# Patient Record
Sex: FEMALE | Race: WHITE | NOT HISPANIC OR LATINO | Employment: FULL TIME | URBAN - NONMETROPOLITAN AREA
[De-identification: names, ages, dates, MRNs, and addresses within clinical notes are randomized per-mention and may not be internally consistent; named-entity substitution may affect disease eponyms.]

---
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
## 2021-04-06 ENCOUNTER — APPOINTMENT (OUTPATIENT)
Dept: GENERAL RADIOLOGY | Facility: HOSPITAL | Age: 21
End: 2021-04-06

## 2021-04-06 ENCOUNTER — HOSPITAL ENCOUNTER (EMERGENCY)
Facility: HOSPITAL | Age: 21
Discharge: HOME | End: 2021-04-06
Attending: EMERGENCY MEDICINE | Admitting: EMERGENCY MEDICINE

## 2021-04-06 VITALS
SYSTOLIC BLOOD PRESSURE: 124 MMHG | DIASTOLIC BLOOD PRESSURE: 74 MMHG | BODY MASS INDEX: 25.04 KG/M2 | RESPIRATION RATE: 16 BRPM | HEART RATE: 80 BPM | OXYGEN SATURATION: 100 % | WEIGHT: 155.8 LBS | HEIGHT: 66 IN | TEMPERATURE: 98 F

## 2021-04-06 DIAGNOSIS — S60.042A CONTUSION OF LEFT RING FINGER WITHOUT DAMAGE TO NAIL, INITIAL ENCOUNTER: Primary | ICD-10-CM

## 2021-04-06 PROCEDURE — 73130 X-RAY EXAM OF HAND: CPT

## 2021-04-06 PROCEDURE — 99282 EMERGENCY DEPT VISIT SF MDM: CPT

## 2021-04-06 RX ORDER — ETODOLAC 200 MG/1
200 CAPSULE ORAL EVERY 8 HOURS
Qty: 15 CAPSULE | Refills: 0 | Status: SHIPPED | OUTPATIENT
Start: 2021-04-06 | End: 2022-01-12

## 2021-04-07 NOTE — ED PROVIDER NOTES
Subjective   Chief Complaint: Pain in the proximal phalanx of the left ring finger  History of Present Illness: 20-year-old female comes in for evaluation of acute onset of pain to the proximal phalanx of the left ring finger just prior to arrival.  She states she is noted some ecchymosis and a small erythematous area to the lateral aspect of the proximal phalanx.  No known injury.  Full range of motion.  Onset: Prior to arrival  Timing: Constant  Exacerbating / Alleviating factors: Worse with movement and palpation  Associated symptoms: No injury, fever, decreased range of motion  Character: sharp stabbing    Nurses Notes reviewed and agree, including vitals, allergies, social history and prior medical history.          Review of Systems   Constitutional: Negative.    HENT: Negative.    Eyes: Negative.    Respiratory: Negative.    Cardiovascular: Negative.    Gastrointestinal: Negative.    Genitourinary: Negative.    Musculoskeletal: Negative.         Pain to the proximal phalanx of the left ring finger   Skin: Negative.    Neurological: Negative.    Psychiatric/Behavioral: Negative.        Past Medical History:   Diagnosis Date   • Thyroid activity decreased        No Known Allergies    Past Surgical History:   Procedure Laterality Date   • FOOT/TOE TENDON REPAIR Left 1/30/2020    Procedure: RECONSTRUCTION PT TENDON LEFT;  Surgeon: Uziel Garcia DPM;  Location: [facility];  Service: Podiatry   • NO PAST SURGERIES     • TARSAL TUNNEL RELEASE Left 1/30/2020    Procedure: EXCISION TARSAL BONE LEFT;  Surgeon: Uziel Garcia DPM;  Location: [facility] OR;  Service: Podiatry   • WISDOM TOOTH EXTRACTION         Family History   Problem Relation Age of Onset   • Hypertension Mother    • Diabetes Father    • Heart attack Maternal Grandfather    • Diabetes Maternal Grandfather        Social History     Socioeconomic History   • Marital status: Single     Spouse name: Not on file   • Number of children: Not on  file   • Years of education: Not on file   • Highest education level: Not on file   Tobacco Use   • Smoking status: Never Smoker   • Smokeless tobacco: Never Used   Substance and Sexual Activity   • Alcohol use: No   • Drug use: No   • Sexual activity: Defer           Objective   Physical Exam  Vitals and nursing note reviewed.   Constitutional:       Appearance: Normal appearance.   HENT:      Head: Normocephalic and atraumatic.      Nose: Nose normal.   Eyes:      Extraocular Movements: Extraocular movements intact.   Cardiovascular:      Rate and Rhythm: Normal rate and regular rhythm.      Pulses: Normal pulses.   Pulmonary:      Effort: Pulmonary effort is normal.   Musculoskeletal:         General: Normal range of motion.      Cervical back: Normal range of motion.      Comments: Very small area of erythema approximately 2 mm in diameter to the medial aspect of the proximal phalanx of the left fourth finger.  No signs of abscess or cellulitis.  There is a very slight discoloration that would be consistent with a mild ecchymosis to the proximal phalanx dorsally of the left fourth finger   Skin:     Capillary Refill: Capillary refill takes less than 2 seconds.   Neurological:      Mental Status: She is alert.   Psychiatric:         Mood and Affect: Mood normal.         Behavior: Behavior normal.         Procedures           ED Course      Plain films reveal no acute bony abnormality.  Findings consistent with contusion will prescribe NSAIDs and have her follow-up outpatient                                     MDM    Final diagnoses:   Contusion of left ring finger without damage to nail, initial encounter       ED Disposition  ED Disposition     ED Disposition Condition Comment    Discharge Stable           Nadya Hidalgo MD  [address] [place] [id number]  [phone]               Medication List      New Prescriptions    etodolac 200 MG capsule  Commonly known as: LODINE  Take 1 capsule by mouth Every 8  (Eight) Hours.           Where to Get Your Medications      These medications were sent to [facility] - [facility] - [address] - [phone] PH - [phone] FX  [address], [place] [address]    Phone: [phone]   · etodolac 200 MG capsule          Bunny Schilling PA-C  04/06/21 3764

## 2021-09-07 ENCOUNTER — HOSPITAL ENCOUNTER (EMERGENCY)
Facility: HOSPITAL | Age: 21
Discharge: HOME | End: 2021-09-07
Attending: EMERGENCY MEDICINE
Payer: MEDICAID

## 2021-09-07 VITALS
BODY MASS INDEX: 22.5 KG/M2 | TEMPERATURE: 98.6 F | RESPIRATION RATE: 18 BRPM | SYSTOLIC BLOOD PRESSURE: 130 MMHG | WEIGHT: 140 LBS | HEIGHT: 66 IN | HEART RATE: 100 BPM | OXYGEN SATURATION: 100 % | DIASTOLIC BLOOD PRESSURE: 76 MMHG

## 2021-09-07 DIAGNOSIS — U07.1 LAB TEST POSITIVE FOR DETECTION OF COVID-19 VIRUS: Primary | ICD-10-CM

## 2021-09-07 LAB — SARS-COV-2, NAAT: DETECTED

## 2021-09-07 PROCEDURE — 99282 EMERGENCY DEPT VISIT SF MDM: CPT

## 2021-09-07 PROCEDURE — 87635 SARS-COV-2 COVID-19 AMP PRB: CPT

## 2021-09-07 RX ORDER — ALBUTEROL SULFATE 90 UG/1
2 AEROSOL, METERED RESPIRATORY (INHALATION) 4 TIMES DAILY PRN
Qty: 8 G | Refills: 0 | Status: SHIPPED | OUTPATIENT
Start: 2021-09-07

## 2021-09-07 RX ORDER — DEXAMETHASONE 6 MG/1
6 TABLET ORAL
Qty: 5 TABLET | Refills: 0 | Status: SHIPPED | OUTPATIENT
Start: 2021-09-07 | End: 2021-09-12

## 2021-09-07 NOTE — ED NOTES
Pt arrived to ED with complaints of concern for COVID, due to her entire household is positive, states she is here to be tested, SOA, fatigue started yesterday.         Candida Lorenz RN  09/07/21 1501

## 2021-09-07 NOTE — ED PROVIDER NOTES
History     Socioeconomic History    Marital status: None     Spouse name: None    Number of children: None    Years of education: None    Highest education level: None   Occupational History    None   Tobacco Use    Smoking status: None   Substance and Sexual Activity    Alcohol use: None    Drug use: None    Sexual activity: None   Other Topics Concern    None   Social History Narrative    None     Social Determinants of Health     Financial Resource Strain:     Difficulty of Paying Living Expenses:    Food Insecurity:     Worried About Running Out of Food in the Last Year:     Ran Out of Food in the Last Year:    Transportation Needs:     Lack of Transportation (Medical):  Lack of Transportation (Non-Medical):    Physical Activity:     Days of Exercise per Week:     Minutes of Exercise per Session:    Stress:     Feeling of Stress :    Social Connections:     Frequency of Communication with Friends and Family:     Frequency of Social Gatherings with Friends and Family:     Attends Rastafarian Services:     Active Member of Clubs or Organizations:     Attends Club or Organization Meetings:     Marital Status:    Intimate Partner Violence:     Fear of Current or Ex-Partner:     Emotionally Abused:     Physically Abused:     Sexually Abused:          PHYSICAL EXAM    (up to 7 forlevel 4, 8 or more for level 5)     ED Triage Vitals [09/07/21 1641]   BP Temp Temp Source Pulse Resp SpO2 Height Weight   130/76 98.6 °F (37 °C) Oral 100 18 100 % 5' 6\" (1.676 m) 140 lb (63.5 kg)       Physical Exam  General :Patient is awake, alert, oriented, in no acute distress, nontoxic appearing  HEENT: Pupils are equally round and reactive to light, EOMI. Cardiac: Heart regular rate, rhythm, no murmurs, rubs, or gallops  Lungs: Lungs are clear to auscultation, there is no wheezing, rhonchi, or rales. Abdomen:Abdomen is soft, nontender, nondistended.    Musculoskeletal: Ambulatory  Back: No midline or bony tenderness. Dermatology: Skin is warm and dry  Psych: Mentation is grossly normal, cognition is grossly normal. Affect is appropriate. DIAGNOSTIC RESULTS       RADIOLOGY:   Non-plain film images such as CT, Ultrasoundand MRI are read by the radiologist. Plain radiographic images are visualized and preliminarily interpreted by the emergency physician with the below findings:      [] Radiologist's Report Reviewed:  No orders to display         ED BEDSIDE ULTRASOUND:   Performed by ED Physician - none    LABS:  Labs Reviewed   COVID-19, RAPID - Abnormal; Notable for the following components:       Result Value    SARS-CoV-2, NAAT DETECTED (*)     All other components within normal limits    Narrative:     Enrrique Pooja tel. [phone],  COVID CALLED TO Marc Arias RN. Jane Reeves, 09/07/2021 17:01, by Don Vera  Performed at:  [address] [place] [address] Laboratory  [address] [place] [address], [place] [address]   Phone [phone]       I have reviewed and interpreted all of the currently available lab resultsfrom this visit (if applicable):  Results for orders placed or performed during the hospital encounter of 09/07/21   COVID-19, Rapid    Specimen: Nasopharyngeal Swab   Result Value Ref Range    SARS-CoV-2, NAAT DETECTED (AA) Not Detected        All other labs were within normal range or not returned as of this dictation. EMERGENCY DEPARTMENT COURSE and DIFFERENTIAL DIAGNOSIS/MDM:   Vitals:    Vitals:    09/07/21 1641   BP: 130/76   Pulse: 100   Resp: 18   Temp: 98.6 °F (37 °C)   TempSrc: Oral   SpO2: 100%   Weight: 140 lb (63.5 kg)   Height: 5' 6\" (1.676 m)           The patient will follow-up with their PCP in 1-2 days for reevaluation. If the patient or family members have any further concerns or any worsening symptoms they will return to the ED for reevaluation. Patient has been informed that she has to be quarantined for 10 to 14days. CONSULTS:  None    PROCEDURES:  Procedures    CRITICAL CARE TIME    Total Critical Care time was 0 minutes, excluding separately reportable procedures. There was a high probability of clinically significant/life threatening deterioration in the patient's condition which required my urgent intervention. FINAL IMPRESSION      1. Lab test positive for detection of COVID-19 virus          DISPOSITION/PLAN   DISPOSITION Decision To Discharge 09/07/2021 05:02:01 PM      PATIENT REFERRED TO:  [facility] Emergency Department  [facility] [address] [facility]  [phone]  In 3 days  If symptoms worsen    [facility] Emergency Department  [facility] [address] [facility]  [phone]    You must quarantine for 10 to 14 days. DISCHARGE MEDICATIONS:  New Prescriptions    ALBUTEROL SULFATE HFA (VENTOLIN HFA) 108 (90 BASE) MCG/ACT INHALER    Inhale 2 puffs into the lungs 4 times daily as needed for Wheezing or Shortness of Breath    DEXAMETHASONE (DECADRON) 6 MG TABLET    Take 1 tablet by mouth daily (with breakfast) for 5 days       Comment: Please note this report has been produced using speech recognition software and may contain errors related tothat system including errors in grammar, punctuation, and spelling, as well as words and phrases that may be inappropriate. If there are any questions or concerns please feel free to contact the dictating provider forclarification.     Trisha Weems DO  Attending Emergency Physician                  Solange Sheehan DO  09/07/21 0651

## 2022-01-04 ENCOUNTER — TELEPHONE (OUTPATIENT)
Dept: OBSTETRICS AND GYNECOLOGY | Facility: CLINIC | Age: 22
End: 2022-01-04

## 2022-01-04 RX ORDER — PROMETHAZINE HYDROCHLORIDE 12.5 MG/1
12.5 TABLET ORAL EVERY 6 HOURS PRN
Qty: 30 TABLET | Refills: 0 | Status: ON HOLD | OUTPATIENT
Start: 2022-01-04 | End: 2022-04-22 | Stop reason: SDUPTHER

## 2022-01-04 NOTE — TELEPHONE ENCOUNTER
----- Message from Chantal Lynn MA sent at 1/4/2022  8:55 AM EST -----  Patient has NOB fletcher on 1/12/22 and is requesting a RX for nausea.         Chemo Castillo    [facility]    Thank You      Patient's Phone number [phone]

## 2022-01-11 ENCOUNTER — HOSPITAL ENCOUNTER (EMERGENCY)
Facility: HOSPITAL | Age: 22
Discharge: HOME | End: 2022-01-11
Attending: FAMILY MEDICINE | Admitting: FAMILY MEDICINE

## 2022-01-11 VITALS
OXYGEN SATURATION: 100 % | TEMPERATURE: 97.7 F | HEART RATE: 79 BPM | RESPIRATION RATE: 16 BRPM | DIASTOLIC BLOOD PRESSURE: 68 MMHG | WEIGHT: 146.2 LBS | HEIGHT: 66 IN | SYSTOLIC BLOOD PRESSURE: 100 MMHG | BODY MASS INDEX: 23.5 KG/M2

## 2022-01-11 DIAGNOSIS — N30.00 ACUTE CYSTITIS WITHOUT HEMATURIA: ICD-10-CM

## 2022-01-11 DIAGNOSIS — O21.9 VOMITING DURING PREGNANCY: Primary | ICD-10-CM

## 2022-01-11 LAB
ALBUMIN SERPL-MCNC: 4.2 G/DL (ref 3.5–5.2)
ALBUMIN/GLOB SERPL: 1.4 G/DL
ALP SERPL-CCNC: 54 U/L (ref 39–117)
ALT SERPL W P-5'-P-CCNC: 14 U/L (ref 1–33)
ANION GAP SERPL CALCULATED.3IONS-SCNC: 10.8 MMOL/L (ref 5–15)
AST SERPL-CCNC: 19 U/L (ref 1–32)
BACTERIA UR QL AUTO: ABNORMAL /HPF
BASOPHILS # BLD AUTO: 0.03 10*3/MM3 (ref 0–0.2)
BASOPHILS NFR BLD AUTO: 0.3 % (ref 0–1.5)
BILIRUB SERPL-MCNC: 0.4 MG/DL (ref 0–1.2)
BILIRUB UR QL STRIP: NEGATIVE
BUN SERPL-MCNC: 8 MG/DL (ref 6–20)
BUN/CREAT SERPL: 13.8 (ref 7–25)
CALCIUM SPEC-SCNC: 9.4 MG/DL (ref 8.6–10.5)
CHLORIDE SERPL-SCNC: 101 MMOL/L (ref 98–107)
CLARITY UR: CLEAR
CO2 SERPL-SCNC: 22.2 MMOL/L (ref 22–29)
COLOR UR: YELLOW
CREAT SERPL-MCNC: 0.58 MG/DL (ref 0.57–1)
DEPRECATED RDW RBC AUTO: 37.4 FL (ref 37–54)
EOSINOPHIL # BLD AUTO: 0.05 10*3/MM3 (ref 0–0.4)
EOSINOPHIL NFR BLD AUTO: 0.5 % (ref 0.3–6.2)
ERYTHROCYTE [DISTWIDTH] IN BLOOD BY AUTOMATED COUNT: 11.8 % (ref 12.3–15.4)
GFR SERPL CREATININE-BSD FRML MDRD: 133 ML/MIN/1.73
GLOBULIN UR ELPH-MCNC: 3.1 GM/DL
GLUCOSE SERPL-MCNC: 86 MG/DL (ref 65–99)
GLUCOSE UR STRIP-MCNC: NEGATIVE MG/DL
HCT VFR BLD AUTO: 35.6 % (ref 34–46.6)
HGB BLD-MCNC: 12.4 G/DL (ref 12–15.9)
HGB UR QL STRIP.AUTO: NEGATIVE
HYALINE CASTS UR QL AUTO: ABNORMAL /LPF
IMM GRANULOCYTES # BLD AUTO: 0.02 10*3/MM3 (ref 0–0.05)
IMM GRANULOCYTES NFR BLD AUTO: 0.2 % (ref 0–0.5)
KETONES UR QL STRIP: ABNORMAL
LEUKOCYTE ESTERASE UR QL STRIP.AUTO: ABNORMAL
LYMPHOCYTES # BLD AUTO: 1.73 10*3/MM3 (ref 0.7–3.1)
LYMPHOCYTES NFR BLD AUTO: 17.6 % (ref 19.6–45.3)
MCH RBC QN AUTO: 30.2 PG (ref 26.6–33)
MCHC RBC AUTO-ENTMCNC: 34.8 G/DL (ref 31.5–35.7)
MCV RBC AUTO: 86.8 FL (ref 79–97)
MONOCYTES # BLD AUTO: 0.73 10*3/MM3 (ref 0.1–0.9)
MONOCYTES NFR BLD AUTO: 7.4 % (ref 5–12)
NEUTROPHILS NFR BLD AUTO: 7.27 10*3/MM3 (ref 1.7–7)
NEUTROPHILS NFR BLD AUTO: 74 % (ref 42.7–76)
NITRITE UR QL STRIP: NEGATIVE
NRBC BLD AUTO-RTO: 0 /100 WBC (ref 0–0.2)
PH UR STRIP.AUTO: 5.5 [PH] (ref 5–8)
PLATELET # BLD AUTO: 252 10*3/MM3 (ref 140–450)
PMV BLD AUTO: 10 FL (ref 6–12)
POTASSIUM SERPL-SCNC: 4 MMOL/L (ref 3.5–5.2)
PROT SERPL-MCNC: 7.3 G/DL (ref 6–8.5)
PROT UR QL STRIP: NEGATIVE
RBC # BLD AUTO: 4.1 10*6/MM3 (ref 3.77–5.28)
RBC # UR STRIP: ABNORMAL /HPF
REF LAB TEST METHOD: ABNORMAL
SODIUM SERPL-SCNC: 134 MMOL/L (ref 136–145)
SP GR UR STRIP: 1.02 (ref 1–1.03)
SQUAMOUS #/AREA URNS HPF: ABNORMAL /HPF
UROBILINOGEN UR QL STRIP: ABNORMAL
WBC # UR STRIP: ABNORMAL /HPF
WBC NRBC COR # BLD: 9.83 10*3/MM3 (ref 3.4–10.8)

## 2022-01-11 PROCEDURE — 80053 COMPREHEN METABOLIC PANEL: CPT | Performed by: FAMILY MEDICINE

## 2022-01-11 PROCEDURE — 85025 COMPLETE CBC W/AUTO DIFF WBC: CPT | Performed by: FAMILY MEDICINE

## 2022-01-11 PROCEDURE — 96374 THER/PROPH/DIAG INJ IV PUSH: CPT

## 2022-01-11 PROCEDURE — 25010000002 METOCLOPRAMIDE PER 10 MG: Performed by: FAMILY MEDICINE

## 2022-01-11 PROCEDURE — 87186 SC STD MICRODIL/AGAR DIL: CPT | Performed by: FAMILY MEDICINE

## 2022-01-11 PROCEDURE — 81001 URINALYSIS AUTO W/SCOPE: CPT | Performed by: FAMILY MEDICINE

## 2022-01-11 PROCEDURE — 87086 URINE CULTURE/COLONY COUNT: CPT | Performed by: FAMILY MEDICINE

## 2022-01-11 PROCEDURE — 99283 EMERGENCY DEPT VISIT LOW MDM: CPT

## 2022-01-11 PROCEDURE — 87077 CULTURE AEROBIC IDENTIFY: CPT | Performed by: FAMILY MEDICINE

## 2022-01-11 RX ORDER — PRENATAL VIT NO.126/IRON/FOLIC 28MG-0.8MG
TABLET ORAL DAILY
COMMUNITY
End: 2022-09-08

## 2022-01-11 RX ORDER — METOCLOPRAMIDE HYDROCHLORIDE 5 MG/ML
10 INJECTION INTRAMUSCULAR; INTRAVENOUS ONCE
Status: COMPLETED | OUTPATIENT
Start: 2022-01-11 | End: 2022-01-11

## 2022-01-11 RX ORDER — CEPHALEXIN 500 MG/1
500 CAPSULE ORAL EVERY 6 HOURS
Qty: 20 CAPSULE | Refills: 0 | Status: SHIPPED | OUTPATIENT
Start: 2022-01-11 | End: 2022-01-12

## 2022-01-11 RX ORDER — PROMETHAZINE HYDROCHLORIDE 25 MG/1
25 TABLET ORAL EVERY 6 HOURS PRN
Qty: 10 TABLET | Refills: 0 | Status: SHIPPED | OUTPATIENT
Start: 2022-01-11 | End: 2022-04-04

## 2022-01-11 RX ADMIN — METOCLOPRAMIDE 10 MG: 5 INJECTION, SOLUTION INTRAMUSCULAR; INTRAVENOUS at 02:23

## 2022-01-11 RX ADMIN — SODIUM CHLORIDE 1000 ML: 9 INJECTION, SOLUTION INTRAVENOUS at 02:22

## 2022-01-11 NOTE — ED PROVIDER NOTES
Subjective   21-year-old female presents emergency department at 11 weeks IUP.  Patient reports that she went to work today felt fine and then since she has been home she is having uncontrollable vomiting.  Patient reports she has vomited at least 6 times she did take Phenergan prior to arrival but is still vomited 1 time.      History provided by:  Patient  Pregnancy Problem  The current episode started today. The problem has been unchanged. The problem occurs continuously. Episode is moderate. Gestational age is 11 weeks. Patient reports no abdominal pain. Primary symptoms: vomiting. Prenatal care has been regular.   Associated symptoms include nausea and vomiting.   Associated symptoms include no dysuria and no fever.       Review of Systems   Constitutional: Negative.  Negative for fever.   HENT: Negative.    Respiratory: Negative.    Cardiovascular: Negative.  Negative for chest pain.   Gastrointestinal: Positive for nausea and vomiting. Negative for abdominal pain.   Endocrine: Negative.    Genitourinary: Negative.  Negative for dysuria.   Skin: Negative.    Neurological: Negative.    Psychiatric/Behavioral: Negative.    All other systems reviewed and are negative.      Past Medical History:   Diagnosis Date   • Thyroid activity decreased        No Known Allergies    Past Surgical History:   Procedure Laterality Date   • FOOT/TOE TENDON REPAIR Left 1/30/2020    Procedure: RECONSTRUCTION PT TENDON LEFT;  Surgeon: Uziel Garcia DPM;  Location: [facility] OR;  Service: Podiatry   • NO PAST SURGERIES     • TARSAL TUNNEL RELEASE Left 1/30/2020    Procedure: EXCISION TARSAL BONE LEFT;  Surgeon: Uziel Garcia DPM;  Location: [facility] OR;  Service: Podiatry   • WISDOM TOOTH EXTRACTION         Family History   Problem Relation Age of Onset   • Hypertension Mother    • Diabetes Father    • Heart attack Maternal Grandfather    • Diabetes Maternal Grandfather        Social History     Socioeconomic History   •  Marital status: Single   Tobacco Use   • Smoking status: Never Smoker   • Smokeless tobacco: Never Used   Substance and Sexual Activity   • Alcohol use: No   • Drug use: No   • Sexual activity: Defer           Objective   Physical Exam  Vitals and nursing note reviewed.   Constitutional:       Appearance: Normal appearance.   HENT:      Head: Normocephalic and atraumatic.      Right Ear: Tympanic membrane normal.      Left Ear: Tympanic membrane normal.      Nose: Nose normal.      Mouth/Throat:      Mouth: Mucous membranes are dry.   Eyes:      Extraocular Movements: Extraocular movements intact.      Pupils: Pupils are equal, round, and reactive to light.   Cardiovascular:      Rate and Rhythm: Normal rate and regular rhythm.   Pulmonary:      Effort: Pulmonary effort is normal.   Abdominal:      General: Abdomen is flat. Bowel sounds are normal.      Palpations: Abdomen is soft.   Musculoskeletal:         General: Normal range of motion.      Cervical back: Normal range of motion.   Skin:     General: Skin is warm.      Capillary Refill: Capillary refill takes less than 2 seconds.   Neurological:      General: No focal deficit present.      Mental Status: She is alert and oriented to person, place, and time.   Psychiatric:         Mood and Affect: Mood normal.         Behavior: Behavior normal.         Thought Content: Thought content normal.         Judgment: Judgment normal.         Procedures           ED Course  ED Course as of 01/11/22 0515   Tue Jan 11, 2022   0515 Patient's ED stay has been unremarkable.  Patient has had no vomiting since arrival.  Patient is also tolerated p.o. intake.  Will discharge home with outpatient follow-up. [MH]      ED Course User Index  [MH] Concepcion Mtz DO MDM  Number of Diagnoses or Management Options  Acute cystitis without hematuria: new and requires workup  Vomiting during pregnancy: new and requires workup      Amount and/or Complexity of Data Reviewed  Clinical lab tests: reviewed and ordered  Tests in the radiology section of CPT®: ordered and reviewed  Tests in the medicine section of CPT®: reviewed and ordered  Independent visualization of images, tracings, or specimens: yes    Risk of Complications, Morbidity, and/or Mortality  Presenting problems: high  Diagnostic procedures: high  Management options: high    Patient Progress  Patient progress: improved      Final diagnoses:   Vomiting during pregnancy   Acute cystitis without hematuria       ED Disposition  ED Disposition     ED Disposition Condition Comment    Discharge Stable           Nadya Hidalgo MD  [address] [place], [place] [id number]  [phone]    Schedule an appointment as soon as possible for a visit            Medication List      New Prescriptions    cephalexin 500 MG capsule  Commonly known as: KEFLEX  Take 1 capsule by mouth Every 6 (Six) Hours for 5 days.        Changed    * promethazine 12.5 MG tablet  Commonly known as: PHENERGAN  Take 1 tablet by mouth Every 6 (Six) Hours As Needed for Nausea or Vomiting.  What changed: Another medication with the same name was added. Make sure you understand how and when to take each.     * promethazine 25 MG tablet  Commonly known as: PHENERGAN  Take 1 tablet by mouth Every 6 (Six) Hours As Needed for Nausea or Vomiting.  What changed: You were already taking a medication with the same name, and this prescription was added. Make sure you understand how and when to take each.         * This list has 2 medication(s) that are the same as other medications prescribed for you. Read the directions carefully, and ask your doctor or other care provider to review them with you.               Where to Get Your Medications      These medications were sent to [id number] [place], [place] - [address] [employer] [facility] [employer] [facility] - [phone] [facility] [phone] FX  [address]  [facility] [address]    Phone: [phone]   · cephalexin 500 MG capsule  · promethazine 25 MG tablet          Concepcion Mtz DO  01/11/22 0523

## 2022-01-11 NOTE — ED NOTES
Called 2nd floor requesting a RN come down to assess for fetal heart tones.      Debbie Perez, RN  01/11/22 0238

## 2022-01-12 ENCOUNTER — INITIAL PRENATAL (OUTPATIENT)
Dept: OBSTETRICS AND GYNECOLOGY | Facility: CLINIC | Age: 22
End: 2022-01-12

## 2022-01-12 VITALS — BODY MASS INDEX: 23.08 KG/M2 | WEIGHT: 143 LBS | SYSTOLIC BLOOD PRESSURE: 100 MMHG | DIASTOLIC BLOOD PRESSURE: 62 MMHG

## 2022-01-12 DIAGNOSIS — O21.9 NAUSEA/VOMITING IN PREGNANCY: ICD-10-CM

## 2022-01-12 DIAGNOSIS — Z34.91 PRENATAL CARE IN FIRST TRIMESTER: Primary | ICD-10-CM

## 2022-01-12 PROCEDURE — 99204 OFFICE O/P NEW MOD 45 MIN: CPT | Performed by: OBSTETRICS & GYNECOLOGY

## 2022-01-12 RX ORDER — FOLIC ACID 0.4 MG
TABLET ORAL
COMMUNITY
Start: 2022-01-04 | End: 2022-04-04

## 2022-01-12 RX ORDER — DIPHENHYDRAMINE HYDROCHLORIDE 25 MG/1
25 CAPSULE ORAL NIGHTLY
Qty: 30 TABLET | Refills: 5 | Status: SHIPPED | OUTPATIENT
Start: 2022-01-12 | End: 2022-07-30 | Stop reason: HOSPADM

## 2022-01-12 NOTE — PROGRESS NOTES
New Pregnancy Visit    Subjective   Chief Complaint   Patient presents with   • Initial Prenatal Visit     LMP 10/16/21, TVS done today 11w5d. No complaints.       Romy Langley is a 21 y.o. year old .  Patient's last menstrual period was 10/16/2021.  She presents to initiate prenatal care with our group today.     First pregnancy.  Nausea and emesis, Phenergan is sedating.    Social History    Tobacco Use      Smoking status: Never Smoker      Smokeless tobacco: Never Used      Current Outpatient Medications on File Prior to Visit   Medication Sig Dispense Refill   • GNP Folic Acid 400 MCG tablet      • prenatal vitamin (prenatal, CLASSIC, vitamin) tablet Take  by mouth Daily.     • promethazine (PHENERGAN) 12.5 MG tablet Take 1 tablet by mouth Every 6 (Six) Hours As Needed for Nausea or Vomiting. 30 tablet 0   • promethazine (PHENERGAN) 25 MG tablet Take 1 tablet by mouth Every 6 (Six) Hours As Needed for Nausea or Vomiting. 10 tablet 0   • [DISCONTINUED] cephalexin (KEFLEX) 500 MG capsule Take 1 capsule by mouth Every 6 (Six) Hours for 5 days. 20 capsule 0   • [DISCONTINUED] etodolac (LODINE) 200 MG capsule Take 1 capsule by mouth Every 8 (Eight) Hours. 15 capsule 0   • [DISCONTINUED] polyethylene glycol (MIRALAX) powder   3     No current facility-administered medications on file prior to visit.          Objective   /62   Wt 64.9 kg (143 lb)   LMP 10/16/2021   BMI 23.08 kg/m²   Physical Exam:  Normal, gestational age-appropriate exam today        Medical Decision Making:    Lab Review:   No data reviewed    Note Review:  None    Imaging Review:  Pelvic ultrasound report   IUP at 11+3 weeks appropriate fetal cardiac activity.  DEQUAN is 2022 based on 8 week ultrasound.  Both ovaries are normal.  Normal cervix.  Assessment   1. IUP at 11+3 weeks  2. Supervision of low risk pregnancy   3. Nausea and emesis     Plan    1. The problem list for pregnancy was initiated  today  2. Tests/Orders/Rx for today:  Orders Placed This Encounter   Procedures   • NuSwab VG+ - Swab, Cervix     Order Specific Question:   Release to patient     Answer:   Immediate   • OB Panel With HIV     Order Specific Question:   Release to patient     Answer:   Immediate       Medication Management: B6/Unisom    3. Testing for GC / Chlamydia / trichomonas was done today  4. Genetic testing reviewed: she will consider the information and make a decision at a later date.  5. Information reviewed: exercise in pregnancy, nutrition in pregnancy, weight gain in pregnancy, work and travel restrictions during pregnancy, list of OTC medications acceptable in pregnancy and call coverage groups    Follow up: 4 week(s)    Parvez Galindo MD  Obstetrics and Gynecology  [facility]

## 2022-01-13 LAB — BACTERIA SPEC AEROBE CULT: ABNORMAL

## 2022-01-14 LAB
A VAGINAE DNA VAG QL NAA+PROBE: ABNORMAL SCORE
ABO GROUP BLD: ABNORMAL
BASOPHILS # BLD AUTO: 0 X10E3/UL (ref 0–0.2)
BASOPHILS NFR BLD AUTO: 0 %
BLD GP AB SCN SERPL QL: NEGATIVE
BVAB2 DNA VAG QL NAA+PROBE: ABNORMAL SCORE
C ALBICANS DNA VAG QL NAA+PROBE: POSITIVE
C GLABRATA DNA VAG QL NAA+PROBE: NEGATIVE
C TRACH DNA VAG QL NAA+PROBE: NEGATIVE
EOSINOPHIL # BLD AUTO: 0.1 X10E3/UL (ref 0–0.4)
EOSINOPHIL NFR BLD AUTO: 1 %
ERYTHROCYTE [DISTWIDTH] IN BLOOD BY AUTOMATED COUNT: 12.2 % (ref 11.7–15.4)
HBV SURFACE AG SERPL QL IA: NEGATIVE
HCT VFR BLD AUTO: 38.3 % (ref 34–46.6)
HCV AB S/CO SERPL IA: 0.2 S/CO RATIO (ref 0–0.9)
HGB BLD-MCNC: 12.6 G/DL (ref 11.1–15.9)
HIV 1+2 AB+HIV1 P24 AG SERPL QL IA: NON REACTIVE
IMM GRANULOCYTES # BLD AUTO: 0 X10E3/UL (ref 0–0.1)
IMM GRANULOCYTES NFR BLD AUTO: 0 %
LYMPHOCYTES # BLD AUTO: 1.8 X10E3/UL (ref 0.7–3.1)
LYMPHOCYTES NFR BLD AUTO: 18 %
MCH RBC QN AUTO: 29.2 PG (ref 26.6–33)
MCHC RBC AUTO-ENTMCNC: 32.9 G/DL (ref 31.5–35.7)
MCV RBC AUTO: 89 FL (ref 79–97)
MEGA1 DNA VAG QL NAA+PROBE: ABNORMAL SCORE
MONOCYTES # BLD AUTO: 0.6 X10E3/UL (ref 0.1–0.9)
MONOCYTES NFR BLD AUTO: 6 %
N GONORRHOEA DNA VAG QL NAA+PROBE: NEGATIVE
NEUTROPHILS # BLD AUTO: 7.4 X10E3/UL (ref 1.4–7)
NEUTROPHILS NFR BLD AUTO: 75 %
PLATELET # BLD AUTO: 291 X10E3/UL (ref 150–450)
RBC # BLD AUTO: 4.32 X10E6/UL (ref 3.77–5.28)
RH BLD: POSITIVE
RPR SER QL: NON REACTIVE
RUBV IGG SERPL IA-ACNC: 1.66 INDEX
T VAGINALIS DNA VAG QL NAA+PROBE: NEGATIVE
WBC # BLD AUTO: 9.9 X10E3/UL (ref 3.4–10.8)

## 2022-01-18 ENCOUNTER — LAB (OUTPATIENT)
Dept: LAB | Facility: HOSPITAL | Age: 22
End: 2022-01-18

## 2022-01-18 DIAGNOSIS — B37.31 VULVOVAGINAL CANDIDIASIS: Primary | ICD-10-CM

## 2022-01-18 DIAGNOSIS — Z03.818 ENCOUNTER FOR PATIENT CONCERN ABOUT EXPOSURE TO INFECTIOUS ORGANISM: Primary | ICD-10-CM

## 2022-01-18 PROCEDURE — U0004 COV-19 TEST NON-CDC HGH THRU: HCPCS

## 2022-01-19 LAB — SARS-COV-2 RNA NOSE QL NAA+PROBE: DETECTED

## 2022-02-09 ENCOUNTER — ROUTINE PRENATAL (OUTPATIENT)
Dept: OBSTETRICS AND GYNECOLOGY | Facility: CLINIC | Age: 22
End: 2022-02-09

## 2022-02-09 VITALS — BODY MASS INDEX: 23.24 KG/M2 | DIASTOLIC BLOOD PRESSURE: 60 MMHG | SYSTOLIC BLOOD PRESSURE: 110 MMHG | WEIGHT: 144 LBS

## 2022-02-09 DIAGNOSIS — O21.9 NAUSEA/VOMITING IN PREGNANCY: ICD-10-CM

## 2022-02-09 DIAGNOSIS — R35.0 URINARY FREQUENCY: ICD-10-CM

## 2022-02-09 DIAGNOSIS — Z34.02 ENCOUNTER FOR SUPERVISION OF NORMAL FIRST PREGNANCY IN SECOND TRIMESTER: Primary | ICD-10-CM

## 2022-02-09 DIAGNOSIS — U07.1 COVID-19 VIRUS DETECTED: ICD-10-CM

## 2022-02-09 PROCEDURE — 99214 OFFICE O/P EST MOD 30 MIN: CPT | Performed by: OBSTETRICS & GYNECOLOGY

## 2022-02-09 RX ORDER — ASPIRIN 81 MG/1
81 TABLET ORAL DAILY
Qty: 30 TABLET | Refills: 6 | Status: SHIPPED | OUTPATIENT
Start: 2022-02-09 | End: 2022-05-13 | Stop reason: HOSPADM

## 2022-02-09 RX ORDER — FAMOTIDINE 20 MG/1
20 TABLET, FILM COATED ORAL 2 TIMES DAILY
Qty: 60 TABLET | Refills: 5 | Status: SHIPPED | OUTPATIENT
Start: 2022-02-09 | End: 2022-08-04

## 2022-02-10 NOTE — PROGRESS NOTES
Chief Complaint  Routine Prenatal Visit (No complaints. )    History of Present Illness:  Romy is a  currently at 15w4d who presents today with no complaints other than continued nausea vomiting.  Patient has been using her Unisom and B6.  Patient reports she has continued symptoms.  Patient was diagnosed with Covid in January.  Patient is not on a baby aspirin.  Patient did have labs last visit as noted.  These findings have been reviewed with the patient.  Patient declines any genetic screening today.  Patient did complete her antibiotics.  Patient denies any dysuria today.  She denies any flank pain, fever, or chills.  The patient does have urinary frequency.    Exam:  Vitals:  See prenatal flowsheet as noted and reviewed  General: Alert, cooperative, and does not appear in any distress  Abdomen:   See prenatal flowsheet as noted and reviewed    Uterus gravid, non-tender; no palpable masses    No guarding or rebound tenderness  Pelvic:  See prenatal flowsheet as noted and reviewed  Ext:  See prenatal flowsheet as noted and reviewed    Moves extremities well, no cyanosis and no redness  Urine:  See prenatal flowsheet as noted and reviewed    Data Review:  The following data was reviewed by: Suni Oquendo MD on 2022:  Prenatal Labs:  Lab Results   Component Value Date    HGB 12.6 2022    RUBELLAABIGG 1.66 2022    HEPBSAG Negative 2022    ABO O 2022    RH Positive 2022    ABSCRN Negative 2022    UBE4TMZ1 Non Reactive 2022    HEPCVIRUSABY 0.2 2022    URINECX >100,000 CFU/mL Escherichia coli (A) 2022       Lab on 2022   Component Date Value   • SARS-CoV-2 PAUL 2022 Detected*   Initial Prenatal on 2022   Component Date Value   • Atopobium Vaginae 2022 Moderate - 1    • BVAB 2 2022 Low - 0    • Megasphaera 1 2022 Low - 0    • Bree Albicans, PAUL 2022 Positive*   • Bree Glabrata, PAUL 2022 Negative    •  Trichomonas vaginosis 01/12/2022 Negative    • Chlamydia trachomatis, N* 01/12/2022 Negative    • Neisseria gonorrhoeae, N* 01/12/2022 Negative    • Hepatitis B Surface Ag 01/12/2022 Negative    • Hep C Virus Ab 01/12/2022 0.2    • RPR 01/12/2022 Non Reactive    • Rubella Antibodies, IgG 01/12/2022 1.66    • ABO Type 01/12/2022 O    • Rh Factor 01/12/2022 Positive    • Antibody Screen 01/12/2022 Negative    • HIV Screen 4th Gen w/RFX* 01/12/2022 Non Reactive    • WBC 01/12/2022 9.9    • RBC 01/12/2022 4.32    • Hemoglobin 01/12/2022 12.6    • Hematocrit 01/12/2022 38.3    • MCV 01/12/2022 89    • MCH 01/12/2022 29.2    • MCHC 01/12/2022 32.9    • RDW 01/12/2022 12.2    • Platelets 01/12/2022 291    • Neutrophil Rel % 01/12/2022 75    • Lymphocyte Rel % 01/12/2022 18    • Monocyte Rel % 01/12/2022 6    • Eosinophil Rel % 01/12/2022 1    • Basophil Rel % 01/12/2022 0    • Neutrophils Absolute 01/12/2022 7.4*   • Lymphocytes Absolute 01/12/2022 1.8    • Monocytes Absolute 01/12/2022 0.6    • Eosinophils Absolute 01/12/2022 0.1    • Basophils Absolute 01/12/2022 0.0    • Immature Granulocyte Rel* 01/12/2022 0    • Immature Grans Absolute 01/12/2022 0.0    Admission on 01/11/2022, Discharged on 01/11/2022   Component Date Value   • Glucose 01/11/2022 86    • BUN 01/11/2022 8    • Creatinine 01/11/2022 0.58    • Sodium 01/11/2022 134*   • Potassium 01/11/2022 4.0    • Chloride 01/11/2022 101    • CO2 01/11/2022 22.2    • Calcium 01/11/2022 9.4    • Total Protein 01/11/2022 7.3    • Albumin 01/11/2022 4.20    • ALT (SGPT) 01/11/2022 14    • AST (SGOT) 01/11/2022 19    • Alkaline Phosphatase 01/11/2022 54    • Total Bilirubin 01/11/2022 0.4    • eGFR Non African Amer 01/11/2022 133    • Globulin 01/11/2022 3.1    • A/G Ratio 01/11/2022 1.4    • BUN/Creatinine Ratio 01/11/2022 13.8    • Anion Gap 01/11/2022 10.8    • Color, UA 01/11/2022 Yellow    • Appearance, UA 01/11/2022 Clear    • pH, UA 01/11/2022 5.5    • Specific  Gravity, UA 01/11/2022 1.019    • Glucose, UA 01/11/2022 Negative    • Ketones, UA 01/11/2022 80 mg/dL (3+)*   • Bilirubin, UA 01/11/2022 Negative    • Blood, UA 01/11/2022 Negative    • Protein, UA 01/11/2022 Negative    • Leuk Esterase, UA 01/11/2022 Moderate (2+)*   • Nitrite, UA 01/11/2022 Negative    • Urobilinogen, UA 01/11/2022 1.0 E.U./dL    • WBC 01/11/2022 9.83    • RBC 01/11/2022 4.10    • Hemoglobin 01/11/2022 12.4    • Hematocrit 01/11/2022 35.6    • MCV 01/11/2022 86.8    • MCH 01/11/2022 30.2    • MCHC 01/11/2022 34.8    • RDW 01/11/2022 11.8*   • RDW-SD 01/11/2022 37.4    • MPV 01/11/2022 10.0    • Platelets 01/11/2022 252    • Neutrophil % 01/11/2022 74.0    • Lymphocyte % 01/11/2022 17.6*   • Monocyte % 01/11/2022 7.4    • Eosinophil % 01/11/2022 0.5    • Basophil % 01/11/2022 0.3    • Immature Grans % 01/11/2022 0.2    • Neutrophils, Absolute 01/11/2022 7.27*   • Lymphocytes, Absolute 01/11/2022 1.73    • Monocytes, Absolute 01/11/2022 0.73    • Eosinophils, Absolute 01/11/2022 0.05    • Basophils, Absolute 01/11/2022 0.03    • Immature Grans, Absolute 01/11/2022 0.02    • nRBC 01/11/2022 0.0    • RBC, UA 01/11/2022 None Seen    • WBC, UA 01/11/2022 6-12*   • Bacteria, UA 01/11/2022 2+*   • Squamous Epithelial Cell* 01/11/2022 3-6*   • Hyaline Casts, UA 01/11/2022 None Seen    • Methodology 01/11/2022 Manual Light Microscopy    • Urine Culture 01/11/2022 >100,000 CFU/mL Escherichia coli*     Imaging:  US Ob < 14 Weeks Single or First Gestation  IUP at 11+3 weeks appropriate fetal cardiac activity.  DEQUAN is 07/31/2022   based on 8 week ultrasound.  Both ovaries are normal.  Normal cervix.    Parvez Galindo MD  Obstetrics and Gynecology  [facility]    Medical Records:  None    Assessment and Plan:  Problem List Items Addressed This Visit     None      Visit Diagnoses     Encounter for supervision of normal first pregnancy in second trimester    -  Primary  Topics discussed:     ab  precautions  genetic screening - Today we discussed genetic testing.  She is aware that the MSAFP-4 and NIPT - [id number] is a screening test.  A screening test is not a diagnostic test.  This means that a negative test does not guarantee an unaffected fetus and a positive test does not mean the fetus has the condition for which the test is being performed.  If the test returns positive, a diagnostic test should be consider to determine if the fetus in fact has the condition.  After considering the options previously presented, she is still undecided if she is interested in having genetic testing performed.  GERD management  kick counts and fetal movement  PIH precautions  Anatomic scan next visit.  Patient is to consider genetic screening as discussed.    Relevant Orders    US Ob 14 + Weeks Single or First Gestation    Urinary frequency      We will send urine for clean-catch UA culture and sensitivity.  Plan pending results.    Relevant Orders    Urine Culture - Urine, Urine, Clean Catch    Urinalysis With Microscopic - Urine, Clean Catch    Nausea/vomiting in pregnancy      Prescription is given for Pepcid as needed.    Relevant Medications    famotidine (PEPCID) 20 MG tablet    COVID-19 virus detected      Prescription is given for baby aspirin as discussed.  Instructions and precautions have been given.  Scan next visit as noted.    Relevant Medications    aspirin (aspirin) 81 MG EC tablet        Follow Up/Instructions:  Follow up as scheduled.  Patient was given instructions and counseling regarding her condition or for health maintenance advice. Please see specific information pulled into the AVS if appropriate.     Note: Speech recognition transcription software may have been used to dictate portions of this document.  An attempt at proofreading has been made though minor errors in transcription may still be present.    This note was electronically signed.  Suni Oquendo M.D.

## 2022-02-11 LAB
APPEARANCE UR: CLEAR
BACTERIA #/AREA URNS HPF: ABNORMAL /HPF
BACTERIA UR CULT: NORMAL
BACTERIA UR CULT: NORMAL
BILIRUB UR QL STRIP: NEGATIVE
CASTS URNS MICRO: ABNORMAL
COLOR UR: YELLOW
EPI CELLS #/AREA URNS HPF: ABNORMAL /HPF
GLUCOSE UR QL STRIP: NEGATIVE
HGB UR QL STRIP: NEGATIVE
KETONES UR QL STRIP: NEGATIVE
LEUKOCYTE ESTERASE UR QL STRIP: ABNORMAL
NITRITE UR QL STRIP: NEGATIVE
PH UR STRIP: >=9 [PH] (ref 5–8)
PROT UR QL STRIP: ABNORMAL
RBC #/AREA URNS HPF: ABNORMAL /HPF
SP GR UR STRIP: 1.02 (ref 1–1.03)
UROBILINOGEN UR STRIP-MCNC: ABNORMAL MG/DL
WBC #/AREA URNS HPF: ABNORMAL /HPF

## 2022-03-02 ENCOUNTER — ROUTINE PRENATAL (OUTPATIENT)
Dept: OBSTETRICS AND GYNECOLOGY | Facility: CLINIC | Age: 22
End: 2022-03-02

## 2022-03-02 VITALS — DIASTOLIC BLOOD PRESSURE: 64 MMHG | BODY MASS INDEX: 24.37 KG/M2 | WEIGHT: 151 LBS | SYSTOLIC BLOOD PRESSURE: 108 MMHG

## 2022-03-02 DIAGNOSIS — O43.199 MARGINAL INSERTION OF UMBILICAL CORD AFFECTING MANAGEMENT OF MOTHER: ICD-10-CM

## 2022-03-02 DIAGNOSIS — Z34.92 PRENATAL CARE IN SECOND TRIMESTER: Primary | ICD-10-CM

## 2022-03-02 PROCEDURE — 99213 OFFICE O/P EST LOW 20 MIN: CPT | Performed by: OBSTETRICS & GYNECOLOGY

## 2022-03-05 PROBLEM — O43.199 MARGINAL INSERTION OF UMBILICAL CORD AFFECTING MANAGEMENT OF MOTHER: Status: ACTIVE | Noted: 2022-03-05

## 2022-03-05 NOTE — PROGRESS NOTES
Prenatal Care Visit    Subjective   Chief Complaint   Patient presents with   • Routine Prenatal Visit     Anatomy scan done today, no complaints.       History:   Romy is a  currently at 18w6d who presents for a prenatal care visit today.    No major issues.    Social History    Tobacco Use      Smoking status: Never Smoker      Smokeless tobacco: Never Used       Objective   /64   Wt 68.5 kg (151 lb)   LMP 10/16/2021   BMI 24.37 kg/m²   Physical Exam:  Normal, gestational age-appropriate exam today        Plan   Medical Decision Making:    I have reviewed the prenatal labs and ultrasound(s) today. I have reviewed the most recent prenatal progress note(s).    Diagnosis: Supervision of high risk pregnancy   Marginal cord insertion   Tests/Orders/Rx today: No orders of the defined types were placed in this encounter.      Medication Management: None     Topics discussed: Prenatal care milestones  U/S findings   Repeat growth scan in 6-8 weeks   Tests next visit: none   Next visit: 4 week(s)     Parvez Galindo MD  Obstetrics and Gynecology  [facility]

## 2022-03-07 ENCOUNTER — HOSPITAL ENCOUNTER (OUTPATIENT)
Dept: MRI IMAGING | Facility: HOSPITAL | Age: 22
Discharge: HOME | End: 2022-03-07

## 2022-03-07 DIAGNOSIS — M66.872 NONTRAUMATIC RUPTURE OF LEFT POSTERIOR TIBIAL TENDON: ICD-10-CM

## 2022-03-13 ENCOUNTER — APPOINTMENT (OUTPATIENT)
Dept: ULTRASOUND IMAGING | Facility: HOSPITAL | Age: 22
End: 2022-03-13

## 2022-03-13 ENCOUNTER — HOSPITAL ENCOUNTER (EMERGENCY)
Facility: HOSPITAL | Age: 22
Discharge: HOME | End: 2022-03-13
Attending: EMERGENCY MEDICINE | Admitting: EMERGENCY MEDICINE

## 2022-03-13 VITALS
HEART RATE: 89 BPM | BODY MASS INDEX: 24.11 KG/M2 | OXYGEN SATURATION: 100 % | SYSTOLIC BLOOD PRESSURE: 97 MMHG | TEMPERATURE: 98.1 F | HEIGHT: 66 IN | WEIGHT: 150 LBS | RESPIRATION RATE: 18 BRPM | DIASTOLIC BLOOD PRESSURE: 50 MMHG

## 2022-03-13 DIAGNOSIS — N39.0 ACUTE LOWER UTI: ICD-10-CM

## 2022-03-13 DIAGNOSIS — Z3A.21 21 WEEKS GESTATION OF PREGNANCY: Primary | ICD-10-CM

## 2022-03-13 LAB
ALBUMIN SERPL-MCNC: 4 G/DL (ref 3.5–5.2)
ALBUMIN/GLOB SERPL: 1.2 G/DL
ALP SERPL-CCNC: 65 U/L (ref 39–117)
ALT SERPL W P-5'-P-CCNC: 18 U/L (ref 1–33)
ANION GAP SERPL CALCULATED.3IONS-SCNC: 11.2 MMOL/L (ref 5–15)
AST SERPL-CCNC: 21 U/L (ref 1–32)
BACTERIA UR QL AUTO: ABNORMAL /HPF
BASOPHILS # BLD AUTO: 0.03 10*3/MM3 (ref 0–0.2)
BASOPHILS NFR BLD AUTO: 0.3 % (ref 0–1.5)
BILIRUB SERPL-MCNC: 0.2 MG/DL (ref 0–1.2)
BILIRUB UR QL STRIP: NEGATIVE
BUN SERPL-MCNC: 6 MG/DL (ref 6–20)
BUN/CREAT SERPL: 9.8 (ref 7–25)
CALCIUM SPEC-SCNC: 9 MG/DL (ref 8.6–10.5)
CHLORIDE SERPL-SCNC: 100 MMOL/L (ref 98–107)
CLARITY UR: ABNORMAL
CO2 SERPL-SCNC: 22.8 MMOL/L (ref 22–29)
COLOR UR: YELLOW
CREAT SERPL-MCNC: 0.61 MG/DL (ref 0.57–1)
DEPRECATED RDW RBC AUTO: 40.2 FL (ref 37–54)
EGFRCR SERPLBLD CKD-EPI 2021: 131.4 ML/MIN/1.73
EOSINOPHIL # BLD AUTO: 0.09 10*3/MM3 (ref 0–0.4)
EOSINOPHIL NFR BLD AUTO: 0.8 % (ref 0.3–6.2)
ERYTHROCYTE [DISTWIDTH] IN BLOOD BY AUTOMATED COUNT: 12.6 % (ref 12.3–15.4)
GLOBULIN UR ELPH-MCNC: 3.4 GM/DL
GLUCOSE SERPL-MCNC: 83 MG/DL (ref 65–99)
GLUCOSE UR STRIP-MCNC: NEGATIVE MG/DL
HCT VFR BLD AUTO: 33 % (ref 34–46.6)
HGB BLD-MCNC: 11.4 G/DL (ref 12–15.9)
HGB UR QL STRIP.AUTO: NEGATIVE
HOLD SPECIMEN: NORMAL
HOLD SPECIMEN: NORMAL
HYALINE CASTS UR QL AUTO: ABNORMAL /LPF
IMM GRANULOCYTES # BLD AUTO: 0.06 10*3/MM3 (ref 0–0.05)
IMM GRANULOCYTES NFR BLD AUTO: 0.5 % (ref 0–0.5)
KETONES UR QL STRIP: NEGATIVE
LEUKOCYTE ESTERASE UR QL STRIP.AUTO: ABNORMAL
LYMPHOCYTES # BLD AUTO: 1.91 10*3/MM3 (ref 0.7–3.1)
LYMPHOCYTES NFR BLD AUTO: 16.2 % (ref 19.6–45.3)
MCH RBC QN AUTO: 30.5 PG (ref 26.6–33)
MCHC RBC AUTO-ENTMCNC: 34.5 G/DL (ref 31.5–35.7)
MCV RBC AUTO: 88.2 FL (ref 79–97)
MONOCYTES # BLD AUTO: 0.68 10*3/MM3 (ref 0.1–0.9)
MONOCYTES NFR BLD AUTO: 5.8 % (ref 5–12)
NEUTROPHILS NFR BLD AUTO: 76.4 % (ref 42.7–76)
NEUTROPHILS NFR BLD AUTO: 9 10*3/MM3 (ref 1.7–7)
NITRITE UR QL STRIP: NEGATIVE
NRBC BLD AUTO-RTO: 0 /100 WBC (ref 0–0.2)
PH UR STRIP.AUTO: 7 [PH] (ref 5–8)
PLATELET # BLD AUTO: 291 10*3/MM3 (ref 140–450)
PMV BLD AUTO: 9.6 FL (ref 6–12)
POTASSIUM SERPL-SCNC: 3.7 MMOL/L (ref 3.5–5.2)
PROT SERPL-MCNC: 7.4 G/DL (ref 6–8.5)
PROT UR QL STRIP: NEGATIVE
RBC # BLD AUTO: 3.74 10*6/MM3 (ref 3.77–5.28)
RBC # UR STRIP: ABNORMAL /HPF
REF LAB TEST METHOD: ABNORMAL
SODIUM SERPL-SCNC: 134 MMOL/L (ref 136–145)
SP GR UR STRIP: 1.02 (ref 1–1.03)
SQUAMOUS #/AREA URNS HPF: ABNORMAL /HPF
UROBILINOGEN UR QL STRIP: ABNORMAL
WBC # UR STRIP: ABNORMAL /HPF
WBC NRBC COR # BLD: 11.77 10*3/MM3 (ref 3.4–10.8)
WHOLE BLOOD HOLD SPECIMEN: NORMAL
WHOLE BLOOD HOLD SPECIMEN: NORMAL

## 2022-03-13 PROCEDURE — 99283 EMERGENCY DEPT VISIT LOW MDM: CPT

## 2022-03-13 PROCEDURE — 81001 URINALYSIS AUTO W/SCOPE: CPT | Performed by: EMERGENCY MEDICINE

## 2022-03-13 PROCEDURE — 76805 OB US >/= 14 WKS SNGL FETUS: CPT

## 2022-03-13 PROCEDURE — 80053 COMPREHEN METABOLIC PANEL: CPT | Performed by: PHYSICIAN ASSISTANT

## 2022-03-13 PROCEDURE — 85025 COMPLETE CBC W/AUTO DIFF WBC: CPT | Performed by: PHYSICIAN ASSISTANT

## 2022-03-13 PROCEDURE — 36415 COLL VENOUS BLD VENIPUNCTURE: CPT

## 2022-03-13 RX ORDER — SODIUM CHLORIDE 0.9 % (FLUSH) 0.9 %
10 SYRINGE (ML) INJECTION AS NEEDED
Status: DISCONTINUED | OUTPATIENT
Start: 2022-03-13 | End: 2022-03-13 | Stop reason: HOSPADM

## 2022-03-13 RX ORDER — CEPHALEXIN 500 MG/1
500 CAPSULE ORAL 2 TIMES DAILY
Qty: 20 CAPSULE | Refills: 0 | Status: SHIPPED | OUTPATIENT
Start: 2022-03-13 | End: 2022-04-04

## 2022-03-13 RX ADMIN — SODIUM CHLORIDE 1000 ML: 9 INJECTION, SOLUTION INTRAVENOUS at 17:12

## 2022-03-13 NOTE — ED PROVIDER NOTES
Subjective   21-year-old female who presents to the emergency department chief complaint lower abdominal pain.  Patient has had cramping.  Patient does states she is 20 weeks pregnant.  No associated nausea or vomiting or diarrhea.  No reported fever, chills, body aches.  Patient states is been present for the last 2 days.  Patient has had normal prenatal care up to this point.      History provided by:  Patient   used: No    Abdominal Pain  Pain location:  Suprapubic and periumbilical  Pain quality: cramping    Pain radiates to:  Does not radiate  Pain severity:  Moderate  Onset quality:  Gradual  Duration:  2 days  Timing:  Intermittent  Progression:  Worsening  Chronicity:  New  Context: not alcohol use, not awakening from sleep, not diet changes, not laxative use, not previous surgeries, not recent illness, not recent sexual activity, not recent travel, not retching, not sick contacts, not suspicious food intake and not trauma    Relieved by:  Nothing  Worsened by:  Nothing  Ineffective treatments:  None tried  Associated symptoms: chills, fatigue and nausea    Associated symptoms: no chest pain, no cough, no dysuria, no hematemesis, no hematuria, no shortness of breath, no sore throat, no vaginal discharge and no vomiting    Risk factors: no alcohol abuse, no aspirin use, not elderly, has not had multiple surgeries, no NSAID use, not pregnant and no recent hospitalization        Review of Systems   Constitutional: Positive for chills and fatigue.   HENT: Negative.  Negative for congestion, dental problem, drooling, ear discharge, hearing loss, mouth sores and sore throat.    Eyes: Negative.  Negative for photophobia, pain and itching.   Respiratory: Negative.  Negative for cough, choking, shortness of breath and wheezing.    Cardiovascular: Negative.  Negative for chest pain, palpitations and leg swelling.   Gastrointestinal: Positive for abdominal pain and nausea. Negative for hematemesis  and vomiting.   Endocrine: Negative.  Negative for heat intolerance, polydipsia, polyphagia and polyuria.   Genitourinary: Negative.  Negative for dysuria, enuresis, flank pain, frequency, genital sores, hematuria, menstrual problem and vaginal discharge.   Musculoskeletal: Negative.  Negative for back pain, gait problem, joint swelling, myalgias and neck pain.   Skin: Negative.  Negative for color change, pallor and wound.   Neurological: Negative.  Negative for seizures, syncope, speech difficulty, numbness and headaches.   Hematological: Negative.    Psychiatric/Behavioral: Negative.  Negative for agitation, behavioral problems, confusion, decreased concentration, dysphoric mood and self-injury. The patient is not hyperactive.    All other systems reviewed and are negative.      Past Medical History:   Diagnosis Date   • Thyroid activity decreased    • Urinary tract infection        No Known Allergies    Past Surgical History:   Procedure Laterality Date   • FOOT/TOE TENDON REPAIR Left 1/30/2020    Procedure: RECONSTRUCTION PT TENDON LEFT;  Surgeon: Uziel Garcia DPM;  Location: [facility];  Service: Podiatry   • NO PAST SURGERIES     • TARSAL TUNNEL RELEASE Left 1/30/2020    Procedure: EXCISION TARSAL BONE LEFT;  Surgeon: Uziel Garcia DPM;  Location: [facility];  Service: Podiatry   • WISDOM TOOTH EXTRACTION         Family History   Problem Relation Age of Onset   • Hypertension Mother    • Diabetes Father    • Heart attack Maternal Grandfather    • Diabetes Maternal Grandfather        Social History     Socioeconomic History   • Marital status: Single   Tobacco Use   • Smoking status: Never Smoker   • Smokeless tobacco: Never Used   Vaping Use   • Vaping Use: Never used   Substance and Sexual Activity   • Alcohol use: No   • Drug use: No   • Sexual activity: Defer     Partners: Male           Objective   Physical Exam  Vitals and nursing note reviewed.   Constitutional:       General: She is  not in acute distress.     Appearance: She is well-developed and normal weight. She is not ill-appearing, toxic-appearing or diaphoretic.   HENT:      Head: Normocephalic and atraumatic.      Mouth/Throat:      Mouth: Mucous membranes are moist.      Pharynx: Oropharynx is clear. No pharyngeal swelling or oropharyngeal exudate.   Eyes:      General: No scleral icterus.     Extraocular Movements: Extraocular movements intact.      Pupils: Pupils are equal, round, and reactive to light.   Cardiovascular:      Rate and Rhythm: Normal rate and regular rhythm.      Heart sounds: Normal heart sounds. No murmur heard.    No friction rub. No gallop.   Pulmonary:      Effort: Pulmonary effort is normal. No respiratory distress.      Breath sounds: Normal breath sounds. No stridor. No wheezing, rhonchi or rales.   Chest:      Chest wall: No tenderness.   Abdominal:      General: Abdomen is flat. Bowel sounds are normal. There is no distension or abdominal bruit. There are no signs of injury.      Palpations: Abdomen is soft. There is no shifting dullness, fluid wave, hepatomegaly, splenomegaly, mass or pulsatile mass.      Tenderness: There is abdominal tenderness in the right lower quadrant, suprapubic area and left lower quadrant. There is guarding. There is no rebound. Negative signs include Rovsing's sign, McBurney's sign and obturator sign.      Hernia: No hernia is present. There is no hernia in the umbilical area, ventral area, left inguinal area, right femoral area, left femoral area or right inguinal area.   Skin:     General: Skin is warm and dry.      Capillary Refill: Capillary refill takes less than 2 seconds.      Coloration: Skin is not cyanotic, jaundiced or mottled.      Findings: No erythema.   Neurological:      General: No focal deficit present.      Mental Status: She is alert and oriented to person, place, and time.      Cranial Nerves: No cranial nerve deficit.   Psychiatric:         Mood and Affect:  Mood normal. Mood is not anxious.         Behavior: Behavior normal.         Procedures           ED Course  ED Course as of 03/14/22 1916   Sun Mar 13, 2022   1901 Normal appearing anterior placenta fetal heart rate 152.  21 weeks 4 days.  Estimated date of delivery July 26.  Patient will be discharged advised to follow-up with primary care. [BH]      ED Course User Index  [BH] Max Lopez PA-C                                                 [facility]    Final diagnoses:   21 weeks gestation of pregnancy   Acute lower UTI       ED Disposition  ED Disposition     ED Disposition   Discharge    Condition   Stable    Comment   --             Nadya Hidalgo MD  [address] [place] [id number] [phone]    Call in 1 day           Medication List      New Prescriptions    cephalexin 500 MG capsule  Commonly known as: KEFLEX  Take 1 capsule by mouth 2 (Two) Times a Day.           Where to Get Your Medications      These medications were sent to [id number] - [place], [place] - [address] AT [facility] [address] - [phone] PH - [phone] FX  [address] [facility] [address]    Phone: [phone]   · cephalexin 500 MG capsule          Max Lopez PA-C  03/14/22 1916

## 2022-04-04 ENCOUNTER — ROUTINE PRENATAL (OUTPATIENT)
Dept: OBSTETRICS AND GYNECOLOGY | Facility: CLINIC | Age: 22
End: 2022-04-04

## 2022-04-04 VITALS — WEIGHT: 157.5 LBS | BODY MASS INDEX: 25.42 KG/M2 | SYSTOLIC BLOOD PRESSURE: 106 MMHG | DIASTOLIC BLOOD PRESSURE: 68 MMHG

## 2022-04-04 DIAGNOSIS — D50.9 IRON DEFICIENCY ANEMIA DURING PREGNANCY: ICD-10-CM

## 2022-04-04 DIAGNOSIS — K21.9 GASTROESOPHAGEAL REFLUX DISEASE WITHOUT ESOPHAGITIS: ICD-10-CM

## 2022-04-04 DIAGNOSIS — O21.9 NAUSEA/VOMITING IN PREGNANCY: ICD-10-CM

## 2022-04-04 DIAGNOSIS — O43.199 MARGINAL INSERTION OF UMBILICAL CORD AFFECTING MANAGEMENT OF MOTHER: ICD-10-CM

## 2022-04-04 DIAGNOSIS — O99.019 IRON DEFICIENCY ANEMIA DURING PREGNANCY: ICD-10-CM

## 2022-04-04 DIAGNOSIS — Z34.02 ENCOUNTER FOR SUPERVISION OF NORMAL FIRST PREGNANCY IN SECOND TRIMESTER: Primary | ICD-10-CM

## 2022-04-04 PROCEDURE — 99214 OFFICE O/P EST MOD 30 MIN: CPT | Performed by: OBSTETRICS & GYNECOLOGY

## 2022-04-04 RX ORDER — FERROUS SULFATE 325(65) MG
325 TABLET ORAL
Qty: 60 TABLET | Refills: 3 | Status: SHIPPED | OUTPATIENT
Start: 2022-04-04 | End: 2022-04-21 | Stop reason: SDUPTHER

## 2022-04-05 NOTE — PROGRESS NOTES
Chief Complaint  Routine Prenatal Visit (Doing well, No complaints)    History of Present Illness:  Romy is a  currently at 23w2d who presents today with no complaints.  Patient reports she continues to have episodes of nausea and vomiting.  Patient has continued on her Unisom as well as B6.  Patient is also taking her Pepcid.  Patient was recently seen in the emergency room for abdominal pain as noted.  Patient had a scan.  Patient also had labs as noted.  Patient reports she does not eat any meat.  Patient reports positive fetal movement.  Patient denies any cramping or contractions.    Exam:  Vitals:  See prenatal flowsheet as noted and reviewed  General: Alert, cooperative, and does not appear in any distress  Abdomen:   See prenatal flowsheet as noted and reviewed    Uterus gravid, non-tender; no palpable masses    No guarding or rebound tenderness  Pelvic:  See prenatal flowsheet as noted and reviewed  Ext:  See prenatal flowsheet as noted and reviewed    Moves extremities well, no cyanosis and no redness  Urine:  See prenatal flowsheet as noted and reviewed    Data Review:  The following data was reviewed by: Suni Oquendo MD on 2022:  Prenatal Labs:  Lab Results   Component Value Date    HGB 11.4 (L) 2022    RUBELLAABIGG 1.66 2022    HEPBSAG Negative 2022    ABO O 2022    RH Positive 2022    ABSCRN Negative 2022    ZSL6PSO7 Non Reactive 2022    HEPCVIRUSABY 0.2 2022    URINECX Final report 2022       Admission on 2022, Discharged on 2022   Component Date Value   • Color, UA 2022 Yellow    • Appearance, UA 2022 Turbid (A)   • pH, UA 2022 7.0    • Specific Gravity, UA 2022 1.019    • Glucose, UA 2022 Negative    • Ketones, UA 2022 Negative    • Bilirubin, UA 2022 Negative    • Blood, UA 2022 Negative    • Protein, UA 2022 Negative    • Leuk Esterase, UA 2022 Moderate (2+)  (A)   • Nitrite, UA 03/13/2022 Negative    • Urobilinogen, UA 03/13/2022 1.0 E.U./dL    • Extra Tube 03/13/2022 Hold for add-ons.    • Extra Tube 03/13/2022 hold for add-on    • Extra Tube 03/13/2022 Hold for add-ons.    • Extra Tube 03/13/2022 hold for add-on    • RBC, UA 03/13/2022 None Seen    • WBC, UA 03/13/2022 13-20 (A)   • Bacteria, UA 03/13/2022 3+ (A)   • Squamous Epithelial Cell* 03/13/2022 13-20 (A)   • Hyaline Casts, UA 03/13/2022 None Seen    • Methodology 03/13/2022 Manual Light Microscopy    • Glucose 03/13/2022 83    • BUN 03/13/2022 6    • Creatinine 03/13/2022 0.61    • Sodium 03/13/2022 134 (A)   • Potassium 03/13/2022 3.7    • Chloride 03/13/2022 100    • CO2 03/13/2022 22.8    • Calcium 03/13/2022 9.0    • Total Protein 03/13/2022 7.4    • Albumin 03/13/2022 4.00    • ALT (SGPT) 03/13/2022 18    • AST (SGOT) 03/13/2022 21    • Alkaline Phosphatase 03/13/2022 65    • Total Bilirubin 03/13/2022 0.2    • Globulin 03/13/2022 3.4    • A/G Ratio 03/13/2022 1.2    • BUN/Creatinine Ratio 03/13/2022 9.8    • Anion Gap 03/13/2022 11.2    • eGFR 03/13/2022 131.4    • WBC 03/13/2022 11.77 (A)   • RBC 03/13/2022 3.74 (A)   • Hemoglobin 03/13/2022 11.4 (A)   • Hematocrit 03/13/2022 33.0 (A)   • MCV 03/13/2022 88.2    • MCH 03/13/2022 30.5    • MCHC 03/13/2022 34.5    • RDW 03/13/2022 12.6    • RDW-SD 03/13/2022 40.2    • MPV 03/13/2022 9.6    • Platelets 03/13/2022 291    • Neutrophil % 03/13/2022 76.4 (A)   • Lymphocyte % 03/13/2022 16.2 (A)   • Monocyte % 03/13/2022 5.8    • Eosinophil % 03/13/2022 0.8    • Basophil % 03/13/2022 0.3    • Immature Grans % 03/13/2022 0.5    • Neutrophils, Absolute 03/13/2022 9.00 (A)   • Lymphocytes, Absolute 03/13/2022 1.91    • Monocytes, Absolute 03/13/2022 0.68    • Eosinophils, Absolute 03/13/2022 0.09    • Basophils, Absolute 03/13/2022 0.03    • Immature Grans, Absolute 03/13/2022 0.06 (A)   • nRBC 03/13/2022 0.0      Imaging:  US Ob 14 + Weeks Single or First  Gestation  Narrative: FINAL REPORT    TECHNIQUE:  Transvaginal sonographic images the pelvis were obtained.    CLINICAL HISTORY:  abdominal pain, nausea    FINDINGS:  There is a single intrauterine pregnancy.  There is a normal  appearing anterior placenta.  A fetal heart rate of 152 bpm is  detected.  The biparietal diameter of 5.1 cm corresponds to 21  weeks 4 days gestation.  The abdominal circumference of 15 cm  corresponds to 20 weeks 2 days gestation.  The femur length of  3.3 cm corresponds to 20 weeks 2 days gestation.  These findings  correspond to an average ultrasound age of 20 weeks 5 days and  an estimated date of delivery of 2022.  The ovaries are  unremarkable.  There is no free fluid.  Impression: Living intrauterine pregnancy.    Authenticated by Arron Flores M.D. on 2022 06:19:31 PM    Medical Records:  None    Assessment and Plan:  Problem List Items Addressed This Visit        Other    Marginal insertion of umbilical cord affecting management of mother  Repeat scan next visit as noted.    Relevant Orders    US Ob Limited 1 + Fetuses      Other Visit Diagnoses     Encounter for supervision of normal first pregnancy in second trimester    -  Primary  Topics discussed:     GERD management  iron supplementation  kick counts and fetal movement  PIH precautions   labor signs and symptoms  Labs next visit as noted.    Relevant Orders    CBC (No Diff)    Gestational Screen 1 Hr ([facility])    Nausea/vomiting in pregnancy      Patient to continue her current medication as discussed.    Gastroesophageal reflux disease without esophagitis      Patient to continue her Pepcid as previously given.    Iron deficiency anemia during pregnancy      Prescriptions given for iron supplements as discussed.    Relevant Medications    ferrous sulfate 325 (65 FE) MG tablet        Follow Up/Instructions:  Follow up as scheduled.  Patient was given instructions and counseling regarding her condition or for  health maintenance advice. Please see specific information pulled into the AVS if appropriate.     Note: Speech recognition transcription software may have been used to dictate portions of this document.  An attempt at proofreading has been made though minor errors in transcription may still be present.    This note was electronically signed.  Suni Oquendo M.D.

## 2022-04-18 ENCOUNTER — ROUTINE PRENATAL (OUTPATIENT)
Dept: OBSTETRICS AND GYNECOLOGY | Facility: CLINIC | Age: 22
End: 2022-04-18

## 2022-04-18 VITALS — BODY MASS INDEX: 26.31 KG/M2 | SYSTOLIC BLOOD PRESSURE: 108 MMHG | WEIGHT: 163 LBS | DIASTOLIC BLOOD PRESSURE: 68 MMHG

## 2022-04-18 DIAGNOSIS — Z34.02 ENCOUNTER FOR SUPERVISION OF NORMAL FIRST PREGNANCY IN SECOND TRIMESTER: Primary | ICD-10-CM

## 2022-04-18 LAB
ERYTHROCYTE [DISTWIDTH] IN BLOOD BY AUTOMATED COUNT: 11.9 % (ref 12.3–15.4)
GLUCOSE 1H P 50 G GLC PO SERPL-MCNC: 89 MG/DL (ref 65–139)
HCT VFR BLD AUTO: 31.8 % (ref 34–46.6)
HGB BLD-MCNC: 10.8 G/DL (ref 12–15.9)
MCH RBC QN AUTO: 30.2 PG (ref 26.6–33)
MCHC RBC AUTO-ENTMCNC: 34 G/DL (ref 31.5–35.7)
MCV RBC AUTO: 88.8 FL (ref 79–97)
PLATELET # BLD AUTO: 285 10*3/MM3 (ref 140–450)
RBC # BLD AUTO: 3.58 10*6/MM3 (ref 3.77–5.28)
WBC # BLD AUTO: 11.73 10*3/MM3 (ref 3.4–10.8)

## 2022-04-18 PROCEDURE — 99213 OFFICE O/P EST LOW 20 MIN: CPT | Performed by: MIDWIFE

## 2022-04-18 NOTE — PROGRESS NOTES
Chief Complaint   Patient presents with   • Routine Prenatal Visit     Shamar today,   No  Complaints/concerns        HPI: Romy is a  currently at 25w1d here for prenatal visit who reports the following:  Baby is active.  She stopped taking her Pepcid because she was taking iron.  She continues to have problems with heartburn.                EXAM:     Vitals:    22 1015   BP: 108/68      Abdomen:   See prenatal flowsheet as noted and reviewed, soft, nontender   Pelvic:  See prenatal flowsheet as noted and reviewed   Urine:  See prenatal flowsheet as noted and reviewed    Lab Results   Component Value Date    ABO O 2022    RH Positive 2022    ABSCRN Negative 2022       MDM:  Impression: Supervision of low risk pregnancy  Anemia in pregnancy   Tests done today: GCT  HgB   Topics discussed: kick counts and fetal movement  Advise she may take Pepcid but not at the same time as iron   Reviewed OB labs   Tests next visit: none                RTO:                        4 weeks    This note was electronically signed.  Karly Duarte, JASPAL  2022

## 2022-04-20 ENCOUNTER — TELEPHONE (OUTPATIENT)
Dept: OBSTETRICS AND GYNECOLOGY | Facility: CLINIC | Age: 22
End: 2022-04-20

## 2022-04-20 NOTE — TELEPHONE ENCOUNTER
----- Message from Remedios Negrete sent at 4/19/2022  3:11 PM EDT -----  Regarding: LAB QUESTION  Patient is requesting a return call with lab results. Ob patient    Patient call back: [phone]

## 2022-04-21 ENCOUNTER — HOSPITAL ENCOUNTER (OUTPATIENT)
Facility: HOSPITAL | Age: 22
Setting detail: OBSERVATION
Discharge: HOME | End: 2022-04-22
Attending: MIDWIFE | Admitting: OBSTETRICS & GYNECOLOGY

## 2022-04-21 ENCOUNTER — TELEPHONE (OUTPATIENT)
Dept: OBSTETRICS AND GYNECOLOGY | Facility: CLINIC | Age: 22
End: 2022-04-21

## 2022-04-21 DIAGNOSIS — O99.019 IRON DEFICIENCY ANEMIA DURING PREGNANCY: ICD-10-CM

## 2022-04-21 DIAGNOSIS — D50.9 IRON DEFICIENCY ANEMIA DURING PREGNANCY: ICD-10-CM

## 2022-04-21 PROBLEM — R11.10 HYPEREMESIS: Status: ACTIVE | Noted: 2022-04-21

## 2022-04-21 LAB
BILIRUB BLD-MCNC: NEGATIVE MG/DL
CLARITY, POC: CLEAR
COLOR UR: ABNORMAL
GLUCOSE UR STRIP-MCNC: NEGATIVE MG/DL
KETONES UR QL: ABNORMAL
LEUKOCYTE EST, POC: NEGATIVE
NITRITE UR-MCNC: NEGATIVE MG/ML
PH UR: 6 [PH] (ref 5–8)
PROT UR STRIP-MCNC: ABNORMAL MG/DL
RBC # UR STRIP: NEGATIVE /UL
SP GR UR: 1.03 (ref 1–1.03)
UROBILINOGEN UR QL: NORMAL

## 2022-04-21 PROCEDURE — 25010000002 ONDANSETRON PER 1 MG: Performed by: MIDWIFE

## 2022-04-21 PROCEDURE — 59025 FETAL NON-STRESS TEST: CPT

## 2022-04-21 PROCEDURE — G0378 HOSPITAL OBSERVATION PER HR: HCPCS

## 2022-04-21 PROCEDURE — 96374 THER/PROPH/DIAG INJ IV PUSH: CPT

## 2022-04-21 PROCEDURE — 96361 HYDRATE IV INFUSION ADD-ON: CPT

## 2022-04-21 PROCEDURE — 81002 URINALYSIS NONAUTO W/O SCOPE: CPT | Performed by: MIDWIFE

## 2022-04-21 RX ORDER — SODIUM CHLORIDE, SODIUM LACTATE, POTASSIUM CHLORIDE, CALCIUM CHLORIDE 600; 310; 30; 20 MG/100ML; MG/100ML; MG/100ML; MG/100ML
150 INJECTION, SOLUTION INTRAVENOUS CONTINUOUS
Status: DISCONTINUED | OUTPATIENT
Start: 2022-04-21 | End: 2022-04-22 | Stop reason: HOSPADM

## 2022-04-21 RX ORDER — SODIUM CHLORIDE 0.9 % (FLUSH) 0.9 %
10 SYRINGE (ML) INJECTION AS NEEDED
Status: DISCONTINUED | OUTPATIENT
Start: 2022-04-21 | End: 2022-04-22 | Stop reason: HOSPADM

## 2022-04-21 RX ORDER — FERROUS SULFATE 325(65) MG
325 TABLET ORAL
Qty: 60 TABLET | Refills: 3 | Status: SHIPPED | OUTPATIENT
Start: 2022-04-21 | End: 2022-05-13 | Stop reason: HOSPADM

## 2022-04-21 RX ORDER — ONDANSETRON 2 MG/ML
4 INJECTION INTRAMUSCULAR; INTRAVENOUS EVERY 4 HOURS PRN
Status: DISCONTINUED | OUTPATIENT
Start: 2022-04-21 | End: 2022-04-22 | Stop reason: HOSPADM

## 2022-04-21 RX ORDER — SODIUM CHLORIDE 0.9 % (FLUSH) 0.9 %
10 SYRINGE (ML) INJECTION EVERY 12 HOURS SCHEDULED
Status: DISCONTINUED | OUTPATIENT
Start: 2022-04-21 | End: 2022-04-22 | Stop reason: HOSPADM

## 2022-04-21 RX ADMIN — SODIUM CHLORIDE, POTASSIUM CHLORIDE, SODIUM LACTATE AND CALCIUM CHLORIDE 150 ML/HR: 600; 310; 30; 20 INJECTION, SOLUTION INTRAVENOUS at 22:36

## 2022-04-21 RX ADMIN — ONDANSETRON 4 MG: 2 INJECTION INTRAMUSCULAR; INTRAVENOUS at 22:37

## 2022-04-21 NOTE — TELEPHONE ENCOUNTER
----- Message from Chantal Lynn MA sent at 4/21/2022 11:13 AM EDT -----  Patient is requesting RX for iron. She went to [facility]  ( [facility]) to pick it up and it was not there.       [facility] ( [facility])    Thank You

## 2022-04-22 ENCOUNTER — HOSPITAL ENCOUNTER (OUTPATIENT)
Facility: HOSPITAL | Age: 22
End: 2022-04-22
Attending: OBSTETRICS & GYNECOLOGY | Admitting: OBSTETRICS & GYNECOLOGY

## 2022-04-22 VITALS
SYSTOLIC BLOOD PRESSURE: 102 MMHG | WEIGHT: 166 LBS | RESPIRATION RATE: 16 BRPM | DIASTOLIC BLOOD PRESSURE: 55 MMHG | HEIGHT: 66 IN | BODY MASS INDEX: 26.68 KG/M2 | HEART RATE: 98 BPM | TEMPERATURE: 98 F | OXYGEN SATURATION: 97 %

## 2022-04-22 LAB
ALBUMIN SERPL-MCNC: 3.1 G/DL (ref 3.5–5.2)
ALBUMIN/GLOB SERPL: 1.2 G/DL
ALP SERPL-CCNC: 66 U/L (ref 39–117)
ALT SERPL W P-5'-P-CCNC: 8 U/L (ref 1–33)
AMYLASE SERPL-CCNC: 53 U/L (ref 28–100)
ANION GAP SERPL CALCULATED.3IONS-SCNC: 8.8 MMOL/L (ref 5–15)
AST SERPL-CCNC: 13 U/L (ref 1–32)
BILIRUB SERPL-MCNC: 0.5 MG/DL (ref 0–1.2)
BUN SERPL-MCNC: 7 MG/DL (ref 6–20)
BUN/CREAT SERPL: 13 (ref 7–25)
CALCIUM SPEC-SCNC: 8.3 MG/DL (ref 8.6–10.5)
CHLORIDE SERPL-SCNC: 104 MMOL/L (ref 98–107)
CO2 SERPL-SCNC: 22.2 MMOL/L (ref 22–29)
CREAT SERPL-MCNC: 0.54 MG/DL (ref 0.57–1)
DEPRECATED RDW RBC AUTO: 40.1 FL (ref 37–54)
EGFRCR SERPLBLD CKD-EPI 2021: 134.5 ML/MIN/1.73
ERYTHROCYTE [DISTWIDTH] IN BLOOD BY AUTOMATED COUNT: 12.4 % (ref 12.3–15.4)
GLOBULIN UR ELPH-MCNC: 2.5 GM/DL
GLUCOSE SERPL-MCNC: 87 MG/DL (ref 65–99)
HCT VFR BLD AUTO: 28 % (ref 34–46.6)
HGB BLD-MCNC: 9.7 G/DL (ref 12–15.9)
LIPASE SERPL-CCNC: 16 U/L (ref 13–60)
MCH RBC QN AUTO: 30.7 PG (ref 26.6–33)
MCHC RBC AUTO-ENTMCNC: 34.6 G/DL (ref 31.5–35.7)
MCV RBC AUTO: 88.6 FL (ref 79–97)
PLATELET # BLD AUTO: 211 10*3/MM3 (ref 140–450)
PMV BLD AUTO: 8.7 FL (ref 6–12)
POTASSIUM SERPL-SCNC: 3.9 MMOL/L (ref 3.5–5.2)
PROT SERPL-MCNC: 5.6 G/DL (ref 6–8.5)
RBC # BLD AUTO: 3.16 10*6/MM3 (ref 3.77–5.28)
SARS-COV-2 RNA PNL SPEC NAA+PROBE: NOT DETECTED
SODIUM SERPL-SCNC: 135 MMOL/L (ref 136–145)
WBC NRBC COR # BLD: 12.78 10*3/MM3 (ref 3.4–10.8)

## 2022-04-22 PROCEDURE — 25010000002 ONDANSETRON PER 1 MG: Performed by: MIDWIFE

## 2022-04-22 PROCEDURE — 83690 ASSAY OF LIPASE: CPT | Performed by: MIDWIFE

## 2022-04-22 PROCEDURE — G0378 HOSPITAL OBSERVATION PER HR: HCPCS

## 2022-04-22 PROCEDURE — 99234 HOSP IP/OBS SM DT SF/LOW 45: CPT | Performed by: MIDWIFE

## 2022-04-22 PROCEDURE — 96361 HYDRATE IV INFUSION ADD-ON: CPT

## 2022-04-22 PROCEDURE — 59025 FETAL NON-STRESS TEST: CPT

## 2022-04-22 PROCEDURE — G0463 HOSPITAL OUTPT CLINIC VISIT: HCPCS

## 2022-04-22 PROCEDURE — 96376 TX/PRO/DX INJ SAME DRUG ADON: CPT

## 2022-04-22 PROCEDURE — 80053 COMPREHEN METABOLIC PANEL: CPT | Performed by: MIDWIFE

## 2022-04-22 PROCEDURE — 85027 COMPLETE CBC AUTOMATED: CPT | Performed by: MIDWIFE

## 2022-04-22 PROCEDURE — 87635 SARS-COV-2 COVID-19 AMP PRB: CPT | Performed by: MIDWIFE

## 2022-04-22 PROCEDURE — 82150 ASSAY OF AMYLASE: CPT | Performed by: MIDWIFE

## 2022-04-22 RX ORDER — PROMETHAZINE HYDROCHLORIDE 12.5 MG/1
12.5 TABLET ORAL EVERY 6 HOURS PRN
Qty: 30 TABLET | Refills: 0 | Status: SHIPPED | OUTPATIENT
Start: 2022-04-22 | End: 2022-05-13 | Stop reason: HOSPADM

## 2022-04-22 RX ORDER — PROMETHAZINE HYDROCHLORIDE 25 MG/1
25 SUPPOSITORY RECTAL EVERY 4 HOURS PRN
Status: DISCONTINUED | OUTPATIENT
Start: 2022-04-22 | End: 2022-04-22 | Stop reason: HOSPADM

## 2022-04-22 RX ADMIN — PROMETHAZINE HYDROCHLORIDE 25 MG: 25 SUPPOSITORY RECTAL at 01:59

## 2022-04-22 RX ADMIN — ONDANSETRON 4 MG: 2 INJECTION INTRAMUSCULAR; INTRAVENOUS at 09:49

## 2022-04-22 RX ADMIN — SODIUM CHLORIDE, POTASSIUM CHLORIDE, SODIUM LACTATE AND CALCIUM CHLORIDE 150 ML/HR: 600; 310; 30; 20 INJECTION, SOLUTION INTRAVENOUS at 09:49

## 2022-04-22 NOTE — NURSING NOTE
JASPAL Mujica notified of pt vomiting again and not time for more Zofran.  Telephone order given for Phenergan 25 mg [place].

## 2022-04-22 NOTE — NON STRESS TEST
Triage Note - Nursing Documentation  Labor and Delivery Admission Log    Romy Langley  : 2000  MRN: [id number]  CSN: [id number]    Date in / Time in:  2022 (22)  Time in:     Date out / Time out:    Time out:  Still a pt    Nurse: Deidra Verdin RN    Patient Info: She is a 22 y.o. year old  at 25w5d with an DEQUAN of 2022, Date entered prior to episode creation who was seen on the [facility].    Chief Complaint:   Chief Complaint   Patient presents with   • Abdominal Cramping   • Hyperemesis Gravidarum       Provider Instructions / Disposition: still a pt    Patient Active Problem List   Diagnosis   • Marginal insertion of umbilical cord affecting management of mother   • Hyperemesis       NST Documentation (Only applicable > 32 weeks):  Reactive

## 2022-04-22 NOTE — H&P
: 2000  MRN: [id number]  [facility]: [id number]    History and Physical    Chief Complaint   Patient presents with   • Abdominal Cramping   • Hyperemesis Gravidarum      Romy Langley is a 22 y.o. year old  with an Estimated Date of Delivery: 22 currently at 25w5d presenting with nausea and vomiting.  Her symptoms started yesterday. She vomited several times after arrival. She states diarrhea started during the night. She was given IVFs, Zofran and Phenergan. She states she is still nauseated this am. Baby is active. She denies any ctxs.    She has received prenatal care with [facility]. It has been complicated by anemia      OB History    Para Term  AB Living   1 0 0 0 0 0   SAB IAB Ectopic Molar Multiple Live Births   0 0 0 0 0 0      # Outcome Date GA Lbr Jay/2nd Weight Sex Delivery Anes PTL Lv   1 Current              Past Medical History:   Diagnosis Date   • Thyroid activity decreased    • Urinary tract infection      Past Surgical History:   Procedure Laterality Date   • FOOT/TOE TENDON REPAIR Left 2020    Procedure: RECONSTRUCTION PT TENDON LEFT;  Surgeon: Uziel Garcia DPM;  Location: [facility] OR;  Service: Podiatry   • NO PAST SURGERIES     • TARSAL TUNNEL RELEASE Left 2020    Procedure: EXCISION TARSAL BONE LEFT;  Surgeon: Uziel Garcia DPM;  Location: [facility] OR;  Service: Podiatry   • WISDOM TOOTH EXTRACTION         Current Facility-Administered Medications:   •  lactated ringers infusion, 150 mL/hr, Intravenous, Continuous, Karly Duarte CNM, Last Rate: 150 mL/hr at 22, 150 mL/hr at 22  •  ondansetron (ZOFRAN) injection 4 mg, 4 mg, Intravenous, Q4H PRN, Karly Duarte, CNM, 4 mg at 22  •  promethazine (PHENERGAN) suppository 25 mg, 25 mg, Rectal, Q4H PRN, Karly Duarte, CNM, 25 mg at 22 0159  •  sodium chloride 0.9 % flush 10 mL, 10 mL, Intravenous, Q12H, Karly Duarte CNM  •   "sodium chloride 0.9 % flush 10 mL, 10 mL, Intravenous, PRN, Karly Duarte CNM    No Known Allergies    Review of Systems     Respiratory ROS: no cough, shortness of breath, or wheezing  Cardiovascular ROS: no chest pain or dyspnea on exertion  Gastrointestinal ROS: positive for - diarrhea and nausea/vomiting  Genito-Urinary ROS: no dysuria, trouble voiding, or hematuria        Objective   /55   Pulse 98   Temp 98 °F (36.7 °C) (Oral)   Resp 16   Ht 167.6 cm (66\")   Wt 75.3 kg (166 lb)   LMP 10/16/2021   SpO2 97%   BMI 26.79 kg/m²     Physical Exam: General Appearance: alert, appears stated age and cooperative  Lungs: respirations regular, respirations even and respirations unlabored  Abdomen: no guarding, no rebound tenderness and uterus gravid and nontender  Extremities: moves extremities well, no edema, no cyanosis and no redness  Skin: no bleeding, bruising or rash and no lesions noted  Neurologic: Mental Status orientated to person, place, time and situation, Speech normal content and execusion       FHT's: reassuring and category 1      Cervix: was not checked.   Presentation: cephalic   Contractions: none - external monitors used         Prenatal Labs  Lab Results   Component Value Date    HGB 10.8 (L) 04/18/2022    HEPBSAG Negative 01/12/2022    ABSCRN Negative 01/12/2022    [id number] Non Reactive 01/12/2022    HEPCVIRUSABY 0.2 01/12/2022    URINECX Final report 02/09/2022       Current Labs Reviewed   Lab Results (last 24 hours)     Procedure Component Value Units Date/Time    COVID PRE-OP / PRE-PROCEDURE SCREENING ORDER (NO ISOLATION) - Swab, Nasal Cavity [[id number]]  (Normal) Collected: 04/22/22 0115    Specimen: Swab from Nasal Cavity Updated: 04/22/22 0157    Narrative:      The following orders were created for panel order COVID PRE-OP / PRE-PROCEDURE SCREENING ORDER (NO ISOLATION) - Swab, Nasal Cavity.  Procedure                               Abnormality         Status                  "    ---------                               -----------         ------                     COVID-19,[facility]...[[id number]]  Normal              Final result                 Please view results for these tests on the individual orders.    COVID-19,[facility] IN-HOUSE,Nasal Swab No Transport Media 3-4 HR TAT - Swab, Nasal Cavity [[id number]]  (Normal) Collected: 04/22/22 0115    Specimen: Swab from Nasal Cavity Updated: 04/22/22 0157     COVID19 Not Detected    Narrative:      Fact sheet for providers: https://www.fda.gov/media/117170/download     Fact sheet for patients: https://www.fda.gov/media/954857/download    Test performed by PCR.    Consider negative results in combination with clinical observations, patient history, and epidemiological information.    POC Urinalysis Dipstick [[id number]]  (Abnormal) Collected: 04/21/22 2206    Specimen: Urine, Clean Catch Updated: 04/21/22 2207     Color Dark Yellow     Clarity, UA Clear     Glucose, UA Negative mg/dL      Bilirubin Negative     Ketones, UA Trace     Specific Gravity  1.030     Blood, UA Negative     pH, Urine 6.0     Protein, POC Trace mg/dL      Urobilinogen, UA Normal     Leukocytes Negative     Nitrite, UA Negative               Assessment   1. IUP at 25w5d  2. N/V/D           Plan   1. Observation     2. IVFs, Zofran, Phenergan   3.   CBC, CMP, Amylase, Lipase    New Medications Ordered This Visit   Medications   • sodium chloride 0.9 % flush 10 mL   • sodium chloride 0.9 % flush 10 mL   • lactated ringers infusion   • ondansetron (ZOFRAN) injection 4 mg   • promethazine (PHENERGAN) suppository 25 mg       Karly Duarte CNM  4/22/2022  08:23 EDT

## 2022-04-22 NOTE — NON STRESS TEST
Triage Note - Nursing Documentation  Labor and Delivery Admission Log    Romy Langley  : 2000  MRN: [id number]  CSN: [id number]    Date in / Time in:  2022  Time in:     Date out / Time out:  2022  Time out: 1500  Nurse: Sheila Bush RN    Patient Info: She is a 22 y.o. year old  at 25w5d with an DEQUAN of 2022, Date entered prior to episode creation who was seen on the [facility].    Chief Complaint:   Chief Complaint   Patient presents with   • Abdominal Cramping   • Hyperemesis Gravidarum       Provider Instructions / Disposition: Pt sent with prescription for nausea medication. Will return to office for follow up     Patient Active Problem List   Diagnosis   • Marginal insertion of umbilical cord affecting management of mother   • Hyperemesis       NST Documentation (Only applicable > 32 weeks):

## 2022-04-22 NOTE — DISCHARGE SUMMARY
Discharge Summary     Jose Langley  : 2000  MRN: [id number]  CSN: [id number]    Date of Admission: 2022   Date of Discharge:  2022       Admission Diagnosis: 1. Hyperemesis [R11.10], Nausea/vomiting/diarrhea   Discharge Diagnosis: 1. Same as above plus  2. Pregnancy at 25w5d     Hospital Course  Patient is a 22 y.o.  who at 25w5d was admitted for observation for IVFs due to N/V/D. This started yesterday. She was given Zofran IV and Phenergan supp x 1. The vomiting has stopped. She has tolerated clear liquids and feels ready to go home.    Min/max vitals past 24 hours:   Temp  Min: 98 °F (36.7 °C)  Max: 98 °F (36.7 °C)  BP  Min: 102/55  Max: 119/69  Pulse  Min: 92  Max: 98  Resp  Min: 16  Max: 16         Condition at Discharge: stable    Discharge labs  Lab Results   Component Value Date    WBC 12.78 (H) 2022    HGB 9.7 (L) 2022    HCT 28.0 (L) 2022     2022       Discharge Medications     Discharge Medications      Continue These Medications      Instructions Start Date   aspirin 81 MG EC tablet   81 mg, Oral, Daily      doxylamine 25 MG tablet  Commonly known as: UNISOM   25 mg, Oral, Nightly      famotidine 20 MG tablet  Commonly known as: PEPCID   20 mg, Oral, 2 Times Daily      ferrous sulfate 325 (65 FE) MG tablet   325 mg, Oral, Daily With Breakfast      prenatal (CLASSIC) vitamin  tablet  Generic drug: prenatal vitamin   Oral, Daily      promethazine 12.5 MG tablet  Commonly known as: PHENERGAN   12.5 mg, Oral, Every 6 Hours PRN      vitamin B-6 25 MG tablet  Commonly known as: PYRIDOXINE   25 mg, Oral, Nightly             Discharge Disposition Home or Self Care       Follow-up: As scheduled with [facility] KEARA Duarte, JASPAL  2022

## 2022-04-25 ENCOUNTER — TELEPHONE (OUTPATIENT)
Dept: OBSTETRICS AND GYNECOLOGY | Facility: CLINIC | Age: 22
End: 2022-04-25

## 2022-04-25 RX ORDER — FERROUS SULFATE 325(65) MG
325 TABLET ORAL 2 TIMES DAILY
Qty: 30 TABLET | Refills: 5 | Status: SHIPPED | OUTPATIENT
Start: 2022-04-25 | End: 2022-05-25

## 2022-04-25 NOTE — TELEPHONE ENCOUNTER
----- Message from Chantal Lynn MA sent at 4/25/2022  1:05 PM EDT -----  Patient is requesting RX for iron to be sent to [facility]. [facility] would not refill, because it was too soon.      Dr [facility]      Thank You

## 2022-05-09 ENCOUNTER — HOSPITAL ENCOUNTER (OUTPATIENT)
Facility: HOSPITAL | Age: 22
Setting detail: OBSERVATION
Discharge: HOME | End: 2022-05-13
Attending: OBSTETRICS & GYNECOLOGY | Admitting: OBSTETRICS & GYNECOLOGY

## 2022-05-09 LAB
ALBUMIN SERPL-MCNC: 3.4 G/DL (ref 3.5–5.2)
ALBUMIN/GLOB SERPL: 1.2 G/DL
ALP SERPL-CCNC: 93 U/L (ref 39–117)
ALT SERPL W P-5'-P-CCNC: 7 U/L (ref 1–33)
ANION GAP SERPL CALCULATED.3IONS-SCNC: 12 MMOL/L (ref 5–15)
AST SERPL-CCNC: 13 U/L (ref 1–32)
BILIRUB BLD-MCNC: NEGATIVE MG/DL
BILIRUB SERPL-MCNC: 0.3 MG/DL (ref 0–1.2)
BUN SERPL-MCNC: 9 MG/DL (ref 6–20)
BUN/CREAT SERPL: 17.3 (ref 7–25)
CALCIUM SPEC-SCNC: 8.5 MG/DL (ref 8.6–10.5)
CHLORIDE SERPL-SCNC: 98 MMOL/L (ref 98–107)
CLARITY, POC: ABNORMAL
CO2 SERPL-SCNC: 23 MMOL/L (ref 22–29)
COLOR UR: ABNORMAL
CREAT SERPL-MCNC: 0.52 MG/DL (ref 0.57–1)
EGFRCR SERPLBLD CKD-EPI 2021: 135.8 ML/MIN/1.73
GLOBULIN UR ELPH-MCNC: 2.8 GM/DL
GLUCOSE SERPL-MCNC: 93 MG/DL (ref 65–99)
GLUCOSE UR STRIP-MCNC: NEGATIVE MG/DL
KETONES UR QL: NEGATIVE
LEUKOCYTE EST, POC: NEGATIVE
NITRITE UR-MCNC: NEGATIVE MG/ML
PH UR: 5 [PH] (ref 5–8)
POTASSIUM SERPL-SCNC: 3.9 MMOL/L (ref 3.5–5.2)
PROT SERPL-MCNC: 6.2 G/DL (ref 6–8.5)
PROT UR STRIP-MCNC: ABNORMAL MG/DL
RBC # UR STRIP: NEGATIVE /UL
SODIUM SERPL-SCNC: 133 MMOL/L (ref 136–145)
SP GR UR: 1.03 (ref 1–1.03)
UROBILINOGEN UR QL: NORMAL

## 2022-05-09 PROCEDURE — G0463 HOSPITAL OUTPT CLINIC VISIT: HCPCS

## 2022-05-09 PROCEDURE — 96361 HYDRATE IV INFUSION ADD-ON: CPT

## 2022-05-09 PROCEDURE — 96374 THER/PROPH/DIAG INJ IV PUSH: CPT

## 2022-05-09 PROCEDURE — G0378 HOSPITAL OBSERVATION PER HR: HCPCS

## 2022-05-09 PROCEDURE — 81002 URINALYSIS NONAUTO W/O SCOPE: CPT | Performed by: OBSTETRICS & GYNECOLOGY

## 2022-05-09 PROCEDURE — 25010000002 ONDANSETRON PER 1 MG: Performed by: OBSTETRICS & GYNECOLOGY

## 2022-05-09 PROCEDURE — 59025 FETAL NON-STRESS TEST: CPT

## 2022-05-09 PROCEDURE — 80053 COMPREHEN METABOLIC PANEL: CPT | Performed by: OBSTETRICS & GYNECOLOGY

## 2022-05-09 PROCEDURE — 36415 COLL VENOUS BLD VENIPUNCTURE: CPT | Performed by: OBSTETRICS & GYNECOLOGY

## 2022-05-09 RX ORDER — PROMETHAZINE HYDROCHLORIDE 25 MG/1
25 SUPPOSITORY RECTAL ONCE
Status: DISCONTINUED | OUTPATIENT
Start: 2022-05-09 | End: 2022-05-09

## 2022-05-09 RX ORDER — ONDANSETRON 2 MG/ML
4 INJECTION INTRAMUSCULAR; INTRAVENOUS EVERY 6 HOURS PRN
Status: DISCONTINUED | OUTPATIENT
Start: 2022-05-09 | End: 2022-05-11

## 2022-05-09 RX ORDER — PROMETHAZINE HYDROCHLORIDE 12.5 MG/1
25 SUPPOSITORY RECTAL ONCE
Status: COMPLETED | OUTPATIENT
Start: 2022-05-09 | End: 2022-05-09

## 2022-05-09 RX ORDER — ONDANSETRON 2 MG/ML
4 INJECTION INTRAMUSCULAR; INTRAVENOUS EVERY 6 HOURS PRN
Status: DISCONTINUED | OUTPATIENT
Start: 2022-05-09 | End: 2022-05-13 | Stop reason: HOSPADM

## 2022-05-09 RX ORDER — PROMETHAZINE HYDROCHLORIDE 25 MG/1
25 SUPPOSITORY RECTAL EVERY 6 HOURS PRN
Status: ACTIVE | OUTPATIENT
Start: 2022-05-09 | End: 2022-05-11

## 2022-05-09 RX ORDER — SODIUM CHLORIDE 9 MG/ML
999 INJECTION, SOLUTION INTRAVENOUS ONCE
Status: COMPLETED | OUTPATIENT
Start: 2022-05-09 | End: 2022-05-09

## 2022-05-09 RX ORDER — DEXTROSE AND SODIUM CHLORIDE 5; .9 G/100ML; G/100ML
125 INJECTION, SOLUTION INTRAVENOUS CONTINUOUS
Status: DISCONTINUED | OUTPATIENT
Start: 2022-05-09 | End: 2022-05-13 | Stop reason: HOSPADM

## 2022-05-09 RX ADMIN — DEXTROSE AND SODIUM CHLORIDE 125 ML/HR: 5; 900 INJECTION, SOLUTION INTRAVENOUS at 21:43

## 2022-05-09 RX ADMIN — ONDANSETRON 4 MG: 2 INJECTION INTRAMUSCULAR; INTRAVENOUS at 20:10

## 2022-05-09 RX ADMIN — SODIUM CHLORIDE 1000 ML: 9 INJECTION, SOLUTION INTRAVENOUS at 18:13

## 2022-05-09 RX ADMIN — PROMETHAZINE HYDROCHLORIDE 25 MG: 12.5 SUPPOSITORY RECTAL at 18:41

## 2022-05-09 RX ADMIN — SODIUM CHLORIDE 999 ML/HR: 9 INJECTION, SOLUTION INTRAVENOUS at 19:30

## 2022-05-09 NOTE — NURSING NOTE
Discussed POC with Dr. Rodriguez, orders received to admit for observation overnight, move to mother-baby, q shift NST, meds and fluids orders received.

## 2022-05-10 PROCEDURE — 96375 TX/PRO/DX INJ NEW DRUG ADDON: CPT

## 2022-05-10 PROCEDURE — 96361 HYDRATE IV INFUSION ADD-ON: CPT

## 2022-05-10 PROCEDURE — 96376 TX/PRO/DX INJ SAME DRUG ADON: CPT

## 2022-05-10 PROCEDURE — 25010000002 ONDANSETRON PER 1 MG: Performed by: MIDWIFE

## 2022-05-10 PROCEDURE — 99219 PR INITIAL OBSERVATION CARE/DAY 50 MINUTES: CPT | Performed by: MIDWIFE

## 2022-05-10 PROCEDURE — G0378 HOSPITAL OBSERVATION PER HR: HCPCS

## 2022-05-10 RX ORDER — FAMOTIDINE 10 MG/ML
20 INJECTION, SOLUTION INTRAVENOUS EVERY 12 HOURS
Status: DISCONTINUED | OUTPATIENT
Start: 2022-05-10 | End: 2022-05-13 | Stop reason: HOSPADM

## 2022-05-10 RX ADMIN — DEXTROSE AND SODIUM CHLORIDE 125 ML/HR: 5; 900 INJECTION, SOLUTION INTRAVENOUS at 05:32

## 2022-05-10 RX ADMIN — DEXTROSE AND SODIUM CHLORIDE 125 ML/HR: 5; 900 INJECTION, SOLUTION INTRAVENOUS at 19:18

## 2022-05-10 RX ADMIN — ONDANSETRON 4 MG: 2 INJECTION INTRAMUSCULAR; INTRAVENOUS at 19:14

## 2022-05-10 RX ADMIN — ONDANSETRON 4 MG: 2 INJECTION INTRAMUSCULAR; INTRAVENOUS at 09:41

## 2022-05-10 RX ADMIN — FAMOTIDINE 20 MG: 10 INJECTION, SOLUTION INTRAVENOUS at 21:15

## 2022-05-10 RX ADMIN — FAMOTIDINE 20 MG: 10 INJECTION, SOLUTION INTRAVENOUS at 09:41

## 2022-05-10 RX ADMIN — DEXTROSE AND SODIUM CHLORIDE 125 ML/HR: 5; 900 INJECTION, SOLUTION INTRAVENOUS at 12:18

## 2022-05-10 NOTE — H&P
: 2000  MRN: [id number]  [facility]: [id number]    History and Physical    Chief Complaint   Patient presents with   • Non-stress Test     Nausea/vomiting      Romy Langley is a 22 y.o. year old  with an Estimated Date of Delivery: 22 currently at 28w2d presenting with nausea, vomiting and diarrhea.  Her symptoms started at 0230 yesterday. She arrived on [facility] @ 1630. She was admitted overnight for observation. She received Phenergan supp and Zofran IV x 1 since arrival. She states she continues to be nauseated this am and ate a little bit of breakfast. She has been taking Phenergan at home. Baby is active. She denies cramping or ctxs.    She has received prenatal care with [facility]. It has been complicated by nausea and vomiting      OB History    Para Term  AB Living   1 0 0 0 0 0   SAB IAB Ectopic Molar Multiple Live Births   0 0 0 0 0 0      # Outcome Date GA Lbr Jay/2nd Weight Sex Delivery Anes PTL Lv   1 Current              Past Medical History:   Diagnosis Date   • Thyroid activity decreased    • Urinary tract infection      Past Surgical History:   Procedure Laterality Date   • FOOT/TOE TENDON REPAIR Left 2020    Procedure: RECONSTRUCTION PT TENDON LEFT;  Surgeon: Uziel Garcia DPM;  Location: [facility] OR;  Service: Podiatry   • NO PAST SURGERIES     • TARSAL TUNNEL RELEASE Left 2020    Procedure: EXCISION TARSAL BONE LEFT;  Surgeon: Uziel Garcia DPM;  Location: [facility] OR;  Service: Podiatry   • WISDOM TOOTH EXTRACTION         Current Facility-Administered Medications:   •  dextrose 5 % and sodium chloride 0.9 % infusion, 125 mL/hr, Intravenous, Continuous, Karly Duarte CNM, Last Rate: 125 mL/hr at 05/10/22 0532, 125 mL/hr at 05/10/22 0532  •  famotidine (PEPCID) injection 20 mg, 20 mg, Intravenous, Q12H, Karly Duarte CNM  •  ondansetron (ZOFRAN) injection 4 mg, 4 mg, Intravenous, Q6H PRN, Karly Duarte CNM, 4 mg at  "05/09/22 2010  •  ondansetron (ZOFRAN) injection 4 mg, 4 mg, Intravenous, Q6H PRN, Karly Duarte CNM  •  promethazine (PHENERGAN) suppository 25 mg, 25 mg, Rectal, Q6H PRN, Lukas Rodriguez MD    No Known Allergies    Review of Systems     Respiratory ROS: no cough, shortness of breath, or wheezing  Cardiovascular ROS: no chest pain or dyspnea on exertion  Gastrointestinal ROS: positive for - diarrhea and nausea/vomiting  Genito-Urinary ROS: no dysuria, trouble voiding, or hematuria        Objective   BP (!) 82/42 (BP Location: Right arm, Patient Position: Lying)   Pulse 79   Temp 98 °F (36.7 °C) (Oral)   Resp 18   Ht 167.6 cm (66\")   Wt 74.9 kg (165 lb 1.6 oz)   LMP 10/16/2021   SpO2 98%   BMI 26.65 kg/m²     Physical Exam: General Appearance: alert, appears stated age and cooperative  Lungs: clear to auscultation, respirations regular, respirations even and respirations unlabored  Heart: regular rhythm and normal rate and normal S1, S2  Abdomen: uterus gravid and nontender  Extremities: moves extremities well, no edema, no cyanosis and no redness  Skin: no bleeding, bruising or rash and no lesions noted  Neurologic: Mental Status orientated to person, place, time and situation, Speech normal content and execusion       FHT's: reassuring and category 1      Cervix: was not checked.   Presentation: cephalic   Contractions: none         Prenatal Labs  Lab Results   Component Value Date    HGB 9.7 (L) 04/22/2022    HEPBSAG Negative 01/12/2022    ABSCRN Negative 01/12/2022    [id number] Non Reactive 01/12/2022    HEPCVIRUSABY 0.2 01/12/2022    URINECX Final report 02/09/2022       Current Labs Reviewed   Lab Results (last 24 hours)     Procedure Component Value Units Date/Time    Comprehensive Metabolic Panel [[id number]]  (Abnormal) Collected: 05/09/22 1825    Specimen: Blood Updated: 05/09/22 1901     Glucose 93 mg/dL      BUN 9 mg/dL      Creatinine 0.52 mg/dL      Sodium 133 mmol/L      Potassium " 3.9 mmol/L      Chloride 98 mmol/L      CO2 23.0 mmol/L      Calcium 8.5 mg/dL      Total Protein 6.2 g/dL      Albumin 3.40 g/dL      ALT (SGPT) 7 U/L      AST (SGOT) 13 U/L      Alkaline Phosphatase 93 U/L      Total Bilirubin 0.3 mg/dL      Globulin 2.8 gm/dL      A/G Ratio 1.2 g/dL      BUN/Creatinine Ratio 17.3     Anion Gap 12.0 mmol/L      eGFR 135.8 mL/min/1.73      Comment: National Kidney Foundation and American Society of Nephrology (ASN) Task Force recommended calculation based on the Chronic Kidney Disease Epidemiology Collaboration (CKD-EPI) equation refit without adjustment for race.       Narrative:      GFR Normal >60  Chronic Kidney Disease <60  Kidney Failure <15      POC Urinalysis Dipstick [[id number]]  (Abnormal) Collected: 05/09/22 1656    Specimen: Urine, Clean Catch Updated: 05/09/22 1658     Color Dark Yellow     Clarity, UA Slightly Cloudy     Glucose, UA Negative mg/dL      Bilirubin Negative     Ketones, UA Negative     Specific Gravity  1.030     Blood, UA Negative     pH, Urine 5.0     Protein, POC Trace mg/dL      Urobilinogen, UA Normal     Leukocytes Negative     Nitrite, UA Negative               Assessment   1. IUP at 28w2d  2. N/V/D           Plan   1. Observation     2. CMP  3. IVFs  4. Pepcid 20 mg IV q 12 hrs    New Medications Ordered This Visit   Medications   • sodium chloride 0.9 % bolus 1,000 mL   • promethazine (PHENERGAN) suppository 25 mg   • sodium chloride 0.9 % infusion   • dextrose 5 % and sodium chloride 0.9 % infusion   • ondansetron (ZOFRAN) injection 4 mg   • ondansetron (ZOFRAN) injection 4 mg   • promethazine (PHENERGAN) suppository 25 mg   • famotidine (PEPCID) injection 20 mg       Karly Duarte CNM  5/10/2022  09:03 EDT

## 2022-05-11 ENCOUNTER — HOSPITAL ENCOUNTER (OUTPATIENT)
Facility: HOSPITAL | Age: 22
End: 2022-05-11
Attending: OBSTETRICS & GYNECOLOGY | Admitting: OBSTETRICS & GYNECOLOGY

## 2022-05-11 ENCOUNTER — APPOINTMENT (OUTPATIENT)
Dept: GENERAL RADIOLOGY | Facility: HOSPITAL | Age: 22
End: 2022-05-11

## 2022-05-11 LAB
BASOPHILS # BLD AUTO: 0.05 10*3/MM3 (ref 0–0.2)
BASOPHILS NFR BLD AUTO: 0.3 % (ref 0–1.5)
D-LACTATE SERPL-SCNC: 1.3 MMOL/L (ref 0.5–2)
DEPRECATED RDW RBC AUTO: 38.2 FL (ref 37–54)
EOSINOPHIL # BLD AUTO: 0.01 10*3/MM3 (ref 0–0.4)
EOSINOPHIL NFR BLD AUTO: 0.1 % (ref 0.3–6.2)
ERYTHROCYTE [DISTWIDTH] IN BLOOD BY AUTOMATED COUNT: 11.9 % (ref 12.3–15.4)
HCT VFR BLD AUTO: 31.3 % (ref 34–46.6)
HGB BLD-MCNC: 10.4 G/DL (ref 12–15.9)
IMM GRANULOCYTES # BLD AUTO: 0.11 10*3/MM3 (ref 0–0.05)
IMM GRANULOCYTES NFR BLD AUTO: 0.6 % (ref 0–0.5)
LYMPHOCYTES # BLD AUTO: 0.78 10*3/MM3 (ref 0.7–3.1)
LYMPHOCYTES NFR BLD AUTO: 4.2 % (ref 19.6–45.3)
MCH RBC QN AUTO: 29.5 PG (ref 26.6–33)
MCHC RBC AUTO-ENTMCNC: 33.2 G/DL (ref 31.5–35.7)
MCV RBC AUTO: 88.7 FL (ref 79–97)
MONOCYTES # BLD AUTO: 1.19 10*3/MM3 (ref 0.1–0.9)
MONOCYTES NFR BLD AUTO: 6.4 % (ref 5–12)
NEUTROPHILS NFR BLD AUTO: 16.36 10*3/MM3 (ref 1.7–7)
NEUTROPHILS NFR BLD AUTO: 88.4 % (ref 42.7–76)
NRBC BLD AUTO-RTO: 0 /100 WBC (ref 0–0.2)
PLATELET # BLD AUTO: 287 10*3/MM3 (ref 140–450)
PMV BLD AUTO: 9.1 FL (ref 6–12)
RBC # BLD AUTO: 3.53 10*6/MM3 (ref 3.77–5.28)
SARS-COV-2 RNA PNL SPEC NAA+PROBE: NOT DETECTED
WBC NRBC COR # BLD: 18.5 10*3/MM3 (ref 3.4–10.8)

## 2022-05-11 PROCEDURE — 25010000002 AZITHROMYCIN PER 500 MG: Performed by: OBSTETRICS & GYNECOLOGY

## 2022-05-11 PROCEDURE — G0378 HOSPITAL OBSERVATION PER HR: HCPCS

## 2022-05-11 PROCEDURE — 83605 ASSAY OF LACTIC ACID: CPT | Performed by: OBSTETRICS & GYNECOLOGY

## 2022-05-11 PROCEDURE — 96372 THER/PROPH/DIAG INJ SC/IM: CPT

## 2022-05-11 PROCEDURE — 25010000002 BETAMETHASONE ACET & SOD PHOS PER 4 MG: Performed by: OBSTETRICS & GYNECOLOGY

## 2022-05-11 PROCEDURE — 25010000002 CEFTRIAXONE SODIUM-DEXTROSE 1-3.74 GM-%(50ML) RECONSTITUTED SOLUTION: Performed by: OBSTETRICS & GYNECOLOGY

## 2022-05-11 PROCEDURE — 96361 HYDRATE IV INFUSION ADD-ON: CPT

## 2022-05-11 PROCEDURE — 85025 COMPLETE CBC W/AUTO DIFF WBC: CPT | Performed by: PHYSICIAN ASSISTANT

## 2022-05-11 PROCEDURE — 87040 BLOOD CULTURE FOR BACTERIA: CPT | Performed by: OBSTETRICS & GYNECOLOGY

## 2022-05-11 PROCEDURE — 59025 FETAL NON-STRESS TEST: CPT | Performed by: OBSTETRICS & GYNECOLOGY

## 2022-05-11 PROCEDURE — 71045 X-RAY EXAM CHEST 1 VIEW: CPT

## 2022-05-11 PROCEDURE — 25010000002 ONDANSETRON PER 1 MG: Performed by: MIDWIFE

## 2022-05-11 PROCEDURE — 63710000001 DIPHENHYDRAMINE PER 50 MG: Performed by: OBSTETRICS & GYNECOLOGY

## 2022-05-11 PROCEDURE — 99225 PR SBSQ OBSERVATION CARE/DAY 25 MINUTES: CPT | Performed by: OBSTETRICS & GYNECOLOGY

## 2022-05-11 PROCEDURE — 96376 TX/PRO/DX INJ SAME DRUG ADON: CPT

## 2022-05-11 PROCEDURE — 87635 SARS-COV-2 COVID-19 AMP PRB: CPT | Performed by: OBSTETRICS & GYNECOLOGY

## 2022-05-11 RX ORDER — LANOLIN ALCOHOL/MO/W.PET/CERES
25 CREAM (GRAM) TOPICAL NIGHTLY
Status: DISCONTINUED | OUTPATIENT
Start: 2022-05-11 | End: 2022-05-13 | Stop reason: HOSPADM

## 2022-05-11 RX ORDER — DIPHENHYDRAMINE HCL 25 MG
25 CAPSULE ORAL NIGHTLY
Status: DISCONTINUED | OUTPATIENT
Start: 2022-05-11 | End: 2022-05-13 | Stop reason: HOSPADM

## 2022-05-11 RX ORDER — BETAMETHASONE SODIUM PHOSPHATE AND BETAMETHASONE ACETATE 3; 3 MG/ML; MG/ML
12 INJECTION, SUSPENSION INTRA-ARTICULAR; INTRALESIONAL; INTRAMUSCULAR; SOFT TISSUE EVERY 24 HOURS
Status: COMPLETED | OUTPATIENT
Start: 2022-05-11 | End: 2022-05-12

## 2022-05-11 RX ORDER — METOCLOPRAMIDE 10 MG/1
10 TABLET ORAL
Status: DISCONTINUED | OUTPATIENT
Start: 2022-05-11 | End: 2022-05-13 | Stop reason: HOSPADM

## 2022-05-11 RX ORDER — CEFTRIAXONE 1 G/50ML
1 INJECTION, SOLUTION INTRAVENOUS EVERY 24 HOURS
Status: DISCONTINUED | OUTPATIENT
Start: 2022-05-11 | End: 2022-05-13 | Stop reason: HOSPADM

## 2022-05-11 RX ADMIN — CEFTRIAXONE 1 G: 1 INJECTION, SOLUTION INTRAVENOUS at 10:43

## 2022-05-11 RX ADMIN — FAMOTIDINE 20 MG: 10 INJECTION, SOLUTION INTRAVENOUS at 22:37

## 2022-05-11 RX ADMIN — PYRIDOXINE HCL TAB 50 MG 25 MG: 50 TAB at 23:00

## 2022-05-11 RX ADMIN — DEXTROSE AND SODIUM CHLORIDE 125 ML/HR: 5; 900 INJECTION, SOLUTION INTRAVENOUS at 01:30

## 2022-05-11 RX ADMIN — FAMOTIDINE 20 MG: 10 INJECTION, SOLUTION INTRAVENOUS at 10:43

## 2022-05-11 RX ADMIN — SODIUM CHLORIDE 500 MG: 900 INJECTION, SOLUTION INTRAVENOUS at 11:42

## 2022-05-11 RX ADMIN — ONDANSETRON 4 MG: 2 INJECTION INTRAMUSCULAR; INTRAVENOUS at 07:40

## 2022-05-11 RX ADMIN — METOCLOPRAMIDE 10 MG: 10 TABLET ORAL at 16:31

## 2022-05-11 RX ADMIN — DIPHENHYDRAMINE HYDROCHLORIDE 25 MG: 25 CAPSULE ORAL at 22:37

## 2022-05-11 RX ADMIN — BETAMETHASONE SODIUM PHOSPHATE AND BETAMETHASONE ACETATE 12 MG: 3; 3 INJECTION, SUSPENSION INTRA-ARTICULAR; INTRALESIONAL; INTRAMUSCULAR at 12:57

## 2022-05-11 RX ADMIN — ONDANSETRON 4 MG: 2 INJECTION INTRAMUSCULAR; INTRAVENOUS at 01:27

## 2022-05-11 NOTE — PLAN OF CARE
Goal Outcome Evaluation:           Progress: improving  Outcome Evaluation: no nausea or comitting, has rested through the shift.  VSS denies any pain.

## 2022-05-11 NOTE — NURSING NOTE
Transferred to [facility] per order of Dr. Galindo. Report given to Sheila SINGLETARY and Steff SINGLETARY.

## 2022-05-11 NOTE — SIGNIFICANT NOTE
0545 up to bathroom, void lemon urine,  on return call out complaining of chilling warm blankets placed on her temp 98.6. reflex 2+.  DENIES any extra nausea, decllined phenergan   Will continue to evaluate temperatur.

## 2022-05-11 NOTE — PROGRESS NOTES
"[facility]  Obstetric Progress Note      Subjective   History 23yo G1 admitted on 5/9/2022 at 28-2/7 weeks with nausea vomiting diarrhea.  Patient reports that her GI symptoms have improved although she is still feeling nauseous she has not had any emesis and diarrhea is improved.  However early this morning she had a rather abrupt onset of chest pain and shortness of breath.  Has started experiencing some coughing after doing her blow bottles.  Chest x-ray obtained at this morning revealed an extensive right lung pneumonia.           Objective     Vital signs in last 24 hours:    Vital Signs Range for the last 24 hours  Temperature: Temp:  [97.5 °F (36.4 °C)-99.6 °F (37.6 °C)] 99.6 °F (37.6 °C)   Temp Source: Temp src: Temporal   BP: BP: ()/(56-71) 113/71   Pulse: Heart Rate:  [] 112   Respirations: Resp:  [16] 16   SPO2: SpO2:  [96 %-100 %] 96 %   O2 Amount (l/min):     O2 Devices Device (Oxygen Therapy): room air   Weight:         Flowsheet Rows    Flowsheet Row First Filed Value   Admission Height 167.6 cm (66\") Documented at 05/09/2022 1642   Admission Weight 74.9 kg (165 lb 1.6 oz) Documented at 05/09/2022 1642          Intake/Output last 24 hours:      Intake/Output Summary (Last 24 hours) at 5/11/2022 0952  Last data filed at 5/11/2022 0557  Gross per 24 hour   Intake 3075 ml   Output 950 ml   Net 2125 ml       Intake/Output this shift:    No intake/output data recorded.  Exam  Physical Exam:  General: Patient is in no acute distress   Heart normal rate, regular rhythm    Lungs Chest: decreased air entry noted right upper lung         Extremities Exam of extremities: no pedal edema                       Assessment & Plan       Assessment:  1.Pregnancy  28w4d gestation with hyperemesis improving   2.New onset chest pain and shortness of breath  3.Right lung pneumonia  4.Anemia      Plan:  1. MD consulted and will begin antibiotics  2. Stat CBC         Jackelin Pradhan PA-C  5/11/2022  09:52 " EDT

## 2022-05-11 NOTE — PROGRESS NOTES
Daily Progress Note    Service: OB/GYN        Hospital Day: 3   Attending: Lukas Rodriguez*     Diagnoses:         IUP at 28+3 weeks  Right lung pneumonia, possible aspiration related  Nausea and emesis    Subjective:                                                                                            She reports a worsening cough starting this morning.  The cough is productive.  Ongoing nausea and vomiting  Appropriate fetal movement    ROS: She denies dizziness, lightheadedness, leg pain or leg swelling. All other systems reviewed and negative.    Objective:      24hour vital signs:   Temp:  [97.5 °F (36.4 °C)-99.6 °F (37.6 °C)] 98.8 °F (37.1 °C)  Heart Rate:  [] 105  Resp:  [16-18] 18  BP: ()/(56-71) 106/69     I/O last 3 completed shifts:  In: 6075 [P.O.:75; I.V.:6000]  Out: 1100 [Urine:1100]     General:    alert and no distress   HEENT:   NCAT, MMM   Cardiovascular:   RRR, no murmurs   Pulmonary:   Diminished breath sounds on the right, no wheezing, no crackles, unlabored breathing   Abdomen:  Gravid, active bowel sounds, no TTP, no distension       Extremities:  no edema, wwp, SCDs in place b/l        Labs:  Results from last 7 days   Lab Units 05/11/22  1002   WBC 10*3/mm3 18.50*   HEMOGLOBIN g/dL 10.4*   HEMATOCRIT % 31.3*   PLATELETS 10*3/mm3 287           Results from last 7 days   Lab Units 05/09/22  1825   SODIUM mmol/L 133*   POTASSIUM mmol/L 3.9   CHLORIDE mmol/L 98   CO2 mmol/L 23.0   BUN mg/dL 9   CREATININE mg/dL 0.52*   GLUCOSE mg/dL 93   CALCIUM mg/dL 8.5*        Other Notable Lab and Imaging Results:      Medications:  Scheduled Meds:  azithromycin, 500 mg, Intravenous, Q24H  betamethasone acetate-betamethasone sodium phosphate, 12 mg, Intramuscular, Q24H  cefTRIAXone, 1 g, Intravenous, Q24H  famotidine, 20 mg, Intravenous, Q12H           Continuous Infusions:  dextrose 5 % and sodium chloride 0.9 %, 125 mL/hr, Last Rate: 125 mL/hr (05/11/22 0130)            PRN Meds:   ondansetron  •  ondansetron  •  promethazine      .Assessment:     22 y.o. year old  female  IUP at 28+3 weeks  Right lung pneumonia, possible aspiration related  Nausea and emesis    Plan:     - Pain: well controlled with current regimen  - CV: No issues  - DVT ppx: SCD's + ambulate  - Pulm: CXR reveals extensive right lung pneumonia this morning.  Start Azithromycin 500 mg IV q 24 hours + Rocephin 1 g IV q 24 hours.  Blood cultures ordered.  CBC with differential ordered.  Lactate ordered.  - GI: Zofran prn nausea  - : No voiding issues. Adequate UOP.   - Heme: Mild anemia. Repeat CBC with Diff tomorrow AM.   - FEN: D5 normal saline at 125 cc/h.  Advance diet as tolerated.  - Endocrine: No issues  - Move to labor duran for continuous fetal monitoring.  Betamethasone #1 ordered.    Parvez Galindo MD  Obstetrics and Gynecology  [facility]

## 2022-05-12 VITALS
DIASTOLIC BLOOD PRESSURE: 56 MMHG | TEMPERATURE: 97.9 F | HEART RATE: 88 BPM | HEIGHT: 66 IN | RESPIRATION RATE: 16 BRPM | OXYGEN SATURATION: 99 % | BODY MASS INDEX: 26.53 KG/M2 | WEIGHT: 165.1 LBS | SYSTOLIC BLOOD PRESSURE: 103 MMHG

## 2022-05-12 LAB
BASOPHILS # BLD AUTO: 0.02 10*3/MM3 (ref 0–0.2)
BASOPHILS NFR BLD AUTO: 0.1 % (ref 0–1.5)
DEPRECATED RDW RBC AUTO: 37.7 FL (ref 37–54)
EOSINOPHIL # BLD AUTO: 0.01 10*3/MM3 (ref 0–0.4)
EOSINOPHIL NFR BLD AUTO: 0.1 % (ref 0.3–6.2)
ERYTHROCYTE [DISTWIDTH] IN BLOOD BY AUTOMATED COUNT: 11.8 % (ref 12.3–15.4)
HCT VFR BLD AUTO: 27 % (ref 34–46.6)
HGB BLD-MCNC: 9.2 G/DL (ref 12–15.9)
IMM GRANULOCYTES # BLD AUTO: 0.13 10*3/MM3 (ref 0–0.05)
IMM GRANULOCYTES NFR BLD AUTO: 0.7 % (ref 0–0.5)
LYMPHOCYTES # BLD AUTO: 1.23 10*3/MM3 (ref 0.7–3.1)
LYMPHOCYTES NFR BLD AUTO: 6.7 % (ref 19.6–45.3)
MCH RBC QN AUTO: 30 PG (ref 26.6–33)
MCHC RBC AUTO-ENTMCNC: 34.1 G/DL (ref 31.5–35.7)
MCV RBC AUTO: 87.9 FL (ref 79–97)
MONOCYTES # BLD AUTO: 0.9 10*3/MM3 (ref 0.1–0.9)
MONOCYTES NFR BLD AUTO: 4.9 % (ref 5–12)
NEUTROPHILS NFR BLD AUTO: 15.96 10*3/MM3 (ref 1.7–7)
NEUTROPHILS NFR BLD AUTO: 87.5 % (ref 42.7–76)
NRBC BLD AUTO-RTO: 0 /100 WBC (ref 0–0.2)
PLATELET # BLD AUTO: 273 10*3/MM3 (ref 140–450)
PMV BLD AUTO: 9.2 FL (ref 6–12)
RBC # BLD AUTO: 3.07 10*6/MM3 (ref 3.77–5.28)
WBC NRBC COR # BLD: 18.25 10*3/MM3 (ref 3.4–10.8)

## 2022-05-12 PROCEDURE — 99225 PR SBSQ OBSERVATION CARE/DAY 25 MINUTES: CPT | Performed by: MIDWIFE

## 2022-05-12 PROCEDURE — G0378 HOSPITAL OBSERVATION PER HR: HCPCS

## 2022-05-12 PROCEDURE — 85025 COMPLETE CBC W/AUTO DIFF WBC: CPT | Performed by: OBSTETRICS & GYNECOLOGY

## 2022-05-12 PROCEDURE — 25010000002 AZITHROMYCIN PER 500 MG: Performed by: OBSTETRICS & GYNECOLOGY

## 2022-05-12 PROCEDURE — 96372 THER/PROPH/DIAG INJ SC/IM: CPT

## 2022-05-12 PROCEDURE — 96376 TX/PRO/DX INJ SAME DRUG ADON: CPT

## 2022-05-12 PROCEDURE — 25010000002 CEFTRIAXONE SODIUM-DEXTROSE 1-3.74 GM-%(50ML) RECONSTITUTED SOLUTION: Performed by: OBSTETRICS & GYNECOLOGY

## 2022-05-12 PROCEDURE — 63710000001 DIPHENHYDRAMINE PER 50 MG: Performed by: OBSTETRICS & GYNECOLOGY

## 2022-05-12 PROCEDURE — 25010000002 BETAMETHASONE ACET & SOD PHOS PER 4 MG: Performed by: OBSTETRICS & GYNECOLOGY

## 2022-05-12 PROCEDURE — 99225 PR SBSQ OBSERVATION CARE/DAY 25 MINUTES: CPT | Performed by: NURSE PRACTITIONER

## 2022-05-12 RX ADMIN — DEXTROSE AND SODIUM CHLORIDE 125 ML/HR: 5; 900 INJECTION, SOLUTION INTRAVENOUS at 00:40

## 2022-05-12 RX ADMIN — PYRIDOXINE HCL TAB 50 MG 25 MG: 50 TAB at 21:24

## 2022-05-12 RX ADMIN — METOCLOPRAMIDE 10 MG: 10 TABLET ORAL at 17:32

## 2022-05-12 RX ADMIN — FAMOTIDINE 20 MG: 10 INJECTION, SOLUTION INTRAVENOUS at 10:55

## 2022-05-12 RX ADMIN — CEFTRIAXONE 1 G: 1 INJECTION, SOLUTION INTRAVENOUS at 10:55

## 2022-05-12 RX ADMIN — BETAMETHASONE SODIUM PHOSPHATE AND BETAMETHASONE ACETATE 12 MG: 3; 3 INJECTION, SUSPENSION INTRA-ARTICULAR; INTRALESIONAL; INTRAMUSCULAR at 13:38

## 2022-05-12 RX ADMIN — DEXTROSE AND SODIUM CHLORIDE 125 ML/HR: 5; 900 INJECTION, SOLUTION INTRAVENOUS at 08:25

## 2022-05-12 RX ADMIN — FAMOTIDINE 20 MG: 10 INJECTION, SOLUTION INTRAVENOUS at 21:24

## 2022-05-12 RX ADMIN — METOCLOPRAMIDE 10 MG: 10 TABLET ORAL at 08:25

## 2022-05-12 RX ADMIN — SODIUM CHLORIDE 500 MG: 900 INJECTION, SOLUTION INTRAVENOUS at 11:47

## 2022-05-12 RX ADMIN — METOCLOPRAMIDE 10 MG: 10 TABLET ORAL at 11:49

## 2022-05-12 RX ADMIN — DIPHENHYDRAMINE HYDROCHLORIDE 25 MG: 25 CAPSULE ORAL at 21:24

## 2022-05-12 NOTE — PROGRESS NOTES
Jose Langley  : 2000  MRN: [id number]  CSN: [id number]    Antepartum Progress Note    Subjective   Her nausea and vomiting have improved. She continues with a productive cough of yellow green sputum. Her breathing is better. She states she awakened Monday am vomiting in her sleep. She hadn't been taking her Pepcid regularly at home. She came to hospital Monday afternoon. She states yesterday am she awakened coughing with shortness of breath. Baby is active. She denies any contractions.      Objective     Min/max vitals past 24 hours:   Temp  Min: 97.8 °F (36.6 °C)  Max: 98.8 °F (37.1 °C)  BP  Min: 94/44  Max: 106/69  Pulse  Min: 79  Max: 105  Resp  Min: 18  Max: 18         General: well developed; well nourished  no acute distress   Heart: normal apical impulse  regular rate and rhythm   Lungs: breathing is unlabored  clear to auscultation bilaterally   Abdomen: gravid and nontender   FHT's: reassuring and appropriate for gestational age.   Cervix: was not checked.   Contractions: none   Back: NA   Extremities: normal appearance with no cyanosis or edema and no calf tenderness      Labs:   Lab Results (last 24 hours)     Procedure Component Value Units Date/Time    CBC & Differential [[id number]]  (Abnormal) Collected: 22    Specimen: Blood Updated: 22    Narrative:      The following orders were created for panel order CBC & Differential.  Procedure                               Abnormality         Status                     ---------                               -----------         ------                     CBC Auto Differential[[id number]]        Abnormal            Final result                 Please view results for these tests on the individual orders.    CBC Auto Differential [[id number]]  (Abnormal) Collected: 22    Specimen: Blood Updated: 22     WBC 18.25 10*3/mm3      RBC 3.07 10*6/mm3      Hemoglobin 9.2 g/dL      Hematocrit  27.0 %      MCV 87.9 fL      MCH 30.0 pg      MCHC 34.1 g/dL      RDW 11.8 %      RDW-SD 37.7 fl      MPV 9.2 fL      Platelets 273 10*3/mm3      Neutrophil % 87.5 %      Lymphocyte % 6.7 %      Monocyte % 4.9 %      Eosinophil % 0.1 %      Basophil % 0.1 %      Immature Grans % 0.7 %      Neutrophils, Absolute 15.96 10*3/mm3      Lymphocytes, Absolute 1.23 10*3/mm3      Monocytes, Absolute 0.90 10*3/mm3      Eosinophils, Absolute 0.01 10*3/mm3      Basophils, Absolute 0.02 10*3/mm3      Immature Grans, Absolute 0.13 10*3/mm3      nRBC 0.0 /100 WBC     COVID PRE-OP / PRE-PROCEDURE SCREENING ORDER (NO ISOLATION) - Swab, Nasal Cavity [[id number]]  (Normal) Collected: 05/11/22 0956    Specimen: Swab from Nasal Cavity Updated: 05/11/22 1048    Narrative:      The following orders were created for panel order COVID PRE-OP / PRE-PROCEDURE SCREENING ORDER (NO ISOLATION) - Swab, Nasal Cavity.  Procedure                               Abnormality         Status                     ---------                               -----------         ------                     COVID-19,[facility]...[[id number]]  Normal              Final result                 Please view results for these tests on the individual orders.    COVID-19,[facility] IN-HOUSE,Nasal Swab No Transport Media 3-4 HR TAT - Swab, Nasal Cavity [[id number]]  (Normal) Collected: 05/11/22 0956    Specimen: Swab from Nasal Cavity Updated: 05/11/22 1048     COVID19 Not Detected    Narrative:      Fact sheet for providers: https://www.fda.gov/media/660532/download     Fact sheet for patients: https://www.fda.gov/media/966054/download    Test performed by PCR.    Consider negative results in combination with clinical observations, patient history, and epidemiological information.    Lactic Acid, Plasma [[id number]]  (Normal) Collected: 05/11/22 1002    Specimen: Blood Updated: 05/11/22 1030     Lactate 1.3 mmol/L     CBC & Differential [[id number]]  (Abnormal) Collected:  05/11/22 1002    Specimen: Blood Updated: 05/11/22 1020    Narrative:      The following orders were created for panel order CBC & Differential.  Procedure                               Abnormality         Status                     ---------                               -----------         ------                     CBC Auto Differential[[id number]]        Abnormal            Final result                 Please view results for these tests on the individual orders.    CBC Auto Differential [[id number]]  (Abnormal) Collected: 05/11/22 1002    Specimen: Blood Updated: 05/11/22 1020     WBC 18.50 10*3/mm3      RBC 3.53 10*6/mm3      Hemoglobin 10.4 g/dL      Hematocrit 31.3 %      MCV 88.7 fL      MCH 29.5 pg      MCHC 33.2 g/dL      RDW 11.9 %      RDW-SD 38.2 fl      MPV 9.1 fL      Platelets 287 10*3/mm3      Neutrophil % 88.4 %      Lymphocyte % 4.2 %      Monocyte % 6.4 %      Eosinophil % 0.1 %      Basophil % 0.3 %      Immature Grans % 0.6 %      Neutrophils, Absolute 16.36 10*3/mm3      Lymphocytes, Absolute 0.78 10*3/mm3      Monocytes, Absolute 1.19 10*3/mm3      Eosinophils, Absolute 0.01 10*3/mm3      Basophils, Absolute 0.05 10*3/mm3      Immature Grans, Absolute 0.11 10*3/mm3      nRBC 0.0 /100 WBC     Blood Culture - Blood, Arm, Right [[id number]] Collected: 05/11/22 1002    Specimen: Blood from Arm, Right Updated: 05/11/22 1009    Blood Culture - Blood, Arm, Right [[id number]] Collected: 05/11/22 1002    Specimen: Blood from Arm, Right Updated: 05/11/22 1009             Assessment   1. IUP at 28w4d  2. Right lung pneumonia, possible aspiration  3. Nausea/vomiting resolved     Plan   1. Consulted with Dr Oquendo: continue IV antibiotics, IVFs. She will receive Celestone second dose this afternoon. Consult with Hospitalist for assessment and appropriate antibiotics.    Karly Duarte, APRN  5/12/2022  09:12 EDT

## 2022-05-12 NOTE — NON STRESS TEST
Romy Langley, a  at 28w3d with an DEQUAN of 2022, Date entered prior to episode creation, was seen at [facility] for a nonstress test.    Chief Complaint   Patient presents with   • Non-stress Test     Nausea/vomiting       Patient Active Problem List   Diagnosis   • Marginal insertion of umbilical cord affecting management of mother   • Hyperemesis       Start Time:   Stop Time:

## 2022-05-12 NOTE — CONSULTS
[facility]      Name:  Romy Langley   Age:  22 y.o.  Sex:  female  :  2000  MRN:  [id number]   Visit Number:  [id number]  Admission Date:  2022  Date Of Service:  22  Primary Care Physician:  Nadya Hidalgo MD    Consulting Physician:    JASPAL Rey    Referring Physician:    Jere    Reason For Consult:    Medical Management of Pneumonia during Pregnancy    Chief Complaint:     Pneumonia    History Of Presenting Illness:      Patient is a 22 year old female who is  with DEQUAN 2022 who presented to the hospital 5/10/2022 with complaints of nausea, vomiting, and diarrhea which started on Monday.  She was admitted for observation.  Patient was given IV fluids and anti-emetics with some improvement but developed some chest fullness and a cough.  CXR was obtained and noted right lung pneumonia which is likely aspiration related given presentation.  Patient had blood cultures drawn and was started on Rocephin and Azithromycin for antibiotic coverage.  Vital signs have been stable and patient has been afebrile.  Leukocytosis present with lab work, normal lactate, and grossly unremarkable CMP.  Fetal monitoring has been continued along with pulse oximetry monitoring.  She has no pertinent past medical history.    Patient is examined at bedside with mom present in the room.  Patient denies any shortness of breath, chest pain or pressure, and states she is no longer nauseated.  She is resting on room air with saturations at 97%.  She is currently tolerating a bland diet.    Review Of Systems:     All systems were reviewed and negative except for: chest fullness initially    Past Medical History: Patient  has a past medical history of Thyroid activity decreased and Urinary tract infection.    Past Surgical History: Patient  has a past surgical history that includes [place] tooth extraction; No past surgeries; Foot/Toe Tendon Repair  (Left, 1/30/2020); and Tarsal Tunnel Release (Left, 1/30/2020).    Social History: Patient  reports that she has never smoked. She has never used smokeless tobacco. She reports that she does not drink alcohol and does not use drugs.    Family History: Patient's family history includes Diabetes in her father and maternal grandfather; Heart attack in her maternal grandfather; Hypertension in her mother.    Allergies:      Patient has no known allergies.    Home Medications:    Prior to Admission Medications     Prescriptions Last Dose Informant Patient Reported? Taking?    aspirin (aspirin) 81 MG EC tablet Past Month  No Yes    Take 1 tablet by mouth Daily.    doxylamine (UNISOM) 25 MG tablet Past Month  No Yes    Take 1 tablet by mouth Every Night.    famotidine (PEPCID) 20 MG tablet Past Month  No Yes    Take 1 tablet by mouth 2 (Two) Times a Day.    ferrous sulfate 325 (65 FE) MG tablet Past Week  No Yes    Take 1 tablet by mouth Daily With Breakfast.    ferrous sulfate 325 (65 FE) MG tablet Past Week  No Yes    Take 1 tablet by mouth 2 (Two) Times a Day for 30 days.    prenatal vitamin (prenatal, CLASSIC, vitamin) tablet Past Week  Yes Yes    Take  by mouth Daily.    promethazine (PHENERGAN) 12.5 MG tablet 5/9/2022  No Yes    Take 1 tablet by mouth Every 6 (Six) Hours As Needed for Nausea or Vomiting.    vitamin B-6 (PYRIDOXINE) 25 MG tablet Past Month  No Yes    Take 1 tablet by mouth Every Night.           Medication Review:     I have reviewed the patient's active and prn medications.      Vital Signs:    Temp:  [97.8 °F (36.6 °C)-98 °F (36.7 °C)] 97.8 °F (36.6 °C)  Heart Rate:  [79-81] 81  BP: (94)/(44) 94/44        05/09/22  1642   Weight: 74.9 kg (165 lb 1.6 oz)       Body mass index is 26.65 kg/m².    Physical Exam:     General Appearance:  Alert and cooperative, pleasant young female, no acute distress on exam   Head:  Atraumatic and normocephalic.   Eyes: Conjunctivae and sclerae normal, no icterus. No  pallor.   Ears:  Ears with no abnormalities noted.   Throat: No oral lesions, no thrush, oral mucosa moist.   Neck: Supple, trachea midline   Back:   No kyphoscoliosis present. No tenderness to palpation.   Lungs:   Breath sounds heard bilaterally equally.  No crackles or wheezing. Unlabored on room air with stable saturations   Heart:  Normal S1 and S2, tachycardic, no murmur, no gallop, no rub. No JVD.   Abdomen:   Normal bowel sounds, no masses, no organomegaly. Patient is 28 wks pregnant with stable FHTs   Extremities: Supple, no edema, no cyanosis, no clubbing.   Pulses: Pulses palpable bilaterally.   Skin: No bleeding or rash.   Neurologic: Alert and oriented x 3. No facial asymmetry. Moves all four limbs.       Laboratory data:    Results from last 7 days   Lab Units 05/09/22  1825   SODIUM mmol/L 133*   POTASSIUM mmol/L 3.9   CHLORIDE mmol/L 98   CO2 mmol/L 23.0   BUN mg/dL 9   CREATININE mg/dL 0.52*   CALCIUM mg/dL 8.5*   BILIRUBIN mg/dL 0.3   ALK PHOS U/L 93   ALT (SGPT) U/L 7   AST (SGOT) U/L 13   GLUCOSE mg/dL 93     Results from last 7 days   Lab Units 05/12/22  0605 05/11/22  1002   WBC 10*3/mm3 18.25* 18.50*   HEMOGLOBIN g/dL 9.2* 10.4*   HEMATOCRIT % 27.0* 31.3*   PLATELETS 10*3/mm3 273 287                             Results from last 7 days   Lab Units 05/09/22  1656   COLOR UA  Dark Yellow   SPECIFIC GRAVITY, URINE  1.030   KETONES UA  Negative   LEUKOCYTES UA  Negative   PH, URINE  5.0   BILIRUBIN UA  Negative   UROBILINOGEN UA  Normal     Pain Management Panel    There is no flowsheet data to display.       Results from last 7 days   Lab Units 05/11/22  1002   BLOODCX  No growth at 24 hours  No growth at 24 hours       Radiology:    XR Chest 1 View    Result Date: 5/11/2022  PROCEDURE: XR CHEST 1 VIEW-  HISTORY: new onset wheezing cough and chest discomfort  COMPARISON:  None  FINDINGS:  Portable view of the chest demonstrates extensive airspace opacities, right much greater than left,  compatible with pneumonia. There is no evidence of effusion, pneumothorax or other significant pleural disease. The mediastinum is unremarkable.  The heart size is normal.      Extensive right lung pneumonia.  This report was signed and finalized on 5/11/2022 7:54 AM by Olman Heller MD.      Assessment:     1.  Right Lung Pneumonia--likely aspiration  2.  IUP 28 wks and 4 days  3.  Nausea and vomiting    Recommendations/Plan:     1.  Right Lung Pneumonia--likely aspiration related  -Agree with current antibiotics--Rocephin and Azithromycin.  -Recommend monitoring overnight although patient is currently on room air with stable saturations.    -Would transition to Augmentin PO at time of discharge.  -Follow blood cultures, currently with no growth to date  -Lactate is normal  -Recheck basic labs in am    2.  IUP 28 weeks 4 days  -Continue fetal monitoring     3.  Nausea and Vomiting  -Continue with current antiemetics as patient denies any nausea on my exam    At this point hospitalist will sign off as patient is stable. Reach for any questions or with any patient decompensation.    Thank you for this consult. Please do not hesitate to call me for any questions.    Martina Sen, APRN  05/12/22  15:18 EDT    Dictated utilizing Dragon dictation.

## 2022-05-12 NOTE — PLAN OF CARE
Goal Outcome Evaluation:  Plan of Care Reviewed With: patient        Progress: improving  Outcome Evaluation: VSS, denies n/v, sleeping well

## 2022-05-13 PROBLEM — J18.9 PNEUMONIA AFFECTING PREGNANCY IN THIRD TRIMESTER: Status: ACTIVE | Noted: 2022-05-13

## 2022-05-13 PROBLEM — O99.513 PNEUMONIA AFFECTING PREGNANCY IN THIRD TRIMESTER: Status: ACTIVE | Noted: 2022-05-13

## 2022-05-13 LAB
DEPRECATED RDW RBC AUTO: 38 FL (ref 37–54)
ERYTHROCYTE [DISTWIDTH] IN BLOOD BY AUTOMATED COUNT: 11.9 % (ref 12.3–15.4)
HCT VFR BLD AUTO: 27.9 % (ref 34–46.6)
HGB BLD-MCNC: 9.3 G/DL (ref 12–15.9)
MCH RBC QN AUTO: 29.5 PG (ref 26.6–33)
MCHC RBC AUTO-ENTMCNC: 33.3 G/DL (ref 31.5–35.7)
MCV RBC AUTO: 88.6 FL (ref 79–97)
PLATELET # BLD AUTO: 266 10*3/MM3 (ref 140–450)
PMV BLD AUTO: 8.8 FL (ref 6–12)
RBC # BLD AUTO: 3.15 10*6/MM3 (ref 3.77–5.28)
WBC NRBC COR # BLD: 16.65 10*3/MM3 (ref 3.4–10.8)

## 2022-05-13 PROCEDURE — G0378 HOSPITAL OBSERVATION PER HR: HCPCS

## 2022-05-13 PROCEDURE — 85027 COMPLETE CBC AUTOMATED: CPT | Performed by: MIDWIFE

## 2022-05-13 PROCEDURE — 99217 PR OBSERVATION CARE DISCHARGE MANAGEMENT: CPT | Performed by: MIDWIFE

## 2022-05-13 RX ORDER — METOCLOPRAMIDE 10 MG/1
10 TABLET ORAL
Qty: 90 TABLET | Refills: 3 | Status: SHIPPED | OUTPATIENT
Start: 2022-05-13 | End: 2022-07-30 | Stop reason: HOSPADM

## 2022-05-13 RX ORDER — AMOXICILLIN AND CLAVULANATE POTASSIUM 875; 125 MG/1; MG/1
1 TABLET, FILM COATED ORAL EVERY 12 HOURS
Qty: 14 TABLET | Refills: 0 | Status: SHIPPED | OUTPATIENT
Start: 2022-05-13 | End: 2022-05-20

## 2022-05-13 RX ORDER — ONDANSETRON 4 MG/1
4 TABLET, ORALLY DISINTEGRATING ORAL EVERY 8 HOURS PRN
Qty: 20 TABLET | Refills: 0 | Status: SHIPPED | OUTPATIENT
Start: 2022-05-13 | End: 2022-06-16 | Stop reason: SDUPTHER

## 2022-05-13 NOTE — PLAN OF CARE
Goal Outcome Evaluation:  Plan of Care Reviewed With: patient        Progress: improving  Outcome Evaluation: VSS, denies n/v, wants to go home tomorrow

## 2022-05-13 NOTE — NON STRESS TEST
Triage Note - Nursing Documentation  Labor and Delivery Admission Log    Romy Langley  : 2000  MRN: [id number]  CSN: [id number]    Date in / Time in:  2022    Time in: 1638    Date out / Time out:  2022  Time out: 1030    Nurse: Sheila Bush RN    Patient Info: She is a 22 y.o. year old  at 28w5d with an DEQUAN of 2022, Date entered prior to episode creation who was seen on the [facility].    Chief Complaint:   Chief Complaint   Patient presents with   • Non-stress Test     Nausea/vomiting       Provider Instructions / Disposition: Patient educated on home care, medications, and importance of continuing cough and deep breathing exercises. Patient instructed to follow up with the office . Patient expresses no further questions or needs at this time.     Patient Active Problem List   Diagnosis   • Marginal insertion of umbilical cord affecting management of mother   • Hyperemesis   • Pneumonia affecting pregnancy in third trimester       NST Documentation (Only applicable > 32 weeks): Interpretation A  Nonstress Test Interpretation A: Reactive (22 1102 : Sheila Bush, RN)

## 2022-05-13 NOTE — NURSING NOTE
JASPAL Mujica called by pt request to see if fetal monitoring could be d/c'd.  No monitoring tonight per Evita.

## 2022-05-13 NOTE — NON STRESS TEST
Romy Langley, a  at 28w4d with an DEQUAN of 2022, Date entered prior to episode creation, was seen at [facility] for a nonstress test.    Chief Complaint   Patient presents with   • Non-stress Test     Nausea/vomiting       Patient Active Problem List   Diagnosis   • Marginal insertion of umbilical cord affecting management of mother   • Hyperemesis       Start Time:   Stop Time:     Interpretation A  Nonstress Test Interpretation A: Reactive (22 : Deidra Verdin, RN)

## 2022-05-13 NOTE — PROGRESS NOTES
Jose Langley  : 2000  MRN: [id number]  CSN: [id number]    Antepartum Progress Note    Subjective   She feels good this am. States coughing is less. Baby is active. She denies any ctxs.      Objective     Min/max vitals past 24 hours:   Temp  Min: 97.9 °F (36.6 °C)  Max: 97.9 °F (36.6 °C)  BP  Min: 103/56  Max: 103/56  Pulse  Min: 88  Max: 88  Resp  Min: 16  Max: 16         General: well developed; well nourished  no acute distress   Heart: Not performed.   Lungs: breathing is unlabored  clear to auscultation bilaterally   Abdomen: soft, non-tender; no masses  Gravid   FHT's: reassuring   Cervix: was not checked.   Contractions: none   Back: NA   Extremities: normal appearance with no cyanosis or edema and no calf tenderness      Labs:   Lab Results (last 24 hours)     Procedure Component Value Units Date/Time    Blood Culture - Blood, Arm, Right [[id number]]  (Normal) Collected: 22 1002    Specimen: Blood from Arm, Right Updated: 22 1017     Blood Culture No growth at 24 hours    Blood Culture - Blood, Arm, Right [[id number]]  (Normal) Collected: 22 1002    Specimen: Blood from Arm, Right Updated: 22 1017     Blood Culture No growth at 24 hours             Assessment   1. IUP at 28w5d  2. N/V resolved  3. Right lung pneumonia     Plan   1. CBC  2. Will discuss with Dr Rodriguez about possible discharge    Karly Duarte, JASPAL  2022  08:10 EDT

## 2022-05-13 NOTE — DISCHARGE SUMMARY
Discharge Summary     Jose Langley  : 2000  MRN: [id number]  CSN: [id number]    Date of Admission: 2022   Date of Discharge:  2022       Admission Diagnosis: 1. Hyperemesis [R11.10]   Discharge Diagnosis: 1. Same as above plus  2. Pregnancy at 28w5d  3. Right lung pneumonia     Consults:                            Hospitalist  Hospital Course  Patient is a 22 y.o.  who at 28w5d was admitted for N/V/D. She states early that am she was awakened in her sleep with vomiting. She was given IVFs, antiemetics. On she was diagnosed with pneumonia most likely due to aspiration and started on IV antibiotics. Blood cultures negative. O2 sats >95 on room air. Her N/V have resolved. She is breathing without difficulty. She desires to go home.    Min/max vitals past 24 hours:   Temp  Min: 97.9 °F (36.6 °C)  Max: 97.9 °F (36.6 °C)  BP  Min: 103/56  Max: 103/56  Pulse  Min: 88  Max: 88  Resp  Min: 16  Max: 16         Condition at Discharge: stable    Discharge labs  Lab Results   Component Value Date    WBC 16.65 (H) 2022    HGB 9.3 (L) 2022    HCT 27.9 (L) 2022     2022       Discharge Medications     Discharge Medications      New Medications      Instructions Start Date   amoxicillin-clavulanate 875-125 MG per tablet  Commonly known as: Augmentin   1 tablet, Oral, Every 12 Hours      metoclopramide 10 MG tablet  Commonly known as: REGLAN   10 mg, Oral, 3 Times Daily Before Meals      ondansetron ODT 4 MG disintegrating tablet  Commonly known as: Zofran ODT   4 mg, Translingual, Every 8 Hours PRN         Changes to Medications      Instructions Start Date   ferrous sulfate 325 (65 FE) MG tablet  What changed: Another medication with the same name was removed. Continue taking this medication, and follow the directions you see here.   325 mg, Oral, 2 Times Daily         Continue These Medications      Instructions Start Date   doxylamine 25 MG  tablet  Commonly known as: UNISOM   25 mg, Oral, Nightly      famotidine 20 MG tablet  Commonly known as: PEPCID   20 mg, Oral, 2 Times Daily      prenatal (CLASSIC) vitamin  tablet  Generic drug: prenatal vitamin   Oral, Daily      vitamin B-6 25 MG tablet  Commonly known as: PYRIDOXINE   25 mg, Oral, Nightly         Stop These Medications    aspirin 81 MG EC tablet     promethazine 12.5 MG tablet  Commonly known as: PHENERGAN            Discharge Disposition Home or Self Care       Follow-up: 3 days with [facility] as scheduled     Karly Duarte, APRN  5/13/2022

## 2022-05-16 ENCOUNTER — ROUTINE PRENATAL (OUTPATIENT)
Dept: OBSTETRICS AND GYNECOLOGY | Facility: CLINIC | Age: 22
End: 2022-05-16

## 2022-05-16 VITALS — WEIGHT: 168 LBS | BODY MASS INDEX: 27.12 KG/M2 | SYSTOLIC BLOOD PRESSURE: 108 MMHG | DIASTOLIC BLOOD PRESSURE: 70 MMHG

## 2022-05-16 DIAGNOSIS — O99.513 PNEUMONIA AFFECTING PREGNANCY IN THIRD TRIMESTER: Primary | ICD-10-CM

## 2022-05-16 DIAGNOSIS — J18.9 PNEUMONIA AFFECTING PREGNANCY IN THIRD TRIMESTER: Primary | ICD-10-CM

## 2022-05-16 LAB
BACTERIA SPEC AEROBE CULT: NORMAL
BACTERIA SPEC AEROBE CULT: NORMAL

## 2022-05-16 PROCEDURE — 99213 OFFICE O/P EST LOW 20 MIN: CPT | Performed by: MIDWIFE

## 2022-05-16 NOTE — PROGRESS NOTES
Chief Complaint   Patient presents with   • Routine Prenatal Visit     No Complaints/concerns        HPI: Romy is a  currently at 29w1d here for prenatal visit who reports the following:  Baby is active. She denies any cramping or ctxs. Her cough is minimal. She is continuing her antibiotics for pneumonia. Her nausea and vomiting have improved. She is taking her Pepcid and Reglan.                EXAM:     Vitals:    22 1330   BP: 108/70      Abdomen:   See prenatal flowsheet as noted and reviewed, soft, nontender   Pelvic:  See prenatal flowsheet as noted and reviewed   Urine:  See prenatal flowsheet as noted and reviewed    Lab Results   Component Value Date    ABO O 2022    RH Positive 2022    ABSCRN Negative 2022       MDM:  Impression: Supervision of low risk pregnancy   S/P pneumonia   Tests done today: none   Topics discussed: kick counts and fetal movement   labor signs and symptoms  Continue current meds  Reviewed OB labs   Tests next visit: none                RTO:                        1 weeks    This note was electronically signed.  Karly Duarte, JASPAL  2022

## 2022-05-23 ENCOUNTER — ROUTINE PRENATAL (OUTPATIENT)
Dept: OBSTETRICS AND GYNECOLOGY | Facility: CLINIC | Age: 22
End: 2022-05-23

## 2022-05-23 VITALS — WEIGHT: 169 LBS | SYSTOLIC BLOOD PRESSURE: 108 MMHG | DIASTOLIC BLOOD PRESSURE: 60 MMHG | BODY MASS INDEX: 27.28 KG/M2

## 2022-05-23 DIAGNOSIS — J18.9 PNEUMONIA AFFECTING PREGNANCY IN THIRD TRIMESTER: ICD-10-CM

## 2022-05-23 DIAGNOSIS — Z34.93 PRENATAL CARE IN THIRD TRIMESTER: Primary | ICD-10-CM

## 2022-05-23 DIAGNOSIS — O43.199 MARGINAL INSERTION OF UMBILICAL CORD AFFECTING MANAGEMENT OF MOTHER: ICD-10-CM

## 2022-05-23 DIAGNOSIS — O99.513 PNEUMONIA AFFECTING PREGNANCY IN THIRD TRIMESTER: ICD-10-CM

## 2022-05-23 DIAGNOSIS — R11.10 HYPEREMESIS: ICD-10-CM

## 2022-05-23 PROCEDURE — 99213 OFFICE O/P EST LOW 20 MIN: CPT | Performed by: OBSTETRICS & GYNECOLOGY

## 2022-05-23 NOTE — PROGRESS NOTES
Prenatal Care Visit    Subjective   Chief Complaint   Patient presents with   • Routine Prenatal Visit     No complaints       History:   Romy is a  currently at 30w1d who presents for a prenatal care visit today.    No major issues.    Social History    Tobacco Use      Smoking status: Never Smoker      Smokeless tobacco: Never Used       Objective   /60   Wt 76.7 kg (169 lb)   LMP 10/16/2021   BMI 27.28 kg/m²   Physical Exam:  Normal, gestational age-appropriate exam today        Plan   Medical Decision Making:    I have reviewed the prenatal labs and ultrasound(s) today. I have reviewed the most recent prenatal progress note(s).    Diagnosis: Supervision of high risk pregnancy   Marginal cord insertion  Recent RL pneumonia  Nausea and emesis   Tests/Orders/Rx today: No orders of the defined types were placed in this encounter.      Medication Management: None     Topics discussed: Prenatal care milestones  kick counts and fetal movement  PIH precautions   labor signs and symptoms   She has finished Abx and feels well, no respiratory symptoms, no fevers   Tests next visit: U/S for EFW   Next visit: 2 week(s)     Parvez Galindo MD  Obstetrics and Gynecology  [facility]

## 2022-06-06 ENCOUNTER — ROUTINE PRENATAL (OUTPATIENT)
Dept: OBSTETRICS AND GYNECOLOGY | Facility: CLINIC | Age: 22
End: 2022-06-06

## 2022-06-06 VITALS — SYSTOLIC BLOOD PRESSURE: 108 MMHG | WEIGHT: 168 LBS | DIASTOLIC BLOOD PRESSURE: 64 MMHG | BODY MASS INDEX: 27.12 KG/M2

## 2022-06-06 DIAGNOSIS — Z34.93 PRENATAL CARE IN THIRD TRIMESTER: Primary | ICD-10-CM

## 2022-06-06 DIAGNOSIS — O99.513 PNEUMONIA AFFECTING PREGNANCY IN THIRD TRIMESTER: ICD-10-CM

## 2022-06-06 DIAGNOSIS — O43.199 MARGINAL INSERTION OF UMBILICAL CORD AFFECTING MANAGEMENT OF MOTHER: ICD-10-CM

## 2022-06-06 DIAGNOSIS — J18.9 PNEUMONIA AFFECTING PREGNANCY IN THIRD TRIMESTER: ICD-10-CM

## 2022-06-06 PROCEDURE — 99213 OFFICE O/P EST LOW 20 MIN: CPT | Performed by: OBSTETRICS & GYNECOLOGY

## 2022-06-06 NOTE — PROGRESS NOTES
Prenatal Care Visit    Subjective   Chief Complaint   Patient presents with   • Routine Prenatal Visit     Growth scan done today, no complaints.       History:   Romy is a  currently at 32w1d who presents for a prenatal care visit today.    No major issues.    Social History    Tobacco Use      Smoking status: Never Smoker      Smokeless tobacco: Never Used       Objective   /64   Wt 76.2 kg (168 lb)   LMP 10/16/2021   BMI 27.12 kg/m²   Physical Exam:  Normal, gestational age-appropriate exam today        Plan   Medical Decision Making:    I have reviewed the prenatal labs and ultrasound(s) today. I have reviewed the most recent prenatal progress note(s).    Diagnosis: Supervision of high risk pregnancy   Marginal cord insertion  Recent RL pneumonia, resolved  Nausea and emesis, improved  Anemia   Tests/Orders/Rx today: No orders of the defined types were placed in this encounter.      Medication Management: None     Topics discussed: Prenatal care milestones  kick counts and fetal movement  PIH precautions   labor signs and symptoms  U/S findings   Anemia + iron   Tests next visit: none   Next visit: 2 week(s)     Parvez Galindo MD  Obstetrics and Gynecology  [facility]

## 2022-06-15 ENCOUNTER — TELEPHONE (OUTPATIENT)
Dept: OBSTETRICS AND GYNECOLOGY | Facility: CLINIC | Age: 22
End: 2022-06-15

## 2022-06-15 NOTE — TELEPHONE ENCOUNTER
----- Message from Ginger Sims sent at 6/15/2022  2:22 PM EDT -----  Pt requested a refill for: Reglan, Zofran & Iron.    Rx: Jr

## 2022-06-16 RX ORDER — FERROUS SULFATE 325(65) MG
325 TABLET ORAL
Qty: 1 TABLET | Refills: 4 | Status: SHIPPED | OUTPATIENT
Start: 2022-06-16 | End: 2022-09-08

## 2022-06-16 RX ORDER — ONDANSETRON 4 MG/1
4 TABLET, ORALLY DISINTEGRATING ORAL EVERY 8 HOURS PRN
Qty: 20 TABLET | Refills: 0 | Status: SHIPPED | OUTPATIENT
Start: 2022-06-16 | End: 2022-07-30 | Stop reason: HOSPADM

## 2022-06-20 ENCOUNTER — ROUTINE PRENATAL (OUTPATIENT)
Dept: OBSTETRICS AND GYNECOLOGY | Facility: CLINIC | Age: 22
End: 2022-06-20

## 2022-06-20 VITALS — SYSTOLIC BLOOD PRESSURE: 100 MMHG | DIASTOLIC BLOOD PRESSURE: 60 MMHG | BODY MASS INDEX: 28.28 KG/M2 | WEIGHT: 175.2 LBS

## 2022-06-20 DIAGNOSIS — Z34.93 THIRD TRIMESTER PREGNANCY: Primary | ICD-10-CM

## 2022-06-20 DIAGNOSIS — O43.199 MARGINAL INSERTION OF UMBILICAL CORD AFFECTING MANAGEMENT OF MOTHER: ICD-10-CM

## 2022-06-20 PROCEDURE — 99213 OFFICE O/P EST LOW 20 MIN: CPT | Performed by: OBSTETRICS & GYNECOLOGY

## 2022-06-20 NOTE — PROGRESS NOTES
Chief Complaint   Patient presents with   • Routine Prenatal Visit     Prenatal visit with no problems or concerns        HPI:   , 34w1d gestation reports doing well    ROS:  See Prenatal Episode/Flowsheet  /60   Wt 79.5 kg (175 lb 3.2 oz)   LMP 10/16/2021   BMI 28.28 kg/m²      EXAM:  EXTREMITIES:  No swelling-See Prenatal Episode/Flowsheet    ABDOMEN:  FHTs/Movement noted-See Prenatal Episode/Flowsheet    URINE GLUCOSE/PROTEIN:  See Prenatal Episode/Flowsheet    PELVIC EXAM:  See Prenatal Episode/Flowsheet  CV:  Lungs:  GYN:    MDM:    Lab Results   Component Value Date    HGB 9.3 (L) 2022    RUBELLAABIGG 1.66 2022    HEPBSAG Negative 2022    ABO O 2022    RH Positive 2022    ABSCRN Negative 2022    VIT5SFU9 Non Reactive 2022    HEPCVIRUSABY 0.2 2022    URINECX Final report 2022       U/S:US Ob Follow Up Transabdominal Approach (2022 14:34)      1. IUP 34w1d  2. Routine care   3. GBs next time  4. Anemia: taking Fe and PNV

## 2022-06-25 ENCOUNTER — HOSPITAL ENCOUNTER (OUTPATIENT)
Facility: HOSPITAL | Age: 22
Discharge: HOME | End: 2022-06-25
Attending: OBSTETRICS & GYNECOLOGY | Admitting: OBSTETRICS & GYNECOLOGY

## 2022-06-25 VITALS
OXYGEN SATURATION: 99 % | SYSTOLIC BLOOD PRESSURE: 111 MMHG | DIASTOLIC BLOOD PRESSURE: 67 MMHG | BODY MASS INDEX: 28.28 KG/M2 | WEIGHT: 176 LBS | RESPIRATION RATE: 16 BRPM | TEMPERATURE: 97.9 F | HEART RATE: 87 BPM | HEIGHT: 66 IN

## 2022-06-25 LAB
BILIRUB BLD-MCNC: NEGATIVE MG/DL
CLARITY, POC: CLEAR
COLOR UR: ABNORMAL
GLUCOSE UR STRIP-MCNC: NEGATIVE MG/DL
KETONES UR QL: ABNORMAL
LEUKOCYTE EST, POC: NEGATIVE
NITRITE UR-MCNC: NEGATIVE MG/ML
PH UR: 5 [PH] (ref 5–8)
PROT UR STRIP-MCNC: NEGATIVE MG/DL
RBC # UR STRIP: NEGATIVE /UL
SP GR UR: 1.03 (ref 1–1.03)
UROBILINOGEN UR QL: NORMAL

## 2022-06-25 PROCEDURE — 59025 FETAL NON-STRESS TEST: CPT

## 2022-06-25 PROCEDURE — 81002 URINALYSIS NONAUTO W/O SCOPE: CPT | Performed by: OBSTETRICS & GYNECOLOGY

## 2022-06-25 PROCEDURE — G0463 HOSPITAL OUTPT CLINIC VISIT: HCPCS

## 2022-06-25 NOTE — NURSING NOTE
Patient complains of bilat lower abd cramping and lower back pain. Abdomen palpated soft and nontender with positive fetal movement. Denies vaginal bleeding or leaking of fluids. Patient appears calm, pleasant and without distress.

## 2022-06-25 NOTE — NON STRESS TEST
"Triage Note - Nursing Documentation  Labor and Delivery Admission Log    Romy Langley  : 2000  MRN: [id number]  CSN: [id number]    Date in / Time in:  2022  Time in: 22    Date out / Time out:    Time out: 250    Nurse: Elliott Sorenson, RN    Patient Info: She is a 22 y.o. year old  at 34w6d with an DEQUAN of 2022, Date entered prior to episode creation who was seen on the [facility].    Chief Complaint:   Chief Complaint   Patient presents with   • Pelvic Pain     \"I feel like my hips are splitting apart.\"       Provider Instructions / Disposition: EFM, po hydrate,  labor precautions given. Verbalized understanding. Discharged home in stable condition.     Patient Active Problem List   Diagnosis   • Marginal insertion of umbilical cord affecting management of mother   • Hyperemesis   • Pneumonia affecting pregnancy in third trimester       NST Documentation (Only applicable > 32 weeks): Interpretation A  Nonstress Test Interpretation A: Reactive (22 0239 : Elliott Sorenson, RN)  "

## 2022-07-05 ENCOUNTER — ROUTINE PRENATAL (OUTPATIENT)
Dept: OBSTETRICS AND GYNECOLOGY | Facility: CLINIC | Age: 22
End: 2022-07-05

## 2022-07-05 VITALS — WEIGHT: 178 LBS | DIASTOLIC BLOOD PRESSURE: 62 MMHG | SYSTOLIC BLOOD PRESSURE: 104 MMHG | BODY MASS INDEX: 28.74 KG/M2

## 2022-07-05 DIAGNOSIS — J18.9 PNEUMONIA AFFECTING PREGNANCY IN THIRD TRIMESTER: ICD-10-CM

## 2022-07-05 DIAGNOSIS — O99.513 PNEUMONIA AFFECTING PREGNANCY IN THIRD TRIMESTER: ICD-10-CM

## 2022-07-05 DIAGNOSIS — O43.199 MARGINAL INSERTION OF UMBILICAL CORD AFFECTING MANAGEMENT OF MOTHER: ICD-10-CM

## 2022-07-05 DIAGNOSIS — Z36.85 ANTENATAL SCREENING FOR STREPTOCOCCUS B: ICD-10-CM

## 2022-07-05 DIAGNOSIS — Z34.93 PRENATAL CARE IN THIRD TRIMESTER: Primary | ICD-10-CM

## 2022-07-05 PROCEDURE — 99213 OFFICE O/P EST LOW 20 MIN: CPT | Performed by: OBSTETRICS & GYNECOLOGY

## 2022-07-05 NOTE — PROGRESS NOTES
Prenatal Care Visit    Subjective   Chief Complaint   Patient presents with   • Routine Prenatal Visit     Growth scan and GBS done today, no complaints       History:   Romy is a  currently at 36w2d who presents for a prenatal care visit today.    No major issues.    Social History    Tobacco Use      Smoking status: Never Smoker      Smokeless tobacco: Never Used       Objective   /62   Wt 80.7 kg (178 lb)   LMP 10/16/2021   BMI 28.74 kg/m²   Physical Exam:  Normal, gestational age-appropriate exam today        Plan   Medical Decision Making:    I have reviewed the prenatal labs and ultrasound(s) today. I have reviewed the most recent prenatal progress note(s).    Diagnosis: Supervision of high risk pregnancy   Marginal cord insertion  Recent RL pneumonia, resolved  Anemia   Tests/Orders/Rx today: Orders Placed This Encounter   Procedures   • Strep Grp B PAUL + Reflex - Swab, Vaginal/Rectum     Order Specific Question:   Release to patient     Answer:   Immediate       Medication Management: None     Topics discussed: Prenatal care milestones  kick counts and fetal movement  PIH precautions   labor signs and symptoms  U/S findings   Anemia + iron   Tests next visit: none   Next visit: 1 week(s)     Parvez Galindo MD  Obstetrics and Gynecology  [facility]

## 2022-07-06 ENCOUNTER — HOSPITAL ENCOUNTER (OUTPATIENT)
Facility: HOSPITAL | Age: 22
Discharge: HOME | End: 2022-07-06
Attending: OBSTETRICS & GYNECOLOGY | Admitting: OBSTETRICS & GYNECOLOGY

## 2022-07-06 VITALS
DIASTOLIC BLOOD PRESSURE: 66 MMHG | WEIGHT: 174 LBS | HEART RATE: 89 BPM | SYSTOLIC BLOOD PRESSURE: 107 MMHG | TEMPERATURE: 98 F | BODY MASS INDEX: 27.97 KG/M2 | OXYGEN SATURATION: 100 % | HEIGHT: 66 IN | RESPIRATION RATE: 16 BRPM

## 2022-07-06 PROCEDURE — 81002 URINALYSIS NONAUTO W/O SCOPE: CPT | Performed by: OBSTETRICS & GYNECOLOGY

## 2022-07-06 PROCEDURE — 59025 FETAL NON-STRESS TEST: CPT

## 2022-07-06 PROCEDURE — 59025 FETAL NON-STRESS TEST: CPT | Performed by: OBSTETRICS & GYNECOLOGY

## 2022-07-06 PROCEDURE — 84112 EVAL AMNIOTIC FLUID PROTEIN: CPT | Performed by: OBSTETRICS & GYNECOLOGY

## 2022-07-06 PROCEDURE — G0463 HOSPITAL OUTPT CLINIC VISIT: HCPCS

## 2022-07-07 LAB — A1 MICROGLOB PLACENTAL VAG QL: NEGATIVE

## 2022-07-07 NOTE — NON STRESS TEST
"Triage Note - Nursing Documentation  Labor and Delivery Admission Log    Romy Langley  : 2000  MRN: [id number]  CSN: [id number]    Date in / Time in:  2022  Time in:22  8482    Date out / Time out:    Time out:22   1350     Nurse: Elliott Sorenson, RN    Patient Info: She is a 22 y.o. year old  at 36w4d with an DEQUAN of 2022, Date entered prior to episode creation who was seen on the [facility].    Chief Complaint:   Chief Complaint   Patient presents with   • Back Pain     \"My lower back hurts and I feel pressure.\"       Provider Instructions / Disposition: NST. Cervix closed, thick, and high. Labor precautions given. Verbalized understanding. Discharged home in stable condition.     Patient Active Problem List   Diagnosis   • Marginal insertion of umbilical cord affecting management of mother   • Hyperemesis   • Pneumonia affecting pregnancy in third trimester       NST Documentation (Only applicable > 32 weeks): Interpretation A  Nonstress Test Interpretation A: Reactive (22 2315 : Elliott Sorenson, RN)  "

## 2022-07-11 LAB
CLINDAMYCIN ISLT KB: NORMAL
GP B STREP DNA SPEC QL NAA+PROBE: POSITIVE
ORGANISM ID: NORMAL

## 2022-07-12 ENCOUNTER — ROUTINE PRENATAL (OUTPATIENT)
Dept: OBSTETRICS AND GYNECOLOGY | Facility: CLINIC | Age: 22
End: 2022-07-12

## 2022-07-12 VITALS — DIASTOLIC BLOOD PRESSURE: 60 MMHG | BODY MASS INDEX: 29.16 KG/M2 | SYSTOLIC BLOOD PRESSURE: 102 MMHG | WEIGHT: 180.6 LBS

## 2022-07-12 DIAGNOSIS — Z34.93 THIRD TRIMESTER PREGNANCY: Primary | ICD-10-CM

## 2022-07-12 PROCEDURE — 99212 OFFICE O/P EST SF 10 MIN: CPT | Performed by: OBSTETRICS & GYNECOLOGY

## 2022-07-12 NOTE — PROGRESS NOTES
Chief Complaint   Patient presents with   • Routine Prenatal Visit     Prenatal visit with, No problems or concerns.        HPI:   , 37w2d gestation reports doing well    ROS:  See Prenatal Episode/Flowsheet  /60   Wt 81.9 kg (180 lb 9.6 oz)   LMP 10/16/2021   BMI 29.16 kg/m²      EXAM:  EXTREMITIES:  No swelling-See Prenatal Episode/Flowsheet    ABDOMEN:  FHTs/Movement noted-See Prenatal Episode/Flowsheet    URINE GLUCOSE/PROTEIN:  See Prenatal Episode/Flowsheet    PELVIC EXAM:  See Prenatal Episode/Flowsheet  CV:  Lungs:  GYN:    MDM:    Lab Results   Component Value Date    HGB 9.3 (L) 2022    RUBELLAABIGG 1.66 2022    HEPBSAG Negative 2022    ABO O 2022    RH Positive 2022    ABSCRN Negative 2022    YGM4BTA3 Non Reactive 2022    HEPCVIRUSABY 0.2 2022    STREPGPB Positive (A) 2022    URINECX Final report 2022       U/S:US Ob Follow Up Transabdominal Approach (2022 08:53)      1. IUP 37w2d  2. Routine care   3. Anemia: taking Fe and PNV  4. Check cervix next time

## 2022-07-18 ENCOUNTER — HOSPITAL ENCOUNTER (OUTPATIENT)
Facility: HOSPITAL | Age: 22
Discharge: HOME | End: 2022-07-18
Attending: OBSTETRICS & GYNECOLOGY | Admitting: OBSTETRICS & GYNECOLOGY

## 2022-07-18 VITALS
WEIGHT: 178 LBS | DIASTOLIC BLOOD PRESSURE: 56 MMHG | OXYGEN SATURATION: 95 % | HEIGHT: 66 IN | TEMPERATURE: 97.9 F | HEART RATE: 98 BPM | RESPIRATION RATE: 18 BRPM | BODY MASS INDEX: 28.61 KG/M2 | SYSTOLIC BLOOD PRESSURE: 96 MMHG

## 2022-07-18 LAB
BACTERIA UR QL AUTO: ABNORMAL /HPF
BILIRUB BLD-MCNC: NEGATIVE MG/DL
BILIRUB UR QL STRIP: NEGATIVE
CLARITY UR: ABNORMAL
CLARITY, POC: ABNORMAL
COLOR UR: ABNORMAL
COLOR UR: YELLOW
GLUCOSE UR STRIP-MCNC: NEGATIVE MG/DL
GLUCOSE UR STRIP-MCNC: NEGATIVE MG/DL
HGB UR QL STRIP.AUTO: ABNORMAL
HYALINE CASTS UR QL AUTO: ABNORMAL /LPF
KETONES UR QL STRIP: NEGATIVE
KETONES UR QL: NEGATIVE
LEUKOCYTE EST, POC: ABNORMAL
LEUKOCYTE ESTERASE UR QL STRIP.AUTO: ABNORMAL
NITRITE UR QL STRIP: NEGATIVE
NITRITE UR-MCNC: NEGATIVE MG/ML
PH UR STRIP.AUTO: 6 [PH] (ref 5–8)
PH UR: 6 [PH] (ref 5–8)
PROT UR QL STRIP: ABNORMAL
PROT UR STRIP-MCNC: ABNORMAL MG/DL
RBC # UR STRIP: ABNORMAL /HPF
RBC # UR STRIP: ABNORMAL /UL
REF LAB TEST METHOD: ABNORMAL
SP GR UR STRIP: 1.02 (ref 1–1.03)
SP GR UR: 1.02 (ref 1–1.03)
SQUAMOUS #/AREA URNS HPF: ABNORMAL /HPF
UROBILINOGEN UR QL STRIP: ABNORMAL
UROBILINOGEN UR QL: NORMAL
WBC # UR STRIP: ABNORMAL /HPF

## 2022-07-18 PROCEDURE — G0463 HOSPITAL OUTPT CLINIC VISIT: HCPCS

## 2022-07-18 PROCEDURE — 99214 OFFICE O/P EST MOD 30 MIN: CPT | Performed by: OBSTETRICS & GYNECOLOGY

## 2022-07-18 PROCEDURE — 81002 URINALYSIS NONAUTO W/O SCOPE: CPT | Performed by: OBSTETRICS & GYNECOLOGY

## 2022-07-18 PROCEDURE — 87086 URINE CULTURE/COLONY COUNT: CPT | Performed by: OBSTETRICS & GYNECOLOGY

## 2022-07-18 PROCEDURE — 59025 FETAL NON-STRESS TEST: CPT

## 2022-07-18 PROCEDURE — 81001 URINALYSIS AUTO W/SCOPE: CPT | Performed by: OBSTETRICS & GYNECOLOGY

## 2022-07-18 RX ORDER — CEPHALEXIN 500 MG/1
500 CAPSULE ORAL 4 TIMES DAILY
Qty: 28 CAPSULE | Refills: 0 | Status: SHIPPED | OUTPATIENT
Start: 2022-07-18 | End: 2022-07-25

## 2022-07-18 RX ORDER — ACETAMINOPHEN 500 MG
1000 TABLET ORAL ONCE
Status: COMPLETED | OUTPATIENT
Start: 2022-07-18 | End: 2022-07-18

## 2022-07-18 RX ORDER — SODIUM CHLORIDE, SODIUM LACTATE, POTASSIUM CHLORIDE, CALCIUM CHLORIDE 600; 310; 30; 20 MG/100ML; MG/100ML; MG/100ML; MG/100ML
999 INJECTION, SOLUTION INTRAVENOUS CONTINUOUS
Status: DISCONTINUED | OUTPATIENT
Start: 2022-07-18 | End: 2022-07-18 | Stop reason: HOSPADM

## 2022-07-18 RX ORDER — CEPHALEXIN 250 MG/1
500 CAPSULE ORAL ONCE
Status: COMPLETED | OUTPATIENT
Start: 2022-07-18 | End: 2022-07-18

## 2022-07-18 RX ADMIN — CEPHALEXIN 500 MG: 250 CAPSULE ORAL at 16:49

## 2022-07-18 RX ADMIN — ACETAMINOPHEN 1000 MG: 500 TABLET ORAL at 17:45

## 2022-07-18 RX ADMIN — SODIUM CHLORIDE, POTASSIUM CHLORIDE, SODIUM LACTATE AND CALCIUM CHLORIDE 999 ML: 600; 310; 30; 20 INJECTION, SOLUTION INTRAVENOUS at 17:43

## 2022-07-18 NOTE — H&P
[facility]  Romy Langley  : 2000  MRN: [id number]  CSN: [id number]    History and Physical  Romy Langley is a 22 y.o. year old  with an Estimated Date of Delivery: 22 currently at 38w1d presenting with suprapubic pain.  Her symptoms began today.  Patient is here with her mother.  Patient has not been checked in the office recently.  Patient does report having tightening as well.  Patient does report the suprapubic pain is associated with the tightening.  Patient denies any vaginal bleeding or leaking of fluid.  She does report having back pain as well.  She denies any fever or chills.  Patient denies any other symptoms.    Prenatal care has been with [facility].  It has been complicated by anemia, GERD.    History  OB History    Para Term  AB Living   1 0 0 0 0 0   SAB IAB Ectopic Molar Multiple Live Births   0 0 0 0 0 0      # Outcome Date GA Lbr Jay/2nd Weight Sex Delivery Anes PTL Lv   1 Current              Past Medical History:   Diagnosis Date   • Thyroid activity decreased    • Urinary tract infection      No current facility-administered medications on file prior to encounter.     Current Outpatient Medications on File Prior to Encounter   Medication Sig Dispense Refill   • famotidine (PEPCID) 20 MG tablet Take 1 tablet by mouth 2 (Two) Times a Day. 60 tablet 5   • metoclopramide (REGLAN) 10 MG tablet Take 1 tablet by mouth 3 (Three) Times a Day Before Meals. 90 tablet 3   • ondansetron ODT (Zofran ODT) 4 MG disintegrating tablet Place 1 tablet on the tongue Every 8 (Eight) Hours As Needed for Nausea or Vomiting. 20 tablet 0   • doxylamine (UNISOM) 25 MG tablet Take 1 tablet by mouth Every Night. 30 tablet 5   • ferrous sulfate 325 (65 FE) MG tablet Take 1 tablet by mouth Daily With Breakfast. 1 tablet 4   • prenatal vitamin (prenatal, CLASSIC, vitamin) tablet Take  by mouth Daily.     • vitamin B-6 (PYRIDOXINE) 25 MG tablet Take 1 tablet by  mouth Every Night. 30 tablet 5     No Known Allergies  Past Surgical History:   Procedure Laterality Date   • FOOT/TOE TENDON REPAIR Left 1/30/2020    Procedure: RECONSTRUCTION PT TENDON LEFT;  Surgeon: Uziel Garcia DPM;  Location: [facility] OR;  Service: Podiatry   • NO PAST SURGERIES     • TARSAL TUNNEL RELEASE Left 1/30/2020    Procedure: EXCISION TARSAL BONE LEFT;  Surgeon: Uziel Garcia DPM;  Location: [facility] OR;  Service: Podiatry   • WISDOM TOOTH EXTRACTION       Family History   Problem Relation Age of Onset   • Hypertension Mother    • Diabetes Father    • Heart attack Maternal Grandfather    • Diabetes Maternal Grandfather      Social History     Socioeconomic History   • Marital status: Single   Tobacco Use   • Smoking status: Never Smoker   • Smokeless tobacco: Never Used   Vaping Use   • Vaping Use: Never used   Substance and Sexual Activity   • Alcohol use: No   • Drug use: No   • Sexual activity: Defer     Partners: Male     Review of Systems  All systems were reviewed and negative except for:  Genitourinary: postivie for  suprapubic pain    Objective  Vitals:    07/18/22 1509 07/18/22 1514 07/18/22 1515 07/18/22 1519   BP:   111/66    BP Location:       Patient Position:       Pulse: 116 113 115 110   Resp:       Temp:       TempSrc:       SpO2: 98% 98%  98%   Weight:       Height:         Physical Exam:  General Appearance:  Alert and cooperative, not in any acute distress.   Head:  Atraumatic and normocephalic, without obvious abnormality.   Eyes:          PERRLA, conjunctivae and sclerae normal, no Icterus. No pallor. Extraocular movements are within normal limits.   Ears:  Ears appear intact with no abnormalities noted.   Throat: No oral lesions, no thrush, oral mucosa moist.   Neck: Supple, trachea midline, no thyromegaly, no carotid bruit.   Back:   No kyphoscoliosis present. No tenderness to palpation,   range of motion normal.  No CVAT.   Lungs:   Chest shape is normal. Breath  sounds heard bilaterally equally.  No crackles or wheezing. No Pleural rub or bronchial breathing.   Heart:  Normal S1 and S2, no murmur, no gallop, no rub. No JVD.   Abdomen:   Normal bowel sounds, no masses, no organomegaly. Soft, non-tender, no guarding, no rebound tenderness.  Gravid uterus.   Cervix: was checked (by me): 1 cm / 80 % / -2   Presentation: cephalic   Extremities: Moves all extremities well, no edema, no cyanosis, no clubbing.   Pulses: Pulses palpable and equal bilaterally.   Skin: No bleeding, bruising or rash.   Lymph nodes: No palpable adenopathy.   Neurologic: Alert and oriented x 3. Moves all four limbs equally. No tremors. No facial asymetry.     FHT's:  reassuring and category 1  Contractions:  irregular    Prenatal Labs  Lab Results   Component Value Date    HGB 9.3 (L) 05/13/2022    HEPBSAG Negative 01/12/2022    ABSCRN Negative 01/12/2022    LXD7MAA6 Non Reactive 01/12/2022    HEPCVIRUSABY 0.2 01/12/2022    URINECX Final report 02/09/2022       Current Labs Reviewed   I reviewed the patient's new clinical results.  Lab Results (last 24 hours)     Procedure Component Value Units Date/Time    Urinalysis, Microscopic Only - Urine, Clean Catch [[id number]]  (Abnormal) Collected: 07/18/22 1521    Specimen: Urine, Clean Catch Updated: 07/18/22 1556     RBC, UA None Seen /HPF      WBC, UA 13-20 /HPF      Bacteria, UA 1+ /HPF      Squamous Epithelial Cells, UA 3-6 /HPF      Hyaline Casts, UA None Seen /LPF      Methodology Manual Light Microscopy    Urinalysis With Culture If Indicated - Urine, Clean Catch [[id number]]  (Abnormal) Collected: 07/18/22 1521    Specimen: Urine, Clean Catch Updated: 07/18/22 1543     Color, UA Yellow     Appearance, UA Cloudy     pH, UA 6.0     Specific Gravity, UA 1.023     Glucose, UA Negative     Ketones, UA Negative     Bilirubin, UA Negative     Blood, UA Trace     Protein, UA Trace     Leuk Esterase, UA Moderate (2+)     Nitrite, UA Negative     Urobilinogen, UA  1.0 E.U./dL    Narrative:      In absence of clinical symptoms, the presence of pyuria, bacteria, and/or nitrites on the urinalysis result does not correlate with infection.    Urine Culture - Urine, Urine, Clean Catch [[id number]] Collected: 07/18/22 1521    Specimen: Urine, Clean Catch Updated: 07/18/22 1542    POC Urinalysis Dipstick [[id number]]  (Abnormal) Collected: 07/18/22 1519    Specimen: Urine Updated: 07/18/22 1520     Color Dark Yellow     Clarity, UA Slightly Cloudy     Glucose, UA Negative mg/dL      Bilirubin Negative     Ketones, UA Negative     Specific Gravity  1.025     Blood, UA Trace     pH, Urine 6.0     Protein, POC Trace mg/dL      Urobilinogen, UA Normal     Leukocytes Moderate (2+)     Nitrite, UA Negative          Current Imaging Reviewed  None  Imaging Results (Last 72 Hours)     ** No results found for the last 72 hours. **             Assessment   1. IUP at 38w1d  2. Suprapubic pain  3. Low back pain     Plan   1. Patient to receive Keflex p.o.  We will continue to monitor patient.  If improvement in her symptoms and no further changes then patient will be discharged home on Keflex.  Patient has a follow-up appointment tomorrow.  Instructions and precautions have been given.  Patient voices understanding.  2. Follow up instructions given.  3. Discharge instructions and precautions given.    Suni Oquendo M.D.  7/18/2022  16:42 EDT

## 2022-07-18 NOTE — NON STRESS TEST
"Triage Note - Nursing Documentation  Labor and Delivery Admission Log    Romy Langley  : 2000  MRN: [id number]  CSN: [id number]    Date in / Time in:  2022  Time in: 14:52    Date out / Time out:    Time out: 18:35    Nurse: Dalia Coleman RN    Patient Info: She is a 22 y.o. year old  at 38w1d with an DEQUAN of 2022, Date entered prior to episode creation who was [facility] the [facility].    Chief Complaint:   Chief Complaint   Patient presents with   • Abdominal Pain     \"I have had abdominal pain that radiates to my back for a couple days.\"       Provider Instructions / Disposition: pt came in complaining abdominal pain Monitored Pt started on Keflex for UTI give a liter of fluid bolus and has an appt tommorow    Patient Active Problem List   Diagnosis   • Marginal insertion of umbilical cord affecting management of mother   • Hyperemesis   • Pneumonia affecting pregnancy in third trimester       NST Documentation (Only applicable > 32 weeks):      "

## 2022-07-19 ENCOUNTER — ROUTINE PRENATAL (OUTPATIENT)
Dept: OBSTETRICS AND GYNECOLOGY | Facility: CLINIC | Age: 22
End: 2022-07-19

## 2022-07-19 ENCOUNTER — HOSPITAL ENCOUNTER (OUTPATIENT)
Facility: HOSPITAL | Age: 22
Discharge: HOME | End: 2022-07-19
Attending: OBSTETRICS & GYNECOLOGY | Admitting: OBSTETRICS & GYNECOLOGY

## 2022-07-19 VITALS
TEMPERATURE: 98.2 F | HEART RATE: 64 BPM | HEIGHT: 66 IN | BODY MASS INDEX: 29.11 KG/M2 | DIASTOLIC BLOOD PRESSURE: 59 MMHG | OXYGEN SATURATION: 99 % | RESPIRATION RATE: 18 BRPM | SYSTOLIC BLOOD PRESSURE: 109 MMHG | WEIGHT: 181.1 LBS

## 2022-07-19 VITALS — DIASTOLIC BLOOD PRESSURE: 60 MMHG | BODY MASS INDEX: 29.38 KG/M2 | SYSTOLIC BLOOD PRESSURE: 116 MMHG | WEIGHT: 182 LBS

## 2022-07-19 DIAGNOSIS — O09.93 ENCOUNTER FOR SUPERVISION OF HIGH RISK PREGNANCY IN THIRD TRIMESTER, ANTEPARTUM: Primary | ICD-10-CM

## 2022-07-19 DIAGNOSIS — O99.019 IRON DEFICIENCY ANEMIA DURING PREGNANCY: ICD-10-CM

## 2022-07-19 DIAGNOSIS — K21.9 GASTROESOPHAGEAL REFLUX DISEASE WITHOUT ESOPHAGITIS: ICD-10-CM

## 2022-07-19 DIAGNOSIS — D50.9 IRON DEFICIENCY ANEMIA DURING PREGNANCY: ICD-10-CM

## 2022-07-19 DIAGNOSIS — O43.199 MARGINAL INSERTION OF UMBILICAL CORD AFFECTING MANAGEMENT OF MOTHER: ICD-10-CM

## 2022-07-19 DIAGNOSIS — O47.9 IRREGULAR UTERINE CONTRACTIONS: ICD-10-CM

## 2022-07-19 LAB
BACTERIA SPEC AEROBE CULT: NO GROWTH
BILIRUB BLD-MCNC: NEGATIVE MG/DL
CLARITY, POC: CLEAR
COLOR UR: YELLOW
GLUCOSE UR STRIP-MCNC: NEGATIVE MG/DL
KETONES UR QL: NEGATIVE
LEUKOCYTE EST, POC: NEGATIVE
NITRITE UR-MCNC: NEGATIVE MG/ML
PH UR: 6.5 [PH] (ref 5–8)
PROT UR STRIP-MCNC: NEGATIVE MG/DL
RBC # UR STRIP: NEGATIVE /UL
SP GR UR: 1.01 (ref 1–1.03)
UROBILINOGEN UR QL: NORMAL

## 2022-07-19 PROCEDURE — 99214 OFFICE O/P EST MOD 30 MIN: CPT | Performed by: OBSTETRICS & GYNECOLOGY

## 2022-07-19 PROCEDURE — 59025 FETAL NON-STRESS TEST: CPT

## 2022-07-19 PROCEDURE — 59025 FETAL NON-STRESS TEST: CPT | Performed by: OBSTETRICS & GYNECOLOGY

## 2022-07-19 PROCEDURE — G0463 HOSPITAL OUTPT CLINIC VISIT: HCPCS

## 2022-07-19 PROCEDURE — 81002 URINALYSIS NONAUTO W/O SCOPE: CPT | Performed by: OBSTETRICS & GYNECOLOGY

## 2022-07-19 NOTE — NON STRESS TEST
Triage Note - Nursing Documentation  Labor and Delivery Admission Log    Romy Langley  : 2000  MRN: [id number]  CSN: [id number]    Date in / Time in:  2022  Time in: 107    Date out / Time out:    Time out: 325    Nurse: Anali Mendoza RN    Patient Info: She is a 22 y.o. year old  at 38w2d with an DEQUAN of 2022, Date entered prior to episode creation who was seen on the [facility].    Chief Complaint:   Chief Complaint   Patient presents with   • Contractions       Provider Instructions / Disposition: discharge home, follow up in OB office    Patient Active Problem List   Diagnosis   • Marginal insertion of umbilical cord affecting management of mother   • Hyperemesis   • Pneumonia affecting pregnancy in third trimester       NST Documentation (Only applicable > 32 weeks): Interpretation A  Nonstress Test Interpretation A: Reactive (22 0200 : Anali Mendoza, RN)

## 2022-07-20 NOTE — PROGRESS NOTES
Chief Complaint  Routine Prenatal Visit (Patient complains of pressure and contractions, was on labor duran last night. )    History of Present Illness:  Romy is a  currently at 38w3d who presents today with complaints of pressure as well as continued contractions.  Patient was seen on labor and delivery x2 yesterday.  Patient was noted to be 1 cm.  Patient did have evidence of UTI.  She has started her Keflex.  Patient denies any vaginal bleeding or spotting.  She does report good fetal movement.  Patient has been taking her prenatal vitamins and iron supplements.    Exam:  Vitals:  See prenatal flowsheet as noted and reviewed  General: Alert, cooperative, and does not appear in any distress  Abdomen:   See prenatal flowsheet as noted and reviewed    Uterus gravid, non-tender; no palpable masses    No guarding or rebound tenderness  Pelvic:  See prenatal flowsheet as noted and reviewed  Ext:  See prenatal flowsheet as noted and reviewed    Moves extremities well, no cyanosis and no redness  Urine:  See prenatal flowsheet as noted and reviewed    Data Review:  The following data was reviewed by: Suni Oquendo MD on 2022:  Prenatal Labs:  Lab Results   Component Value Date    HGB 9.3 (L) 2022    RUBELLAABIGG 1.66 2022    HEPBSAG Negative 2022    ABO O 2022    RH Positive 2022    ABSCRN Negative 2022    DHH4YML3 Non Reactive 2022    HEPCVIRUSABY 0.2 2022    STREPGPB Positive (A) 2022    URINECX No growth 2022       Admission on 2022, Discharged on 2022   Component Date Value   • Color 2022 Yellow    • Clarity, UA 2022 Clear    • Glucose, UA 2022 Negative    • Bilirubin 2022 Negative    • Ketones, UA 2022 Negative    • Specific Gravity  2022 1.015    • Blood, UA 2022 Negative    • pH, Urine 2022 6.5    • Protein, POC 2022 Negative    • Urobilinogen, UA 2022 Normal    •  Leukocytes 07/19/2022 Negative    • Nitrite, UA 07/19/2022 Negative    Admission on 07/18/2022, Discharged on 07/18/2022   Component Date Value   • Color 07/18/2022 Dark Yellow    • Clarity, UA 07/18/2022 Slightly Cloudy (A)   • Glucose, UA 07/18/2022 Negative    • Bilirubin 07/18/2022 Negative    • Ketones, UA 07/18/2022 Negative    • Specific Gravity  07/18/2022 1.025    • Blood, UA 07/18/2022 Trace (A)   • pH, Urine 07/18/2022 6.0    • Protein, POC 07/18/2022 Trace (A)   • Urobilinogen, UA 07/18/2022 Normal    • Leukocytes 07/18/2022 Moderate (2+) (A)   • Nitrite, UA 07/18/2022 Negative    • Color, UA 07/18/2022 Yellow    • Appearance, UA 07/18/2022 Cloudy (A)   • pH, UA 07/18/2022 6.0    • Specific Gravity, UA 07/18/2022 1.023    • Glucose, UA 07/18/2022 Negative    • Ketones, UA 07/18/2022 Negative    • Bilirubin, UA 07/18/2022 Negative    • Blood, UA 07/18/2022 Trace (A)   • Protein, UA 07/18/2022 Trace (A)   • Leuk Esterase, UA 07/18/2022 Moderate (2+) (A)   • Nitrite, UA 07/18/2022 Negative    • Urobilinogen, UA 07/18/2022 1.0 E.U./dL    • RBC, UA 07/18/2022 None Seen    • WBC, UA 07/18/2022 13-20 (A)   • Bacteria, UA 07/18/2022 1+ (A)   • Squamous Epithelial Cell* 07/18/2022 3-6 (A)   • Hyaline Casts, UA 07/18/2022 None Seen    • Methodology 07/18/2022 Manual Light Microscopy    • Urine Culture 07/18/2022 No growth    Admission on 07/06/2022, Discharged on 07/06/2022   Component Date Value   • Color 07/06/2022 Orange (A)   • Clarity, UA 07/06/2022 Clear    • Glucose, UA 07/06/2022 Negative    • Bilirubin 07/06/2022 Negative    • Ketones, UA 07/06/2022 Trace (A)   • Specific Gravity  07/06/2022 1.030    • Blood, UA 07/06/2022 Negative    • pH, Urine 07/06/2022 5.0    • Protein, POC 07/06/2022 Negative    • Urobilinogen, UA 07/06/2022 Normal    • Leukocytes 07/06/2022 Negative    • Nitrite, UA 07/06/2022 Negative    • Rupture of Membranes 07/06/2022 Negative    Routine Prenatal on 07/05/2022   Component Date  Value   • Strep Gp B PAUL 2022 Positive (A)   • Organism ID 2022 Comment    • Clindamycin 2022 Susceptible    Admission on 2022, Discharged on 2022   Component Date Value   • Color 2022 Orange (A)   • Clarity, UA 2022 Clear    • Glucose, UA 2022 Negative    • Bilirubin 2022 Negative    • Ketones, UA 2022 1+ (A)   • Specific Gravity  2022 1.030    • Blood, UA 2022 Negative    • pH, Urine 2022 5.0    • Protein, POC 2022 Negative    • Urobilinogen, UA 2022 Normal    • Leukocytes 2022 Negative    • Nitrite, UA 2022 Negative      Imaging:  US Ob Follow Up Transabdominal Approach  Impression:   IUP at 36+2 weeks. Limited anatomy was reviewed today and is normal in   appearance. Marginal cord insertion. EFW 3124g(74%) AC 87%. Cephalic   presentation. Placenta is anterior. Amniotic fluid and fetal heart rate   are normal.    Parvez Galindo MD  Obstetrics and Gynecology  [facility]     Medical Records:  None    Assessment and Plan:  Problem List Items Addressed This Visit        Gravid and     Marginal insertion of umbilical cord affecting management of mother  NST as ordered.  Scan for growth next visit.    Relevant Orders    US Ob Follow Up Transabdominal Approach    US Fetal Biophysical Profile;Without Non-Stress Testing      Other Visit Diagnoses     Encounter for supervision of high risk pregnancy in third trimester, antepartum    -  Primary  Topics discussed:     iron supplementation  kick counts and fetal movement  labor signs and symptoms  PIH precautions  NST as scheduled.  Biophysical profile next visit with scan for growth.    Iron deficiency anemia during pregnancy      Patient is to continue her iron supplements.    Gastroesophageal reflux disease without esophagitis      Patient is to continue her current medication as previously given.    Irregular uterine contractions      Labor  precautions and instructions are given.  There are no changes noted on examination.        Follow Up/Instructions:  Follow up as scheduled.  Patient was given instructions and counseling regarding her condition or for health maintenance advice. Please see specific information pulled into the AVS if appropriate.     Note: Speech recognition transcription software may have been used to dictate portions of this document.  An attempt at proofreading has been made though minor errors in transcription may still be present.    This note was electronically signed.  Suni Oquendo M.D.

## 2022-07-24 ENCOUNTER — HOSPITAL ENCOUNTER (OUTPATIENT)
Facility: HOSPITAL | Age: 22
Discharge: HOME | End: 2022-07-24
Attending: OBSTETRICS & GYNECOLOGY | Admitting: OBSTETRICS & GYNECOLOGY

## 2022-07-24 VITALS
OXYGEN SATURATION: 98 % | DIASTOLIC BLOOD PRESSURE: 72 MMHG | HEART RATE: 90 BPM | SYSTOLIC BLOOD PRESSURE: 126 MMHG | TEMPERATURE: 97.9 F | RESPIRATION RATE: 18 BRPM

## 2022-07-24 LAB
BILIRUB BLD-MCNC: NEGATIVE MG/DL
CLARITY, POC: CLEAR
COLOR UR: NORMAL
GLUCOSE UR STRIP-MCNC: NEGATIVE MG/DL
KETONES UR QL: NEGATIVE
LEUKOCYTE EST, POC: NEGATIVE
NITRITE UR-MCNC: NEGATIVE MG/ML
PH UR: 6 [PH] (ref 5–8)
PROT UR STRIP-MCNC: NEGATIVE MG/DL
RBC # UR STRIP: NEGATIVE /UL
SP GR UR: 1.02 (ref 1–1.03)
UROBILINOGEN UR QL: NORMAL

## 2022-07-24 PROCEDURE — 59025 FETAL NON-STRESS TEST: CPT

## 2022-07-24 PROCEDURE — 59025 FETAL NON-STRESS TEST: CPT | Performed by: OBSTETRICS & GYNECOLOGY

## 2022-07-24 PROCEDURE — 81002 URINALYSIS NONAUTO W/O SCOPE: CPT | Performed by: OBSTETRICS & GYNECOLOGY

## 2022-07-24 PROCEDURE — G0463 HOSPITAL OUTPT CLINIC VISIT: HCPCS

## 2022-07-24 RX ORDER — SODIUM CHLORIDE 0.9 % (FLUSH) 0.9 %
10 SYRINGE (ML) INJECTION AS NEEDED
Status: DISCONTINUED | OUTPATIENT
Start: 2022-07-24 | End: 2022-07-24 | Stop reason: HOSPADM

## 2022-07-24 RX ORDER — SODIUM CHLORIDE, SODIUM LACTATE, POTASSIUM CHLORIDE, CALCIUM CHLORIDE 600; 310; 30; 20 MG/100ML; MG/100ML; MG/100ML; MG/100ML
999 INJECTION, SOLUTION INTRAVENOUS CONTINUOUS
Status: DISCONTINUED | OUTPATIENT
Start: 2022-07-24 | End: 2022-07-24 | Stop reason: HOSPADM

## 2022-07-24 RX ORDER — HYDROXYZINE PAMOATE 50 MG/1
50 CAPSULE ORAL ONCE
Status: COMPLETED | OUTPATIENT
Start: 2022-07-24 | End: 2022-07-24

## 2022-07-24 RX ORDER — SODIUM CHLORIDE 0.9 % (FLUSH) 0.9 %
10 SYRINGE (ML) INJECTION EVERY 12 HOURS SCHEDULED
Status: DISCONTINUED | OUTPATIENT
Start: 2022-07-24 | End: 2022-07-24 | Stop reason: HOSPADM

## 2022-07-24 RX ADMIN — SODIUM CHLORIDE, POTASSIUM CHLORIDE, SODIUM LACTATE AND CALCIUM CHLORIDE 999 ML/HR: 600; 310; 30; 20 INJECTION, SOLUTION INTRAVENOUS at 18:00

## 2022-07-24 RX ADMIN — HYDROXYZINE PAMOATE 50 MG: 50 CAPSULE ORAL at 20:40

## 2022-07-24 NOTE — NON STRESS TEST
Non Stress Test    [facility]    Patient: Romy Langley  : 2000  MRN: [id number]  CSN: [id number]    Gestational Age: 38w0d    Indication for NST contractions       Time On 01:12   Time Off 03:16       Interpretation    Baseline 's beats per minute   Category 1   Decelerations Absent       Additional Comments See nursing notes       Recommendations for f/u See nursing notes       This note has been electronically signed.    Suni Oquendo M.D.

## 2022-07-26 ENCOUNTER — PREP FOR SURGERY (OUTPATIENT)
Dept: OTHER | Facility: HOSPITAL | Age: 22
End: 2022-07-26

## 2022-07-26 ENCOUNTER — ROUTINE PRENATAL (OUTPATIENT)
Dept: OBSTETRICS AND GYNECOLOGY | Facility: CLINIC | Age: 22
End: 2022-07-26

## 2022-07-26 VITALS — SYSTOLIC BLOOD PRESSURE: 108 MMHG | DIASTOLIC BLOOD PRESSURE: 62 MMHG | WEIGHT: 184 LBS | BODY MASS INDEX: 29.7 KG/M2

## 2022-07-26 DIAGNOSIS — Z34.93 PRENATAL CARE IN THIRD TRIMESTER: Primary | ICD-10-CM

## 2022-07-26 DIAGNOSIS — O99.513 PNEUMONIA AFFECTING PREGNANCY IN THIRD TRIMESTER: ICD-10-CM

## 2022-07-26 DIAGNOSIS — O99.820 GBS (GROUP B STREPTOCOCCUS CARRIER), +RV CULTURE, CURRENTLY PREGNANT: ICD-10-CM

## 2022-07-26 DIAGNOSIS — J18.9 PNEUMONIA AFFECTING PREGNANCY IN THIRD TRIMESTER: ICD-10-CM

## 2022-07-26 DIAGNOSIS — O43.199 MARGINAL INSERTION OF UMBILICAL CORD AFFECTING MANAGEMENT OF MOTHER: ICD-10-CM

## 2022-07-26 DIAGNOSIS — Z34.90 ENCOUNTER FOR INDUCTION OF LABOR: Primary | ICD-10-CM

## 2022-07-26 PROCEDURE — 99213 OFFICE O/P EST LOW 20 MIN: CPT | Performed by: OBSTETRICS & GYNECOLOGY

## 2022-07-26 RX ORDER — MORPHINE SULFATE 4 MG/ML
4 INJECTION, SOLUTION INTRAMUSCULAR; INTRAVENOUS ONCE AS NEEDED
Status: CANCELLED | OUTPATIENT
Start: 2022-07-26

## 2022-07-26 RX ORDER — MISOPROSTOL 200 UG/1
800 TABLET ORAL AS NEEDED
Status: CANCELLED | OUTPATIENT
Start: 2022-07-26

## 2022-07-26 RX ORDER — PROMETHAZINE HYDROCHLORIDE 12.5 MG/1
12.5 TABLET ORAL EVERY 6 HOURS PRN
Status: CANCELLED | OUTPATIENT
Start: 2022-07-26

## 2022-07-26 RX ORDER — PENICILLIN G 3000000 [IU]/50ML
3 INJECTION, SOLUTION INTRAVENOUS
Status: CANCELLED | OUTPATIENT
Start: 2022-07-26 | End: 2022-07-29

## 2022-07-26 RX ORDER — SODIUM CHLORIDE, SODIUM LACTATE, POTASSIUM CHLORIDE, CALCIUM CHLORIDE 600; 310; 30; 20 MG/100ML; MG/100ML; MG/100ML; MG/100ML
125 INJECTION, SOLUTION INTRAVENOUS CONTINUOUS
Status: CANCELLED | OUTPATIENT
Start: 2022-07-26

## 2022-07-26 RX ORDER — METHYLERGONOVINE MALEATE 0.2 MG/ML
200 INJECTION INTRAVENOUS ONCE AS NEEDED
Status: CANCELLED | OUTPATIENT
Start: 2022-07-26

## 2022-07-26 RX ORDER — MORPHINE SULFATE 2 MG/ML
2 INJECTION, SOLUTION INTRAMUSCULAR; INTRAVENOUS ONCE AS NEEDED
Status: CANCELLED | OUTPATIENT
Start: 2022-07-26

## 2022-07-26 RX ORDER — ONDANSETRON 4 MG/1
4 TABLET, FILM COATED ORAL ONCE AS NEEDED
Status: CANCELLED | OUTPATIENT
Start: 2022-07-26

## 2022-07-26 RX ORDER — ONDANSETRON 2 MG/ML
4 INJECTION INTRAMUSCULAR; INTRAVENOUS ONCE AS NEEDED
Status: CANCELLED | OUTPATIENT
Start: 2022-07-26

## 2022-07-26 RX ORDER — SODIUM CHLORIDE 0.9 % (FLUSH) 0.9 %
3 SYRINGE (ML) INJECTION EVERY 12 HOURS SCHEDULED
Status: CANCELLED | OUTPATIENT
Start: 2022-07-26

## 2022-07-26 RX ORDER — PENICILLIN G 3000000 [IU]/50ML
3 INJECTION, SOLUTION INTRAVENOUS
Status: CANCELLED | OUTPATIENT
Start: 2022-07-26 | End: 2022-07-26

## 2022-07-26 RX ORDER — MORPHINE SULFATE 4 MG/ML
4 INJECTION, SOLUTION INTRAMUSCULAR; INTRAVENOUS EVERY 4 HOURS PRN
Status: CANCELLED | OUTPATIENT
Start: 2022-07-26 | End: 2022-08-05

## 2022-07-26 RX ORDER — OXYTOCIN/0.9 % SODIUM CHLORIDE 30/500 ML
1-20 PLASTIC BAG, INJECTION (ML) INTRAVENOUS
Status: CANCELLED | OUTPATIENT
Start: 2022-07-26

## 2022-07-26 RX ORDER — MORPHINE SULFATE 2 MG/ML
6 INJECTION, SOLUTION INTRAMUSCULAR; INTRAVENOUS EVERY 4 HOURS PRN
Status: CANCELLED | OUTPATIENT
Start: 2022-07-26 | End: 2022-08-05

## 2022-07-26 RX ORDER — LIDOCAINE HYDROCHLORIDE 10 MG/ML
5 INJECTION, SOLUTION EPIDURAL; INFILTRATION; INTRACAUDAL; PERINEURAL AS NEEDED
Status: CANCELLED | OUTPATIENT
Start: 2022-07-26

## 2022-07-26 RX ORDER — CARBOPROST TROMETHAMINE 250 UG/ML
250 INJECTION, SOLUTION INTRAMUSCULAR AS NEEDED
Status: CANCELLED | OUTPATIENT
Start: 2022-07-26

## 2022-07-26 RX ORDER — ACETAMINOPHEN 325 MG/1
650 TABLET ORAL ONCE AS NEEDED
Status: CANCELLED | OUTPATIENT
Start: 2022-07-26

## 2022-07-26 RX ORDER — PROMETHAZINE HYDROCHLORIDE 12.5 MG/1
12.5 SUPPOSITORY RECTAL EVERY 6 HOURS PRN
Status: CANCELLED | OUTPATIENT
Start: 2022-07-26

## 2022-07-26 RX ORDER — OXYTOCIN/0.9 % SODIUM CHLORIDE 30/500 ML
85 PLASTIC BAG, INJECTION (ML) INTRAVENOUS ONCE
Status: CANCELLED | OUTPATIENT
Start: 2022-07-26 | End: 2022-07-26

## 2022-07-26 RX ORDER — SODIUM CHLORIDE 0.9 % (FLUSH) 0.9 %
3-10 SYRINGE (ML) INJECTION AS NEEDED
Status: CANCELLED | OUTPATIENT
Start: 2022-07-26

## 2022-07-26 RX ORDER — OXYTOCIN/0.9 % SODIUM CHLORIDE 30/500 ML
650 PLASTIC BAG, INJECTION (ML) INTRAVENOUS ONCE
Status: CANCELLED | OUTPATIENT
Start: 2022-07-26 | End: 2022-07-26

## 2022-07-26 NOTE — PROGRESS NOTES
Prenatal Care Visit    Subjective   Chief Complaint   Patient presents with   • Routine Prenatal Visit     Growth scan done today, no complaints.       History:   Romy is a  currently at 39w2d who presents for a prenatal care visit today.    No major issues.    Social History    Tobacco Use      Smoking status: Never Smoker      Smokeless tobacco: Never Used       Objective   /62   Wt 83.5 kg (184 lb)   LMP 10/16/2021   BMI 29.70 kg/m²   Physical Exam:  Normal, gestational age-appropriate exam today   SVE 0.5/50/-3       Plan   Medical Decision Making:    I have reviewed the prenatal labs and ultrasound(s) today. I have reviewed the most recent prenatal progress note(s).    Diagnosis: Supervision of high risk pregnancy   Marginal cord insertion  Recent RL pneumonia, resolved  Anemia  Fetal liver abnormality on U/S  GBS +   Tests/Orders/Rx today: No orders of the defined types were placed in this encounter.      Medication Management: None     Topics discussed: Prenatal care milestones  induction of labor  kick counts and fetal movement  labor signs and symptoms  PIH precautions  U/S findings   Anemia + iron  GBS   Tests next visit: none   Next visit: none     Parvez Galindo MD  Obstetrics and Gynecology  [facility]

## 2022-07-27 ENCOUNTER — ANESTHESIA (OUTPATIENT)
Dept: PERIOP | Facility: HOSPITAL | Age: 22
End: 2022-07-27

## 2022-07-27 ENCOUNTER — HOSPITAL ENCOUNTER (OUTPATIENT)
Dept: LABOR AND DELIVERY | Facility: HOSPITAL | Age: 22
Discharge: HOME | End: 2022-07-27

## 2022-07-27 ENCOUNTER — ANESTHESIA EVENT (OUTPATIENT)
Dept: PERIOP | Facility: HOSPITAL | Age: 22
End: 2022-07-27

## 2022-07-27 ENCOUNTER — HOSPITAL ENCOUNTER (INPATIENT)
Facility: HOSPITAL | Age: 22
LOS: 3 days | Discharge: HOME | End: 2022-07-30
Attending: OBSTETRICS & GYNECOLOGY | Admitting: OBSTETRICS & GYNECOLOGY

## 2022-07-27 DIAGNOSIS — Z98.891 S/P CESAREAN SECTION: ICD-10-CM

## 2022-07-27 DIAGNOSIS — Z34.90 ENCOUNTER FOR INDUCTION OF LABOR: ICD-10-CM

## 2022-07-27 LAB
ABO GROUP BLD: NORMAL
ABO GROUP BLD: NORMAL
BASOPHILS # BLD AUTO: 0.04 10*3/MM3 (ref 0–0.2)
BASOPHILS NFR BLD AUTO: 0.3 % (ref 0–1.5)
BILIRUB BLD-MCNC: NEGATIVE MG/DL
BLD GP AB SCN SERPL QL: NEGATIVE
CLARITY, POC: CLEAR
COLOR UR: ABNORMAL
DEPRECATED RDW RBC AUTO: 37.9 FL (ref 37–54)
EOSINOPHIL # BLD AUTO: 0.06 10*3/MM3 (ref 0–0.4)
EOSINOPHIL NFR BLD AUTO: 0.5 % (ref 0.3–6.2)
ERYTHROCYTE [DISTWIDTH] IN BLOOD BY AUTOMATED COUNT: 13.2 % (ref 12.3–15.4)
GLUCOSE UR STRIP-MCNC: NEGATIVE MG/DL
HCT VFR BLD AUTO: 28.3 % (ref 34–46.6)
HGB BLD-MCNC: 9.1 G/DL (ref 12–15.9)
IMM GRANULOCYTES # BLD AUTO: 0.08 10*3/MM3 (ref 0–0.05)
IMM GRANULOCYTES NFR BLD AUTO: 0.6 % (ref 0–0.5)
KETONES UR QL: NEGATIVE
LEUKOCYTE EST, POC: NEGATIVE
LYMPHOCYTES # BLD AUTO: 2.17 10*3/MM3 (ref 0.7–3.1)
LYMPHOCYTES NFR BLD AUTO: 17.4 % (ref 19.6–45.3)
MCH RBC QN AUTO: 25.3 PG (ref 26.6–33)
MCHC RBC AUTO-ENTMCNC: 32.2 G/DL (ref 31.5–35.7)
MCV RBC AUTO: 78.8 FL (ref 79–97)
MONOCYTES # BLD AUTO: 1.1 10*3/MM3 (ref 0.1–0.9)
MONOCYTES NFR BLD AUTO: 8.8 % (ref 5–12)
NEUTROPHILS NFR BLD AUTO: 72.4 % (ref 42.7–76)
NEUTROPHILS NFR BLD AUTO: 9.04 10*3/MM3 (ref 1.7–7)
NITRITE UR-MCNC: NEGATIVE MG/ML
NRBC BLD AUTO-RTO: 0 /100 WBC (ref 0–0.2)
PH UR: 6.5 [PH] (ref 5–8)
PLATELET # BLD AUTO: 260 10*3/MM3 (ref 140–450)
PMV BLD AUTO: 10 FL (ref 6–12)
PROT UR STRIP-MCNC: NEGATIVE MG/DL
RBC # BLD AUTO: 3.59 10*6/MM3 (ref 3.77–5.28)
RBC # UR STRIP: ABNORMAL /UL
RH BLD: POSITIVE
RH BLD: POSITIVE
SARS-COV-2 RNA PNL SPEC NAA+PROBE: NOT DETECTED
SP GR UR: 1.02 (ref 1–1.03)
T&S EXPIRATION DATE: NORMAL
UROBILINOGEN UR QL: NORMAL
WBC NRBC COR # BLD: 12.49 10*3/MM3 (ref 3.4–10.8)

## 2022-07-27 PROCEDURE — 86901 BLOOD TYPING SEROLOGIC RH(D): CPT | Performed by: OBSTETRICS & GYNECOLOGY

## 2022-07-27 PROCEDURE — 25010000002 PENICILLIN G POTASSIUM PER 600000 UNITS: Performed by: OBSTETRICS & GYNECOLOGY

## 2022-07-27 PROCEDURE — 85025 COMPLETE CBC W/AUTO DIFF WBC: CPT | Performed by: OBSTETRICS & GYNECOLOGY

## 2022-07-27 PROCEDURE — 86850 RBC ANTIBODY SCREEN: CPT | Performed by: OBSTETRICS & GYNECOLOGY

## 2022-07-27 PROCEDURE — 87635 SARS-COV-2 COVID-19 AMP PRB: CPT | Performed by: PHYSICIAN ASSISTANT

## 2022-07-27 PROCEDURE — 86901 BLOOD TYPING SEROLOGIC RH(D): CPT

## 2022-07-27 PROCEDURE — 51702 INSERT TEMP BLADDER CATH: CPT

## 2022-07-27 PROCEDURE — 3E033VJ INTRODUCTION OF OTHER HORMONE INTO PERIPHERAL VEIN, PERCUTANEOUS APPROACH: ICD-10-PCS | Performed by: OBSTETRICS & GYNECOLOGY

## 2022-07-27 PROCEDURE — 63710000001 PROMETHAZINE PER 12.5 MG: Performed by: OBSTETRICS & GYNECOLOGY

## 2022-07-27 PROCEDURE — 59025 FETAL NON-STRESS TEST: CPT

## 2022-07-27 PROCEDURE — 86900 BLOOD TYPING SEROLOGIC ABO: CPT | Performed by: OBSTETRICS & GYNECOLOGY

## 2022-07-27 PROCEDURE — 99223 1ST HOSP IP/OBS HIGH 75: CPT | Performed by: OBSTETRICS & GYNECOLOGY

## 2022-07-27 PROCEDURE — 81002 URINALYSIS NONAUTO W/O SCOPE: CPT | Performed by: OBSTETRICS & GYNECOLOGY

## 2022-07-27 PROCEDURE — 86900 BLOOD TYPING SEROLOGIC ABO: CPT

## 2022-07-27 RX ORDER — MORPHINE SULFATE 2 MG/ML
6 INJECTION, SOLUTION INTRAMUSCULAR; INTRAVENOUS EVERY 4 HOURS PRN
Status: DISCONTINUED | OUTPATIENT
Start: 2022-07-27 | End: 2022-07-28 | Stop reason: HOSPADM

## 2022-07-27 RX ORDER — ONDANSETRON 2 MG/ML
4 INJECTION INTRAMUSCULAR; INTRAVENOUS ONCE AS NEEDED
Status: DISCONTINUED | OUTPATIENT
Start: 2022-07-27 | End: 2022-07-28 | Stop reason: HOSPADM

## 2022-07-27 RX ORDER — SODIUM CHLORIDE, SODIUM LACTATE, POTASSIUM CHLORIDE, CALCIUM CHLORIDE 600; 310; 30; 20 MG/100ML; MG/100ML; MG/100ML; MG/100ML
125 INJECTION, SOLUTION INTRAVENOUS CONTINUOUS
Status: DISCONTINUED | OUTPATIENT
Start: 2022-07-27 | End: 2022-07-28

## 2022-07-27 RX ORDER — PENICILLIN G 3000000 [IU]/50ML
3 INJECTION, SOLUTION INTRAVENOUS
Status: COMPLETED | OUTPATIENT
Start: 2022-07-27 | End: 2022-07-27

## 2022-07-27 RX ORDER — PROMETHAZINE HYDROCHLORIDE 12.5 MG/1
12.5 SUPPOSITORY RECTAL EVERY 6 HOURS PRN
Status: DISCONTINUED | OUTPATIENT
Start: 2022-07-27 | End: 2022-07-28 | Stop reason: HOSPADM

## 2022-07-27 RX ORDER — SODIUM CHLORIDE 0.9 % (FLUSH) 0.9 %
3 SYRINGE (ML) INJECTION EVERY 12 HOURS SCHEDULED
Status: DISCONTINUED | OUTPATIENT
Start: 2022-07-27 | End: 2022-07-28 | Stop reason: HOSPADM

## 2022-07-27 RX ORDER — TRISODIUM CITRATE DIHYDRATE AND CITRIC ACID MONOHYDRATE 500; 334 MG/5ML; MG/5ML
30 SOLUTION ORAL ONCE
Status: DISCONTINUED | OUTPATIENT
Start: 2022-07-27 | End: 2022-07-28 | Stop reason: HOSPADM

## 2022-07-27 RX ORDER — SODIUM CHLORIDE 0.9 % (FLUSH) 0.9 %
3-10 SYRINGE (ML) INJECTION AS NEEDED
Status: DISCONTINUED | OUTPATIENT
Start: 2022-07-27 | End: 2022-07-28 | Stop reason: HOSPADM

## 2022-07-27 RX ORDER — EPHEDRINE SULFATE 5 MG/ML
5 INJECTION INTRAVENOUS
Status: DISCONTINUED | OUTPATIENT
Start: 2022-07-27 | End: 2022-07-28 | Stop reason: HOSPADM

## 2022-07-27 RX ORDER — LIDOCAINE HYDROCHLORIDE 10 MG/ML
5 INJECTION, SOLUTION EPIDURAL; INFILTRATION; INTRACAUDAL; PERINEURAL AS NEEDED
Status: DISCONTINUED | OUTPATIENT
Start: 2022-07-27 | End: 2022-07-28 | Stop reason: HOSPADM

## 2022-07-27 RX ORDER — PENICILLIN G 3000000 [IU]/50ML
3 INJECTION, SOLUTION INTRAVENOUS
Status: DISCONTINUED | OUTPATIENT
Start: 2022-07-27 | End: 2022-07-28 | Stop reason: HOSPADM

## 2022-07-27 RX ORDER — OXYTOCIN/0.9 % SODIUM CHLORIDE 30/500 ML
1-20 PLASTIC BAG, INJECTION (ML) INTRAVENOUS
Status: DISCONTINUED | OUTPATIENT
Start: 2022-07-27 | End: 2022-07-28 | Stop reason: HOSPADM

## 2022-07-27 RX ORDER — PROMETHAZINE HYDROCHLORIDE 12.5 MG/1
12.5 TABLET ORAL EVERY 6 HOURS PRN
Status: DISCONTINUED | OUTPATIENT
Start: 2022-07-27 | End: 2022-07-28 | Stop reason: HOSPADM

## 2022-07-27 RX ORDER — MORPHINE SULFATE 4 MG/ML
4 INJECTION, SOLUTION INTRAMUSCULAR; INTRAVENOUS EVERY 4 HOURS PRN
Status: DISCONTINUED | OUTPATIENT
Start: 2022-07-27 | End: 2022-07-28 | Stop reason: HOSPADM

## 2022-07-27 RX ADMIN — PENICILLIN G 3 MILLION UNITS: 3000000 INJECTION, SOLUTION INTRAVENOUS at 17:53

## 2022-07-27 RX ADMIN — PENICILLIN G 3 MILLION UNITS: 3000000 INJECTION, SOLUTION INTRAVENOUS at 16:47

## 2022-07-27 RX ADMIN — PROMETHAZINE HYDROCHLORIDE 12.5 MG: 12.5 TABLET ORAL at 22:51

## 2022-07-27 RX ADMIN — SODIUM CHLORIDE, POTASSIUM CHLORIDE, SODIUM LACTATE AND CALCIUM CHLORIDE 125 ML/HR: 600; 310; 30; 20 INJECTION, SOLUTION INTRAVENOUS at 16:20

## 2022-07-27 RX ADMIN — SODIUM CHLORIDE, POTASSIUM CHLORIDE, SODIUM LACTATE AND CALCIUM CHLORIDE 125 ML/HR: 600; 310; 30; 20 INJECTION, SOLUTION INTRAVENOUS at 22:50

## 2022-07-27 RX ADMIN — PENICILLIN G 3 MILLION UNITS: 3000000 INJECTION, SOLUTION INTRAVENOUS at 20:06

## 2022-07-27 RX ADMIN — Medication 1 MILLI-UNITS/MIN: at 20:01

## 2022-07-27 NOTE — ANESTHESIA PREPROCEDURE EVALUATION
Anesthesia Evaluation     Patient summary reviewed and Nursing notes reviewed   no history of anesthetic complications:  NPO Solid Status: > 8 hours  NPO Liquid Status: > 8 hours           Airway   Mallampati: II  TM distance: <3 FB  Neck ROM: full  Possible difficult intubation  Dental - normal exam     Pulmonary - normal exam   (+) pneumonia resolved ,   Cardiovascular - negative cardio ROS and normal exam    Rhythm: regular  Rate: normal        Neuro/Psych  GI/Hepatic/Renal/Endo    (+)   thyroid problem hypothyroidism    Musculoskeletal     Abdominal  - normal exam    Abdomen: soft.  Bowel sounds: normal.   Substance History      OB/GYN    (+) Pregnant,         Other   blood dyscrasia anemia,     ROS/Med Hx Other: Hgb 9.1  Plt 260                  Anesthesia Plan    ASA 2     epidural     (Risks discussed , including but not limited to:  Headache, itching, n&v, infection, failure, decreased blood pressure, permanent chronic/back pain, nerve damage, paralysis, etc. All questions answered and informed consent obtained. )    Anesthetic plan, risks, benefits, and alternatives have been provided, discussed and informed consent has been obtained with: patient.        CODE STATUS:

## 2022-07-28 PROCEDURE — 25010000002 ONDANSETRON PER 1 MG: Performed by: NURSE ANESTHETIST, CERTIFIED REGISTERED

## 2022-07-28 PROCEDURE — 63710000001 DIPHENHYDRAMINE PER 50 MG: Performed by: OBSTETRICS & GYNECOLOGY

## 2022-07-28 PROCEDURE — 25010000002 MIDAZOLAM PER 1MG: Performed by: NURSE ANESTHETIST, CERTIFIED REGISTERED

## 2022-07-28 PROCEDURE — 25010000002 PENICILLIN G POTASSIUM PER 600000 UNITS: Performed by: OBSTETRICS & GYNECOLOGY

## 2022-07-28 PROCEDURE — 25010000002 MORPHINE PER 10 MG: Performed by: NURSE ANESTHETIST, CERTIFIED REGISTERED

## 2022-07-28 PROCEDURE — 0 CEFAZOLIN SODIUM-DEXTROSE 2-3 GM-%(50ML) RECONSTITUTED SOLUTION: Performed by: MIDWIFE

## 2022-07-28 PROCEDURE — 25010000002 AZITHROMYCIN PER 500 MG: Performed by: MIDWIFE

## 2022-07-28 PROCEDURE — 59515 CESAREAN DELIVERY: CPT | Performed by: OBSTETRICS & GYNECOLOGY

## 2022-07-28 PROCEDURE — 51703 INSERT BLADDER CATH COMPLEX: CPT

## 2022-07-28 PROCEDURE — 59025 FETAL NON-STRESS TEST: CPT

## 2022-07-28 PROCEDURE — 25010000002 PROPOFOL 10 MG/ML EMULSION: Performed by: NURSE ANESTHETIST, CERTIFIED REGISTERED

## 2022-07-28 PROCEDURE — C1755 CATHETER, INTRASPINAL: HCPCS | Performed by: NURSE ANESTHETIST, CERTIFIED REGISTERED

## 2022-07-28 PROCEDURE — 25010000002 FENTANYL CITRATE (PF) 100 MCG/2ML SOLUTION: Performed by: NURSE ANESTHETIST, CERTIFIED REGISTERED

## 2022-07-28 PROCEDURE — 10907ZC DRAINAGE OF AMNIOTIC FLUID, THERAPEUTIC FROM PRODUCTS OF CONCEPTION, VIA NATURAL OR ARTIFICIAL OPENING: ICD-10-PCS | Performed by: MIDWIFE

## 2022-07-28 RX ORDER — CARBOPROST TROMETHAMINE 250 UG/ML
250 INJECTION, SOLUTION INTRAMUSCULAR AS NEEDED
Status: DISCONTINUED | OUTPATIENT
Start: 2022-07-28 | End: 2022-07-28 | Stop reason: HOSPADM

## 2022-07-28 RX ORDER — KETOROLAC TROMETHAMINE 30 MG/ML
30 INJECTION, SOLUTION INTRAMUSCULAR; INTRAVENOUS ONCE
Status: DISCONTINUED | OUTPATIENT
Start: 2022-07-28 | End: 2022-07-28 | Stop reason: HOSPADM

## 2022-07-28 RX ORDER — SODIUM CHLORIDE, SODIUM LACTATE, POTASSIUM CHLORIDE, CALCIUM CHLORIDE 600; 310; 30; 20 MG/100ML; MG/100ML; MG/100ML; MG/100ML
INJECTION, SOLUTION INTRAVENOUS CONTINUOUS PRN
Status: DISCONTINUED | OUTPATIENT
Start: 2022-07-28 | End: 2022-07-28 | Stop reason: SURG

## 2022-07-28 RX ORDER — ACETAMINOPHEN 500 MG
1000 TABLET ORAL EVERY 6 HOURS
Status: DISPENSED | OUTPATIENT
Start: 2022-07-28 | End: 2022-07-29

## 2022-07-28 RX ORDER — DEXTROSE AND SODIUM CHLORIDE 5; .9 G/100ML; G/100ML
125 INJECTION, SOLUTION INTRAVENOUS CONTINUOUS
Status: DISCONTINUED | OUTPATIENT
Start: 2022-07-28 | End: 2022-07-30 | Stop reason: HOSPADM

## 2022-07-28 RX ORDER — CARBOPROST TROMETHAMINE 250 UG/ML
250 INJECTION, SOLUTION INTRAMUSCULAR AS NEEDED
Status: DISCONTINUED | OUTPATIENT
Start: 2022-07-28 | End: 2022-07-30 | Stop reason: HOSPADM

## 2022-07-28 RX ORDER — OXYTOCIN/0.9 % SODIUM CHLORIDE 30/500 ML
85 PLASTIC BAG, INJECTION (ML) INTRAVENOUS ONCE
Status: DISCONTINUED | OUTPATIENT
Start: 2022-07-28 | End: 2022-07-30 | Stop reason: HOSPADM

## 2022-07-28 RX ORDER — MORPHINE SULFATE 4 MG/ML
4 INJECTION, SOLUTION INTRAMUSCULAR; INTRAVENOUS ONCE AS NEEDED
Status: DISCONTINUED | OUTPATIENT
Start: 2022-07-28 | End: 2022-07-30 | Stop reason: HOSPADM

## 2022-07-28 RX ORDER — PRENATAL VIT/IRON FUM/FOLIC AC 27MG-0.8MG
1 TABLET ORAL DAILY
Status: DISCONTINUED | OUTPATIENT
Start: 2022-07-28 | End: 2022-07-30 | Stop reason: HOSPADM

## 2022-07-28 RX ORDER — DOCUSATE SODIUM 100 MG/1
100 CAPSULE, LIQUID FILLED ORAL 2 TIMES DAILY PRN
Status: DISCONTINUED | OUTPATIENT
Start: 2022-07-28 | End: 2022-07-30 | Stop reason: HOSPADM

## 2022-07-28 RX ORDER — OXYCODONE HYDROCHLORIDE 5 MG/1
10 TABLET ORAL EVERY 4 HOURS PRN
Status: DISCONTINUED | OUTPATIENT
Start: 2022-07-28 | End: 2022-07-30 | Stop reason: HOSPADM

## 2022-07-28 RX ORDER — CALCIUM CARBONATE 200(500)MG
1 TABLET,CHEWABLE ORAL EVERY 4 HOURS PRN
Status: DISCONTINUED | OUTPATIENT
Start: 2022-07-28 | End: 2022-07-30 | Stop reason: HOSPADM

## 2022-07-28 RX ORDER — ALUMINA, MAGNESIA, AND SIMETHICONE 2400; 2400; 240 MG/30ML; MG/30ML; MG/30ML
15 SUSPENSION ORAL EVERY 4 HOURS PRN
Status: DISCONTINUED | OUTPATIENT
Start: 2022-07-28 | End: 2022-07-30 | Stop reason: HOSPADM

## 2022-07-28 RX ORDER — FAMOTIDINE 10 MG/ML
20 INJECTION, SOLUTION INTRAVENOUS ONCE
Status: COMPLETED | OUTPATIENT
Start: 2022-07-28 | End: 2022-07-28

## 2022-07-28 RX ORDER — TRISODIUM CITRATE DIHYDRATE AND CITRIC ACID MONOHYDRATE 500; 334 MG/5ML; MG/5ML
30 SOLUTION ORAL ONCE
Status: COMPLETED | OUTPATIENT
Start: 2022-07-28 | End: 2022-07-28

## 2022-07-28 RX ORDER — IBUPROFEN 600 MG/1
600 TABLET ORAL EVERY 4 HOURS PRN
Status: DISCONTINUED | OUTPATIENT
Start: 2022-07-28 | End: 2022-07-30 | Stop reason: HOSPADM

## 2022-07-28 RX ORDER — MORPHINE SULFATE 2 MG/ML
2 INJECTION, SOLUTION INTRAMUSCULAR; INTRAVENOUS EVERY 4 HOURS PRN
Status: DISCONTINUED | OUTPATIENT
Start: 2022-07-28 | End: 2022-07-30 | Stop reason: HOSPADM

## 2022-07-28 RX ORDER — FENTANYL CITRATE 50 UG/ML
INJECTION, SOLUTION INTRAMUSCULAR; INTRAVENOUS AS NEEDED
Status: DISCONTINUED | OUTPATIENT
Start: 2022-07-28 | End: 2022-07-28 | Stop reason: SURG

## 2022-07-28 RX ORDER — NALOXONE HCL 0.4 MG/ML
0.4 VIAL (ML) INJECTION
Status: DISCONTINUED | OUTPATIENT
Start: 2022-07-28 | End: 2022-07-30 | Stop reason: HOSPADM

## 2022-07-28 RX ORDER — OXYTOCIN 10 [USP'U]/ML
INJECTION, SOLUTION INTRAMUSCULAR; INTRAVENOUS AS NEEDED
Status: DISCONTINUED | OUTPATIENT
Start: 2022-07-28 | End: 2022-07-28 | Stop reason: SURG

## 2022-07-28 RX ORDER — KETAMINE HCL IN NACL, ISO-OSM 100MG/10ML
SYRINGE (ML) INJECTION AS NEEDED
Status: DISCONTINUED | OUTPATIENT
Start: 2022-07-28 | End: 2022-07-28 | Stop reason: SURG

## 2022-07-28 RX ORDER — MORPHINE SULFATE 2 MG/ML
2 INJECTION, SOLUTION INTRAMUSCULAR; INTRAVENOUS ONCE AS NEEDED
Status: DISCONTINUED | OUTPATIENT
Start: 2022-07-28 | End: 2022-07-30 | Stop reason: HOSPADM

## 2022-07-28 RX ORDER — DIPHENHYDRAMINE HCL 25 MG
25 CAPSULE ORAL EVERY 4 HOURS PRN
Status: DISCONTINUED | OUTPATIENT
Start: 2022-07-28 | End: 2022-07-30 | Stop reason: HOSPADM

## 2022-07-28 RX ORDER — MORPHINE SULFATE 1 MG/ML
INJECTION, SOLUTION EPIDURAL; INTRATHECAL; INTRAVENOUS AS NEEDED
Status: DISCONTINUED | OUTPATIENT
Start: 2022-07-28 | End: 2022-07-28 | Stop reason: SURG

## 2022-07-28 RX ORDER — ONDANSETRON 2 MG/ML
4 INJECTION INTRAMUSCULAR; INTRAVENOUS ONCE AS NEEDED
Status: DISCONTINUED | OUTPATIENT
Start: 2022-07-28 | End: 2022-07-30 | Stop reason: HOSPADM

## 2022-07-28 RX ORDER — METHYLERGONOVINE MALEATE 0.2 MG/ML
200 INJECTION INTRAVENOUS ONCE AS NEEDED
Status: DISCONTINUED | OUTPATIENT
Start: 2022-07-28 | End: 2022-07-30 | Stop reason: HOSPADM

## 2022-07-28 RX ORDER — MISOPROSTOL 200 UG/1
800 TABLET ORAL AS NEEDED
Status: DISCONTINUED | OUTPATIENT
Start: 2022-07-28 | End: 2022-07-28 | Stop reason: HOSPADM

## 2022-07-28 RX ORDER — OXYTOCIN/0.9 % SODIUM CHLORIDE 30/500 ML
333 PLASTIC BAG, INJECTION (ML) INTRAVENOUS ONCE
Status: DISCONTINUED | OUTPATIENT
Start: 2022-07-28 | End: 2022-07-28 | Stop reason: HOSPADM

## 2022-07-28 RX ORDER — OXYCODONE HYDROCHLORIDE 5 MG/1
5 TABLET ORAL EVERY 4 HOURS PRN
Status: DISCONTINUED | OUTPATIENT
Start: 2022-07-28 | End: 2022-07-30 | Stop reason: HOSPADM

## 2022-07-28 RX ORDER — ACETAMINOPHEN 325 MG/1
650 TABLET ORAL EVERY 6 HOURS
Status: DISCONTINUED | OUTPATIENT
Start: 2022-07-29 | End: 2022-07-30 | Stop reason: HOSPADM

## 2022-07-28 RX ORDER — ACETAMINOPHEN 500 MG
1000 TABLET ORAL ONCE
Status: DISCONTINUED | OUTPATIENT
Start: 2022-07-28 | End: 2022-07-28 | Stop reason: HOSPADM

## 2022-07-28 RX ORDER — CEFAZOLIN SODIUM 2 G/50ML
2 SOLUTION INTRAVENOUS ONCE
Status: COMPLETED | OUTPATIENT
Start: 2022-07-28 | End: 2022-07-28

## 2022-07-28 RX ORDER — ONDANSETRON 2 MG/ML
4 INJECTION INTRAMUSCULAR; INTRAVENOUS EVERY 6 HOURS PRN
Status: DISCONTINUED | OUTPATIENT
Start: 2022-07-28 | End: 2022-07-30 | Stop reason: HOSPADM

## 2022-07-28 RX ORDER — ACETAMINOPHEN 325 MG/1
650 TABLET ORAL ONCE AS NEEDED
Status: COMPLETED | OUTPATIENT
Start: 2022-07-28 | End: 2022-07-29

## 2022-07-28 RX ORDER — ONDANSETRON 2 MG/ML
INJECTION INTRAMUSCULAR; INTRAVENOUS AS NEEDED
Status: DISCONTINUED | OUTPATIENT
Start: 2022-07-28 | End: 2022-07-28 | Stop reason: SURG

## 2022-07-28 RX ORDER — OXYTOCIN/0.9 % SODIUM CHLORIDE 30/500 ML
650 PLASTIC BAG, INJECTION (ML) INTRAVENOUS ONCE
Status: DISCONTINUED | OUTPATIENT
Start: 2022-07-28 | End: 2022-07-30 | Stop reason: HOSPADM

## 2022-07-28 RX ORDER — LIDOCAINE HYDROCHLORIDE AND EPINEPHRINE 20; 5 MG/ML; UG/ML
INJECTION, SOLUTION EPIDURAL; INFILTRATION; INTRACAUDAL; PERINEURAL AS NEEDED
Status: DISCONTINUED | OUTPATIENT
Start: 2022-07-28 | End: 2022-07-28 | Stop reason: SURG

## 2022-07-28 RX ORDER — OXYTOCIN/0.9 % SODIUM CHLORIDE 30/500 ML
42 PLASTIC BAG, INJECTION (ML) INTRAVENOUS AS NEEDED
Status: DISCONTINUED | OUTPATIENT
Start: 2022-07-28 | End: 2022-07-28 | Stop reason: HOSPADM

## 2022-07-28 RX ORDER — SIMETHICONE 80 MG
80 TABLET,CHEWABLE ORAL 4 TIMES DAILY PRN
Status: DISCONTINUED | OUTPATIENT
Start: 2022-07-28 | End: 2022-07-30 | Stop reason: HOSPADM

## 2022-07-28 RX ORDER — MISOPROSTOL 200 UG/1
800 TABLET ORAL AS NEEDED
Status: DISCONTINUED | OUTPATIENT
Start: 2022-07-28 | End: 2022-07-30 | Stop reason: HOSPADM

## 2022-07-28 RX ORDER — MIDAZOLAM HYDROCHLORIDE 2 MG/2ML
INJECTION, SOLUTION INTRAMUSCULAR; INTRAVENOUS AS NEEDED
Status: DISCONTINUED | OUTPATIENT
Start: 2022-07-28 | End: 2022-07-28 | Stop reason: SURG

## 2022-07-28 RX ORDER — METHYLERGONOVINE MALEATE 0.2 MG/ML
200 INJECTION INTRAVENOUS ONCE AS NEEDED
Status: DISCONTINUED | OUTPATIENT
Start: 2022-07-28 | End: 2022-07-28 | Stop reason: HOSPADM

## 2022-07-28 RX ORDER — ONDANSETRON 4 MG/1
4 TABLET, FILM COATED ORAL ONCE AS NEEDED
Status: DISCONTINUED | OUTPATIENT
Start: 2022-07-28 | End: 2022-07-30 | Stop reason: HOSPADM

## 2022-07-28 RX ORDER — PROPOFOL 10 MG/ML
VIAL (ML) INTRAVENOUS AS NEEDED
Status: DISCONTINUED | OUTPATIENT
Start: 2022-07-28 | End: 2022-07-28 | Stop reason: SURG

## 2022-07-28 RX ADMIN — MORPHINE SULFATE 2.5 MG: 1 INJECTION, SOLUTION EPIDURAL; INTRATHECAL; INTRAVENOUS at 16:57

## 2022-07-28 RX ADMIN — ONDANSETRON 4 MG: 2 INJECTION INTRAMUSCULAR; INTRAVENOUS at 16:26

## 2022-07-28 RX ADMIN — LIDOCAINE HYDROCHLORIDE,EPINEPHRINE BITARTRATE 3 ML: 20; .005 INJECTION, SOLUTION EPIDURAL; INFILTRATION; INTRACAUDAL; PERINEURAL at 16:42

## 2022-07-28 RX ADMIN — SODIUM CITRATE AND CITRIC ACID MONOHYDRATE 30 ML: 500; 334 SOLUTION ORAL at 16:15

## 2022-07-28 RX ADMIN — DEXTROSE AND SODIUM CHLORIDE 125 ML/HR: 5; 900 INJECTION, SOLUTION INTRAVENOUS at 20:08

## 2022-07-28 RX ADMIN — FENTANYL CITRATE 50 MCG: 50 INJECTION INTRAMUSCULAR; INTRAVENOUS at 16:53

## 2022-07-28 RX ADMIN — Medication 5 MILLI-UNITS/MIN: at 00:02

## 2022-07-28 RX ADMIN — MIDAZOLAM HYDROCHLORIDE 2 MG: 1 INJECTION, SOLUTION INTRAMUSCULAR; INTRAVENOUS at 16:44

## 2022-07-28 RX ADMIN — LIDOCAINE HYDROCHLORIDE,EPINEPHRINE BITARTRATE 15 ML: 20; .005 INJECTION, SOLUTION EPIDURAL; INFILTRATION; INTRACAUDAL; PERINEURAL at 16:26

## 2022-07-28 RX ADMIN — MORPHINE SULFATE 2.5 MG: 1 INJECTION EPIDURAL; INTRATHECAL; INTRAVENOUS at 16:57

## 2022-07-28 RX ADMIN — PROPOFOL 20 MG: 10 INJECTION, EMULSION INTRAVENOUS at 16:55

## 2022-07-28 RX ADMIN — PENICILLIN G 3 MILLION UNITS: 3000000 INJECTION, SOLUTION INTRAVENOUS at 00:02

## 2022-07-28 RX ADMIN — Medication 25 MG: at 16:43

## 2022-07-28 RX ADMIN — ACETAMINOPHEN 1000 MG: 500 TABLET ORAL at 20:07

## 2022-07-28 RX ADMIN — FAMOTIDINE 20 MG: 10 INJECTION, SOLUTION INTRAVENOUS at 16:14

## 2022-07-28 RX ADMIN — OXYTOCIN 20 UNITS: 10 INJECTION INTRAVENOUS at 17:13

## 2022-07-28 RX ADMIN — OXYCODONE HYDROCHLORIDE 5 MG: 5 TABLET ORAL at 21:05

## 2022-07-28 RX ADMIN — Medication 14 ML/HR: at 09:29

## 2022-07-28 RX ADMIN — FENTANYL CITRATE 50 MCG: 50 INJECTION INTRAMUSCULAR; INTRAVENOUS at 16:55

## 2022-07-28 RX ADMIN — DIPHENHYDRAMINE HYDROCHLORIDE 25 MG: 25 CAPSULE ORAL at 22:40

## 2022-07-28 RX ADMIN — SODIUM CHLORIDE, POTASSIUM CHLORIDE, SODIUM LACTATE AND CALCIUM CHLORIDE: 600; 310; 30; 20 INJECTION, SOLUTION INTRAVENOUS at 16:27

## 2022-07-28 RX ADMIN — PENICILLIN G 3 MILLION UNITS: 3000000 INJECTION, SOLUTION INTRAVENOUS at 07:43

## 2022-07-28 RX ADMIN — DEXTROSE AND SODIUM CHLORIDE 125 ML/HR: 5; 900 INJECTION, SOLUTION INTRAVENOUS at 18:09

## 2022-07-28 RX ADMIN — PENICILLIN G 3 MILLION UNITS: 3000000 INJECTION, SOLUTION INTRAVENOUS at 03:45

## 2022-07-28 RX ADMIN — CEFAZOLIN SODIUM 2 G: 2 SOLUTION INTRAVENOUS at 16:27

## 2022-07-28 RX ADMIN — OXYTOCIN 20 UNITS: 10 INJECTION INTRAVENOUS at 16:52

## 2022-07-28 RX ADMIN — PROPOFOL 20 MG: 10 INJECTION, EMULSION INTRAVENOUS at 16:43

## 2022-07-28 RX ADMIN — SODIUM CHLORIDE, POTASSIUM CHLORIDE, SODIUM LACTATE AND CALCIUM CHLORIDE: 600; 310; 30; 20 INJECTION, SOLUTION INTRAVENOUS at 16:52

## 2022-07-28 RX ADMIN — PROPOFOL 20 MG: 10 INJECTION, EMULSION INTRAVENOUS at 16:48

## 2022-07-28 RX ADMIN — SODIUM CHLORIDE, POTASSIUM CHLORIDE, SODIUM LACTATE AND CALCIUM CHLORIDE: 600; 310; 30; 20 INJECTION, SOLUTION INTRAVENOUS at 17:13

## 2022-07-28 NOTE — ANESTHESIA POSTPROCEDURE EVALUATION
Patient: Romy Langley    Procedure Summary     Date: 22 Room / Location: [facility] OR 2 /  CAN OR    Anesthesia Start: 909 Anesthesia Stop:     Procedure:  SECTION PRIMARY (N/A Abdomen) Diagnosis:       Failure to progress in labor      (Failure to progress in labor [O62.2])    Surgeons: Lukas Rodriguez MD Provider: Ray Bush CRNA    Anesthesia Type: epidural ASA Status: 2          Anesthesia Type: epidural    Vitals  Vitals Value Taken Time   /54 22 1555   Temp     Pulse 78 22 1609   Resp     SpO2 100 % 22 1609   Vitals shown include unvalidated device data.        Post Anesthesia Care and Evaluation    Patient location during evaluation: PACU  Patient participation: complete - patient participated  Level of consciousness: awake and alert  Pain score: 1  Pain management: satisfactory to patient    Airway patency: patent  Anesthetic complications: No anesthetic complications  PONV Status: none  Cardiovascular status: acceptable and stable  Respiratory status: acceptable, nonlabored ventilation, spontaneous ventilation and unassisted  Hydration status: acceptable    Comments: Vitals signs as noted in nursing documentation as per protocol.

## 2022-07-28 NOTE — ANESTHESIA PROCEDURE NOTES
Labor Epidural      Patient location during procedure: OB  Indication:at surgeon's request  Performed By  CRNA/UNIQUE: Ray Bush CRNA  Preanesthetic Checklist  Completed: patient identified, IV checked, site marked, risks and benefits discussed, surgical consent, monitors and equipment checked, pre-op evaluation and timeout performed  Prep:  Pt Position:sitting  Sterile Tech:cap, gloves, gown, mask and sterile barrier  Prep:chlorhexidine gluconate and isopropyl alcohol  Monitoring:blood pressure monitoring and continuous pulse oximetry  Epidural Block Procedure:  Approach:midline  Guidance:palpation technique  Location:L3-L4  Needle Type:Tuohy  Needle Gauge:18 G  Loss of Resistance: 3cm  Cath Depth at skin:7 cm  Paresthesia: none  Aspiration:negative  Test Dose:negative  Post Assessment:  Dressing:occlusive dressing applied and secured with tape  Pt Tolerance:patient tolerated the procedure well with no apparent complications  Complications:no

## 2022-07-29 PROBLEM — Z98.891 S/P CESAREAN SECTION: Status: ACTIVE | Noted: 2022-07-29

## 2022-07-29 LAB
BASOPHILS # BLD AUTO: 0.05 10*3/MM3 (ref 0–0.2)
BASOPHILS NFR BLD AUTO: 0.4 % (ref 0–1.5)
DEPRECATED RDW RBC AUTO: 37.8 FL (ref 37–54)
EOSINOPHIL # BLD AUTO: 0.04 10*3/MM3 (ref 0–0.4)
EOSINOPHIL NFR BLD AUTO: 0.3 % (ref 0.3–6.2)
ERYTHROCYTE [DISTWIDTH] IN BLOOD BY AUTOMATED COUNT: 13.3 % (ref 12.3–15.4)
HCT VFR BLD AUTO: 24.9 % (ref 34–46.6)
HGB BLD-MCNC: 7.6 G/DL (ref 12–15.9)
IMM GRANULOCYTES # BLD AUTO: 0.08 10*3/MM3 (ref 0–0.05)
IMM GRANULOCYTES NFR BLD AUTO: 0.6 % (ref 0–0.5)
LYMPHOCYTES # BLD AUTO: 2.02 10*3/MM3 (ref 0.7–3.1)
LYMPHOCYTES NFR BLD AUTO: 15.9 % (ref 19.6–45.3)
MCH RBC QN AUTO: 24.5 PG (ref 26.6–33)
MCHC RBC AUTO-ENTMCNC: 30.5 G/DL (ref 31.5–35.7)
MCV RBC AUTO: 80.3 FL (ref 79–97)
MONOCYTES # BLD AUTO: 1.14 10*3/MM3 (ref 0.1–0.9)
MONOCYTES NFR BLD AUTO: 9 % (ref 5–12)
NEUTROPHILS NFR BLD AUTO: 73.8 % (ref 42.7–76)
NEUTROPHILS NFR BLD AUTO: 9.35 10*3/MM3 (ref 1.7–7)
NRBC BLD AUTO-RTO: 0 /100 WBC (ref 0–0.2)
PLATELET # BLD AUTO: 214 10*3/MM3 (ref 140–450)
PMV BLD AUTO: 9.8 FL (ref 6–12)
RBC # BLD AUTO: 3.1 10*6/MM3 (ref 3.77–5.28)
WBC NRBC COR # BLD: 12.68 10*3/MM3 (ref 3.4–10.8)

## 2022-07-29 PROCEDURE — 85025 COMPLETE CBC W/AUTO DIFF WBC: CPT | Performed by: OBSTETRICS & GYNECOLOGY

## 2022-07-29 PROCEDURE — 0503F POSTPARTUM CARE VISIT: CPT | Performed by: MIDWIFE

## 2022-07-29 RX ORDER — FERROUS SULFATE TAB EC 324 MG (65 MG FE EQUIVALENT) 324 (65 FE) MG
324 TABLET DELAYED RESPONSE ORAL 2 TIMES DAILY WITH MEALS
Status: DISCONTINUED | OUTPATIENT
Start: 2022-07-29 | End: 2022-07-30 | Stop reason: HOSPADM

## 2022-07-29 RX ADMIN — IBUPROFEN 600 MG: 600 TABLET ORAL at 15:18

## 2022-07-29 RX ADMIN — PRENATAL VITAMINS-IRON FUMARATE 27 MG IRON-FOLIC ACID 0.8 MG TABLET 1 TABLET: at 08:24

## 2022-07-29 RX ADMIN — ACETAMINOPHEN 650 MG: 325 TABLET ORAL at 15:18

## 2022-07-29 RX ADMIN — ACETAMINOPHEN 1000 MG: 500 TABLET ORAL at 02:09

## 2022-07-29 RX ADMIN — ACETAMINOPHEN 650 MG: 325 TABLET ORAL at 20:34

## 2022-07-29 RX ADMIN — ACETAMINOPHEN 1000 MG: 500 TABLET ORAL at 08:24

## 2022-07-29 RX ADMIN — IBUPROFEN 600 MG: 600 TABLET ORAL at 05:34

## 2022-07-29 RX ADMIN — SIMETHICONE 80 MG: 80 TABLET, CHEWABLE ORAL at 15:19

## 2022-07-29 RX ADMIN — FERROUS SULFATE TAB EC 324 MG (65 MG FE EQUIVALENT) 324 MG: 324 (65 FE) TABLET DELAYED RESPONSE at 18:45

## 2022-07-30 VITALS
SYSTOLIC BLOOD PRESSURE: 107 MMHG | HEART RATE: 77 BPM | BODY MASS INDEX: 29.73 KG/M2 | OXYGEN SATURATION: 97 % | TEMPERATURE: 97.1 F | WEIGHT: 185 LBS | HEIGHT: 66 IN | DIASTOLIC BLOOD PRESSURE: 69 MMHG | RESPIRATION RATE: 17 BRPM

## 2022-07-30 PROCEDURE — 0503F POSTPARTUM CARE VISIT: CPT | Performed by: MIDWIFE

## 2022-07-30 RX ORDER — PSEUDOEPHEDRINE HCL 30 MG
100 TABLET ORAL 2 TIMES DAILY PRN
Qty: 30 CAPSULE | Refills: 0 | Status: SHIPPED | OUTPATIENT
Start: 2022-07-30 | End: 2022-09-08

## 2022-07-30 RX ORDER — IBUPROFEN 600 MG/1
600 TABLET ORAL EVERY 6 HOURS PRN
Qty: 60 TABLET | Refills: 0 | Status: SHIPPED | OUTPATIENT
Start: 2022-07-30 | End: 2022-09-08

## 2022-07-30 RX ORDER — ACETAMINOPHEN 325 MG/1
650 TABLET ORAL EVERY 6 HOURS PRN
Qty: 60 TABLET | Refills: 0 | Status: SHIPPED | OUTPATIENT
Start: 2022-07-30 | End: 2022-09-08

## 2022-07-30 RX ORDER — OXYCODONE HYDROCHLORIDE 5 MG/1
5 TABLET ORAL EVERY 4 HOURS PRN
Qty: 15 TABLET | Refills: 0 | Status: SHIPPED | OUTPATIENT
Start: 2022-07-30 | End: 2022-08-04

## 2022-07-30 RX ADMIN — ACETAMINOPHEN 650 MG: 325 TABLET ORAL at 08:50

## 2022-07-30 RX ADMIN — ACETAMINOPHEN 650 MG: 325 TABLET ORAL at 02:30

## 2022-07-30 RX ADMIN — FERROUS SULFATE TAB EC 324 MG (65 MG FE EQUIVALENT) 324 MG: 324 (65 FE) TABLET DELAYED RESPONSE at 08:45

## 2022-07-30 RX ADMIN — IBUPROFEN 600 MG: 600 TABLET ORAL at 00:27

## 2022-07-30 RX ADMIN — PRENATAL VITAMINS-IRON FUMARATE 27 MG IRON-FOLIC ACID 0.8 MG TABLET 1 TABLET: at 08:45

## 2022-08-04 ENCOUNTER — OFFICE VISIT (OUTPATIENT)
Dept: OBSTETRICS AND GYNECOLOGY | Facility: CLINIC | Age: 22
End: 2022-08-04

## 2022-08-04 VITALS
HEIGHT: 66 IN | SYSTOLIC BLOOD PRESSURE: 112 MMHG | WEIGHT: 161 LBS | DIASTOLIC BLOOD PRESSURE: 70 MMHG | BODY MASS INDEX: 25.88 KG/M2

## 2022-08-04 DIAGNOSIS — Z09 POSTOPERATIVE FOLLOW-UP: Primary | ICD-10-CM

## 2022-08-04 PROCEDURE — 0503F POSTPARTUM CARE VISIT: CPT | Performed by: MIDWIFE

## 2022-08-04 NOTE — PROGRESS NOTES
"    Postpartum Progress Note    Patient name: Romy Langley  Date of Service: 2022    ID: 22 y.o.     Diagnosis:   Chief Complaint   Patient presents with   • Post-op     1 week post op C Section, No Complaints/concerns       1 week post op  section  Patient Active Problem List   Diagnosis   • Marginal insertion of umbilical cord affecting management of mother   • Hyperemesis   • Pneumonia affecting pregnancy in third trimester   • GBS (group B Streptococcus carrier), +RV culture, currently pregnant   • Encounter for induction of labor   • Failure to progress in labor   • S/P  section       Subjective:      No complaints, Lochia light .  Ambulating, B&B habits wnl, tolerating regular diet  Pain well controlled. She is taking Tylenol and Ibuprofen occasionally  The patient is currently breastfeeding.       Objective:      Vital signs:  /70   Ht 167.6 cm (66\")   Wt 73 kg (161 lb)   LMP 10/16/2021   BMI 25.99 kg/m²    General: Alert & oriented x4, in no apparent distress  Abdomen: soft, nontender  Uterus: firm, nontender  Incision: healing well  Extremities: nontender; no edema      Labs:  Lab Results   Component Value Date    WBC 12.68 (H) 2022    HGB 7.6 (L) 2022    HCT 24.9 (L) 2022    MCV 80.3 2022     2022             Assessment/Plan:        1. S/p  delivery: Doing well.  2. Infant feeding: Supportive care.  The patient is currently breastfeeding.  3. Contraception: Discussed options for contraception, undecided  4. RTO 5 weeks     No orders of the defined types were placed in this encounter.      Karly Duarte CNM  2022    "

## 2022-09-08 ENCOUNTER — POSTPARTUM VISIT (OUTPATIENT)
Dept: OBSTETRICS AND GYNECOLOGY | Facility: CLINIC | Age: 22
End: 2022-09-08

## 2022-09-08 VITALS
HEIGHT: 66 IN | SYSTOLIC BLOOD PRESSURE: 112 MMHG | DIASTOLIC BLOOD PRESSURE: 68 MMHG | BODY MASS INDEX: 25.23 KG/M2 | WEIGHT: 157 LBS

## 2022-09-08 DIAGNOSIS — Z12.4 SCREENING FOR CERVICAL CANCER: Primary | ICD-10-CM

## 2022-09-08 PROBLEM — J18.9 PNEUMONIA AFFECTING PREGNANCY IN THIRD TRIMESTER: Status: RESOLVED | Noted: 2022-05-13 | Resolved: 2022-09-08

## 2022-09-08 PROBLEM — Z34.90 ENCOUNTER FOR INDUCTION OF LABOR: Status: RESOLVED | Noted: 2022-07-27 | Resolved: 2022-09-08

## 2022-09-08 PROBLEM — R11.10 HYPEREMESIS: Status: RESOLVED | Noted: 2022-04-21 | Resolved: 2022-09-08

## 2022-09-08 PROBLEM — O99.513 PNEUMONIA AFFECTING PREGNANCY IN THIRD TRIMESTER: Status: RESOLVED | Noted: 2022-05-13 | Resolved: 2022-09-08

## 2022-09-08 PROBLEM — Z98.891 S/P CESAREAN SECTION: Status: RESOLVED | Noted: 2022-07-29 | Resolved: 2022-09-08

## 2022-09-08 PROBLEM — O43.199 MARGINAL INSERTION OF UMBILICAL CORD AFFECTING MANAGEMENT OF MOTHER: Status: RESOLVED | Noted: 2022-03-05 | Resolved: 2022-09-08

## 2022-09-08 PROBLEM — O99.820 GBS (GROUP B STREPTOCOCCUS CARRIER), +RV CULTURE, CURRENTLY PREGNANT: Status: RESOLVED | Noted: 2022-07-26 | Resolved: 2022-09-08

## 2022-09-08 PROCEDURE — 0503F POSTPARTUM CARE VISIT: CPT | Performed by: MIDWIFE

## 2022-09-08 NOTE — PROGRESS NOTES
"History  Chief Complaint   Patient presents with   • Postpartum Care     22 C Section  , patient is due for pap,  patient is not breastfeeding and refuses birthcontrol today, No Complaints/concerns         Romy Langley is a 22 y.o. year old  presenting to be seen for her postpartum visit.  She had a Primary  (LTCS).    Since delivery she has been sexually active. She has not used condoms.  She does not have concerns about post-partum blues/depression.   She is bottle feeding.  For ongoing contraception, her plans are undecided.  She has not had a menstrual cycle.         Physical  /68   Ht 167.6 cm (66\")   Wt 71.2 kg (157 lb)   Breastfeeding No   BMI 25.34 kg/m²     General:  well developed; well nourished  no acute distress   Abdomen: soft, non-tender; no masses  incision is healed   Pelvis: External genitalia:  normal appearance of the external genitalia including Bartholin's and [place]'s glands.  Vaginal:  normal pink mucosa without prolapse or lesions.  Cervix:  normal appearance.  Uterus:  normal size, shape and consistency.  Adnexa:  normal bimanual exam of the adnexa.        Assessment   1. Normal 6 week postpartum exam  2. Contraceptive management     Plan   1. BC options reviewed and compared today: condoms  2. Pap smear done  3. Follow up 1 year    No orders of the defined types were placed in this encounter.         This note was electronically signed.  Karly Duarte, JASPAL  2022  "

## 2022-09-12 LAB — REF LAB TEST METHOD: NORMAL

## 2022-10-01 ENCOUNTER — HOSPITAL ENCOUNTER (EMERGENCY)
Facility: HOSPITAL | Age: 22
Discharge: HOME | End: 2022-10-01
Attending: FAMILY MEDICINE
Payer: MEDICAID

## 2022-10-01 ENCOUNTER — APPOINTMENT (OUTPATIENT)
Dept: GENERAL RADIOLOGY | Facility: HOSPITAL | Age: 22
End: 2022-10-01
Payer: MEDICAID

## 2022-10-01 VITALS
HEART RATE: 84 BPM | HEIGHT: 66 IN | BODY MASS INDEX: 24.91 KG/M2 | DIASTOLIC BLOOD PRESSURE: 64 MMHG | RESPIRATION RATE: 16 BRPM | TEMPERATURE: 98 F | OXYGEN SATURATION: 99 % | SYSTOLIC BLOOD PRESSURE: 108 MMHG | WEIGHT: 155 LBS

## 2022-10-01 DIAGNOSIS — S67.02XA CRUSH INJURY TO THUMB, LEFT, INITIAL ENCOUNTER: Primary | ICD-10-CM

## 2022-10-01 PROCEDURE — 73140 X-RAY EXAM OF FINGER(S): CPT

## 2022-10-01 PROCEDURE — 99283 EMERGENCY DEPT VISIT LOW MDM: CPT

## 2022-10-01 ASSESSMENT — PAIN - FUNCTIONAL ASSESSMENT
PAIN_FUNCTIONAL_ASSESSMENT: NONE - DENIES PAIN
PAIN_FUNCTIONAL_ASSESSMENT: 0-10

## 2022-10-01 ASSESSMENT — PAIN SCALES - GENERAL: PAINLEVEL_OUTOF10: 10

## 2022-10-01 NOTE — ED PROVIDER NOTES
[address]  eMERGENCY dEPARTMENT eNCOUnter      Pt Name: Jorge Matthews  MRN: [id number]  [place] 2000  Date of evaluation: 10/1/2022  Provider: Brian Linares DO    CHIEF COMPLAINT       Chief Complaint   Patient presents with    Other     Slammed the car door on her left thumb. Bruising to thumb nail present. denies any other injuries. HISTORY OF PRESENT ILLNESS   (Location/Symptom, Timing/Onset, Context/Setting, Quality, Duration, Modifying Factors, Severity)  Note limiting factors. Jorge Matthews is a 25 y.o. female who presents to the emergency department for having left thumb injury. Pt states that she was getting bottle for the baby out of the vehicle and she had her hand still in the car door area that when she closed it, her left thumb got shut up in the door. She has bruising and pain to the end of the thumb with some bruising under the thumb nail. She said that this happened about 2 days ago. No swelling to the left thumb. She said per nurse pain 10/10, but mild at rest.      Nursing Notes were reviewed. REVIEW OF SYSTEMS    (2-9 systems for level 4, 10 or more forlevel 5)     Review of Systems   Skin:  Positive for color change. Left thumb pain; Bruising under thumb nail   All other systems reviewed and are negative. PAST MEDICAL HISTORY   No past medical history on file. SURGICAL HISTORY       Past Surgical History:   Procedure Laterality Date    ANKLE SURGERY Left      SECTION           CURRENT MEDICATIONS       Discharge Medication List as of 10/1/2022  3:31 AM        CONTINUE these medications which have NOT CHANGED    Details   albuterol sulfate HFA (VENTOLIN HFA) 108 (90 Base) MCG/ACT inhaler Inhale 2 puffs into the lungs 4 times daily as needed for Wheezing or Shortness of Breath, Disp-8 g, R-0Print             ALLERGIES     Patient has no known allergies. FAMILY HISTORY     No family history on file.        SOCIAL HISTORY       Social History     Socioeconomic History    Marital status: Single       SCREENINGS    [place] Coma Scale  Eye Opening: Spontaneous  Best Verbal Response: Oriented  Best Motor Response: Obeys commands  [place] Coma Scale Score: 15        PHYSICAL EXAM    (up to 7 for level 4, 8 or more for level 5)     ED Triage Vitals [10/01/22 0127]   BP Temp Temp Source Heart Rate Resp SpO2 Height Weight   108/64 98 °F (36.7 °C) Oral 84 16 99 % 5' 6\" (1.676 m) 155 lb (70.3 kg)       Physical Exam  Vitals and nursing note reviewed. Constitutional:       General: She is not in acute distress. Appearance: Normal appearance. HENT:      Head: Normocephalic. Eyes:      Extraocular Movements: Extraocular movements intact. Pupils: Pupils are equal, round, and reactive to light. Cardiovascular:      Rate and Rhythm: Normal rate and regular rhythm. Heart sounds: Normal heart sounds. Pulmonary:      Effort: Pulmonary effort is normal.      Breath sounds: Normal breath sounds. Musculoskeletal:         General: Tenderness and signs of injury present. No swelling or deformity. Normal range of motion. Cervical back: Neck supple. Comments: Tenderness to left distal phalanx of thumb; Tenderness to pad of the thumb. Slight rim of subungual hematoma noted. No edema to finger. No laceration or abrasion   Skin:     General: Skin is warm and dry. Neurological:      Mental Status: She is alert and oriented to person, place, and time.    Psychiatric:         Mood and Affect: Mood normal.         Behavior: Behavior normal.       DIAGNOSTIC RESULTS     EKG: All EKG's are interpreted by the Emergency Department Physician who either signs or Co-signs this chart in the absence of a cardiologist.    None    RADIOLOGY:   Non-plain film images such as CT, Ultrasound and MRI are read by the radiologist. Plain radiographic images are visualized andpreliminarily interpreted by the emergency physician with the below findings:    Left Thumb Xray - See Below    Interpretationper the Radiologist below, if available at the time of this note:    XR FINGER LEFT (MIN 2 VIEWS)   Final Result   Impression:   No acute osseous injury. ED BEDSIDE ULTRASOUND:   Performed by ED Physician - none    LABS:  Labs Reviewed - No data to display    All other labs were within normal range or not returned as of this dictation. EMERGENCY DEPARTMENT COURSE and DIFFERENTIAL DIAGNOSIS/MDM:   :    Vitals:    10/01/22 0127   BP: 108/64   Pulse: 84   Resp: 16   Temp: 98 °F (36.7 °C)   TempSrc: Oral   SpO2: 99%   Weight: 155 lb (70.3 kg)   Height: 5' 6\" (1.676 m)           CRITICAL CARE TIME   Total Critical Care time was 0 minutes, excluding separatelyreportable procedures. There was a high probability ofclinically significant/life threatening deterioration in the patient's condition which required my urgent intervention. CONSULTS:  None    PROCEDURES:  None    PROGRESS NOTES:    Pt with crush injury to left thumb by shutting it in car door. Xray ordered  Xray negative for any acute fracture. Been 2 days and no way to evacuate the small hematoma. Mild pain. Will d/c home    Is this patient to be included in the SEP-1 Core Measure due to severe sepsis or septic shock? No   Exclusion criteria - the patient is NOT to be included for SEP-1 Core Measure due to: Infection is not suspected     FINAL IMPRESSION      1.  Crush injury to thumb, left, initial encounter New Problem         DISPOSITION/PLAN   DISPOSITION Decision To Discharge 10/01/2022 03:28:19 AM      PATIENT REFERRED TO:    Follow up with primary care as needed        DISCHARGE MEDICATIONS:  Discharge Medication List as of 10/1/2022  3:31 AM          (Please note that portions of this note were completed with a voice recognition program.  Efforts were made to edit the dictations but occasionallywords are mis-transcribed.)    Lydia Ivory,  (electronically signed)  Attending Emergency Physician          Martita Rendon DO  10/02/22 3699

## 2022-10-02 ASSESSMENT — ENCOUNTER SYMPTOMS: COLOR CHANGE: 1

## 2022-11-16 ENCOUNTER — APPOINTMENT (OUTPATIENT)
Dept: GENERAL RADIOLOGY | Facility: HOSPITAL | Age: 22
End: 2022-11-16
Payer: MEDICAID

## 2022-11-16 ENCOUNTER — HOSPITAL ENCOUNTER (EMERGENCY)
Facility: HOSPITAL | Age: 22
Discharge: HOME | End: 2022-11-16
Attending: EMERGENCY MEDICINE
Payer: MEDICAID

## 2022-11-16 VITALS
DIASTOLIC BLOOD PRESSURE: 69 MMHG | TEMPERATURE: 97.9 F | SYSTOLIC BLOOD PRESSURE: 108 MMHG | RESPIRATION RATE: 20 BRPM | BODY MASS INDEX: 24.69 KG/M2 | WEIGHT: 153 LBS | OXYGEN SATURATION: 98 % | HEART RATE: 68 BPM

## 2022-11-16 DIAGNOSIS — B34.9 VIRAL SYNDROME: Primary | ICD-10-CM

## 2022-11-16 DIAGNOSIS — R06.00 DYSPNEA, UNSPECIFIED TYPE: ICD-10-CM

## 2022-11-16 DIAGNOSIS — R05.1 ACUTE COUGH: ICD-10-CM

## 2022-11-16 LAB
RAPID INFLUENZA  B AGN: NEGATIVE
RAPID INFLUENZA A AGN: NEGATIVE
S PYO AG THROAT QL: NEGATIVE
SARS-COV-2, NAAT: NOT DETECTED

## 2022-11-16 PROCEDURE — 87635 SARS-COV-2 COVID-19 AMP PRB: CPT

## 2022-11-16 PROCEDURE — 87804 INFLUENZA ASSAY W/OPTIC: CPT

## 2022-11-16 PROCEDURE — 99285 EMERGENCY DEPT VISIT HI MDM: CPT

## 2022-11-16 PROCEDURE — 71045 X-RAY EXAM CHEST 1 VIEW: CPT

## 2022-11-16 PROCEDURE — 87880 STREP A ASSAY W/OPTIC: CPT

## 2022-11-16 RX ORDER — BENZONATATE 200 MG/1
200 CAPSULE ORAL 3 TIMES DAILY PRN
Qty: 30 CAPSULE | Refills: 0 | Status: SHIPPED | OUTPATIENT
Start: 2022-11-16

## 2022-11-16 NOTE — ED NOTES
Discharge instructions provided to patient. Patient verbalizes understanding of d/c plan and follow up care. Patient ambulatory off unit to POV at this time with no further needs noted.       Meg Hopson RN  11/16/22 [id number]

## 2022-11-16 NOTE — ED TRIAGE NOTES
Patient states she went to her PCP  Monday was diagnosed with the FLU and was checked for strep but the strep culture wasn't back yet. Patient states she wasn't given any medication and her symptoms of sore throat, stuffy nose , feelings as if she is going to pass out when she bends over has gotten worse. Patient ambulated in exam room without difficulty and does not appear to be short of breath or in distress.

## 2022-11-16 NOTE — ED PROVIDER NOTES
[address] [facility] ENCOUNTER      Pt Name: Tae Goodrich  MRN: [id number]  YOB: 2000  Date of evaluation: 11/16/2022  Provider: Luana Samuel MD    CHIEF COMPLAINT       Chief Complaint   Patient presents with    Pharyngitis     Patient tested positive Monday for the FLU at PCP office . Patient states she wasn't given any medications and S/S of sore throat , body aches, feeling as if she is going to pass out when bending over , and stuffy nose has gotten worse. HISTORY OF PRESENT ILLNESS  (Location/Symptom, Timing/Onset, Context/Setting, Quality, Duration, Modifying Factors, Severity.)   Tae Goodrich is a 25 y.o. female who presents to the emergency department complaining of nonproductive cough, generalized body aches, dyspnea, and chest heaviness which seems worse when she stands up. Symptoms present for 3 days. Has had some decreased oral intake recently. Has a 2 month old child, but is not breast feeding. Denies acute urinary symptoms. Saw PCP 2 days ago and was tested for influenza and was advised she tested positive for influenza. Symptoms began 3 days ago. Denies recent vomiting or diarrhea. Pt's PCP stated she did not recommend Tamiflu for this patient when she saw the patient 2 days ago. Associated malaise. Tried Tylenol, last dose this morning, with partial relief of symptoms. Nursing notes were reviewed. REVIEW OFSYSTEMS    (2-9 systems for level 4, 10 or more for level 5)   ROS:  General:  Positive as per HPI for malaise. Cardiovascular:  Positive as per HPI for chest heaviness. Pt states she feels like she might pass out when she bends over. Respiratory:  Positive as per HPI for shortness of breath and nonproductive cough. Denies hemoptysis. Denies known history of asthma. Nonsmoker.   Gastrointestinal:  Mild intermittent nausea, but denies recent  vomiting or diarrhea  Musculoskeletal:  Positive as per HPI for generalized body aches. Skin:  Denies recent rash or itching. Neurologic:  Denies acute focal weakness or difficulty speaking. Genitourinary:  Denies possibility of pregnancy, LMP 11/3/22. Except as noted above the remainder of the review of systems was reviewed and negative. PAST MEDICAL HISTORY   History reviewed. No pertinent past medical history. SURGICAL HISTORY       Past Surgical History:   Procedure Laterality Date    ANKLE SURGERY Left      SECTION           CURRENT MEDICATIONS       Discharge Medication List as of 2022  5:23 PM          ALLERGIES     Latex    FAMILY HISTORY     No family history on file. SOCIAL HISTORY       Social History     Socioeconomic History    Marital status: Single     Spouse name: None    Number of children: None    Years of education: None    Highest education level: None   Tobacco Use    Smoking status: Never    Smokeless tobacco: Never         PHYSICAL EXAM    (up to 7 for level 4, 8 or more for level 5)     ED Triage Vitals [22 1610]   BP Temp Temp Source Heart Rate Resp SpO2 Height Weight   108/69 97.9 °F (36.6 °C) Oral 68 20 98 % -- 153 lb (69.4 kg)       Physical Exam -  General : No acute distress. Appears stated age. Excellent historian. Normal body habitus. HEENT: No photophobia or eye discharge. Neck: Neck is supple, no meningismus  Cardiac: S1S2 regular rate and rhythm. No murmurs, rubs, or gallops  Lungs: Lungs are clear to auscultation, there is no wheezing, rhonchi, or rales. Coughs during exam. No respiratory distress. Good air entry. Musculoskeletal: No clubbing, no significant peripheral edema. Neuro: Normal speech, no dysarthria. Moves all extremities. Dermatology: Skin is warm and dry, no diaphoresis. Psych: Cooperative, normal affect.       DIAGNOSTIC RESULTS     EKG: All EKG's are interpreted by the Emergency Department Physician who either signs or Co-signs this chart in the [address] a cardiologist.    The EKG interpreted by me shows at 16:52, normal sinus rhythm at 75 BPM, normal intervals, QTc= 438 ms, nonspecific ST/T wave changes, no prior EKG available for comparison. RADIOLOGY:   Non-plain film images such as CT, Ultrasound and MRI are read by the radiologist. Plain radiographic images are visualized and preliminarily interpreted by the emergency physician with the below findings:      [x] Radiologist's Report Reviewed:  XR CHEST PORTABLE   Final Result      Clear lungs. Normal cardiomediastinal silhouette. LABS:    I have reviewed and interpreted all of the currently available lab results from this visit (ifapplicable):  Results for orders placed or performed during the hospital encounter of 11/16/22   Rapid Influenza A/B Antigens    Specimen: Nasopharyngeal   Result Value Ref Range    Rapid Influenza A Ag Negative Negative    Rapid Influenza B Ag Negative Negative   COVID-19, Rapid    Specimen: Nasopharyngeal Swab   Result Value Ref Range    SARS-CoV-2, NAAT Not Detected Not Detected   Strep Screen Group A Throat    Specimen: Throat   Result Value Ref Range    Rapid Strep A Screen Negative Negative        All other labs were within normal range or not returned as of this dictation. EMERGENCY DEPARTMENT COURSE and DIFFERENTIAL DIAGNOSIS/MDM:   Vitals:    Vitals:    11/16/22 1610   BP: 108/69   Pulse: 68   Resp: 20   Temp: 97.9 °F (36.6 °C)   TempSrc: Oral   SpO2: 98%   Weight: 153 lb (69.4 kg)       MEDICATIONS ADMINISTERED IN ED:  Medications - No data to display    Unremarkable ED course. Pt declines Tylenol here, last dose was this morning per pt. She has no clear indication for antibiotics at this time. No wheezing on my exam, oxygen saturation 100% on RA. Low suspicion for cardiomyopathy, CHF, acute PE. Tested positive for influenza 2 days ago, declines Tamiflu, states symptoms began 3 days ago. Nonproductive cough, nonsmoker.        The patient will follow-up with their PCP in 1-2 days for reevaluation. If the patient or family members have anyfurther concerns or any worsening symptoms they will return to the ED for reevaluation. Is this patient to be included in the SEP-1 Core Measure due to severe sepsis or septic shock? No   Exclusion criteria - the patient is NOT to be included for SEP-1 Core Measure due to:  Viral etiology found or highly suspected (including COVID-19) without concomitant bacterial infection     FINAL IMPRESSION      1. Viral syndrome    2. Dyspnea, unspecified type    3. Acute cough          DISPOSITION/PLAN   DISPOSITION Decision To Discharge 11/16/2022 05:19:07 PM      PATIENT REFERRED TO:  [address]  [phone]    In 1 week      DISCHARGE MEDICATIONS:  Discharge Medication List as of 11/16/2022  5:23 PM        START taking these medications    Details   benzonatate (TESSALON) 200 MG capsule Take 1 capsule by mouth 3 times daily as needed for Cough, Disp-30 capsule, R-0Normal             Comment: Please note this report has been produced using speech recognition software and may contain errorsrelated to that system including errors in grammar, punctuation, and spelling, as well as words and phrases that may be inappropriate. If there are any questions or concerns please feel free to contact the dictating providerfor clarification.     Beverley Hinkle MD  Attending Emergency Physician              Beverley Hinkle MD  11/19/22 3806

## 2022-11-16 NOTE — DISCHARGE INSTRUCTIONS
Tylenol and/or ibuprofen over the counter as needed for pain or fever as directed on package. Drink plenty of clear liquids to stay hydrated. Mucinex over the counter as needed for cough. Over the counter throat lozenges with \"benzocaine\" as active ingredient as directed on package as needed for sore throat. You can add Sudafed or DayQuil over the counter as needed for nasal congestion as directed on the package. These are decongestants kept behind the counter at the pharmacy you have to show ID to get. Rest, return to the ER for any new, concerning, or worsening symptoms.

## 2022-12-21 ENCOUNTER — HOSPITAL ENCOUNTER (EMERGENCY)
Facility: HOSPITAL | Age: 22
Discharge: HOME | End: 2022-12-21
Attending: HOSPITALIST
Payer: MEDICAID

## 2022-12-21 VITALS
RESPIRATION RATE: 16 BRPM | HEIGHT: 66 IN | WEIGHT: 143 LBS | HEART RATE: 74 BPM | BODY MASS INDEX: 22.98 KG/M2 | TEMPERATURE: 98.2 F | DIASTOLIC BLOOD PRESSURE: 67 MMHG | SYSTOLIC BLOOD PRESSURE: 123 MMHG | OXYGEN SATURATION: 100 %

## 2022-12-21 DIAGNOSIS — U07.1 COVID-19: Primary | ICD-10-CM

## 2022-12-21 LAB — SARS-COV-2, NAAT: DETECTED

## 2022-12-21 PROCEDURE — 87635 SARS-COV-2 COVID-19 AMP PRB: CPT

## 2022-12-21 PROCEDURE — 99283 EMERGENCY DEPT VISIT LOW MDM: CPT

## 2022-12-21 RX ORDER — ALBUTEROL SULFATE 90 UG/1
2 AEROSOL, METERED RESPIRATORY (INHALATION) EVERY 4 HOURS PRN
Qty: 18 G | Refills: 0 | Status: SHIPPED | OUTPATIENT
Start: 2022-12-21 | End: 2023-01-20

## 2022-12-21 ASSESSMENT — LIFESTYLE VARIABLES
HOW OFTEN DO YOU HAVE A DRINK CONTAINING ALCOHOL: NEVER
HOW MANY STANDARD DRINKS CONTAINING ALCOHOL DO YOU HAVE ON A TYPICAL DAY: PATIENT DOES NOT DRINK

## 2022-12-21 ASSESSMENT — PAIN - FUNCTIONAL ASSESSMENT: PAIN_FUNCTIONAL_ASSESSMENT: NONE - DENIES PAIN

## 2022-12-21 NOTE — ED NOTES
MD in bereavement room to speak with patient regarding test results.       Mallorie Perkins RN  12/21/22 7400

## 2022-12-21 NOTE — Clinical Note
Jelani Holliday was seen and treated in our emergency department on 12/21/2022. She may return to work on . If you have any questions or concerns, please don't hesitate to call.       Grabiel Kincaid, DO

## 2022-12-21 NOTE — ED PROVIDER NOTES
[facility] ENCOUNTER      Pt Name: Rena Patino  MRN: [id number]  YOB: 2000  Date of evaluation: 12/21/2022  Provider: Katie Larkin, [facility]       Chief Complaint   Patient presents with    Covid Testing     Exposure to covid             HISTORY OF PRESENT ILLNESS  (Location/Symptom, Timing/Onset, Context/Setting, Quality, Duration, Modifying Factors, Severity.)   Rena Patino is a 25 y.o. female who presents to the emergency department for cough. Patient since tested positive for COVID today at his doctor's office. They have had exposure to other individuals that tested positive for COVID within the last couple of days from family members. Patient states her symptoms started about 2 days ago. She denies any headaches at the ordinary. She states she has had sore throat. Denies any loss of taste or smell. Denies any earache. States that she has had some mild chest discomfort especially when she coughs. She denies any shortness of breath. Denies any nausea vomiting or diarrhea. She states she has had body aches. Denies any fevers or chills. Positive sick contacts. She has vaccinated for COVID. She is not vaccinated for influenza. Nursing notes were reviewed. REVIEW OFSYSTEMS    (2-9 systems for level 4, 10 or more for level 5)   ROS:  General:  No fevers, no chills, no weakness, + myalgias  Cardiovascular:  No chest pain, no palpitations  Respiratory:  No shortness of breath, +cough, no wheezing  Gastrointestinal:  No pain, no nausea, no vomiting, no diarrhea  Musculoskeletal:  No muscle pain, no joint pain  Skin:  No rash, no easy bruising  Neurologic:  No speech problems, no headache, no extremity weakness  Psychiatric:  No anxiety  Genitourinary:  No dysuria, no hematuria    Except as noted above the remainder of the review of systems was reviewed and negative.        PAST MEDICAL HISTORY   History reviewed. No pertinent past medical history. SURGICAL HISTORY       Past Surgical History:   Procedure Laterality Date    ANKLE SURGERY Left      SECTION           CURRENT MEDICATIONS       Previous Medications    No medications on file       ALLERGIES     Latex    FAMILY HISTORY     History reviewed. No pertinent family history. SOCIAL HISTORY       Social History     Socioeconomic History    Marital status: Single     Spouse name: None    Number of children: None    Years of education: None    Highest education level: None   Tobacco Use    Smoking status: Never    Smokeless tobacco: Never         PHYSICAL EXAM    (up to 7 for level 4, 8 or more for level 5)     ED Triage Vitals   BP Temp Temp src Pulse Resp SpO2 Height Weight   -- -- -- -- -- -- -- --       Physical Exam  General :Patient is awake, alert, oriented, in no acute distress, nontoxic appearing  HEENT: Pupils are equally round and reactive to light, EOMI, conjunctivae clear. Oral mucosa is moist, no exudate. Uvula is midline  Cardiac: Heart regular rate, rhythm, no murmurs, rubs, or gallops  Lungs: Lungs are clear to auscultation, there is no wheezing, rhonchi, or rales. There is no use of accessory muscles. Abdomen: Abdomen is soft, nontender, nondistended. There is no firm or pulsatile masses, no rebound rigidity or guarding. Musculoskeletal: No focal muscle deficits are appreciated  Neuro: Motor intact, sensory intact, level of consciousness is normal  Dermatology: Skin is warm and dry  Psych: Mentation is grossly normal, cognition is grossly normal. Affect is appropriate.       DIAGNOSTIC RESULTS     EKG: All EKG's are interpreted by the Emergency Department Physician who either signs or Co-signs this chart in the [address] a cardiologist.        RADIOLOGY:   Non-plain film images such as CT, Ultrasound and MRI are read by the radiologist. Plain radiographic images are visualized and preliminarily interpreted by the emergency physician with the below findings:      [] Radiologist's Report Reviewed:  No orders to display         ED BEDSIDE ULTRASOUND:   Performed by ED Physician - none    LABS:    I have reviewed and interpreted all of the currently available lab results from this visit (ifapplicable):  Results for orders placed or performed during the hospital encounter of 12/21/22   COVID-19, Rapid    Specimen: Nasopharyngeal Swab   Result Value Ref Range    SARS-CoV-2, NAAT DETECTED (AA) Not Detected        All other labs were within normal range or not returned as of this dictation. EMERGENCY DEPARTMENT COURSE and DIFFERENTIAL DIAGNOSIS/MDM:   Vitals:    Vitals:    12/21/22 1515   BP: 123/67   Pulse: 74   Resp: 16   Temp: 98.2 °F (36.8 °C)   TempSrc: Oral   SpO2: 100%   Weight: 143 lb (64.9 kg)   Height: 5' 6\" (1.676 m)       MEDICATIONS ADMINISTERED IN ED:  Medications - No data to display    Evaluation examination I did have a conversation with the patient about upcoming plan, treatment possible disposition which they are agreeable to the times dictation. Patient advised we going check a rapid COVID since her son is positive this morning and she has been around other family members that has been sick and they actually tested positive within the last couple of days and she has had interaction with him over the past week. Patient symptoms are rather benign with this except just for the cough itself. I did discuss with her about the use of Tylenol Motrin for body aches fevers and chills. We also discussed use of Pedialyte versus Gatorade or Powerade for fluid resuscitation.   Patient is not vaccinated so essentially she would need the 10 full day work excuse since she is symptomatic at this time I did discuss with her though that 5 days and if she is asymptomatic then she could just use an N95 mask and return back to work but she has to wear that all the time and she clearly would not even be able to take it off to eat around other individuals she would literally have to go to her car to eat her lunch and she states that she would just be easier for her to just take the full 10 days and try to even come up with an N95 mask and do this at work. Patient's final disposition return once her diagnostic studies been performed reviewed. Otherwise she is resting comfortably stretcher no acute distress nontoxic-appearing. Rapid COVID screen was positive    Patient's diagnostic studies were discussed with her she does state her understanding. Advised that we write her for an albuterol inhaler to use for cough and shortness of breath. 2 puffs every 4-6 hours as needed. Otherwise discharged home in stable condition. The patient advised that she does need to follow-up with her regular family physician within the next 1 to 2 days for evaluation. She was also given instructions of if symptoms worsens or new symptoms arise she should return back to emergency department for further evaluation work-up. Is this patient to be included in the SEP-1 Core Measure due to severe sepsis or septic shock? No   Exclusion criteria - the patient is NOT to be included for SEP-1 Core Measure due to:  Viral etiology found or highly suspected (including COVID-19) without concomitant bacterial infection          CONSULTS:  None    PROCEDURES:  Procedures    CRITICAL CARE TIME    Total Critical Care time was 0 minutes, excluding separately reportable procedures. There was a high probability of clinically significant/life threatening deterioration in the patient's condition which required my urgent intervention. FINAL IMPRESSION      1. COVID-19          DISPOSITION/PLAN   DISPOSITION Decision To Discharge 12/21/2022 03:18:52 PM      [facility]  [phone]      If symptoms worsen    [facility] Emergency Department  [address]   [address] [facility] [address],[place] [address] [address]  [phone]    As needed, If symptoms worsen    DISCHARGE MEDICATIONS:  New Prescriptions    ALBUTEROL SULFATE HFA (PROVENTIL HFA) 108 (90 BASE) MCG/ACT INHALER    Inhale 2 puffs into the lungs every 4 hours as needed for Wheezing or Shortness of Breath With spacer (and mask if indicated). Thanks. Comment: Please note this report has been produced using speech recognition software and may contain errorsrelated to that system including errors in grammar, punctuation, and spelling, as well as words and phrases that may be inappropriate. If there are any questions or concerns please feel free to contact the dictating providerfor clarification.     Gillian Hernandez DO  Attending Emergency Physician               Gillian Hernandez, DO  12/21/22 9779

## 2022-12-21 NOTE — Clinical Note
Len Devine was seen and treated in our emergency department on 12/21/2022. She may return to work on 01/01/2023. Patient COVID-positive unvaccinated would need 10-day quarantine but advised though 5 days and if she is asymptomatic she could return back to work but would need to wear N95 at all times would not be able to take this mask off around other individuals even if when she ate her lunch she would need to go to an area where she was isolated most likely her own vehicle. If you have any questions or concerns, please don't hesitate to call.       Grabiel Kinciad, DO

## 2023-01-09 ENCOUNTER — HOSPITAL ENCOUNTER (EMERGENCY)
Facility: HOSPITAL | Age: 23
Discharge: HOME | End: 2023-01-09
Attending: STUDENT IN AN ORGANIZED HEALTH CARE EDUCATION/TRAINING PROGRAM | Admitting: STUDENT IN AN ORGANIZED HEALTH CARE EDUCATION/TRAINING PROGRAM
Payer: COMMERCIAL

## 2023-01-09 ENCOUNTER — APPOINTMENT (OUTPATIENT)
Dept: CT IMAGING | Facility: HOSPITAL | Age: 23
End: 2023-01-09
Payer: COMMERCIAL

## 2023-01-09 VITALS
HEART RATE: 91 BPM | SYSTOLIC BLOOD PRESSURE: 106 MMHG | WEIGHT: 156.6 LBS | DIASTOLIC BLOOD PRESSURE: 51 MMHG | TEMPERATURE: 97.8 F | RESPIRATION RATE: 16 BRPM | OXYGEN SATURATION: 100 % | HEIGHT: 66 IN | BODY MASS INDEX: 25.17 KG/M2

## 2023-01-09 DIAGNOSIS — K42.9 UMBILICAL HERNIA WITHOUT OBSTRUCTION AND WITHOUT GANGRENE: ICD-10-CM

## 2023-01-09 DIAGNOSIS — N39.0 ACUTE UTI: Primary | ICD-10-CM

## 2023-01-09 LAB
ALBUMIN SERPL-MCNC: 4.7 G/DL (ref 3.5–5.2)
ALBUMIN/GLOB SERPL: 1.4 G/DL
ALP SERPL-CCNC: 98 U/L (ref 39–117)
ALT SERPL W P-5'-P-CCNC: 8 U/L (ref 1–33)
ANION GAP SERPL CALCULATED.3IONS-SCNC: 9 MMOL/L (ref 5–15)
AST SERPL-CCNC: 17 U/L (ref 1–32)
B-HCG UR QL: NEGATIVE
BACTERIA UR QL AUTO: ABNORMAL /HPF
BASOPHILS # BLD AUTO: 0.07 10*3/MM3 (ref 0–0.2)
BASOPHILS NFR BLD AUTO: 1.1 % (ref 0–1.5)
BILIRUB SERPL-MCNC: 0.5 MG/DL (ref 0–1.2)
BILIRUB UR QL STRIP: NEGATIVE
BUN SERPL-MCNC: 13 MG/DL (ref 6–20)
BUN/CREAT SERPL: 16.5 (ref 7–25)
CALCIUM SPEC-SCNC: 9.5 MG/DL (ref 8.6–10.5)
CHLORIDE SERPL-SCNC: 102 MMOL/L (ref 98–107)
CLARITY UR: CLEAR
CO2 SERPL-SCNC: 27 MMOL/L (ref 22–29)
COLOR UR: YELLOW
CREAT SERPL-MCNC: 0.79 MG/DL (ref 0.57–1)
DEPRECATED RDW RBC AUTO: 46.3 FL (ref 37–54)
EGFRCR SERPLBLD CKD-EPI 2021: 109.3 ML/MIN/1.73
EOSINOPHIL # BLD AUTO: 0.12 10*3/MM3 (ref 0–0.4)
EOSINOPHIL NFR BLD AUTO: 2 % (ref 0.3–6.2)
ERYTHROCYTE [DISTWIDTH] IN BLOOD BY AUTOMATED COUNT: 17.6 % (ref 12.3–15.4)
GLOBULIN UR ELPH-MCNC: 3.4 GM/DL
GLUCOSE SERPL-MCNC: 65 MG/DL (ref 65–99)
GLUCOSE UR STRIP-MCNC: NEGATIVE MG/DL
HCT VFR BLD AUTO: 37 % (ref 34–46.6)
HGB BLD-MCNC: 11.4 G/DL (ref 12–15.9)
HGB UR QL STRIP.AUTO: ABNORMAL
HYALINE CASTS UR QL AUTO: ABNORMAL /LPF
IMM GRANULOCYTES # BLD AUTO: 0.02 10*3/MM3 (ref 0–0.05)
IMM GRANULOCYTES NFR BLD AUTO: 0.3 % (ref 0–0.5)
KETONES UR QL STRIP: NEGATIVE
LEUKOCYTE ESTERASE UR QL STRIP.AUTO: ABNORMAL
LIPASE SERPL-CCNC: 50 U/L (ref 13–60)
LYMPHOCYTES # BLD AUTO: 2.02 10*3/MM3 (ref 0.7–3.1)
LYMPHOCYTES NFR BLD AUTO: 32.8 % (ref 19.6–45.3)
MCH RBC QN AUTO: 22.7 PG (ref 26.6–33)
MCHC RBC AUTO-ENTMCNC: 30.8 G/DL (ref 31.5–35.7)
MCV RBC AUTO: 73.7 FL (ref 79–97)
MICROCYTES BLD QL: NORMAL
MONOCYTES # BLD AUTO: 0.54 10*3/MM3 (ref 0.1–0.9)
MONOCYTES NFR BLD AUTO: 8.8 % (ref 5–12)
NEUTROPHILS NFR BLD AUTO: 3.38 10*3/MM3 (ref 1.7–7)
NEUTROPHILS NFR BLD AUTO: 55 % (ref 42.7–76)
NITRITE UR QL STRIP: POSITIVE
NRBC BLD AUTO-RTO: 0 /100 WBC (ref 0–0.2)
PH UR STRIP.AUTO: 6 [PH] (ref 5–8)
PLAT MORPH BLD: NORMAL
PLATELET # BLD AUTO: 382 10*3/MM3 (ref 140–450)
PMV BLD AUTO: 9.5 FL (ref 6–12)
POTASSIUM SERPL-SCNC: 3.7 MMOL/L (ref 3.5–5.2)
PROT SERPL-MCNC: 8.1 G/DL (ref 6–8.5)
PROT UR QL STRIP: NEGATIVE
RBC # BLD AUTO: 5.02 10*6/MM3 (ref 3.77–5.28)
RBC # UR STRIP: ABNORMAL /HPF
REF LAB TEST METHOD: ABNORMAL
SODIUM SERPL-SCNC: 138 MMOL/L (ref 136–145)
SP GR UR STRIP: 1.02 (ref 1–1.03)
SQUAMOUS #/AREA URNS HPF: ABNORMAL /HPF
UROBILINOGEN UR QL STRIP: ABNORMAL
WBC # UR STRIP: ABNORMAL /HPF
WBC MORPH BLD: NORMAL
WBC NRBC COR # BLD: 6.15 10*3/MM3 (ref 3.4–10.8)

## 2023-01-09 PROCEDURE — 83690 ASSAY OF LIPASE: CPT | Performed by: PHYSICIAN ASSISTANT

## 2023-01-09 PROCEDURE — 80053 COMPREHEN METABOLIC PANEL: CPT | Performed by: PHYSICIAN ASSISTANT

## 2023-01-09 PROCEDURE — 87086 URINE CULTURE/COLONY COUNT: CPT | Performed by: PHYSICIAN ASSISTANT

## 2023-01-09 PROCEDURE — 25010000002 CEFTRIAXONE SODIUM-DEXTROSE 1-3.74 GM-%(50ML) RECONSTITUTED SOLUTION: Performed by: PHYSICIAN ASSISTANT

## 2023-01-09 PROCEDURE — 99283 EMERGENCY DEPT VISIT LOW MDM: CPT

## 2023-01-09 PROCEDURE — 25010000002 KETOROLAC TROMETHAMINE PER 15 MG: Performed by: PHYSICIAN ASSISTANT

## 2023-01-09 PROCEDURE — 81001 URINALYSIS AUTO W/SCOPE: CPT | Performed by: PHYSICIAN ASSISTANT

## 2023-01-09 PROCEDURE — 81025 URINE PREGNANCY TEST: CPT | Performed by: PHYSICIAN ASSISTANT

## 2023-01-09 PROCEDURE — 87186 SC STD MICRODIL/AGAR DIL: CPT | Performed by: PHYSICIAN ASSISTANT

## 2023-01-09 PROCEDURE — 85025 COMPLETE CBC W/AUTO DIFF WBC: CPT | Performed by: PHYSICIAN ASSISTANT

## 2023-01-09 PROCEDURE — 96375 TX/PRO/DX INJ NEW DRUG ADDON: CPT

## 2023-01-09 PROCEDURE — 74176 CT ABD & PELVIS W/O CONTRAST: CPT

## 2023-01-09 PROCEDURE — 96365 THER/PROPH/DIAG IV INF INIT: CPT

## 2023-01-09 PROCEDURE — 85007 BL SMEAR W/DIFF WBC COUNT: CPT | Performed by: PHYSICIAN ASSISTANT

## 2023-01-09 RX ORDER — CEFTRIAXONE 1 G/50ML
1 INJECTION, SOLUTION INTRAVENOUS ONCE
Status: COMPLETED | OUTPATIENT
Start: 2023-01-09 | End: 2023-01-09

## 2023-01-09 RX ORDER — KETOROLAC TROMETHAMINE 10 MG/1
10 TABLET, FILM COATED ORAL EVERY 6 HOURS PRN
Qty: 15 TABLET | Refills: 0 | Status: SHIPPED | OUTPATIENT
Start: 2023-01-09

## 2023-01-09 RX ORDER — CEFDINIR 300 MG/1
300 CAPSULE ORAL 2 TIMES DAILY
Qty: 14 CAPSULE | Refills: 0 | Status: SHIPPED | OUTPATIENT
Start: 2023-01-09 | End: 2023-01-13

## 2023-01-09 RX ORDER — KETOROLAC TROMETHAMINE 30 MG/ML
30 INJECTION, SOLUTION INTRAMUSCULAR; INTRAVENOUS ONCE
Status: COMPLETED | OUTPATIENT
Start: 2023-01-09 | End: 2023-01-09

## 2023-01-09 RX ORDER — ONDANSETRON 4 MG/1
4 TABLET, ORALLY DISINTEGRATING ORAL EVERY 8 HOURS PRN
Qty: 12 TABLET | Refills: 0 | Status: SHIPPED | OUTPATIENT
Start: 2023-01-09 | End: 2023-01-13

## 2023-01-09 RX ADMIN — KETOROLAC TROMETHAMINE 30 MG: 30 INJECTION, SOLUTION INTRAMUSCULAR; INTRAVENOUS at 11:58

## 2023-01-09 RX ADMIN — CEFTRIAXONE 1 G: 1 INJECTION, SOLUTION INTRAVENOUS at 13:23

## 2023-01-09 NOTE — PROGRESS NOTES
"Patient: Romy Langley    YOB: 2000    Date: 01/13/2023    Primary Care Provider: Nadya Hidalgo MD    Chief Complaint   Patient presents with   • Mass     umbilical       SUBJECTIVE:    History of present illness:  I saw the patient in the office today as a consultation for evaluation and treatment of umbilical hernia. Went to ED at [facility] 1/9/23 for pain in umbilicus since 7 days ago when she lifted a heavy bag of dog food and felt a \"tearing\" around her umbilicus.  She states that she has had constant pain since then but it is worse with any kind of movement. She reports normal bowel movements.  She denies associated symptoms including vomiting, fever, urinary symptoms, and additional symptoms or complaints at this time.  She has not been taking any medication for the pain. CT Abdomen Pelvis Without Contrast was done on 1/9/23 which showed  No significant abnormality of the lower abdominal wall is visualized.  Tiny fat-containing umbilical hernia.  No hydronephrosis or nephrolithiasis.     The following portions of the patient's history were reviewed and updated as appropriate: allergies, current medications, past family history, past medical history, past social history, past surgical history and problem list.    Review of Systems   Constitutional: Negative for chills, fever and unexpected weight change.   HENT: Negative for hearing loss, trouble swallowing and voice change.    Eyes: Negative for visual disturbance.   Respiratory: Negative for apnea, cough, chest tightness, shortness of breath and wheezing.    Cardiovascular: Negative for chest pain, palpitations and leg swelling.   Gastrointestinal: Positive for abdominal pain. Negative for abdominal distention, anal bleeding, blood in stool, constipation, diarrhea, nausea, rectal pain and vomiting.   Endocrine: Negative for cold intolerance and heat intolerance.   Genitourinary: Negative for difficulty urinating, " dysuria and flank pain.   Musculoskeletal: Negative for back pain and gait problem.   Skin: Negative for color change, rash and wound.   Neurological: Negative for dizziness, syncope, speech difficulty, weakness, light-headedness, numbness and headaches.   Hematological: Negative for adenopathy. Does not bruise/bleed easily.   Psychiatric/Behavioral: Negative for confusion. The patient is not nervous/anxious.        History:  Past Medical History:   Diagnosis Date   • Thyroid activity decreased    • Urinary tract infection        Past Surgical History:   Procedure Laterality Date   •  SECTION N/A 2022    Procedure:  SECTION PRIMARY;  Surgeon: Lukas Rodriguez MD;  Location: [facility];  Service: Obstetrics/Gynecology;  Laterality: N/A;   • FOOT/TOE TENDON REPAIR Left 2020    Procedure: RECONSTRUCTION PT TENDON LEFT;  Surgeon: Uziel Garcia DPM;  Location: [facility];  Service: Podiatry   • NO PAST SURGERIES     • TARSAL TUNNEL RELEASE Left 2020    Procedure: EXCISION TARSAL BONE LEFT;  Surgeon: Uziel Garcia DPM;  Location: [facility];  Service: Podiatry   • WISDOM TOOTH EXTRACTION         Family History   Problem Relation Age of Onset   • Hypertension Mother    • Diabetes Father    • Heart attack Maternal Grandfather    • Diabetes Maternal Grandfather        Social History     Tobacco Use   • Smoking status: Never   • Smokeless tobacco: Never   Vaping Use   • Vaping Use: Never used   Substance Use Topics   • Alcohol use: No   • Drug use: No       Allergies:  No Known Allergies    Medications:    Current Outpatient Medications:   •  ketorolac (TORADOL) 10 MG tablet, Take 1 tablet by mouth Every 6 (Six) Hours As Needed for Moderate Pain., Disp: 15 tablet, Rfl: 0  •  ondansetron (ZOFRAN) 8 MG tablet, TAKE 1 TABLET BY MOUTH EVERY 8 HOURS FOR 2 DAYS, Disp: , Rfl:     OBJECTIVE:    Vital Signs:   Vitals:    23 1056   BP: 120/68   Pulse: 71   Temp: 97.5 °F (36.4  "°C)   TempSrc: Temporal   SpO2: 100%   Weight: 70.3 kg (155 lb)   Height: 165.1 cm (65\")       Physical Exam:   General Appearance:    Alert, cooperative, in no acute distress   Head:    Normocephalic, without obvious abnormality, atraumatic   Eyes:            Lids and lashes normal, conjunctivae and sclerae normal, no   icterus, no pallor, corneas clear, PERRLA   Ears:    Ears appear intact with no abnormalities noted   Throat:   No oral lesions, no thrush, oral mucosa moist   Neck:   No adenopathy, supple, trachea midline, no thyromegaly, no   carotid bruit, no JVD   Lungs:     Clear to auscultation,respirations regular, even and                  unlabored    Heart:    Regular rhythm and normal rate, normal S1 and S2, no            murmur   Abdomen:     no masses, no organomegaly, soft non-tender, non-distended, no guarding, there is evidence of a small umbilical hernia   Extremities:   Moves all extremities well, no edema, no cyanosis, no             redness   Pulses:   Pulses palpable and equal bilaterally   Skin:   No bleeding, bruising or rash   Lymph nodes:   No palpable adenopathy   Neurologic:   Cranial nerves 2 - 12 grossly intact, sensation intact        Results Review:   I reviewed the patient's new clinical results.  I reviewed the patient's new imaging results and agree with the interpretation.    Review of Systems was reviewed and confirmed as accurate as documented by the MA.    ASSESSMENT/PLAN:    1. Umbilical hernia without obstruction and without gangrene        I had a detailed and extensive discussion with the patient in the office and they understand that they need to undergo hernia repair with possible mesh.  Full risks and benefits of operative versus nonoperative intervention were discussed with the patient and these included things such as nonresolution of symptoms and possible worsening of symptoms without surgical intervention versus infection, bleeding, possible recurrent hernia, " possible postoperative neuralgia from nerve damage or involvement with scar tissue, etc.  The patient understands, agrees, and had no questions for me at the end of the office visit.     I discussed the patients findings and my recommendations with patient.    Electronically signed by Mery Cadet DO  01/13/23

## 2023-01-09 NOTE — DISCHARGE INSTRUCTIONS
Take antibiotic as prescribed. Take Toradol as needed as prescribed for pain. Take Zofran as needed as prescribed for nausea/vomiting. Do not take with other NSAIDs such as Aleve or Ibuprofen. Take Tylenol as needed per directions on the package. Follow up with your PCP for further outpatient evaluation as needed.  Follow-up with Dr. Cadet, general surgery, for further outpatient evaluation of umbilical hernia.  Return to the ER for new or worsening symptoms or acute concerns.

## 2023-01-09 NOTE — ED PROVIDER NOTES
"Subjective   History of Present Illness  Patient is a 22-year-old female with history of thyroid disease and UTI presenting to the ER with complaints of pain around her  incision since 3 days ago when she lifted a heavy bag of dog food and felt a \"tearing\" around her incision site.  She states that the dog who was around 50 pounds and she lifted it while she was at work.  She states that she has had constant pain since then but it is worse with any kind of movement. She reports normal bowel movements.  She denies associated symptoms including vomiting, fever, urinary symptoms, and additional symptoms or complaints at this time.  She has not been taking any medication for the pain.    Review of Systems   Gastrointestinal: Positive for abdominal pain.   All other systems reviewed and are negative.      Past Medical History:   Diagnosis Date   • Thyroid activity decreased    • Urinary tract infection        No Known Allergies    Past Surgical History:   Procedure Laterality Date   •  SECTION N/A 2022    Procedure:  SECTION PRIMARY;  Surgeon: Lukas Rodriguez MD;  Location: [facility];  Service: Obstetrics/Gynecology;  Laterality: N/A;   • FOOT/TOE TENDON REPAIR Left 2020    Procedure: RECONSTRUCTION PT TENDON LEFT;  Surgeon: Uziel Garcia DPM;  Location: [facility] OR;  Service: Podiatry   • NO PAST SURGERIES     • TARSAL TUNNEL RELEASE Left 2020    Procedure: EXCISION TARSAL BONE LEFT;  Surgeon: Uziel Garcia DPM;  Location: [facility] OR;  Service: Podiatry   • WISDOM TOOTH EXTRACTION         Family History   Problem Relation Age of Onset   • Hypertension Mother    • Diabetes Father    • Heart attack Maternal Grandfather    • Diabetes Maternal Grandfather        Social History     Socioeconomic History   • Marital status: Single   Tobacco Use   • Smoking status: Never   • Smokeless tobacco: Never   Vaping Use   • Vaping Use: Never used   Substance and Sexual " Activity   • Alcohol use: No   • Drug use: No   • Sexual activity: Defer     Partners: Male           Objective   Physical Exam  Vitals and nursing note reviewed.   Constitutional:       General: She is not in acute distress.     Appearance: She is normal weight. She is not toxic-appearing.   HENT:      Head: Normocephalic and atraumatic.      Right Ear: External ear normal.      Left Ear: External ear normal.      Nose: Nose normal.   Eyes:      Extraocular Movements: Extraocular movements intact.      Conjunctiva/sclera: Conjunctivae normal.   Cardiovascular:      Rate and Rhythm: Normal rate.   Pulmonary:      Effort: Pulmonary effort is normal. No respiratory distress.   Abdominal:      General: There is no distension.      Palpations: Abdomen is soft.      Tenderness: There is abdominal tenderness. There is no guarding or rebound.      Comments:  scar appears normal, healed well, no bulging or mass noticed on palpation, no signs of peritonitis   Musculoskeletal:         General: Normal range of motion.      Cervical back: Normal range of motion and neck supple.   Skin:     General: Skin is warm and dry.   Neurological:      General: No focal deficit present.      Mental Status: She is alert and oriented to person, place, and time.   Psychiatric:         Mood and Affect: Mood normal.         Behavior: Behavior normal.         Procedures           ED Course      Lab Results (last 24 hours)     Procedure Component Value Units Date/Time    CBC & Differential [[id number]]  (Abnormal) Collected: 23 1158    Specimen: Blood Updated: 23 1231    Narrative:      The following orders were created for panel order CBC & Differential.  Procedure                               Abnormality         Status                     ---------                               -----------         ------                     CBC Auto Differential[[id number]]        Abnormal            Final result               Scan  Slide[[id number]]                                       Final result                 Please view results for these tests on the individual orders.    Comprehensive Metabolic Panel [[id number]] Collected: 01/09/23 1158    Specimen: Blood Updated: 01/09/23 1222     Glucose 65 mg/dL      BUN 13 mg/dL      Creatinine 0.79 mg/dL      Sodium 138 mmol/L      Potassium 3.7 mmol/L      Chloride 102 mmol/L      CO2 27.0 mmol/L      Calcium 9.5 mg/dL      Total Protein 8.1 g/dL      Albumin 4.7 g/dL      ALT (SGPT) 8 U/L      AST (SGOT) 17 U/L      Alkaline Phosphatase 98 U/L      Total Bilirubin 0.5 mg/dL      Globulin 3.4 gm/dL      A/G Ratio 1.4 g/dL      BUN/Creatinine Ratio 16.5     Anion Gap 9.0 mmol/L      eGFR 109.3 mL/min/1.73      Comment: National Kidney Foundation and American Society of Nephrology (ASN) Task Force recommended calculation based on the Chronic Kidney Disease Epidemiology Collaboration (CKD-EPI) equation refit without adjustment for race.       Narrative:      GFR Normal >60  Chronic Kidney Disease <60  Kidney Failure <15      Lipase [[id number]]  (Normal) Collected: 01/09/23 1158    Specimen: Blood Updated: 01/09/23 1222     Lipase 50 U/L     Pregnancy, Urine - Urine, Clean Catch [[id number]]  (Normal) Collected: 01/09/23 1158    Specimen: Urine, Clean Catch Updated: 01/09/23 1208     HCG, Urine QL Negative    Urinalysis With Microscopic If Indicated (No Culture) - Urine, Clean Catch [[id number]]  (Abnormal) Collected: 01/09/23 1158    Specimen: Urine, Clean Catch Updated: 01/09/23 1207     Color, UA Yellow     Appearance, UA Clear     pH, UA 6.0     Specific Gravity, UA 1.024     Glucose, UA Negative     Ketones, UA Negative     Bilirubin, UA Negative     Blood, UA Small (1+)     Protein, UA Negative     Leuk Esterase, UA Small (1+)     Nitrite, UA Positive     Urobilinogen, UA 1.0 E.U./dL    CBC Auto Differential [[id number]]  (Abnormal) Collected: 01/09/23 1158    Specimen: Blood Updated: 01/09/23 1231      WBC 6.15 10*3/mm3      RBC 5.02 10*6/mm3      Hemoglobin 11.4 g/dL      Hematocrit 37.0 %      MCV 73.7 fL      MCH 22.7 pg      MCHC 30.8 g/dL      RDW 17.6 %      RDW-SD 46.3 fl      MPV 9.5 fL      Platelets 382 10*3/mm3      Neutrophil % 55.0 %      Lymphocyte % 32.8 %      Monocyte % 8.8 %      Eosinophil % 2.0 %      Basophil % 1.1 %      Immature Grans % 0.3 %      Neutrophils, Absolute 3.38 10*3/mm3      Lymphocytes, Absolute 2.02 10*3/mm3      Monocytes, Absolute 0.54 10*3/mm3      Eosinophils, Absolute 0.12 10*3/mm3      Basophils, Absolute 0.07 10*3/mm3      Immature Grans, Absolute 0.02 10*3/mm3      nRBC 0.0 /100 WBC     Scan Slide [[id number]] Collected: 01/09/23 1158    Specimen: Blood Updated: 01/09/23 1231     Microcytes Slight/1+     WBC Morphology Normal     Platelet Morphology Normal    Urinalysis, Microscopic Only - Urine, Clean Catch [[id number]]  (Abnormal) Collected: 01/09/23 1158    Specimen: Urine, Clean Catch Updated: 01/09/23 1220     RBC, UA 0-2 /HPF      WBC, UA 6-12 /HPF      Bacteria, UA 3+ /HPF      Squamous Epithelial Cells, UA 0-2 /HPF      Hyaline Casts, UA None Seen /LPF      Methodology Manual Light Microscopy    Urine Culture - Urine, Urine, Clean Catch [[id number]] Collected: 01/09/23 1158    Specimen: Urine, Clean Catch Updated: 01/09/23 1334             CT Abdomen Pelvis Without Contrast    Result Date: 1/9/2023  PROCEDURE: CT ABDOMEN PELVIS WO CONTRAST-  HISTORY: c section 5 months ago, lifted something heavy 3 days ago and has had pain around incision site since then  COMPARISON: None .  PROCEDURE: Axial images were obtained from the lung bases through the pubic symphysis without intravenous contrast.  FINDINGS:  ABDOMEN: The lung bases are clear. The heart size is normal. The limited noncontrast images of the liver are normal. The gallbladder is present. There are no CT evident gallstones present. The spleen is normal. No adrenal masses are seen.  The pancreas has an  "unremarkable unenhanced appearance. The aorta is normal in caliber. Is a tiny fat-containing umbilical hernia. There is no significant free fluid or adenopathy. There is no nephrolithiasis. There is no hydronephrosis. No significant abnormality of the lower abdominal wall is visualized.  PELVIS: The appendix is normal. The urinary bladder is mostly collapsed. The uterus lies midline. There is no significant fluid or adenopathy.      Impression: 1. No significant abnormality of the lower abdominal wall is visualized. 2. Tiny fat-containing umbilical hernia. 3. No hydronephrosis or nephrolithiasis.   499.27 mGy.cm 10.28 mGy  This study was performed with techniques to keep radiation doses as low as reasonably achievable (ALARA). Individualized dose reduction techniques using automated exposure control or adjustment of mA and/or kV according to the patient size were employed.   Images were reviewed, interpreted, and dictated by Dr. Ginger Juan MD Transcribed by Micheline Glynn PA-C.  This report was signed and finalized on 2023 1:56 PM by Ginger Juan MD.                          /51 (BP Location: Left arm, Patient Position: Sitting)   Pulse 91   Temp 97.8 °F (36.6 °C) (Oral)   Resp 16   Ht 167.6 cm (66\")   Wt 71 kg (156 lb 9.6 oz)   SpO2 100%   Breastfeeding No   BMI 25.28 kg/m²             MDM   Patient was evaluated in the ER for abdominal pain around  scar after lifting a heavy bag of dog food at work 3 days ago.  Patient is hemodynamically stable, afebrile, nontoxic-appearing on exam.  No associated symptoms.  Differential diagnosis includes but is not limited to hernia/abdominal wall defect, musculoskeletal pain, muscle strain, UTI, liver disease, pancreatitis, pregnancy, among others.  Initial plan includes basic labs, urinalysis, urine pregnancy test, CT of abdomen pelvis without, and Toradol for pain control.    Labs unremarkable other than mild anemia and urinalysis that is " indicative of a UTI with positive nitrites, 3+ bacteria, 6-12 white blood cells.  Urine culture is pending.  CT abdomen and pelvis reveals no acute abnormalities other than a small fat-containing umbilical hernia.  Patient was given IV Rocephin in the ER.  Prescription was sent for cefdinir as well as Toradol and Zofran.  Patient advised to drink plenty of water, urinate often, and urinate after sexual intercourse.  She was advised to start cefdinir tomorrow.  She is agreeable with plan for discharge.  She was given general surgery follow-up for further outpatient evaluation of umbilical hernia.  She was also advised to follow-up with her PCP as needed.  Precautions were given for return to the ER for any new or worsening symptoms.    Final diagnoses:   Acute UTI   Umbilical hernia without obstruction and without gangrene       ED Disposition  ED Disposition     ED Disposition   Discharge    Condition   Stable    Comment   --             Dino Cooper MD  [address] [facility] [id number] [phone]    Schedule an appointment as soon as possible for a visit   for further outpatient evaluation if symptoms persist    Mery Cadet, DO  [address] [facility] [address]  [facility] [address]  [phone]    Schedule an appointment as soon as possible for a visit   for further outpatient evaluation of umbilical hernia    [facility] Emergency Department  [address] [place] [id number] [phone]  Go to   As needed, If symptoms worsen         Medication List      New Prescriptions    cefdinir 300 MG capsule  Commonly known as: OMNICEF  Take 1 capsule by mouth 2 (Two) Times a Day for 7 days.     ketorolac 10 MG tablet  Commonly known as: TORADOL  Take 1 tablet by mouth Every 6 (Six) Hours As Needed for Moderate Pain.     ondansetron ODT 4 MG disintegrating tablet  Commonly known as: ZOFRAN-ODT  Place 1 tablet on the tongue Every 8 (Eight) Hours As Needed for Nausea or  Vomiting.           Where to Get Your Medications      These medications were sent to [id number] - [place], [place] - [address] AT [facility] [address] - [phone] PH - [phone] FX  [address] [place] [address] [place] [address]    Phone: [phone]   · cefdinir 300 MG capsule  · ketorolac 10 MG tablet  · ondansetron ODT 4 MG disintegrating tablet          Kemi Dc PA-C  01/09/23 1418

## 2023-01-11 LAB — BACTERIA SPEC AEROBE CULT: ABNORMAL

## 2023-01-13 ENCOUNTER — PRE-ADMISSION TESTING (OUTPATIENT)
Dept: PREADMISSION TESTING | Facility: HOSPITAL | Age: 23
End: 2023-01-13
Payer: COMMERCIAL

## 2023-01-13 ENCOUNTER — OFFICE VISIT (OUTPATIENT)
Dept: SURGERY | Facility: CLINIC | Age: 23
End: 2023-01-13
Payer: COMMERCIAL

## 2023-01-13 VITALS
HEART RATE: 71 BPM | BODY MASS INDEX: 25.83 KG/M2 | OXYGEN SATURATION: 100 % | TEMPERATURE: 97.5 F | DIASTOLIC BLOOD PRESSURE: 68 MMHG | SYSTOLIC BLOOD PRESSURE: 120 MMHG | HEIGHT: 65 IN | WEIGHT: 155 LBS

## 2023-01-13 DIAGNOSIS — K42.9 UMBILICAL HERNIA WITHOUT OBSTRUCTION AND WITHOUT GANGRENE: Primary | ICD-10-CM

## 2023-01-13 LAB
DEPRECATED RDW RBC AUTO: 46.5 FL (ref 37–54)
ERYTHROCYTE [DISTWIDTH] IN BLOOD BY AUTOMATED COUNT: 17.7 % (ref 12.3–15.4)
HCT VFR BLD AUTO: 34.8 % (ref 34–46.6)
HGB BLD-MCNC: 10.6 G/DL (ref 12–15.9)
MCH RBC QN AUTO: 22.5 PG (ref 26.6–33)
MCHC RBC AUTO-ENTMCNC: 30.5 G/DL (ref 31.5–35.7)
MCV RBC AUTO: 73.7 FL (ref 79–97)
PLATELET # BLD AUTO: 345 10*3/MM3 (ref 140–450)
PMV BLD AUTO: 10 FL (ref 6–12)
RBC # BLD AUTO: 4.72 10*6/MM3 (ref 3.77–5.28)
WBC NRBC COR # BLD: 7.17 10*3/MM3 (ref 3.4–10.8)

## 2023-01-13 PROCEDURE — 85027 COMPLETE CBC AUTOMATED: CPT

## 2023-01-13 PROCEDURE — 99203 OFFICE O/P NEW LOW 30 MIN: CPT | Performed by: STUDENT IN AN ORGANIZED HEALTH CARE EDUCATION/TRAINING PROGRAM

## 2023-01-13 PROCEDURE — 36415 COLL VENOUS BLD VENIPUNCTURE: CPT

## 2023-01-13 RX ORDER — ONDANSETRON HYDROCHLORIDE 8 MG/1
TABLET, FILM COATED ORAL AS NEEDED
COMMUNITY
Start: 2022-11-14

## 2023-01-13 RX ORDER — HEPARIN SODIUM 5000 [USP'U]/ML
5000 INJECTION, SOLUTION INTRAVENOUS; SUBCUTANEOUS ONCE
Status: CANCELLED | OUTPATIENT
Start: 2023-01-13

## 2023-01-13 RX ORDER — SODIUM CHLORIDE 0.9 % (FLUSH) 0.9 %
10 SYRINGE (ML) INJECTION AS NEEDED
Status: CANCELLED | OUTPATIENT
Start: 2023-01-13

## 2023-01-13 RX ORDER — SODIUM CHLORIDE, SODIUM LACTATE, POTASSIUM CHLORIDE, CALCIUM CHLORIDE 600; 310; 30; 20 MG/100ML; MG/100ML; MG/100ML; MG/100ML
50 INJECTION, SOLUTION INTRAVENOUS CONTINUOUS
Status: CANCELLED | OUTPATIENT
Start: 2023-01-13

## 2023-01-13 RX ORDER — SODIUM CHLORIDE 9 MG/ML
40 INJECTION, SOLUTION INTRAVENOUS AS NEEDED
Status: CANCELLED | OUTPATIENT
Start: 2023-01-13

## 2023-01-13 RX ORDER — SODIUM CHLORIDE 0.9 % (FLUSH) 0.9 %
10 SYRINGE (ML) INJECTION EVERY 12 HOURS SCHEDULED
Status: CANCELLED | OUTPATIENT
Start: 2023-01-13

## 2023-01-13 NOTE — DISCHARGE INSTRUCTIONS
Introduction to anesthesia video viewed by pt in PAT.   PAT PASS GIVEN/REVIEWED WITH PT.  VERBALIZED UNDERSTANDING OF THE FOLLOWING:  DO NOT EAT, DRINK, SMOKE, USE SMOKELESS TOBACCO OR CHEW GUM AFTER MIDNIGHT THE NIGHT BEFORE SURGERY.  THIS ALSO INCLUDES HARD CANDIES AND MINTS.    DO NOT SHAVE THE AREA TO BE OPERATED ON AT LEAST 48 HOURS PRIOR TO THE PROCEDURE.  DO NOT WEAR MAKE UP OR NAIL POLISH.  DO NOT LEAVE IN ANY PIERCING OR WEAR JEWELRY THE DAY OF SURGERY.      DO NOT USE ADHESIVES IF YOU WEAR DENTURES.    DO NOT WEAR EYE CONTACTS; BRING IN YOUR GLASSES.    ONLY TAKE MEDICATION THE MORNING OF YOUR PROCEDURE IF INSTRUCTED BY YOUR SURGEON WITH ENOUGH WATER TO SWALLOW THE MEDICATION.  IF YOUR SURGEON DID NOT SPECIFY WHICH MEDICATIONS TO TAKE, YOU WILL NEED TO CALL THEIR OFFICE FOR FURTHER INSTRUCTIONS AND DO AS THEY INSTRUCT.    LEAVE ANYTHING YOU CONSIDER VALUABLE AT HOME.    YOU WILL NEED TO ARRANGE FOR SOMEONE TO DRIVE YOU HOME AFTER SURGERY.  IT IS RECOMMENDED THAT YOU DO NOT DRIVE, WORK, DRINK ALCOHOL OR MAKE MAJOR DECISIONS FOR AT LEAST 24 HOURS AFTER YOUR PROCEDURE IS COMPLETE.      THE DAY OF YOUR PROCEDURE, BRING IN THE FOLLOWING IF APPLICABLE:   PICTURE ID AND INSURANCE/MEDICARE OR MEDICAID CARDS/ANY CO-PAY THAT MAY BE DUE   COPY OF ADVANCED DIRECTIVE/LIVING WILL/POWER OR    CPAP/BIPAP/INHALERS   SKIN PREP SHEET   YOUR PREADMISSION TESTING PASS (IF NOT A PHONE HISTORY)       Chlorhexadine wipes along with instruction/verification sheet given to pt.  Instructed pt to date, time, and initial the verification sheet once skin prep has been  completed, and to return to [facility] the day of the procedure.  Pt. Verbalizes understanding.

## 2023-01-16 ENCOUNTER — TELEPHONE (OUTPATIENT)
Dept: SURGERY | Facility: CLINIC | Age: 23
End: 2023-01-16
Payer: COMMERCIAL

## 2023-01-19 ENCOUNTER — HOSPITAL ENCOUNTER (OUTPATIENT)
Facility: HOSPITAL | Age: 23
Setting detail: HOSPITAL OUTPATIENT SURGERY
Discharge: HOME | End: 2023-01-19
Attending: STUDENT IN AN ORGANIZED HEALTH CARE EDUCATION/TRAINING PROGRAM | Admitting: STUDENT IN AN ORGANIZED HEALTH CARE EDUCATION/TRAINING PROGRAM
Payer: COMMERCIAL

## 2023-01-19 ENCOUNTER — ANESTHESIA (OUTPATIENT)
Dept: PERIOP | Facility: HOSPITAL | Age: 23
End: 2023-01-19
Payer: COMMERCIAL

## 2023-01-19 ENCOUNTER — ANESTHESIA EVENT (OUTPATIENT)
Dept: PERIOP | Facility: HOSPITAL | Age: 23
End: 2023-01-19
Payer: COMMERCIAL

## 2023-01-19 VITALS
HEART RATE: 84 BPM | SYSTOLIC BLOOD PRESSURE: 111 MMHG | TEMPERATURE: 97.2 F | RESPIRATION RATE: 18 BRPM | DIASTOLIC BLOOD PRESSURE: 74 MMHG | OXYGEN SATURATION: 100 %

## 2023-01-19 DIAGNOSIS — K42.9 UMBILICAL HERNIA WITHOUT OBSTRUCTION AND WITHOUT GANGRENE: ICD-10-CM

## 2023-01-19 LAB
B-HCG UR QL: NEGATIVE
EXPIRATION DATE: NORMAL
INTERNAL NEGATIVE CONTROL: NEGATIVE
INTERNAL POSITIVE CONTROL: NORMAL
Lab: NORMAL

## 2023-01-19 PROCEDURE — 25010000002 PROPOFOL 200 MG/20ML EMULSION: Performed by: NURSE ANESTHETIST, CERTIFIED REGISTERED

## 2023-01-19 PROCEDURE — 0 CEFAZOLIN SODIUM-DEXTROSE 2-3 GM-%(50ML) RECONSTITUTED SOLUTION: Performed by: STUDENT IN AN ORGANIZED HEALTH CARE EDUCATION/TRAINING PROGRAM

## 2023-01-19 PROCEDURE — 25010000002 MIDAZOLAM PER 1MG: Performed by: NURSE ANESTHETIST, CERTIFIED REGISTERED

## 2023-01-19 PROCEDURE — 25010000002 SUCCINYLCHOLINE PER 20 MG: Performed by: NURSE ANESTHETIST, CERTIFIED REGISTERED

## 2023-01-19 PROCEDURE — 25010000002 HYDROMORPHONE 1 MG/ML SOLUTION: Performed by: NURSE ANESTHETIST, CERTIFIED REGISTERED

## 2023-01-19 PROCEDURE — 25010000002 ONDANSETRON PER 1 MG: Performed by: NURSE ANESTHETIST, CERTIFIED REGISTERED

## 2023-01-19 PROCEDURE — 49591 RPR AA HRN 1ST < 3 CM RDC: CPT | Performed by: STUDENT IN AN ORGANIZED HEALTH CARE EDUCATION/TRAINING PROGRAM

## 2023-01-19 PROCEDURE — 0 LIDOCAINE 1 % SOLUTION: Performed by: STUDENT IN AN ORGANIZED HEALTH CARE EDUCATION/TRAINING PROGRAM

## 2023-01-19 PROCEDURE — 25010000002 HEPARIN (PORCINE) PER 1000 UNITS: Performed by: STUDENT IN AN ORGANIZED HEALTH CARE EDUCATION/TRAINING PROGRAM

## 2023-01-19 PROCEDURE — 25010000002 FENTANYL CITRATE (PF) 100 MCG/2ML SOLUTION: Performed by: NURSE ANESTHETIST, CERTIFIED REGISTERED

## 2023-01-19 PROCEDURE — 81025 URINE PREGNANCY TEST: CPT | Performed by: STUDENT IN AN ORGANIZED HEALTH CARE EDUCATION/TRAINING PROGRAM

## 2023-01-19 PROCEDURE — 25010000002 DEXAMETHASONE PER 1 MG: Performed by: NURSE ANESTHETIST, CERTIFIED REGISTERED

## 2023-01-19 RX ORDER — SODIUM CHLORIDE 0.9 % (FLUSH) 0.9 %
10 SYRINGE (ML) INJECTION EVERY 12 HOURS SCHEDULED
Status: DISCONTINUED | OUTPATIENT
Start: 2023-01-19 | End: 2023-01-19 | Stop reason: HOSPADM

## 2023-01-19 RX ORDER — HEPARIN SODIUM 5000 [USP'U]/ML
5000 INJECTION, SOLUTION INTRAVENOUS; SUBCUTANEOUS ONCE
Status: COMPLETED | OUTPATIENT
Start: 2023-01-19 | End: 2023-01-19

## 2023-01-19 RX ORDER — SODIUM CHLORIDE 9 MG/ML
40 INJECTION, SOLUTION INTRAVENOUS AS NEEDED
Status: DISCONTINUED | OUTPATIENT
Start: 2023-01-19 | End: 2023-01-19 | Stop reason: HOSPADM

## 2023-01-19 RX ORDER — DEXAMETHASONE SODIUM PHOSPHATE 4 MG/ML
INJECTION, SOLUTION INTRA-ARTICULAR; INTRALESIONAL; INTRAMUSCULAR; INTRAVENOUS; SOFT TISSUE AS NEEDED
Status: DISCONTINUED | OUTPATIENT
Start: 2023-01-19 | End: 2023-01-19 | Stop reason: SURG

## 2023-01-19 RX ORDER — MIDAZOLAM HYDROCHLORIDE 2 MG/2ML
INJECTION, SOLUTION INTRAMUSCULAR; INTRAVENOUS AS NEEDED
Status: DISCONTINUED | OUTPATIENT
Start: 2023-01-19 | End: 2023-01-19 | Stop reason: SURG

## 2023-01-19 RX ORDER — SODIUM CHLORIDE, SODIUM LACTATE, POTASSIUM CHLORIDE, CALCIUM CHLORIDE 600; 310; 30; 20 MG/100ML; MG/100ML; MG/100ML; MG/100ML
50 INJECTION, SOLUTION INTRAVENOUS CONTINUOUS
Status: DISCONTINUED | OUTPATIENT
Start: 2023-01-19 | End: 2023-01-19 | Stop reason: HOSPADM

## 2023-01-19 RX ORDER — ROCURONIUM BROMIDE 10 MG/ML
INJECTION, SOLUTION INTRAVENOUS AS NEEDED
Status: DISCONTINUED | OUTPATIENT
Start: 2023-01-19 | End: 2023-01-19 | Stop reason: SURG

## 2023-01-19 RX ORDER — ONDANSETRON 2 MG/ML
4 INJECTION INTRAMUSCULAR; INTRAVENOUS ONCE AS NEEDED
Status: DISCONTINUED | OUTPATIENT
Start: 2023-01-19 | End: 2023-01-19 | Stop reason: HOSPADM

## 2023-01-19 RX ORDER — FENTANYL CITRATE 50 UG/ML
INJECTION, SOLUTION INTRAMUSCULAR; INTRAVENOUS AS NEEDED
Status: DISCONTINUED | OUTPATIENT
Start: 2023-01-19 | End: 2023-01-19 | Stop reason: SURG

## 2023-01-19 RX ORDER — BUPIVACAINE HYDROCHLORIDE AND EPINEPHRINE 2.5; 5 MG/ML; UG/ML
INJECTION, SOLUTION EPIDURAL; INFILTRATION; INTRACAUDAL; PERINEURAL AS NEEDED
Status: DISCONTINUED | OUTPATIENT
Start: 2023-01-19 | End: 2023-01-19 | Stop reason: HOSPADM

## 2023-01-19 RX ORDER — CEFAZOLIN SODIUM 2 G/50ML
2 SOLUTION INTRAVENOUS ONCE
Status: COMPLETED | OUTPATIENT
Start: 2023-01-19 | End: 2023-01-19

## 2023-01-19 RX ORDER — LIDOCAINE HYDROCHLORIDE 10 MG/ML
INJECTION, SOLUTION INFILTRATION; PERINEURAL AS NEEDED
Status: DISCONTINUED | OUTPATIENT
Start: 2023-01-19 | End: 2023-01-19 | Stop reason: HOSPADM

## 2023-01-19 RX ORDER — ONDANSETRON 2 MG/ML
INJECTION INTRAMUSCULAR; INTRAVENOUS AS NEEDED
Status: DISCONTINUED | OUTPATIENT
Start: 2023-01-19 | End: 2023-01-19 | Stop reason: SURG

## 2023-01-19 RX ORDER — PROPOFOL 10 MG/ML
INJECTION, EMULSION INTRAVENOUS AS NEEDED
Status: DISCONTINUED | OUTPATIENT
Start: 2023-01-19 | End: 2023-01-19 | Stop reason: SURG

## 2023-01-19 RX ORDER — SUCCINYLCHOLINE CHLORIDE 20 MG/ML
INJECTION INTRAMUSCULAR; INTRAVENOUS AS NEEDED
Status: DISCONTINUED | OUTPATIENT
Start: 2023-01-19 | End: 2023-01-19 | Stop reason: SURG

## 2023-01-19 RX ORDER — LIDOCAINE HYDROCHLORIDE 20 MG/ML
INJECTION, SOLUTION INTRAVENOUS AS NEEDED
Status: DISCONTINUED | OUTPATIENT
Start: 2023-01-19 | End: 2023-01-19 | Stop reason: SURG

## 2023-01-19 RX ORDER — HYDROCODONE BITARTRATE AND ACETAMINOPHEN 5; 325 MG/1; MG/1
1-2 TABLET ORAL EVERY 4 HOURS PRN
Qty: 8 TABLET | Refills: 0 | Status: SHIPPED | OUTPATIENT
Start: 2023-01-19

## 2023-01-19 RX ORDER — SODIUM CHLORIDE 0.9 % (FLUSH) 0.9 %
10 SYRINGE (ML) INJECTION AS NEEDED
Status: DISCONTINUED | OUTPATIENT
Start: 2023-01-19 | End: 2023-01-19 | Stop reason: HOSPADM

## 2023-01-19 RX ADMIN — FENTANYL CITRATE 100 MCG: 50 INJECTION INTRAMUSCULAR; INTRAVENOUS at 14:42

## 2023-01-19 RX ADMIN — MIDAZOLAM HYDROCHLORIDE 2 MG: 1 INJECTION, SOLUTION INTRAMUSCULAR; INTRAVENOUS at 14:42

## 2023-01-19 RX ADMIN — ROCURONIUM BROMIDE 30 MG: 10 INJECTION INTRAVENOUS at 14:53

## 2023-01-19 RX ADMIN — SUGAMMADEX 140 MG: 100 INJECTION, SOLUTION INTRAVENOUS at 15:32

## 2023-01-19 RX ADMIN — HEPARIN SODIUM 5000 UNITS: 5000 INJECTION INTRAVENOUS; SUBCUTANEOUS at 14:38

## 2023-01-19 RX ADMIN — PROPOFOL 200 MG: 10 INJECTION, EMULSION INTRAVENOUS at 14:48

## 2023-01-19 RX ADMIN — SUCCINYLCHOLINE CHLORIDE 160 MG: 20 INJECTION, SOLUTION INTRAMUSCULAR; INTRAVENOUS at 14:48

## 2023-01-19 RX ADMIN — CEFAZOLIN SODIUM 2 G: 2 SOLUTION INTRAVENOUS at 14:42

## 2023-01-19 RX ADMIN — ONDANSETRON 4 MG: 2 INJECTION INTRAMUSCULAR; INTRAVENOUS at 14:50

## 2023-01-19 RX ADMIN — ROCURONIUM BROMIDE 10 MG: 10 INJECTION INTRAVENOUS at 14:48

## 2023-01-19 RX ADMIN — LIDOCAINE HYDROCHLORIDE 40 MG: 20 INJECTION, SOLUTION INTRAVENOUS at 14:48

## 2023-01-19 RX ADMIN — SODIUM CHLORIDE, POTASSIUM CHLORIDE, SODIUM LACTATE AND CALCIUM CHLORIDE 50 ML/HR: 600; 310; 30; 20 INJECTION, SOLUTION INTRAVENOUS at 12:48

## 2023-01-19 RX ADMIN — HYDROMORPHONE HYDROCHLORIDE 0.5 MG: 1 INJECTION, SOLUTION INTRAMUSCULAR; INTRAVENOUS; SUBCUTANEOUS at 16:03

## 2023-01-19 RX ADMIN — DEXAMETHASONE SODIUM PHOSPHATE 8 MG: 4 INJECTION, SOLUTION INTRAMUSCULAR; INTRAVENOUS at 14:50

## 2023-01-19 NOTE — ANESTHESIA PROCEDURE NOTES
Airway  Urgency: elective    Date/Time: 1/19/2023 2:49 PM  Airway not difficult    General Information and Staff    Patient location during procedure: OR  CRNA/CAA: Max Duarte, CELINE    Indications and Patient Condition  Indications for airway management: airway protection    Preoxygenated: yes  Mask difficulty assessment: 1 - vent by mask    Final Airway Details  Final airway type: endotracheal airway      Successful airway: ETT  Cuffed: yes   Successful intubation technique: direct laryngoscopy  Endotracheal tube insertion site: oral  Blade: Garcia  Blade size: 2  ETT size (mm): 7.0  Cormack-Lehane Classification: grade I - full view of glottis  Placement verified by: chest auscultation and capnometry   Measured from: lips  ETT/EBT  to lips (cm): 21  Number of attempts at approach: 1  Assessment: lips, teeth, and gum same as pre-op and atraumatic intubation    Additional Comments  Dentition and Lips as preoperative assesment

## 2023-01-19 NOTE — OP NOTE
PROCEDURE DATE:  1/19/23    SURGEON: Mery Cadet DO    PREOPERATIVE DIAGNOSIS: Umbilical hernia    POSTOPERATIVE DIAGNOSIS: Same    PROCEDURE: Umbilical hernia repair    ANESTHESIA: GETA    EBL: 10cc    SPECIMENS: None    INDICATIONS FOR THE PROCEDURE:  Patient is a 22 year old female who I saw as a consultation for a new painful umbilical hernia after she was lifting heavy bags of dog food. At that time, a CT Abdomen was done in the ED which demonstrated a small fat containing umbilical hernia.     DESCRIPTION OF THE PROCEDURE:  The patient was seen and examined in the preoperative area. History and physical was reviewed, consent was verified, and all questions were answered. Patient was transferred to the OR and placed on the table in supine position. Anesthesia was present to monitor vitals and administer general anesthesia throughout the procedure. Once adequate anesthesia was attained, the patient was prepped and draped in usual sterile fashion. Preoperative antibiotics were given. Time out was performed.     The skin was infiltrated with local anesthesia. A supraumbilical curvilinear incision was made and deepened to the fascia with electrocautery. The umbilical hernia defect was identified and measured approximately 0.75 cm. Decision was made for primary closure. The facial defect was closed with three 0 prolene sutures. The umbilical stalk was pexied to the repair with 3-0 vicryl. The wound was irrigated with sterile saline. The skin dermis was approximated with 3-0 vicryl. The skin was approximated with 4-0 vicryl in running subcuticular fashion. The skin was dressed with surgical glue. Patient tolerated the procedure well and was transferred to PACU in stable condition.     DISPOSITION: PACU    STATUS: Stable     COMPLICATIONS: None

## 2023-01-19 NOTE — DISCHARGE INSTRUCTIONS
No pushing, pulling, tugging, heavy lifting, or strenuous activity.  No major decision making, driving, or drinking alcoholic beverages for 24 hours. (due to the medications you have received)  Always use good hand hygiene/washing techniques.  NO driving while taking pain medications.    * if you have an incision:  Check your incision area every day for signs of infection.   Check for:  * more redness, swelling, or pain  *more fluid or blood  *warmth  *pus or bad smell       To assist you in voiding:  Drink plenty of fluids  Listen to running water while attempting to void.    If you are unable to urinate and you have an uncomfortable urge to void or it has been   6 hours since you were discharged, return to the Emergency Room         Bridion (Sugammadex) is a medication used for the reversal of neuromuscular blockades.( medications utilized during  your anesthetic)    Bridion reduces the effectiveness of hormonal contraceptives, therefore for the next 7days an additional non -hormonal contraceptive should be utilized.

## 2023-01-19 NOTE — ANESTHESIA PREPROCEDURE EVALUATION
Anesthesia Evaluation     Patient summary reviewed and Nursing notes reviewed   no history of anesthetic complications:  NPO Solid Status: > 8 hours  NPO Liquid Status: > 8 hours           Airway   Mallampati: II  TM distance: <3 FB  Neck ROM: full  Possible difficult intubation  Dental - normal exam     Pulmonary - normal exam   (+) pneumonia resolved ,   Cardiovascular - negative cardio ROS and normal exam    Rhythm: regular  Rate: normal        Neuro/Psych  GI/Hepatic/Renal/Endo    (+)   thyroid problem hypothyroidism    Musculoskeletal     Abdominal  - normal exam    Abdomen: soft.  Bowel sounds: normal.   Substance History      OB/GYN negative ob/gyn ROS   (-)  Pregnant        Other   blood dyscrasia anemia,                       Anesthesia Plan    ASA 2     general     (Risks and benefits discussed including risk of aspiration, recall and dental damage. All patient questions answered. Will continue with POC.d. )  intravenous induction     Anesthetic plan, risks, benefits, and alternatives have been provided, discussed and informed consent has been obtained with: patient.  Pre-procedure education provided      CODE STATUS:

## 2023-01-19 NOTE — ANESTHESIA POSTPROCEDURE EVALUATION
Patient: Romy Langley    Procedure Summary     Date: 01/19/23 Room / Location: [facility] OR 1 /  [facility] OR    Anesthesia Start: 1442 Anesthesia Stop: 1536    Procedure: OPEN UMBILICAL HERNIA REPAIR (Abdomen) Diagnosis:       Umbilical hernia without obstruction and without gangrene      (Umbilical hernia without obstruction and without gangrene [K42.9])    Surgeons: Mery Cadet DO Provider: Max Duarte CRNA    Anesthesia Type: general ASA Status: 2          Anesthesia Type: general    Vitals  Vitals Value Taken Time   /76 01/19/23 1620   Temp 97 °F (36.1 °C) 01/19/23 1540   Pulse 85 01/19/23 1628   Resp 14 01/19/23 1605   SpO2 97 % 01/19/23 1628   Vitals shown include unvalidated device data.        Post Anesthesia Care and Evaluation    Patient location during evaluation: PACU  Patient participation: complete - patient participated  Level of consciousness: awake and alert  Pain score: 1  Pain management: adequate    Airway patency: patent  Anesthetic complications: No anesthetic complications  PONV Status: none  Cardiovascular status: acceptable  Respiratory status: acceptable  Hydration status: acceptable  No anesthesia care post op

## 2023-01-24 NOTE — PROGRESS NOTES
"Patient: Romy Langley    YOB: 2000    Date: 01/27/2023    Primary Care Provider: Nadya Hidalgo MD    Chief Complaint   Patient presents with   • Post-op Hernia       History of present illness:  I saw the patient in the office today as a followup from their recent umbilical hernia repair 1/19/23.  They state that they have done well and have minor pain and swelling around incision.    Vital Signs:   Vitals:    01/27/23 1051   BP: 108/64   Pulse: 87   Temp: 97.5 °F (36.4 °C)   SpO2: 99%   Weight: 68.9 kg (152 lb)   Height: 165.1 cm (65\")       Physical Exam:   General Appearance:    Alert, cooperative, in no acute distress   Abdomen:     no masses, no organomegaly, soft non-tender, non-distended, no guarding, wounds are well healed, no evidence of recurrent hernia     Assessment / Plan:    1. Status post repair of ventral hernia        I did discuss the situation with the patient today in the office and they have done well from their recent herniorraphy.  I have released the patient back to normal activity, they understand that they need to be careful about heavy lifting. We will provide her with a work letter for light duty with no heavy lifting for 4 weeks.  I need to see the patient back in the office only if they are having further problems, they know to call me if they are.    Electronically signed by Mery Cadet DO  01/27/23                "

## 2023-01-27 ENCOUNTER — OFFICE VISIT (OUTPATIENT)
Dept: SURGERY | Facility: CLINIC | Age: 23
End: 2023-01-27
Payer: COMMERCIAL

## 2023-01-27 VITALS
BODY MASS INDEX: 25.33 KG/M2 | WEIGHT: 152 LBS | HEART RATE: 87 BPM | OXYGEN SATURATION: 99 % | DIASTOLIC BLOOD PRESSURE: 64 MMHG | SYSTOLIC BLOOD PRESSURE: 108 MMHG | TEMPERATURE: 97.5 F | HEIGHT: 65 IN

## 2023-01-27 DIAGNOSIS — Z87.19 STATUS POST REPAIR OF VENTRAL HERNIA: Primary | ICD-10-CM

## 2023-01-27 DIAGNOSIS — Z98.890 STATUS POST REPAIR OF VENTRAL HERNIA: Primary | ICD-10-CM

## 2023-01-27 PROCEDURE — 99024 POSTOP FOLLOW-UP VISIT: CPT | Performed by: STUDENT IN AN ORGANIZED HEALTH CARE EDUCATION/TRAINING PROGRAM

## 2023-01-31 ENCOUNTER — TELEPHONE (OUTPATIENT)
Dept: SURGERY | Facility: CLINIC | Age: 23
End: 2023-01-31
Payer: COMMERCIAL

## 2023-01-31 NOTE — TELEPHONE ENCOUNTER
Prior Authorization Follow Up     Called [employer] - Phone [phone] Fax [phone] to check status of [employer]. Per rep Shirin it looks to still be pending, she is going to email the person doing the appeal and call me back.    Work note has been printed and patient was notified. She stated that she will pick it up tomorrow from .

## 2023-01-31 NOTE — TELEPHONE ENCOUNTER
"  Caller:     Relationship: SELF    Best call back number: [phone]    What form or medical record are you requesting: LETTER FOR WORK    Who is requesting this form or medical record from you: EMPLOYER- [employer]     How would you like to receive the form or medical records (pick-up, mail, fax): PICK- UP. 2.1.23  PLEASE CALL WHEN READY.       Timeframe paperwork needed: ASAP    Additional notes: EMPLOYER NEEDS NOTE STATING PT  IS CURRENTLY ON A LIFTING RESTRICTION \"NO MORE THAN 1 GAL OF MILK\" IS MAXIMUM.  PER HER WORK DUTY SHE IS TO BE ABLE TO LIFT UP TO 65LBS. PLEASE STATE THAT SHE CAN NOT RETURN UNTIL THIS RESTRICTION IS LIFTED.       EMPLOYER IS SENDING [facility] PAPERWORK TO BE COMPLETED AS WELL.             "

## 2023-02-22 NOTE — PROGRESS NOTES
"Patient: Romy Langley    YOB: 2000    Date: 02/27/2023    Primary Care Provider: Nadya Hidalgo MD    Chief Complaint   Patient presents with   • Follow-up     Umbilical hernia        History of present illness:  I saw the patient in the office today as a followup from their recent hernia repair 1/19/23. She states that she has developed another bulge in this area since she saw me for her first post operative visit on 1/27/23. Her mother reports that the patient had bent over to  a baby during this time and that may have been when this happened. She does not recall feeling a pop. Patient reports that the area is tender to palpation.     Vital Signs:   Vitals:    02/27/23 1343   BP: 108/62   Pulse: 83   SpO2: 99%   Weight: 70.2 kg (154 lb 12.8 oz)   Height: 165.1 cm (65\")       Physical Exam:   General Appearance:    Alert, cooperative, in no acute distress   Abdomen:     no masses, no organomegaly, soft non-tender, non-distended, no guarding, wounds are well healed, there is a palpable bulge at the patient's site of hernia repair.      Assessment / Plan:    1. Umbilical mass      I saw the patient today in office after she developed another bulge in the area of her previous umbilical hernia. An ultrasound was completed in office today and the fascia appears to be intact with no recurrent hernia, but there is a complex tissue density in this area that could be either hematoma, seroma, or fat. I recommend that the patient continue weight lifting restrictions for a total of 4-6 weeks. We will continue with watchful waiting for 4 weeks and if the bulge does not resolve in this time, we will discuss possible surgical revision in the area. Patient and her mother expressed understanding and all questions answered.     Electronically signed by Mery Cadet DO  02/27/23                "

## 2023-02-27 ENCOUNTER — OFFICE VISIT (OUTPATIENT)
Dept: SURGERY | Facility: CLINIC | Age: 23
End: 2023-02-27
Payer: COMMERCIAL

## 2023-02-27 ENCOUNTER — PATIENT ROUNDING (BHMG ONLY) (OUTPATIENT)
Dept: SURGERY | Facility: CLINIC | Age: 23
End: 2023-02-27
Payer: COMMERCIAL

## 2023-02-27 VITALS
BODY MASS INDEX: 25.79 KG/M2 | OXYGEN SATURATION: 99 % | HEIGHT: 65 IN | SYSTOLIC BLOOD PRESSURE: 108 MMHG | WEIGHT: 154.8 LBS | DIASTOLIC BLOOD PRESSURE: 62 MMHG | HEART RATE: 83 BPM

## 2023-02-27 DIAGNOSIS — R19.09 UMBILICAL MASS: Primary | ICD-10-CM

## 2023-02-27 PROCEDURE — 99024 POSTOP FOLLOW-UP VISIT: CPT | Performed by: STUDENT IN AN ORGANIZED HEALTH CARE EDUCATION/TRAINING PROGRAM

## 2023-02-27 NOTE — PROGRESS NOTES
February 27, 2023    Hello, may I speak with Romy Langley?    My name is Rosa Maria    I am  with [facility] GENERAL SURGERY [address] [place] [address]  [facility] [place][note 2] [id number] [phone].    Before we get started may I verify your date of birth? 2000    I am calling to officially welcome you to our practice and ask about your recent visit. Is this a good time to talk? yes    Tell me about your visit with us. What things went well?  good       We're always looking for ways to make our patients' experiences even better. Do you have recommendations on ways we may improve?  no    Overall were you satisfied with your first visit to our practice? yes       I appreciate you taking the time to speak with me today. Is there anything else I can do for you? no      Thank you, and have a great day.

## 2023-03-24 ENCOUNTER — OFFICE VISIT (OUTPATIENT)
Dept: SURGERY | Facility: CLINIC | Age: 23
End: 2023-03-24
Payer: COMMERCIAL

## 2023-03-24 VITALS
SYSTOLIC BLOOD PRESSURE: 122 MMHG | WEIGHT: 162.6 LBS | BODY MASS INDEX: 27.09 KG/M2 | RESPIRATION RATE: 16 BRPM | DIASTOLIC BLOOD PRESSURE: 74 MMHG | HEIGHT: 65 IN | OXYGEN SATURATION: 97 % | HEART RATE: 87 BPM | TEMPERATURE: 97.9 F

## 2023-03-24 DIAGNOSIS — K92.1 HEMATOCHEZIA: Primary | ICD-10-CM

## 2023-03-24 PROCEDURE — 1160F RVW MEDS BY RX/DR IN RCRD: CPT | Performed by: STUDENT IN AN ORGANIZED HEALTH CARE EDUCATION/TRAINING PROGRAM

## 2023-03-24 PROCEDURE — 99213 OFFICE O/P EST LOW 20 MIN: CPT | Performed by: STUDENT IN AN ORGANIZED HEALTH CARE EDUCATION/TRAINING PROGRAM

## 2023-03-24 PROCEDURE — 1159F MED LIST DOCD IN RCRD: CPT | Performed by: STUDENT IN AN ORGANIZED HEALTH CARE EDUCATION/TRAINING PROGRAM

## 2023-03-24 RX ORDER — SODIUM CHLORIDE 9 MG/ML
40 INJECTION, SOLUTION INTRAVENOUS AS NEEDED
OUTPATIENT
Start: 2023-03-24

## 2023-03-24 RX ORDER — SODIUM CHLORIDE 0.9 % (FLUSH) 0.9 %
10 SYRINGE (ML) INJECTION AS NEEDED
OUTPATIENT
Start: 2023-03-24

## 2023-03-24 RX ORDER — BISACODYL 5 MG
TABLET, DELAYED RELEASE (ENTERIC COATED) ORAL
Qty: 4 TABLET | Refills: 0 | Status: SHIPPED | OUTPATIENT
Start: 2023-03-24

## 2023-03-24 RX ORDER — SODIUM CHLORIDE, SODIUM LACTATE, POTASSIUM CHLORIDE, CALCIUM CHLORIDE 600; 310; 30; 20 MG/100ML; MG/100ML; MG/100ML; MG/100ML
50 INJECTION, SOLUTION INTRAVENOUS CONTINUOUS
OUTPATIENT
Start: 2023-03-24

## 2023-03-24 RX ORDER — POLYETHYLENE GLYCOL 3350 17 G/17G
POWDER, FOR SOLUTION ORAL
Qty: 238 EACH | Refills: 0 | Status: SHIPPED | OUTPATIENT
Start: 2023-03-24

## 2023-03-24 RX ORDER — SODIUM CHLORIDE 0.9 % (FLUSH) 0.9 %
10 SYRINGE (ML) INJECTION EVERY 12 HOURS SCHEDULED
OUTPATIENT
Start: 2023-03-24

## 2023-03-24 NOTE — PROGRESS NOTES
Patient: Romy Langley    YOB: 2000    Date: 03/24/2023    Primary Care Provider: Nadya Hidalgo MD    Chief Complaint   Patient presents with   • Rectal Bleeding     Blood In Stool       SUBJECTIVE:    History of present illness:  I saw the patient in the office today as a consultation for evaluation and treatment of blood that is mixed in her stool and in the toilet bowl after a bowel movement. She states that this started yesterday. Patient denies any hemorrhoids or constipation. She states she is having abdominal pain as well that is diffuse. She had rectal bleeding in the past that resolved on its own and occurred when she had significant constipation. Patient was diagnosed with IBS by her PCP, but has never had a colonoscopy. She is not on any medicines for IBS. She is currently having normal bowel movements. Negative family history for colon cancer. Additionally patient did have an umbilical hernia repair 1/19/23 and he pain has persisted. Possible recurrence of hernia, however, office US completed showing no fascial defect.    The following portions of the patient's history were reviewed and updated as appropriate: allergies, current medications, past family history, past medical history, past social history, past surgical history and problem list.    Review of Systems   Constitutional: Negative for chills, fever and unexpected weight change.   HENT: Negative for hearing loss, trouble swallowing and voice change.    Eyes: Negative for visual disturbance.   Respiratory: Negative for apnea, cough, chest tightness, shortness of breath and wheezing.    Cardiovascular: Negative for chest pain, palpitations and leg swelling.   Gastrointestinal: Positive for blood in stool and rectal pain. Negative for abdominal distention, abdominal pain, anal bleeding, constipation, diarrhea, nausea and vomiting.   Endocrine: Negative for cold intolerance and heat intolerance.   Genitourinary:  Negative for difficulty urinating, dysuria and flank pain.   Musculoskeletal: Negative for back pain and gait problem.   Skin: Negative for color change, rash and wound.   Neurological: Negative for dizziness, syncope, speech difficulty, weakness, light-headedness, numbness and headaches.   Hematological: Negative for adenopathy. Does not bruise/bleed easily.   Psychiatric/Behavioral: Negative for confusion. The patient is not nervous/anxious.        History:  Past Medical History:   Diagnosis Date   • Thyroid activity decreased    • Urinary tract infection        Past Surgical History:   Procedure Laterality Date   •  SECTION N/A 2022    Procedure:  SECTION PRIMARY;  Surgeon: Lukas Rodriguez MD;  Location: [facility] OR;  Service: Obstetrics/Gynecology;  Laterality: N/A;   • FOOT/TOE TENDON REPAIR Left 2020    Procedure: RECONSTRUCTION PT TENDON LEFT;  Surgeon: Uziel Garcia DPM;  Location: [facility] OR;  Service: Podiatry   • TARSAL TUNNEL RELEASE Left 2020    Procedure: EXCISION TARSAL BONE LEFT;  Surgeon: Uziel Garcia DPM;  Location: [facility] OR;  Service: Podiatry   • UMBILICAL HERNIA REPAIR N/A 2023    Procedure: OPEN UMBILICAL HERNIA REPAIR;  Surgeon: Mery Cadet DO;  Location: [facility] OR;  Service: General;  Laterality: N/A;   • WISDOM TOOTH EXTRACTION         Family History   Problem Relation Age of Onset   • Hypertension Mother    • Diabetes Father    • Heart attack Maternal Grandfather    • Diabetes Maternal Grandfather        Social History     Tobacco Use   • Smoking status: Never   • Smokeless tobacco: Never   Vaping Use   • Vaping Use: Never used   Substance Use Topics   • Alcohol use: No   • Drug use: No       Allergies:  No Known Allergies    Medications:    Current Outpatient Medications:   •  ketorolac (TORADOL) 10 MG tablet, Take 1 tablet by mouth Every 6 (Six) Hours As Needed for Moderate Pain., Disp: 15 tablet, Rfl: 0  •   "ondansetron (ZOFRAN) 8 MG tablet, Take  by mouth As Needed., Disp: , Rfl:   •  bisacodyl (Dulcolax) 5 MG EC tablet, Take 2 tablets at 3 pm and 2 tablets at 7 pm the day prior to colonoscopy, Disp: 4 tablet, Rfl: 0  •  HYDROcodone-acetaminophen (NORCO) 5-325 MG per tablet, Take 1-2 tablets by mouth Every 4 (Four) Hours As Needed (Pain). (Patient not taking: Reported on 3/24/2023), Disp: 8 tablet, Rfl: 0  •  polyethylene glycol (MiraLax) 17 g packet, Mix 238 gram powder with 64 oz of clear liquid starting at 5 pm. Drink 8 oz every 10-15 minutes until consumed., Disp: 238 each, Rfl: 0    OBJECTIVE:    Vital Signs:   Vitals:    03/24/23 1013   BP: 122/74   Pulse: 87   Resp: 16   Temp: 97.9 °F (36.6 °C)   TempSrc: Temporal   SpO2: 97%   Weight: 73.8 kg (162 lb 9.6 oz)   Height: 165.1 cm (65\")       Physical Exam:   General Appearance:    Alert, cooperative, in no acute distress   Head:    Normocephalic, without obvious abnormality, atraumatic   Eyes:            Lids and lashes normal, conjunctivae and sclerae normal, no   icterus, no pallor, corneas clear, PERRL   Ears:    Ears appear intact with no abnormalities noted   Throat:   No oral lesions, no thrush, oral mucosa moist   Neck:   No adenopathy, supple, trachea midline, no thyromegaly,  no JVD   Lungs:     Clear to auscultation,respirations regular, even and                  unlabored    Heart:    Regular rhythm and normal rate, normal S1 and S2, no            murmur   Abdomen:     no masses, no organomegaly, soft non-tender, non-distended, no guarding, umbilical incision is well healing, there is a palpable nodule that is tender to palpation. She also has mild diffuse tenderness with palpation.   Extremities:   Moves all extremities well, no edema, no cyanosis, no             redness   Pulses:   Pulses palpable and equal bilaterally   Skin:   No bleeding, bruising or rash   Lymph nodes:   No palpable adenopathy   Neurologic:   Cranial nerves 2 - 12 grossly intact, " sensation intact      Results Review:   None    Review of Systems was reviewed and confirmed as accurate as documented by the MA.    ASSESSMENT/PLAN:    1. Hematochezia        I recommend a colonoscopy for further evaluation. The procedure was explained as well as the risks which include but are not limited to bleeding, infection, perforation, abdominal pain etc. The patient understands these risks and the procedure and wishes to proceed.     Electronically signed by Mery Cadet DO  03/24/23 08:54 EDT

## 2023-04-05 ENCOUNTER — TELEPHONE (OUTPATIENT)
Dept: SURGERY | Facility: CLINIC | Age: 23
End: 2023-04-05
Payer: COMMERCIAL

## 2023-04-17 ENCOUNTER — TELEPHONE (OUTPATIENT)
Dept: SURGERY | Facility: CLINIC | Age: 23
End: 2023-04-17
Payer: COMMERCIAL

## 2023-04-20 ENCOUNTER — OUTSIDE FACILITY SERVICE (OUTPATIENT)
Dept: SURGERY | Facility: CLINIC | Age: 23
End: 2023-04-20
Payer: COMMERCIAL

## 2023-04-20 ENCOUNTER — TELEPHONE (OUTPATIENT)
Dept: SURGERY | Facility: CLINIC | Age: 23
End: 2023-04-20

## 2023-04-20 NOTE — TELEPHONE ENCOUNTER
Caller: Romy Langley    Relationship: Self    Best call back number: [phone]    What is the best time to reach you: PT CAN BE REACHED ANYTIME; OKAY TO LEAVE MESSAGE IF NO ANSWER    Who are you requesting to speak with (clinical staff, provider,  specific staff member): CLINICAL    What was the call regarding: PT STATED THEY WERE RETURNING A MISSED CALL - PT STATED THEY BELIEVE IT MAY HAVE BEEN TO R/S COLONOSCOPY AS SHE WAS ADV AT FACILITY THIS MORNING SHE WOULD NEED TO PREFORM A 2 DAY PREP    Do you require a callback: YES

## 2023-04-21 RX ORDER — SOD SULF/POT CHLORIDE/MAG SULF 1.479 G
12 TABLET ORAL TAKE AS DIRECTED
Qty: 24 TABLET | Refills: 0 | Status: SHIPPED | OUTPATIENT
Start: 2023-04-21 | End: 2023-04-23

## 2023-04-21 RX ORDER — POLYETHYLENE GLYCOL 3350 17 G/17G
238 POWDER, FOR SOLUTION ORAL ONCE
Qty: 17 PACKET | Refills: 0 | Status: SHIPPED | OUTPATIENT
Start: 2023-04-21 | End: 2023-04-21

## 2023-05-01 ENCOUNTER — TELEPHONE (OUTPATIENT)
Dept: SURGERY | Facility: CLINIC | Age: 23
End: 2023-05-01
Payer: COMMERCIAL

## 2023-05-01 NOTE — TELEPHONE ENCOUNTER
Called to confirm procedure on 5/4/2023 at [facility].  Message says call cannot go through.  LVM for Romy to call office on mother's phone number.

## 2023-05-01 NOTE — TELEPHONE ENCOUNTER
Caller: ARIELLE DOAN    Relationship to patient: SELF     Best call back number: [phone]    Patient is needing: PT CALLED ACK TO CONFIRM SURGERY.

## 2023-05-04 ENCOUNTER — OUTSIDE FACILITY SERVICE (OUTPATIENT)
Dept: SURGERY | Facility: CLINIC | Age: 23
End: 2023-05-04
Payer: COMMERCIAL

## 2023-05-04 DIAGNOSIS — K59.09 CHRONIC CONSTIPATION: Primary | ICD-10-CM

## 2023-05-16 ENCOUNTER — HOSPITAL ENCOUNTER (OUTPATIENT)
Dept: CT IMAGING | Facility: HOSPITAL | Age: 23
Discharge: HOME | End: 2023-05-16
Admitting: STUDENT IN AN ORGANIZED HEALTH CARE EDUCATION/TRAINING PROGRAM
Payer: COMMERCIAL

## 2023-05-16 DIAGNOSIS — K59.09 CHRONIC CONSTIPATION: ICD-10-CM

## 2023-05-16 PROCEDURE — 74176 CT ABD & PELVIS W/O CONTRAST: CPT

## 2023-05-18 ENCOUNTER — TELEPHONE (OUTPATIENT)
Dept: SURGERY | Facility: CLINIC | Age: 23
End: 2023-05-18
Payer: COMMERCIAL

## 2023-05-18 NOTE — TELEPHONE ENCOUNTER
Patient called requesting CT abdomen results. I advised her that CT abdomen pelvis showed constipation. She understood.

## 2023-08-02 ENCOUNTER — OFFICE VISIT (OUTPATIENT)
Dept: GASTROENTEROLOGY | Facility: CLINIC | Age: 23
End: 2023-08-02
Payer: COMMERCIAL

## 2023-08-02 VITALS — BODY MASS INDEX: 26.21 KG/M2 | WEIGHT: 167 LBS | HEIGHT: 67 IN

## 2023-08-02 DIAGNOSIS — D50.0 IRON DEFICIENCY ANEMIA DUE TO CHRONIC BLOOD LOSS: Primary | ICD-10-CM

## 2023-08-02 DIAGNOSIS — K59.04 CHRONIC IDIOPATHIC CONSTIPATION: ICD-10-CM

## 2023-08-02 DIAGNOSIS — R10.84 GENERALIZED ABDOMINAL PAIN: ICD-10-CM

## 2023-08-02 RX ORDER — FERROUS SULFATE 325(65) MG
1 TABLET ORAL 2 TIMES DAILY
COMMUNITY
Start: 2023-04-20 | End: 2023-08-02

## 2023-08-02 RX ORDER — CHLORHEXIDINE GLUCONATE 0.12 MG/ML
RINSE ORAL
COMMUNITY
Start: 2023-05-19 | End: 2023-08-02

## 2023-08-02 RX ORDER — SULFAMETHOXAZOLE AND TRIMETHOPRIM 800; 160 MG/1; MG/1
1 TABLET ORAL EVERY 12 HOURS SCHEDULED
COMMUNITY
Start: 2023-07-12 | End: 2023-08-02

## 2023-08-02 RX ORDER — OXYCODONE HYDROCHLORIDE AND ACETAMINOPHEN 5; 325 MG/1; MG/1
1 TABLET ORAL
COMMUNITY
Start: 2023-05-19 | End: 2023-08-02

## 2023-08-02 RX ORDER — IBUPROFEN 600 MG/1
600 TABLET ORAL EVERY 6 HOURS PRN
COMMUNITY
Start: 2023-05-19 | End: 2023-08-02

## 2023-08-02 RX ORDER — DOCUSATE SODIUM 100 MG/1
CAPSULE, LIQUID FILLED ORAL
COMMUNITY
Start: 2023-07-12 | End: 2023-08-02

## 2023-08-02 RX ORDER — AMOXICILLIN 500 MG/1
TABLET, FILM COATED ORAL
COMMUNITY
Start: 2023-05-12 | End: 2023-08-02

## 2023-08-02 RX ORDER — NEOMYCIN SULFATE, POLYMYXIN B SULFATE AND DEXAMETHASONE 3.5; 10000; 1 MG/ML; [USP'U]/ML; MG/ML
SUSPENSION/ DROPS OPHTHALMIC
COMMUNITY
Start: 2023-05-10 | End: 2023-08-02

## 2023-08-02 RX ORDER — LINACLOTIDE 145 UG/1
CAPSULE, GELATIN COATED ORAL
COMMUNITY
Start: 2023-07-27 | End: 2023-08-02

## 2023-08-02 RX ORDER — ASCORBIC ACID 500 MG
TABLET ORAL
COMMUNITY
Start: 2023-04-20 | End: 2023-08-02

## 2023-08-02 RX ORDER — OLOPATADINE HYDROCHLORIDE 1 MG/ML
1 SOLUTION/ DROPS OPHTHALMIC 2 TIMES DAILY
COMMUNITY
Start: 2023-05-10 | End: 2023-08-02

## 2023-10-24 ENCOUNTER — OFFICE VISIT (OUTPATIENT)
Dept: GASTROENTEROLOGY | Facility: CLINIC | Age: 23
End: 2023-10-24
Payer: COMMERCIAL

## 2023-10-24 VITALS
HEIGHT: 67 IN | DIASTOLIC BLOOD PRESSURE: 70 MMHG | BODY MASS INDEX: 25.15 KG/M2 | WEIGHT: 160.2 LBS | SYSTOLIC BLOOD PRESSURE: 118 MMHG

## 2023-10-24 DIAGNOSIS — D50.0 IRON DEFICIENCY ANEMIA DUE TO CHRONIC BLOOD LOSS: Primary | ICD-10-CM

## 2023-10-24 DIAGNOSIS — R10.84 GENERALIZED ABDOMINAL PAIN: ICD-10-CM

## 2023-10-24 DIAGNOSIS — K59.04 CHRONIC IDIOPATHIC CONSTIPATION: ICD-10-CM

## 2023-10-24 PROCEDURE — 99214 OFFICE O/P EST MOD 30 MIN: CPT | Performed by: INTERNAL MEDICINE

## 2023-10-24 PROCEDURE — 1160F RVW MEDS BY RX/DR IN RCRD: CPT | Performed by: INTERNAL MEDICINE

## 2023-10-24 PROCEDURE — 1159F MED LIST DOCD IN RCRD: CPT | Performed by: INTERNAL MEDICINE

## 2023-10-24 RX ORDER — ALBUTEROL SULFATE 90 UG/1
2 AEROSOL, METERED RESPIRATORY (INHALATION) EVERY 4 HOURS PRN
COMMUNITY
Start: 2023-09-17

## 2023-10-24 NOTE — PROGRESS NOTES
GASTROENTEROLOGY OFFICE NOTE  Romy Langley  [id number]  2000      Chief Complaint   Patient presents with    Constipation, Abdominal cramps & pain, Nausea        HISTORY OF PRESENT ILLNESS:  Follow-up appointment for this very pleasant 23-year-old white female who I last saw 2023 when she presented with lifelong problems with constipation.  We had recommended a high-fiber diet twice daily dosing of MiraLAX or milk of magnesia and Linzess 290 mcg daily along with prucalopride 2 mg daily.  Prucalopride prescription is not filled..    She states that her last bowel movement was about a week and a half ago.  She does have problems with abdominal cramping pain and nausea as her constipation progresses.    She is also noted to have iron deficiency anemia and EGD and colonoscopy had been recommended.  These have not been completed.  Colonoscopy was attempted in May but she was unable to prep successfully even after 2-day bowel prep.  Another prior attempted colonoscopy also was unsuccessful.  She has a history of a hemoglobin of 11.4 with an MCV of 75.4 and hemoglobin as low as 10.6 last January.   in 2022 hemoglobin was as low as 7.6.    Last CAT scan of abdomen was on 2023 and revealed a moderate fecal impaction and no other abnormalities intra-abdominally.    PAST MEDICAL HISTORY  Past Medical History:    Anemia    Irritable bowel syndrome    Lactose intolerance    Thyroid activity decreased    Urinary tract infection        PAST SURGICAL HISTORY  Past Surgical History:    ABDOMINAL SURGERY     SECTION    Procedure:  SECTION PRIMARY;  Surgeon: Lukas Rodriguez MD;  Location: [facility] OR;  Service: Obstetrics/Gynecology;  Laterality: N/A;    COLONOSCOPY    FOOT/TOE TENDON REPAIR    Procedure: RECONSTRUCTION PT TENDON LEFT;  Surgeon: Uziel Garcia DPM;  Location: [facility] OR;  Service: Podiatry    TARSAL TUNNEL RELEASE    Procedure: EXCISION TARSAL BONE  "LEFT;  Surgeon: Uziel Garcia DPM;  Location: [facility];  Service: Podiatry    UMBILICAL HERNIA REPAIR    Procedure: OPEN UMBILICAL HERNIA REPAIR;  Surgeon: Mery Cadet DO;  Location: [facility];  Service: General;  Laterality: N/A;    WISDOM TOOTH EXTRACTION        MEDICATIONS:    Current Outpatient Medications:     albuterol sulfate  (90 Base) MCG/ACT inhaler, Inhale 2 puffs Every 4 (Four) Hours As Needed., Disp: , Rfl:     bisacodyl (Dulcolax) 5 MG EC tablet, Take 2 tablets at 3 pm and 2 tablets at 7 pm the day prior to colonoscopy, Disp: 4 tablet, Rfl: 0    polyethylene glycol (MiraLax) 17 g packet, Mix 238 gram powder with 64 oz of clear liquid starting at 5 pm. Drink 8 oz every 10-15 minutes until consumed., Disp: 238 each, Rfl: 0    linaclotide (Linzess) 290 MCG capsule capsule, Take 1 capsule by mouth Every Morning Before Breakfast., Disp: 30 capsule, Rfl: 11    ondansetron (ZOFRAN) 8 MG tablet, Take  by mouth As Needed., Disp: , Rfl:     ALLERGIES  has No Known Allergies.    FAMILY HISTORY:  Cancer-related family history is not on file.  Colon Cancer-related family history is not on file.    SOCIAL HISTORY  She  reports that she has never smoked. She has never used smokeless tobacco. She reports that she does not drink alcohol and does not use drugs.   She is engaged.  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  Non-smoker.  Nondrinker.  She has a 1-year-old son Ander      PHYSICAL EXAM   /70 (BP Location: Left arm, Patient Position: Sitting, Cuff Size: Adult)   Ht 170.2 cm (67\")   Wt 72.7 kg (160 lb 3.2 oz)   BMI 25.09 kg/m²   General: Alert and oriented x 3. In no apparent or acute distress.  and No stigmata of chronic liver disease  HEENT: Anicteric sclerae. Normal oropharynx  Neck: Supple. Without lymphadenopathy  CV: Regular rate and rhythm, S1, S2  Lungs: Clear to ausculation. Without rales, rhonchi and wheezing  Abdomen:  Soft,non-distended without palpable masses or " hepatosplenomeagaly, areas of rebound tenderness or guarding.   Extremeties: without clubbing, cyanosis or edema  Neurologic:  Alert and oriented x 3 without focal motor or sensory deficits  Rectal exam: deferred       ASSESSMENT  1.-Chronic idiopathic constipation unresponsive to high-fiber diet plus Linzess plus daily MiraLAX.  Initiate trial of prucalopride.  Samples provided.  2.-History of microcytic hypochromic iron deficiency anemia not associated with heavy menses.  Exclusion of gastroesophageal sources of blood loss is recommended and will be pursued further once we are able to get her to move her bowels a little more regularly and therefore for be more likely to successfully prep her for her colonoscopy    PLAN  1.-Continue MiraLAX daily  2.-Continue high-fiber diet  3.-Initiate prucalopride 2 mg p.o. daily.  Samples provided  4.-EGD and colonoscopy recommended at a later date for further evaluation of her iron deficiency anemia  5.-May need to consider referral for anorectal manometry      De Alvarez MD  10/24/2023   15:20 EDT

## 2023-10-27 PROBLEM — K59.04 CHRONIC IDIOPATHIC CONSTIPATION: Status: ACTIVE | Noted: 2023-10-27

## 2023-10-27 PROBLEM — R10.84 GENERALIZED ABDOMINAL PAIN: Status: ACTIVE | Noted: 2023-10-27

## 2023-10-27 PROBLEM — D50.0 IRON DEFICIENCY ANEMIA DUE TO CHRONIC BLOOD LOSS: Status: ACTIVE | Noted: 2023-10-27

## 2024-01-24 NOTE — H&P (VIEW-ONLY)
"Patient: Romy Langley    YOB: 2000    Date: 01/29/2024    Primary Care Provider: Nadya Hidalgo MD    Chief Complaint   Patient presents with    Pain     Right groin       SUBJECTIVE:    History of present illness:  I saw the patient in the office today as a consultation for evaluation and treatment of another \"knot\" in the area of her previous umbilical hernia repair done on 1/19/23. She states that onset was 1 week ago. She was lifting a heavy fish tank when she felt a \"pop\" and had pain in this area. She states that she has also had nausea associated with the pain.    The following portions of the patient's history were reviewed and updated as appropriate: allergies, current medications, past family history, past medical history, past social history, past surgical history and problem list.    Review of Systems   Constitutional:  Negative for chills, fever and unexpected weight change.   HENT:  Negative for hearing loss, trouble swallowing and voice change.    Eyes:  Negative for visual disturbance.   Respiratory:  Negative for apnea, cough, chest tightness, shortness of breath and wheezing.    Cardiovascular:  Negative for chest pain, palpitations and leg swelling.   Gastrointestinal:  Positive for abdominal pain and nausea. Negative for abdominal distention, anal bleeding, blood in stool, constipation, diarrhea, rectal pain and vomiting.   Endocrine: Negative for cold intolerance and heat intolerance.   Genitourinary:  Negative for difficulty urinating, dysuria and flank pain.   Musculoskeletal:  Negative for back pain and gait problem.   Skin:  Negative for color change, rash and wound.   Neurological:  Negative for dizziness, syncope, speech difficulty, weakness, light-headedness, numbness and headaches.   Hematological:  Negative for adenopathy. Does not bruise/bleed easily.   Psychiatric/Behavioral:  Negative for confusion. The patient is not nervous/anxious.  "       History:  Past Medical History:   Diagnosis Date    Anemia     Irritable bowel syndrome     Lactose intolerance     Thyroid activity decreased     Urinary tract infection        Past Surgical History:   Procedure Laterality Date    ABDOMINAL SURGERY       SECTION N/A 2022    Procedure:  SECTION PRIMARY;  Surgeon: Lukas Rodriguez MD;  Location: [facility];  Service: Obstetrics/Gynecology;  Laterality: N/A;    COLONOSCOPY      FOOT/TOE TENDON REPAIR Left 2020    Procedure: RECONSTRUCTION PT TENDON LEFT;  Surgeon: Uziel Garcia DPM;  Location: [facility] OR;  Service: Podiatry    TARSAL TUNNEL RELEASE Left 2020    Procedure: EXCISION TARSAL BONE LEFT;  Surgeon: Uziel Garcia DPM;  Location: [facility] OR;  Service: Podiatry    UMBILICAL HERNIA REPAIR N/A 2023    Procedure: OPEN UMBILICAL HERNIA REPAIR;  Surgeon: Mery Cadet DO;  Location: [facility];  Service: General;  Laterality: N/A;    WISDOM TOOTH EXTRACTION         Family History   Problem Relation Age of Onset    Hypertension Mother     Diabetes Father     Heart attack Maternal Grandfather     Diabetes Maternal Grandfather        Social History     Tobacco Use    Smoking status: Never    Smokeless tobacco: Never   Vaping Use    Vaping Use: Never used   Substance Use Topics    Alcohol use: No    Drug use: No       Allergies:  No Known Allergies    Medications:    Current Outpatient Medications:     albuterol sulfate  (90 Base) MCG/ACT inhaler, Inhale 2 puffs Every 4 (Four) Hours As Needed. (Patient not taking: Reported on 2024), Disp: , Rfl:     bisacodyl (Dulcolax) 5 MG EC tablet, Take 2 tablets at 3 pm and 2 tablets at 7 pm the day prior to colonoscopy (Patient not taking: Reported on 2024), Disp: 4 tablet, Rfl: 0    polyethylene glycol (MiraLax) 17 g packet, Mix 238 gram powder with 64 oz of clear liquid starting at 5 pm. Drink 8 oz every 10-15 minutes until consumed.  "(Patient not taking: Reported on 1/29/2024), Disp: 238 each, Rfl: 0    Prucalopride Succinate 2 MG tablet, Take 1 tablet by mouth Daily. (Patient not taking: Reported on 1/29/2024), Disp: 30 tablet, Rfl: 11    OBJECTIVE:    Vital Signs:   Vitals:    01/29/24 1312   BP: 114/68   Pulse: 79   Resp: 18   Temp: 98.2 °F (36.8 °C)   TempSrc: Temporal   SpO2: 100%   Weight: 72.1 kg (159 lb)   Height: 170.2 cm (67\")       Physical Exam:   General Appearance:    Alert, cooperative, in no acute distress   Head:    Normocephalic, without obvious abnormality, atraumatic   Eyes:            Lids and lashes normal, conjunctivae and sclerae normal, no   icterus, no pallor, corneas clear, PERRLA   Ears:    Ears appear intact with no abnormalities noted   Throat:   No oral lesions, no thrush, oral mucosa moist   Neck:   No adenopathy, supple, trachea midline, no thyromegaly, no   carotid bruit, no JVD   Lungs:     Clear to auscultation,respirations regular, even and                  unlabored    Heart:    Regular rhythm and normal rate, normal S1 and S2, no            murmur   Abdomen:     no masses, no organomegaly, soft non-tender, non-distended, no guarding, there is evidence of a small umbilical hernia   Extremities:   Moves all extremities well, no edema, no cyanosis, no             redness   Pulses:   Pulses palpable and equal bilaterally   Skin:   No bleeding, bruising or rash   Lymph nodes:   No palpable adenopathy   Neurologic:   Cranial nerves 2 - 12 grossly intact, sensation intact        Results Review:   I reviewed the patient's new clinical results.  I reviewed the patient's new imaging results and agree with the interpretation.    Review of Systems was reviewed and confirmed as accurate as documented by the MA.    ASSESSMENT/PLAN:    1. Umbilical mass    2. Recurrent umbilical hernia      Patient was seen today after a recurrence of her umbilical hernia. Her repair done last year was done primarily without mesh because " the defect was so small. An ultrasound completed today demonstrated recurrence of this hernia with a very small defect and some protruding preperitoneal fat. Sutures likely ruptured with heavy lifting. We discussed patient's options including watchful waiting or repeat open repair. Patient states it is causing her pain so she is opting for repair again. I had a detailed and extensive discussion with the patient in the office and they understand that they need to undergo hernia repair with possible mesh.  Full risks and benefits of operative versus nonoperative intervention were discussed with the patient and these included things such as nonresolution of symptoms and possible worsening of symptoms without surgical intervention versus infection, bleeding, possible recurrent hernia, possible postoperative neuralgia from nerve damage or involvement with scar tissue, etc.  The patient understands, agrees, and had no questions for me at the end of the office visit.     I discussed the patients findings and my recommendations with patient.    Electronically signed by Mery Cadet DO  01/29/24

## 2024-01-24 NOTE — PROGRESS NOTES
"Patient: Romy Langley    YOB: 2000    Date: 01/29/2024    Primary Care Provider: Nadya Hidalgo MD    Chief Complaint   Patient presents with    Pain     Right groin       SUBJECTIVE:    History of present illness:  I saw the patient in the office today as a consultation for evaluation and treatment of another \"knot\" in the area of her previous umbilical hernia repair done on 1/19/23. She states that onset was 1 week ago. She was lifting a heavy fish tank when she felt a \"pop\" and had pain in this area. She states that she has also had nausea associated with the pain.    The following portions of the patient's history were reviewed and updated as appropriate: allergies, current medications, past family history, past medical history, past social history, past surgical history and problem list.    Review of Systems   Constitutional:  Negative for chills, fever and unexpected weight change.   HENT:  Negative for hearing loss, trouble swallowing and voice change.    Eyes:  Negative for visual disturbance.   Respiratory:  Negative for apnea, cough, chest tightness, shortness of breath and wheezing.    Cardiovascular:  Negative for chest pain, palpitations and leg swelling.   Gastrointestinal:  Positive for abdominal pain and nausea. Negative for abdominal distention, anal bleeding, blood in stool, constipation, diarrhea, rectal pain and vomiting.   Endocrine: Negative for cold intolerance and heat intolerance.   Genitourinary:  Negative for difficulty urinating, dysuria and flank pain.   Musculoskeletal:  Negative for back pain and gait problem.   Skin:  Negative for color change, rash and wound.   Neurological:  Negative for dizziness, syncope, speech difficulty, weakness, light-headedness, numbness and headaches.   Hematological:  Negative for adenopathy. Does not bruise/bleed easily.   Psychiatric/Behavioral:  Negative for confusion. The patient is not nervous/anxious.  " I tried to reach pt however he has a calling restriction therefor I was unable to contact him        History:  Past Medical History:   Diagnosis Date    Anemia     Irritable bowel syndrome     Lactose intolerance     Thyroid activity decreased     Urinary tract infection        Past Surgical History:   Procedure Laterality Date    ABDOMINAL SURGERY       SECTION N/A 2022    Procedure:  SECTION PRIMARY;  Surgeon: Lukas Rodriguez MD;  Location: [facility];  Service: Obstetrics/Gynecology;  Laterality: N/A;    COLONOSCOPY      FOOT/TOE TENDON REPAIR Left 2020    Procedure: RECONSTRUCTION PT TENDON LEFT;  Surgeon: Uziel Garcia DPM;  Location: [facility] OR;  Service: Podiatry    TARSAL TUNNEL RELEASE Left 2020    Procedure: EXCISION TARSAL BONE LEFT;  Surgeon: Uziel Garcia DPM;  Location: [facility] OR;  Service: Podiatry    UMBILICAL HERNIA REPAIR N/A 2023    Procedure: OPEN UMBILICAL HERNIA REPAIR;  Surgeon: Mery Cadet DO;  Location: [facility];  Service: General;  Laterality: N/A;    WISDOM TOOTH EXTRACTION         Family History   Problem Relation Age of Onset    Hypertension Mother     Diabetes Father     Heart attack Maternal Grandfather     Diabetes Maternal Grandfather        Social History     Tobacco Use    Smoking status: Never    Smokeless tobacco: Never   Vaping Use    Vaping Use: Never used   Substance Use Topics    Alcohol use: No    Drug use: No       Allergies:  No Known Allergies    Medications:    Current Outpatient Medications:     albuterol sulfate  (90 Base) MCG/ACT inhaler, Inhale 2 puffs Every 4 (Four) Hours As Needed. (Patient not taking: Reported on 2024), Disp: , Rfl:     bisacodyl (Dulcolax) 5 MG EC tablet, Take 2 tablets at 3 pm and 2 tablets at 7 pm the day prior to colonoscopy (Patient not taking: Reported on 2024), Disp: 4 tablet, Rfl: 0    polyethylene glycol (MiraLax) 17 g packet, Mix 238 gram powder with 64 oz of clear liquid starting at 5 pm. Drink 8 oz every 10-15 minutes until consumed.  "(Patient not taking: Reported on 1/29/2024), Disp: 238 each, Rfl: 0    Prucalopride Succinate 2 MG tablet, Take 1 tablet by mouth Daily. (Patient not taking: Reported on 1/29/2024), Disp: 30 tablet, Rfl: 11    OBJECTIVE:    Vital Signs:   Vitals:    01/29/24 1312   BP: 114/68   Pulse: 79   Resp: 18   Temp: 98.2 °F (36.8 °C)   TempSrc: Temporal   SpO2: 100%   Weight: 72.1 kg (159 lb)   Height: 170.2 cm (67\")       Physical Exam:   General Appearance:    Alert, cooperative, in no acute distress   Head:    Normocephalic, without obvious abnormality, atraumatic   Eyes:            Lids and lashes normal, conjunctivae and sclerae normal, no   icterus, no pallor, corneas clear, PERRLA   Ears:    Ears appear intact with no abnormalities noted   Throat:   No oral lesions, no thrush, oral mucosa moist   Neck:   No adenopathy, supple, trachea midline, no thyromegaly, no   carotid bruit, no JVD   Lungs:     Clear to auscultation,respirations regular, even and                  unlabored    Heart:    Regular rhythm and normal rate, normal S1 and S2, no            murmur   Abdomen:     no masses, no organomegaly, soft non-tender, non-distended, no guarding, there is evidence of a small umbilical hernia   Extremities:   Moves all extremities well, no edema, no cyanosis, no             redness   Pulses:   Pulses palpable and equal bilaterally   Skin:   No bleeding, bruising or rash   Lymph nodes:   No palpable adenopathy   Neurologic:   Cranial nerves 2 - 12 grossly intact, sensation intact        Results Review:   I reviewed the patient's new clinical results.  I reviewed the patient's new imaging results and agree with the interpretation.    Review of Systems was reviewed and confirmed as accurate as documented by the MA.    ASSESSMENT/PLAN:    1. Umbilical mass    2. Recurrent umbilical hernia      Patient was seen today after a recurrence of her umbilical hernia. Her repair done last year was done primarily without mesh because " the defect was so small. An ultrasound completed today demonstrated recurrence of this hernia with a very small defect and some protruding preperitoneal fat. Sutures likely ruptured with heavy lifting. We discussed patient's options including watchful waiting or repeat open repair. Patient states it is causing her pain so she is opting for repair again. I had a detailed and extensive discussion with the patient in the office and they understand that they need to undergo hernia repair with possible mesh.  Full risks and benefits of operative versus nonoperative intervention were discussed with the patient and these included things such as nonresolution of symptoms and possible worsening of symptoms without surgical intervention versus infection, bleeding, possible recurrent hernia, possible postoperative neuralgia from nerve damage or involvement with scar tissue, etc.  The patient understands, agrees, and had no questions for me at the end of the office visit.     I discussed the patients findings and my recommendations with patient.    Electronically signed by Mery Cadet DO  01/29/24

## 2024-01-29 ENCOUNTER — OFFICE VISIT (OUTPATIENT)
Dept: SURGERY | Facility: CLINIC | Age: 24
End: 2024-01-29
Payer: COMMERCIAL

## 2024-01-29 VITALS
HEART RATE: 79 BPM | OXYGEN SATURATION: 100 % | SYSTOLIC BLOOD PRESSURE: 114 MMHG | WEIGHT: 159 LBS | HEIGHT: 67 IN | BODY MASS INDEX: 24.96 KG/M2 | DIASTOLIC BLOOD PRESSURE: 68 MMHG | TEMPERATURE: 98.2 F | RESPIRATION RATE: 18 BRPM

## 2024-01-29 DIAGNOSIS — K42.9 RECURRENT UMBILICAL HERNIA: ICD-10-CM

## 2024-01-29 DIAGNOSIS — R19.09 UMBILICAL MASS: Primary | ICD-10-CM

## 2024-01-29 PROCEDURE — 1159F MED LIST DOCD IN RCRD: CPT | Performed by: STUDENT IN AN ORGANIZED HEALTH CARE EDUCATION/TRAINING PROGRAM

## 2024-01-29 PROCEDURE — 99214 OFFICE O/P EST MOD 30 MIN: CPT | Performed by: STUDENT IN AN ORGANIZED HEALTH CARE EDUCATION/TRAINING PROGRAM

## 2024-01-29 PROCEDURE — 1160F RVW MEDS BY RX/DR IN RCRD: CPT | Performed by: STUDENT IN AN ORGANIZED HEALTH CARE EDUCATION/TRAINING PROGRAM

## 2024-01-29 RX ORDER — HEPARIN SODIUM 5000 [USP'U]/ML
5000 INJECTION, SOLUTION INTRAVENOUS; SUBCUTANEOUS ONCE
OUTPATIENT
Start: 2024-01-29

## 2024-01-29 RX ORDER — SODIUM CHLORIDE, SODIUM LACTATE, POTASSIUM CHLORIDE, CALCIUM CHLORIDE 600; 310; 30; 20 MG/100ML; MG/100ML; MG/100ML; MG/100ML
50 INJECTION, SOLUTION INTRAVENOUS CONTINUOUS
OUTPATIENT
Start: 2024-01-29

## 2024-01-29 RX ORDER — SODIUM CHLORIDE 0.9 % (FLUSH) 0.9 %
10 SYRINGE (ML) INJECTION AS NEEDED
OUTPATIENT
Start: 2024-01-29

## 2024-01-29 RX ORDER — SODIUM CHLORIDE 9 MG/ML
40 INJECTION, SOLUTION INTRAVENOUS AS NEEDED
OUTPATIENT
Start: 2024-01-29

## 2024-01-29 RX ORDER — SODIUM CHLORIDE 0.9 % (FLUSH) 0.9 %
10 SYRINGE (ML) INJECTION EVERY 12 HOURS SCHEDULED
OUTPATIENT
Start: 2024-01-29

## 2024-02-05 ENCOUNTER — OFFICE VISIT (OUTPATIENT)
Dept: PRIMARY CARE CLINIC | Age: 24
End: 2024-02-05
Payer: MEDICAID

## 2024-02-05 VITALS
BODY MASS INDEX: 26.12 KG/M2 | WEIGHT: 161.8 LBS | DIASTOLIC BLOOD PRESSURE: 65 MMHG | SYSTOLIC BLOOD PRESSURE: 110 MMHG | HEART RATE: 71 BPM | OXYGEN SATURATION: 99 %

## 2024-02-05 DIAGNOSIS — M25.561 ACUTE PAIN OF RIGHT KNEE: Primary | ICD-10-CM

## 2024-02-05 PROCEDURE — 99213 OFFICE O/P EST LOW 20 MIN: CPT | Performed by: PHYSICIAN ASSISTANT

## 2024-02-05 RX ORDER — IBUPROFEN 800 MG/1
800 TABLET ORAL EVERY 8 HOURS PRN
Qty: 15 TABLET | Refills: 0 | Status: SHIPPED | OUTPATIENT
Start: 2024-02-05 | End: 2024-02-10

## 2024-02-05 ASSESSMENT — ENCOUNTER SYMPTOMS
GASTROINTESTINAL NEGATIVE: 1
RESPIRATORY NEGATIVE: 1

## 2024-02-05 NOTE — PROGRESS NOTES
Chief Complaint   Patient presents with    Knee Injury     Pt fell and hurt right knee last night, says it hurts when she puts pressure on it and is busied and swollen.        Have you seen any other physician or provider since your last visit no    Have you had any other diagnostic tests since your last visit? no    Have you changed or stopped any medications since your last visit? no

## 2024-02-05 NOTE — PROGRESS NOTES
Subjective:     Evangelina Uribe is a 23 y.o. female.    Chief Complaint   Patient presents with    Knee Injury     Pt fell and hurt right knee last night, says it hurts when she puts pressure on it and is busied and swollen.        HPI    Pt here with c/o tripping over her sons toy last night trying to pick him up. Landed on right knee. No other injuries. Pain is 7/10. Pain is constant and dull/sharp. No LOC, hitting head, syncope/dizziness related to fall.     LMP - last week. Denies chance of pregnancy       Current Outpatient Medications:     ibuprofen (ADVIL;MOTRIN) 800 MG tablet, Take 1 tablet by mouth every 8 hours as needed for Pain, Disp: 15 tablet, Rfl: 0    albuterol sulfate HFA (PROVENTIL HFA) 108 (90 Base) MCG/ACT inhaler, Inhale 2 puffs into the lungs every 4 hours as needed for Wheezing or Shortness of Breath With spacer (and mask if indicated). Thanks., Disp: 18 g, Rfl: 0  No past medical history on file.  Past Surgical History:   Procedure Laterality Date    ANKLE SURGERY Left      SECTION        reports that she has never smoked. She has never used smokeless tobacco.  Review of Systems   Constitutional: Negative.    HENT: Negative.     Respiratory: Negative.     Cardiovascular: Negative.    Gastrointestinal: Negative.    Skin: Negative.    Neurological: Negative.    Psychiatric/Behavioral: Negative.           Objective:     Vitals:    24 1145   BP: 110/65   Pulse: 71   SpO2: 99%      BP Readings from Last 3 Encounters:   24 110/65   22 123/67   22 108/69     Wt Readings from Last 3 Encounters:   24 73.4 kg (161 lb 12.8 oz)   22 64.9 kg (143 lb)   22 69.4 kg (153 lb)     No results found for: \"GLUCOSE\", \"CALCIUM\", \"NA\", \"K\", \"CO2\", \"CL\", \"BUN\", \"CREATININE\"  No results found for: \"WBC\", \"HGB\", \"HCT\", \"MCV\", \"PLT\"    Chemistry    No results found for: \"NA\", \"K\", \"CL\", \"CO2\", \"BUN\", \"CREATININE\", \"GLU\" No results found for: \"CALCIUM\", \"ALKPHOS\",

## 2024-02-06 ENCOUNTER — TELEPHONE (OUTPATIENT)
Dept: PREADMISSION TESTING | Facility: HOSPITAL | Age: 24
End: 2024-02-06

## 2024-02-07 ENCOUNTER — PRE-ADMISSION TESTING (OUTPATIENT)
Dept: PREADMISSION TESTING | Facility: HOSPITAL | Age: 24
End: 2024-02-07
Payer: COMMERCIAL

## 2024-02-07 VITALS — WEIGHT: 159 LBS | HEIGHT: 67 IN | BODY MASS INDEX: 24.96 KG/M2

## 2024-02-07 LAB
DEPRECATED RDW RBC AUTO: 39 FL (ref 37–54)
ERYTHROCYTE [DISTWIDTH] IN BLOOD BY AUTOMATED COUNT: 13.7 % (ref 12.3–15.4)
HCT VFR BLD AUTO: 35.8 % (ref 34–46.6)
HGB BLD-MCNC: 11.4 G/DL (ref 12–15.9)
MCH RBC QN AUTO: 25.2 PG (ref 26.6–33)
MCHC RBC AUTO-ENTMCNC: 31.8 G/DL (ref 31.5–35.7)
MCV RBC AUTO: 79.2 FL (ref 79–97)
PLATELET # BLD AUTO: 280 10*3/MM3 (ref 140–450)
PMV BLD AUTO: 9.3 FL (ref 6–12)
QT INTERVAL: 408 MS
QTC INTERVAL: 400 MS
RBC # BLD AUTO: 4.52 10*6/MM3 (ref 3.77–5.28)
WBC NRBC COR # BLD AUTO: 5.83 10*3/MM3 (ref 3.4–10.8)

## 2024-02-07 PROCEDURE — 36415 COLL VENOUS BLD VENIPUNCTURE: CPT

## 2024-02-07 PROCEDURE — 85027 COMPLETE CBC AUTOMATED: CPT

## 2024-02-07 PROCEDURE — 93005 ELECTROCARDIOGRAM TRACING: CPT

## 2024-02-07 RX ORDER — IBUPROFEN 200 MG
400 TABLET ORAL EVERY 6 HOURS PRN
COMMUNITY

## 2024-02-07 NOTE — DISCHARGE INSTRUCTIONS
Pre-Admission testing appointment completed today for patient's upcoming procedure here at [facility].    PAT PASS reviewed with patient and they verbalize understanding of the following:     Do not eat or drink anything after midnight the night before procedure unless otherwise instructed by physician/surgeon's office, this includes no gum, candy, mints, tobacco products or e-cigarettes.  Do not shave the area to be operated on at least 48 hours prior to procedure.  Do not wear makeup, lotion, hair products, or nail polish.  Do not wear any jewelry and remove all piercings.  Do not wear any adhesive if you wear dentures.  Do not wear contacts; bring in glasses if needed.  Only take medications on the morning of procedure as instructed by PAT nurse per anesthesia guidelines or as instructed by physician's office.  If you are on any blood thinners reach out to the physician/surgeon's office for instructions on when/if they will need to be stopped prior to procedure.  Bring in picture ID and insurance card, advanced directive copies if applicable, CPAP/BIPAP/Inhalers if indicated morning of procedure, leave any other valuables at home.  Ensure you have arranged for someone to drive you home the day of your procedure and someone to care for you at home afterwards. It is recommended that you do not drive, drink alcohol, or make any major legal decisions for at least 24 hours after your procedure is complete.  Chlorhexadine wipes along with instruction/information sheet given to patient if indicated. Instructed patient to date, time, and initial the verification sheet once skin prep has been  completed, and to return to [facility] the day of the procedure.     Introduction to anesthesia video watched by patient during appointment.  Instructions and information given to patient about parking, hospital entrance, and registration location.Chlorhexidine wipes along with instruction/verification sheet given to pt.   Instructed pt to date, time, and initial the verification sheet once skin prep has been  completed, and to return to Same Day Surgery the day of the procedure.  Pt. Verbalizes understanding. Introduction to anesthesia video viewed by pt in [facility].

## 2024-02-16 ENCOUNTER — TELEPHONE (OUTPATIENT)
Dept: SURGERY | Facility: CLINIC | Age: 24
End: 2024-02-16
Payer: COMMERCIAL

## 2024-02-21 ENCOUNTER — HOSPITAL ENCOUNTER (OUTPATIENT)
Facility: HOSPITAL | Age: 24
Setting detail: HOSPITAL OUTPATIENT SURGERY
Discharge: HOME | End: 2024-02-21
Attending: STUDENT IN AN ORGANIZED HEALTH CARE EDUCATION/TRAINING PROGRAM | Admitting: STUDENT IN AN ORGANIZED HEALTH CARE EDUCATION/TRAINING PROGRAM
Payer: COMMERCIAL

## 2024-02-21 ENCOUNTER — ANESTHESIA (OUTPATIENT)
Dept: PERIOP | Facility: HOSPITAL | Age: 24
End: 2024-02-21
Payer: COMMERCIAL

## 2024-02-21 ENCOUNTER — ANESTHESIA EVENT (OUTPATIENT)
Dept: PERIOP | Facility: HOSPITAL | Age: 24
End: 2024-02-21
Payer: COMMERCIAL

## 2024-02-21 VITALS
RESPIRATION RATE: 16 BRPM | HEART RATE: 67 BPM | OXYGEN SATURATION: 99 % | SYSTOLIC BLOOD PRESSURE: 100 MMHG | DIASTOLIC BLOOD PRESSURE: 65 MMHG | TEMPERATURE: 97.8 F

## 2024-02-21 DIAGNOSIS — K42.9 RECURRENT UMBILICAL HERNIA: ICD-10-CM

## 2024-02-21 LAB
B-HCG UR QL: NEGATIVE
EXPIRATION DATE: NORMAL
INTERNAL NEGATIVE CONTROL: NORMAL
INTERNAL POSITIVE CONTROL: NORMAL
Lab: NORMAL

## 2024-02-21 PROCEDURE — 25010000002 FENTANYL CITRATE (PF) 50 MCG/ML SOLUTION PREFILLED SYRINGE: Performed by: NURSE ANESTHETIST, CERTIFIED REGISTERED

## 2024-02-21 PROCEDURE — 13100 CMPLX RPR TRUNK 1.1-2.5 CM: CPT | Performed by: STUDENT IN AN ORGANIZED HEALTH CARE EDUCATION/TRAINING PROGRAM

## 2024-02-21 PROCEDURE — 25010000002 SUGAMMADEX 500 MG/5ML SOLUTION: Performed by: NURSE ANESTHETIST, CERTIFIED REGISTERED

## 2024-02-21 PROCEDURE — 25010000002 DEXAMETHASONE PER 1 MG: Performed by: NURSE ANESTHETIST, CERTIFIED REGISTERED

## 2024-02-21 PROCEDURE — 25010000002 CEFAZOLIN SODIUM-DEXTROSE 2-3 GM-%(50ML) RECONSTITUTED SOLUTION: Performed by: STUDENT IN AN ORGANIZED HEALTH CARE EDUCATION/TRAINING PROGRAM

## 2024-02-21 PROCEDURE — 25010000002 BUPIVACAINE (PF) 0.25 % SOLUTION: Performed by: STUDENT IN AN ORGANIZED HEALTH CARE EDUCATION/TRAINING PROGRAM

## 2024-02-21 PROCEDURE — 81025 URINE PREGNANCY TEST: CPT | Performed by: STUDENT IN AN ORGANIZED HEALTH CARE EDUCATION/TRAINING PROGRAM

## 2024-02-21 PROCEDURE — 25810000003 LACTATED RINGERS PER 1000 ML: Performed by: STUDENT IN AN ORGANIZED HEALTH CARE EDUCATION/TRAINING PROGRAM

## 2024-02-21 PROCEDURE — 25010000002 ONDANSETRON PER 1 MG: Performed by: NURSE ANESTHETIST, CERTIFIED REGISTERED

## 2024-02-21 PROCEDURE — 25010000002 HEPARIN (PORCINE) PER 1000 UNITS: Performed by: STUDENT IN AN ORGANIZED HEALTH CARE EDUCATION/TRAINING PROGRAM

## 2024-02-21 PROCEDURE — 25010000002 LIDOCAINE 1 % SOLUTION: Performed by: STUDENT IN AN ORGANIZED HEALTH CARE EDUCATION/TRAINING PROGRAM

## 2024-02-21 PROCEDURE — 25010000002 PROPOFOL 10 MG/ML EMULSION: Performed by: NURSE ANESTHETIST, CERTIFIED REGISTERED

## 2024-02-21 RX ORDER — SODIUM CHLORIDE 9 MG/ML
40 INJECTION, SOLUTION INTRAVENOUS AS NEEDED
Status: DISCONTINUED | OUTPATIENT
Start: 2024-02-21 | End: 2024-02-21 | Stop reason: HOSPADM

## 2024-02-21 RX ORDER — SODIUM CHLORIDE 0.9 % (FLUSH) 0.9 %
10 SYRINGE (ML) INJECTION EVERY 12 HOURS SCHEDULED
Status: DISCONTINUED | OUTPATIENT
Start: 2024-02-21 | End: 2024-02-21 | Stop reason: HOSPADM

## 2024-02-21 RX ORDER — CEFAZOLIN SODIUM 2 G/50ML
2000 SOLUTION INTRAVENOUS ONCE
Status: COMPLETED | OUTPATIENT
Start: 2024-02-21 | End: 2024-02-21

## 2024-02-21 RX ORDER — SODIUM CHLORIDE, SODIUM LACTATE, POTASSIUM CHLORIDE, CALCIUM CHLORIDE 600; 310; 30; 20 MG/100ML; MG/100ML; MG/100ML; MG/100ML
50 INJECTION, SOLUTION INTRAVENOUS CONTINUOUS
Status: DISCONTINUED | OUTPATIENT
Start: 2024-02-21 | End: 2024-02-21 | Stop reason: HOSPADM

## 2024-02-21 RX ORDER — ONDANSETRON 2 MG/ML
4 INJECTION INTRAMUSCULAR; INTRAVENOUS ONCE AS NEEDED
Status: DISCONTINUED | OUTPATIENT
Start: 2024-02-21 | End: 2024-02-21 | Stop reason: HOSPADM

## 2024-02-21 RX ORDER — ROCURONIUM BROMIDE 50 MG/5 ML
SYRINGE (ML) INTRAVENOUS AS NEEDED
Status: DISCONTINUED | OUTPATIENT
Start: 2024-02-21 | End: 2024-02-21 | Stop reason: SURG

## 2024-02-21 RX ORDER — HEPARIN SODIUM 5000 [USP'U]/ML
5000 INJECTION, SOLUTION INTRAVENOUS; SUBCUTANEOUS ONCE
Status: COMPLETED | OUTPATIENT
Start: 2024-02-21 | End: 2024-02-21

## 2024-02-21 RX ORDER — BUPIVACAINE HYDROCHLORIDE 2.5 MG/ML
INJECTION, SOLUTION EPIDURAL; INFILTRATION; INTRACAUDAL AS NEEDED
Status: DISCONTINUED | OUTPATIENT
Start: 2024-02-21 | End: 2024-02-21 | Stop reason: HOSPADM

## 2024-02-21 RX ORDER — PROPOFOL 10 MG/ML
VIAL (ML) INTRAVENOUS AS NEEDED
Status: DISCONTINUED | OUTPATIENT
Start: 2024-02-21 | End: 2024-02-21 | Stop reason: SURG

## 2024-02-21 RX ORDER — FENTANYL CITRATE 50 UG/ML
INJECTION, SOLUTION INTRAMUSCULAR; INTRAVENOUS AS NEEDED
Status: DISCONTINUED | OUTPATIENT
Start: 2024-02-21 | End: 2024-02-21 | Stop reason: SURG

## 2024-02-21 RX ORDER — DEXAMETHASONE SODIUM PHOSPHATE 4 MG/ML
INJECTION, SOLUTION INTRA-ARTICULAR; INTRALESIONAL; INTRAMUSCULAR; INTRAVENOUS; SOFT TISSUE AS NEEDED
Status: DISCONTINUED | OUTPATIENT
Start: 2024-02-21 | End: 2024-02-21 | Stop reason: SURG

## 2024-02-21 RX ORDER — DEXMEDETOMIDINE HYDROCHLORIDE 100 UG/ML
INJECTION, SOLUTION INTRAVENOUS AS NEEDED
Status: DISCONTINUED | OUTPATIENT
Start: 2024-02-21 | End: 2024-02-21 | Stop reason: SURG

## 2024-02-21 RX ORDER — HYDROCODONE BITARTRATE AND ACETAMINOPHEN 5; 325 MG/1; MG/1
1-2 TABLET ORAL EVERY 4 HOURS PRN
Qty: 8 TABLET | Refills: 0 | Status: SHIPPED | OUTPATIENT
Start: 2024-02-21 | End: 2024-02-27

## 2024-02-21 RX ORDER — ONDANSETRON 2 MG/ML
INJECTION INTRAMUSCULAR; INTRAVENOUS AS NEEDED
Status: DISCONTINUED | OUTPATIENT
Start: 2024-02-21 | End: 2024-02-21 | Stop reason: SURG

## 2024-02-21 RX ORDER — LIDOCAINE HYDROCHLORIDE 10 MG/ML
INJECTION, SOLUTION INFILTRATION; PERINEURAL AS NEEDED
Status: DISCONTINUED | OUTPATIENT
Start: 2024-02-21 | End: 2024-02-21 | Stop reason: HOSPADM

## 2024-02-21 RX ORDER — SODIUM CHLORIDE 0.9 % (FLUSH) 0.9 %
10 SYRINGE (ML) INJECTION AS NEEDED
Status: DISCONTINUED | OUTPATIENT
Start: 2024-02-21 | End: 2024-02-21 | Stop reason: HOSPADM

## 2024-02-21 RX ADMIN — CEFAZOLIN SODIUM 2000 MG: 2 SOLUTION INTRAVENOUS at 09:07

## 2024-02-21 RX ADMIN — DEXAMETHASONE SODIUM PHOSPHATE 8 MG: 4 INJECTION, SOLUTION INTRA-ARTICULAR; INTRALESIONAL; INTRAMUSCULAR; INTRAVENOUS; SOFT TISSUE at 09:12

## 2024-02-21 RX ADMIN — FENTANYL CITRATE 50 MCG: 50 INJECTION, SOLUTION INTRAMUSCULAR; INTRAVENOUS at 09:07

## 2024-02-21 RX ADMIN — DEXMEDETOMIDINE 10 MCG: 100 INJECTION, SOLUTION, CONCENTRATE INTRAVENOUS at 09:13

## 2024-02-21 RX ADMIN — SODIUM CHLORIDE, POTASSIUM CHLORIDE, SODIUM LACTATE AND CALCIUM CHLORIDE 50 ML/HR: 600; 310; 30; 20 INJECTION, SOLUTION INTRAVENOUS at 08:27

## 2024-02-21 RX ADMIN — SODIUM CHLORIDE, POTASSIUM CHLORIDE, SODIUM LACTATE AND CALCIUM CHLORIDE: 600; 310; 30; 20 INJECTION, SOLUTION INTRAVENOUS at 09:53

## 2024-02-21 RX ADMIN — DEXMEDETOMIDINE 10 MCG: 100 INJECTION, SOLUTION, CONCENTRATE INTRAVENOUS at 09:18

## 2024-02-21 RX ADMIN — PROPOFOL 50 MG: 10 INJECTION, EMULSION INTRAVENOUS at 09:44

## 2024-02-21 RX ADMIN — SUGAMMADEX 300 MG: 100 INJECTION, SOLUTION INTRAVENOUS at 09:38

## 2024-02-21 RX ADMIN — HEPARIN SODIUM 5000 UNITS: 5000 INJECTION, SOLUTION INTRAVENOUS; SUBCUTANEOUS at 08:57

## 2024-02-21 RX ADMIN — LIDOCAINE HYDROCHLORIDE 100 MG: 20 INJECTION, SOLUTION INTRAVENOUS at 09:07

## 2024-02-21 RX ADMIN — ONDANSETRON 4 MG: 2 INJECTION INTRAMUSCULAR; INTRAVENOUS at 09:44

## 2024-02-21 RX ADMIN — DEXMEDETOMIDINE 10 MCG: 100 INJECTION, SOLUTION, CONCENTRATE INTRAVENOUS at 09:44

## 2024-02-21 RX ADMIN — Medication 50 MG: at 09:07

## 2024-02-21 RX ADMIN — PROPOFOL 200 MG: 10 INJECTION, EMULSION INTRAVENOUS at 09:07

## 2024-02-21 NOTE — ANESTHESIA PREPROCEDURE EVALUATION
Anesthesia Evaluation     Patient summary reviewed, Nursing notes reviewed and pregnancy test completed   no history of anesthetic complications:   NPO Solid Status: > 8 hours  NPO Liquid Status: > 8 hours           Airway   Mallampati: II  TM distance: <3 FB  Neck ROM: full  No difficulty expected  Dental    (+) poor dentition    Pulmonary - normal exam   (+) pneumonia resolved ,  (-) not a smoker  Cardiovascular - negative cardio ROS and normal exam    ECG reviewed  Rhythm: regular  Rate: normal        Neuro/Psych- negative ROS  GI/Hepatic/Renal/Endo    (+) thyroid problem hypothyroidism    Musculoskeletal     Abdominal  - normal exam    Abdomen: soft.  Bowel sounds: normal.   Substance History - negative use     OB/GYN negative ob/gyn ROS   (-)  Pregnant        Other   blood dyscrasia anemia,                   Anesthesia Plan    ASA 2     general     (Risks and benefits discussed including risk of aspiration, recall and dental damage. All patient questions answered. Will continue with POC.d. )  intravenous induction     Anesthetic plan, risks, benefits, and alternatives have been provided, discussed and informed consent has been obtained with: patient.  Pre-procedure education provided  Plan discussed with CRNA.      CODE STATUS:

## 2024-02-21 NOTE — OP NOTE
"PATIENT:    Romy Langley    DATE OF SURGERY:   2/21/2024    PHYSICIAN:    Mery Cadet DO    REFERRING PHYSICIAN:  Nadya Hidalgo MD    YOB: 2000    PREOPERATIVE DIAGNOSIS:  Recurrent umbilical hernia    POSTOPERATIVE DIAGNOSIS: Previous repair intact, excess scar tissue    PROCEDURE:  Umbilical hernia scar revision    EBL:  Less than 50 cc    COMPLICATIONS:  None    SPECIMEN: None    HISTORY: Patient is a 23 year old female that underwent umbilical hernia repair in January of last year. She did do some heavy lifting and noticed a nodule develop in the area. It caused her discomfort. Ultrasound was completed in office which demonstrated a possible 0.4 cm defect. Decision was made to proceed with open revision.    ANESTHESIA:  General endotracheal.    OPERATIVE PROCEDURE:  The patient was taken to the operating room, placed in the supine position, and given general endotracheal anesthesia per the Anesthesia service.  The patient was prepped and draped in the normal sterile fashion and the patient was given preoperative IV antibiotics.  An appropriate timeout per the nursing staff was performed prior to the incision.  I did meet with the patient preoperatively and marked them accordingly.    The skin was localized with anesthetic. An incision was made on the anterior abdominal wall over the area of marking using the previous incision. Using electrocautery and blunt dissection, the umbilical stalk was removed from the previous repair. The repair was explored and all sutures appeared to be intact. There was no evidence of recurrent hernia. There was some firm scar tissue in the vicinity which could be the \"nodule\" the patient was feeling. This was excised. The patient's paralysis was reversed and multiple coughs were induced, however, no fascial defect or bulge of the abdominal wall could be identified.     The umbilical stalk was re-pexied to the repair with 2-0 vicryl. The wound " was irrigated. The skin was closed with interrupted 3-0 vicryl and a running 4-0 vicryl in subcuticular fashion. The skin was dressed with surgical glue and a pressure dressing was applied. Patient tolerated the procedure well and was transferred to PACU in stable condition.     Mery Cadet DO

## 2024-02-21 NOTE — DISCHARGE INSTRUCTIONS
No pushing, pulling, tugging,  heavy lifting, or strenuous activity.  No major decision making, driving, or drinking alcoholic beverages for 24 hours. ( due to the medications you have  received)  Always use good hand hygiene/washing techniques.  NO driving while taking pain medications.    * if you have an incision:  Check your incision area every day for signs of infection.   Check for:  * more redness, swelling, or pain  *more fluid or blood  *warmth  *pus or bad smell.    To assist you in voiding:  Drink plenty of fluids  Listen to running water while attempting to void.    If you are unable to urinate and you have an uncomfortable urge to void or it has been   6 hours since you were discharged, return to the Emergency Room.

## 2024-02-21 NOTE — ANESTHESIA PROCEDURE NOTES
Airway  Urgency: elective    Date/Time: 2/21/2024 9:08 AM  Airway not difficult    General Information and Staff    Patient location during procedure: OR  CRNA/CAA: Chintan Patiño CRNA    Indications and Patient Condition  Indications for airway management: airway protection    Preoxygenated: yes  Mask difficulty assessment: 1 - vent by mask    Final Airway Details  Final airway type: endotracheal airway      Successful airway: ETT  Cuffed: yes   Successful intubation technique: direct laryngoscopy  Endotracheal tube insertion site: oral  Blade: Garcia  Blade size: 2  ETT size (mm): 7.0  Cormack-Lehane Classification: grade I - full view of glottis  Placement verified by: chest auscultation and capnometry   Measured from: teeth  ETT/EBT  to teeth (cm): 21  Number of attempts at approach: 1  Assessment: lips, teeth, and gum same as pre-op and atraumatic intubation

## 2024-02-21 NOTE — ANESTHESIA POSTPROCEDURE EVALUATION
Patient: Romy Langley    Procedure Summary       Date: 02/21/24 Room / Location: [facility] OR  /  [facility]    Anesthesia Start: 0903 Anesthesia Stop: 1004    Procedure: UMBILICAL HERNIA REVISION (Abdomen) Diagnosis:       Recurrent umbilical hernia      (Recurrent umbilical hernia [K42.9])    Surgeons: Mery Cadet DO Provider: Chintan Patiño CRNA    Anesthesia Type: general ASA Status: 2            Anesthesia Type: general    Vitals  Vitals Value Taken Time   /68 02/21/24 1040   Temp 98 °F (36.7 °C) 02/21/24 1005   Pulse 57 02/21/24 1044   Resp 16 02/21/24 1040   SpO2 100 % 02/21/24 1044   Vitals shown include unfiled device data.        Post Anesthesia Care and Evaluation    Patient location during evaluation: PACU  Patient participation: complete - patient participated  Level of consciousness: awake and alert  Pain score: 1  Pain management: adequate    Airway patency: patent  Anesthetic complications: No anesthetic complications  PONV Status: none  Cardiovascular status: acceptable  Respiratory status: acceptable  Hydration status: acceptable  No anesthesia care post op

## 2024-02-26 ENCOUNTER — TELEPHONE (OUTPATIENT)
Dept: SURGERY | Facility: CLINIC | Age: 24
End: 2024-02-26
Payer: COMMERCIAL

## 2024-02-26 NOTE — TELEPHONE ENCOUNTER
Tried to call patient, no answer, LVM regarding work note. No heavy lifting over 10-15 lbs, may return to work on 2/27/24.

## 2024-02-26 NOTE — TELEPHONE ENCOUNTER
Spoke with patient, advised her that a work note will be provided until 03/04/24 with lifting restrictions for 6 weeks. She understood and will  work note at the [employer] [facility] on 2/27/24.

## 2024-02-27 PROCEDURE — 93005 ELECTROCARDIOGRAM TRACING: CPT

## 2024-02-27 PROCEDURE — 99285 EMERGENCY DEPT VISIT HI MDM: CPT

## 2024-02-28 ENCOUNTER — APPOINTMENT (OUTPATIENT)
Dept: GENERAL RADIOLOGY | Facility: HOSPITAL | Age: 24
End: 2024-02-28
Payer: COMMERCIAL

## 2024-02-28 ENCOUNTER — HOSPITAL ENCOUNTER (EMERGENCY)
Facility: HOSPITAL | Age: 24
Discharge: HOME | End: 2024-02-28
Attending: EMERGENCY MEDICINE | Admitting: EMERGENCY MEDICINE
Payer: COMMERCIAL

## 2024-02-28 ENCOUNTER — APPOINTMENT (OUTPATIENT)
Dept: CT IMAGING | Facility: HOSPITAL | Age: 24
End: 2024-02-28
Payer: COMMERCIAL

## 2024-02-28 VITALS
TEMPERATURE: 97.5 F | DIASTOLIC BLOOD PRESSURE: 65 MMHG | WEIGHT: 162 LBS | RESPIRATION RATE: 18 BRPM | SYSTOLIC BLOOD PRESSURE: 121 MMHG | BODY MASS INDEX: 26.03 KG/M2 | OXYGEN SATURATION: 100 % | HEIGHT: 66 IN | HEART RATE: 80 BPM

## 2024-02-28 DIAGNOSIS — R07.9 CHEST PAIN, UNSPECIFIED TYPE: ICD-10-CM

## 2024-02-28 DIAGNOSIS — R11.2 NAUSEA AND VOMITING, UNSPECIFIED VOMITING TYPE: ICD-10-CM

## 2024-02-28 DIAGNOSIS — R51.9 NONINTRACTABLE HEADACHE, UNSPECIFIED CHRONICITY PATTERN, UNSPECIFIED HEADACHE TYPE: Primary | ICD-10-CM

## 2024-02-28 LAB
ALBUMIN SERPL-MCNC: 4.7 G/DL (ref 3.5–5.2)
ALBUMIN/GLOB SERPL: 1.3 G/DL
ALP SERPL-CCNC: 77 U/L (ref 39–117)
ALT SERPL W P-5'-P-CCNC: 16 U/L (ref 1–33)
ANION GAP SERPL CALCULATED.3IONS-SCNC: 10.7 MMOL/L (ref 5–15)
AST SERPL-CCNC: 21 U/L (ref 1–32)
BASOPHILS # BLD AUTO: 0.05 10*3/MM3 (ref 0–0.2)
BASOPHILS NFR BLD AUTO: 0.8 % (ref 0–1.5)
BILIRUB SERPL-MCNC: 0.2 MG/DL (ref 0–1.2)
BUN SERPL-MCNC: 13 MG/DL (ref 6–20)
BUN/CREAT SERPL: 15.3 (ref 7–25)
CALCIUM SPEC-SCNC: 10.2 MG/DL (ref 8.6–10.5)
CHLORIDE SERPL-SCNC: 99 MMOL/L (ref 98–107)
CO2 SERPL-SCNC: 28.3 MMOL/L (ref 22–29)
CREAT SERPL-MCNC: 0.85 MG/DL (ref 0.57–1)
D DIMER PPP FEU-MCNC: 1.51 MCGFEU/ML (ref 0–0.5)
DEPRECATED RDW RBC AUTO: 39.5 FL (ref 37–54)
EGFRCR SERPLBLD CKD-EPI 2021: 99.5 ML/MIN/1.73
EOSINOPHIL # BLD AUTO: 0.1 10*3/MM3 (ref 0–0.4)
EOSINOPHIL NFR BLD AUTO: 1.5 % (ref 0.3–6.2)
ERYTHROCYTE [DISTWIDTH] IN BLOOD BY AUTOMATED COUNT: 14 % (ref 12.3–15.4)
FLUAV RNA RESP QL NAA+PROBE: NOT DETECTED
FLUBV RNA RESP QL NAA+PROBE: NOT DETECTED
GLOBULIN UR ELPH-MCNC: 3.6 GM/DL
GLUCOSE SERPL-MCNC: 91 MG/DL (ref 65–99)
HCG SERPL QL: NEGATIVE
HCT VFR BLD AUTO: 36.7 % (ref 34–46.6)
HGB BLD-MCNC: 11.9 G/DL (ref 12–15.9)
IMM GRANULOCYTES # BLD AUTO: 0.01 10*3/MM3 (ref 0–0.05)
IMM GRANULOCYTES NFR BLD AUTO: 0.2 % (ref 0–0.5)
LIPASE SERPL-CCNC: 51 U/L (ref 13–60)
LYMPHOCYTES # BLD AUTO: 3.06 10*3/MM3 (ref 0.7–3.1)
LYMPHOCYTES NFR BLD AUTO: 46 % (ref 19.6–45.3)
MCH RBC QN AUTO: 25.4 PG (ref 26.6–33)
MCHC RBC AUTO-ENTMCNC: 32.4 G/DL (ref 31.5–35.7)
MCV RBC AUTO: 78.4 FL (ref 79–97)
MONOCYTES # BLD AUTO: 0.68 10*3/MM3 (ref 0.1–0.9)
MONOCYTES NFR BLD AUTO: 10.2 % (ref 5–12)
NEUTROPHILS NFR BLD AUTO: 2.75 10*3/MM3 (ref 1.7–7)
NEUTROPHILS NFR BLD AUTO: 41.3 % (ref 42.7–76)
NRBC BLD AUTO-RTO: 0 /100 WBC (ref 0–0.2)
PLATELET # BLD AUTO: 320 10*3/MM3 (ref 140–450)
PMV BLD AUTO: 9.1 FL (ref 6–12)
POTASSIUM SERPL-SCNC: 3.6 MMOL/L (ref 3.5–5.2)
PROT SERPL-MCNC: 8.3 G/DL (ref 6–8.5)
RBC # BLD AUTO: 4.68 10*6/MM3 (ref 3.77–5.28)
SARS-COV-2 RNA RESP QL NAA+PROBE: NOT DETECTED
SODIUM SERPL-SCNC: 138 MMOL/L (ref 136–145)
WBC NRBC COR # BLD AUTO: 6.65 10*3/MM3 (ref 3.4–10.8)

## 2024-02-28 PROCEDURE — 84703 CHORIONIC GONADOTROPIN ASSAY: CPT | Performed by: EMERGENCY MEDICINE

## 2024-02-28 PROCEDURE — 25510000001 IOPAMIDOL 61 % SOLUTION: Performed by: EMERGENCY MEDICINE

## 2024-02-28 PROCEDURE — 85379 FIBRIN DEGRADATION QUANT: CPT | Performed by: EMERGENCY MEDICINE

## 2024-02-28 PROCEDURE — 25010000002 DROPERIDOL PER 5 MG: Performed by: EMERGENCY MEDICINE

## 2024-02-28 PROCEDURE — 71045 X-RAY EXAM CHEST 1 VIEW: CPT

## 2024-02-28 PROCEDURE — 25810000003 SODIUM CHLORIDE 0.9 % SOLUTION: Performed by: EMERGENCY MEDICINE

## 2024-02-28 PROCEDURE — 83690 ASSAY OF LIPASE: CPT | Performed by: EMERGENCY MEDICINE

## 2024-02-28 PROCEDURE — 25010000002 KETOROLAC TROMETHAMINE PER 15 MG: Performed by: EMERGENCY MEDICINE

## 2024-02-28 PROCEDURE — 96374 THER/PROPH/DIAG INJ IV PUSH: CPT

## 2024-02-28 PROCEDURE — 80053 COMPREHEN METABOLIC PANEL: CPT | Performed by: EMERGENCY MEDICINE

## 2024-02-28 PROCEDURE — 96375 TX/PRO/DX INJ NEW DRUG ADDON: CPT

## 2024-02-28 PROCEDURE — 71275 CT ANGIOGRAPHY CHEST: CPT

## 2024-02-28 PROCEDURE — 87636 SARSCOV2 & INF A&B AMP PRB: CPT

## 2024-02-28 PROCEDURE — 85025 COMPLETE CBC W/AUTO DIFF WBC: CPT | Performed by: EMERGENCY MEDICINE

## 2024-02-28 RX ORDER — DROPERIDOL 2.5 MG/ML
2.5 INJECTION, SOLUTION INTRAMUSCULAR; INTRAVENOUS ONCE
Status: COMPLETED | OUTPATIENT
Start: 2024-02-28 | End: 2024-02-28

## 2024-02-28 RX ORDER — KETOROLAC TROMETHAMINE 30 MG/ML
15 INJECTION, SOLUTION INTRAMUSCULAR; INTRAVENOUS ONCE
Status: COMPLETED | OUTPATIENT
Start: 2024-02-28 | End: 2024-02-28

## 2024-02-28 RX ADMIN — SODIUM CHLORIDE 1000 ML: 9 INJECTION, SOLUTION INTRAVENOUS at 01:59

## 2024-02-28 RX ADMIN — DROPERIDOL 2.5 MG: 2.5 INJECTION, SOLUTION INTRAMUSCULAR; INTRAVENOUS at 03:15

## 2024-02-28 RX ADMIN — KETOROLAC TROMETHAMINE 15 MG: 30 INJECTION, SOLUTION INTRAMUSCULAR; INTRAVENOUS at 03:16

## 2024-02-28 RX ADMIN — IOPAMIDOL 100 ML: 612 INJECTION, SOLUTION INTRAVENOUS at 02:44

## 2024-02-28 NOTE — DISCHARGE INSTRUCTIONS
Patient should follow up with their primary care physician/provider this week and return to the emergency department before then for concerning symptoms, worsening symptoms or new concerns.

## 2024-02-28 NOTE — ED PROVIDER NOTES
EMERGENCY DEPARTMENT ENCOUNTER    Pt Name: Romy Langley  MRN: [id number]  Pt :   2000  Room Number:    Date of encounter:  2024  PCP: Nadya Hidalgo MD  ED Provider: Lukas Harrison MD    Historian: Patient      HPI:  Chief Complaint: Headache, nausea, vomiting, chest pain        Context: Romy Langley is a 23 y.o. female who presents to the ED c/o headache, nausea, vomiting and chest pain.  She denies any significant past medical history.  She reports that about 1 week ago she had umbilical hernia repair surgery.  She says over the past couple days she has had a headache, nausea, vomiting and today she has had some chest pain.  She says she does not know of anything that brings the chest pain on or makes it worse.  She denies any associated fever or cough.  She denies hemoptysis but says that the night she did have what looked like some blood in her emesis.  She says she has not taken anything for her symptoms at home.      PAST MEDICAL HISTORY  Past Medical History:   Diagnosis Date    Anemia     Glaucoma     left eye    Irritable bowel syndrome     Lactose intolerance     Seasonal allergies     Thyroid activity decreased     Umbilical hernia     Urinary tract infection     Wears glasses          PAST SURGICAL HISTORY  Past Surgical History:   Procedure Laterality Date     SECTION N/A 2022    Procedure:  SECTION PRIMARY;  Surgeon: Lukas Rodriguez MD;  Location: [facility];  Service: Obstetrics/Gynecology;  Laterality: N/A;    COLONOSCOPY      FOOT/TOE TENDON REPAIR Left 2020    Procedure: RECONSTRUCTION PT TENDON LEFT;  Surgeon: Uziel Garcia DPM;  Location: [facility] OR;  Service: Podiatry    TARSAL TUNNEL RELEASE Left 2020    Procedure: EXCISION TARSAL BONE LEFT;  Surgeon: Uziel Garcia DPM;  Location: [facility] OR;  Service: Podiatry    UMBILICAL HERNIA REPAIR N/A 2023    Procedure: OPEN UMBILICAL HERNIA  REPAIR;  Surgeon: Mery Cadet DO;  Location: [facility] OR;  Service: General;  Laterality: N/A;    UMBILICAL HERNIA REPAIR N/A 2/21/2024    Procedure: UMBILICAL HERNIA REVISION;  Surgeon: Mery Cadet DO;  Location: [facility] OR;  Service: General;  Laterality: N/A;    WISDOM TOOTH EXTRACTION           FAMILY HISTORY  Family History   Problem Relation Age of Onset    Hypertension Mother     Diabetes Father     Heart attack Maternal Grandfather     Diabetes Maternal Grandfather          SOCIAL HISTORY  Social History     Socioeconomic History    Marital status: Single   Tobacco Use    Smoking status: Never    Smokeless tobacco: Never   Vaping Use    Vaping Use: Never used   Substance and Sexual Activity    Alcohol use: No    Drug use: No    Sexual activity: Defer         ALLERGIES  Latex        REVIEW OF SYSTEMS    All systems reviewed and negative except for those discussed in HPI.       PHYSICAL EXAM    I have reviewed the triage vital signs and nursing notes.    ED Triage Vitals [02/27/24 2342]   Temp Heart Rate Resp BP SpO2   97.7 °F (36.5 °C) 83 18 120/60 99 %      Temp src Heart Rate Source Patient Position BP Location FiO2 (%)   Oral Monitor Sitting Left arm --         General: no acute distress, well-appearing, non-toxic  Skin: normal color, warm and dry  Head: normocephalic, atraumatic  Eyes: Pupils equally round and reactive to light.  Ears: normal tympanic membranes bilaterally.  Nose: normal nasal mucosa, no visible deformity.  Mouth: moist mucous membranes.  Neck: supple.  Chest: no retractions, no visible deformity  Cardiovascular: Regular rate and rhythm.  Lungs: clear to auscultation bilaterally.  Abdomen: soft, non-tender, non-distended. No rebound tenderness, no guarding.  No peritonitis.  Umbilical hernia incisions healing well with no surrounding erythema or warmth.  No wound dehiscence.  Extremities: no cyanosis or edema.   Neuro:  alert and oriented x3, no focal neurological  deficits.  Psych:  appropriate mood and behavior.        LAB RESULTS  Recent Results (from the past 24 hour(s))   Covid-19 + Flu A&B AG, Veritor    Collection Time: 02/27/24 11:15 AM    Specimen: Swab   Result Value Ref Range    SARS Antigen Not Detected Not Detected, Presumptive Negative    Influenza A Antigen JEROMY Not Detected Not Detected    Influenza B Antigen JEROMY Not Detected Not Detected    Internal Control Passed Passed    Lot Number [id number]     Expiration Date 10,825    COVID-19 and FLU A/B PCR, 1 HR TAT - Swab, Nasopharynx    Collection Time: 02/28/24 12:06 AM    Specimen: Nasopharynx; Swab   Result Value Ref Range    COVID19 Not Detected Not Detected - Ref. Range    Influenza A PCR Not Detected Not Detected    Influenza B PCR Not Detected Not Detected   Comprehensive Metabolic Panel    Collection Time: 02/28/24  1:56 AM    Specimen: Blood   Result Value Ref Range    Glucose 91 65 - 99 mg/dL    BUN 13 6 - 20 mg/dL    Creatinine 0.85 0.57 - 1.00 mg/dL    Sodium 138 136 - 145 mmol/L    Potassium 3.6 3.5 - 5.2 mmol/L    Chloride 99 98 - 107 mmol/L    CO2 28.3 22.0 - 29.0 mmol/L    Calcium 10.2 8.6 - 10.5 mg/dL    Total Protein 8.3 6.0 - 8.5 g/dL    Albumin 4.7 3.5 - 5.2 g/dL    ALT (SGPT) 16 1 - 33 U/L    AST (SGOT) 21 1 - 32 U/L    Alkaline Phosphatase 77 39 - 117 U/L    Total Bilirubin 0.2 0.0 - 1.2 mg/dL    Globulin 3.6 gm/dL    A/G Ratio 1.3 g/dL    BUN/Creatinine Ratio 15.3 7.0 - 25.0    Anion Gap 10.7 5.0 - 15.0 mmol/L    eGFR 99.5 >60.0 mL/min/1.73   CBC Auto Differential    Collection Time: 02/28/24  1:56 AM    Specimen: Blood   Result Value Ref Range    WBC 6.65 3.40 - 10.80 10*3/mm3    RBC 4.68 3.77 - 5.28 10*6/mm3    Hemoglobin 11.9 (L) 12.0 - 15.9 g/dL    Hematocrit 36.7 34.0 - 46.6 %    MCV 78.4 (L) 79.0 - 97.0 fL    MCH 25.4 (L) 26.6 - 33.0 pg    MCHC 32.4 31.5 - 35.7 g/dL    RDW 14.0 12.3 - 15.4 %    RDW-SD 39.5 37.0 - 54.0 fl    MPV 9.1 6.0 - 12.0 fL    Platelets 320 140 - 450 10*3/mm3     Neutrophil % 41.3 (L) 42.7 - 76.0 %    Lymphocyte % 46.0 (H) 19.6 - 45.3 %    Monocyte % 10.2 5.0 - 12.0 %    Eosinophil % 1.5 0.3 - 6.2 %    Basophil % 0.8 0.0 - 1.5 %    Immature Grans % 0.2 0.0 - 0.5 %    Neutrophils, Absolute 2.75 1.70 - 7.00 10*3/mm3    Lymphocytes, Absolute 3.06 0.70 - 3.10 10*3/mm3    Monocytes, Absolute 0.68 0.10 - 0.90 10*3/mm3    Eosinophils, Absolute 0.10 0.00 - 0.40 10*3/mm3    Basophils, Absolute 0.05 0.00 - 0.20 10*3/mm3    Immature Grans, Absolute 0.01 0.00 - 0.05 10*3/mm3    nRBC 0.0 0.0 - 0.2 /100 WBC   Lipase    Collection Time: 02/28/24  1:56 AM    Specimen: Blood   Result Value Ref Range    Lipase 51 13 - 60 U/L   hCG, Serum, Qualitative    Collection Time: 02/28/24  1:56 AM    Specimen: Blood   Result Value Ref Range    HCG Qualitative Negative Negative   D-dimer, Quantitative    Collection Time: 02/28/24  1:56 AM    Specimen: Blood   Result Value Ref Range    D-Dimer, Quantitative 1.51 (H) 0.00 - 0.50 MCGFEU/mL       If labs were ordered, I independently reviewed the results and considered them in treating the patient.        RADIOLOGY  CT Angiogram Chest Pulmonary Embolism    Result Date: 2/28/2024  FINAL REPORT TECHNIQUE: null CLINICAL HISTORY: Pulmonary embolism (PE) suspected, positive D-dimer COMPARISON: null FINDINGS: CT angiogram chest, abdomen with and without IV contrast. 3D Post-processing. Technique: Images obtained from the lower neck to the aortic bifurcation. Comparison: None. Findings: CTA chest: No thoracic aortic aneurysm or dissection. No pulmonary embolism. No lung mass or infiltrate. No pneumothorax or effusion. No adenopathy. No acute bony pathology. CTA abdomen: No abdominal aortic aneurysm or dissection. Celiac axis, superior and proximal inferior mesenteric, and renal arteries patent and normal in caliber. Dual left renal arteries incidentally noted. Liver, spleen, pancreas, adrenals, kidneys unremarkable. No dilated bowel loops. Dense fecal retention.  No free fluid, free air, or abscess. No acute bony pathology.     Impression: No thoracic or abdominal aortic aneurysm or dissection. No pulmonary embolism. No lung mass or infiltrate. Dense fecal retention. Authenticated and Electronically Signed by Sonal Panda MD on 02/28/2024 03:20:11 AM     I ordered and independently reviewed the above noted radiographic studies.  See radiologist's dictation for official interpretation.    Per my independent reading: Chest radiograph is negative for acute cardiopulmonary findings based on my independent reading.            PROCEDURES    Procedures    ECG 12 Lead Chest Pain   Final Result          MEDICATIONS GIVEN IN ER    Medications   sodium chloride 0.9 % bolus 1,000 mL (0 mL Intravenous Stopped 2/28/24 0317)   ketorolac (TORADOL) injection 15 mg (15 mg Intravenous Given 2/28/24 0316)   droperidol (INAPSINE) injection 2.5 mg (2.5 mg Intravenous Given 2/28/24 0315)   iopamidol (ISOVUE-300) 61 % injection 100 mL (100 mL Intravenous Given 2/28/24 0244)         MEDICAL DECISION MAKING, PROGRESS, and CONSULTS    All labs, if obtained, have been independently reviewed by me.  All radiology studies, if obtained, have been reviewed by me and the radiologist dictating the report.  All EKG's, if obtained, have been independently viewed and interpreted by me/my attending physician.      Discussion below represents my analysis of pertinent findings related to patient's condition, differential diagnosis, treatment plan and final disposition.                         Differential diagnosis:    Differential diagnosis for this patient includes acute coronary syndrome, pneumothorax, pulmonary embolism, pericarditis, myocarditis, cardiac tamponade, pericardial effusion, aortic dissection, Boerhaave syndrome, musculoskeletal pain, reflux, meningitis, encephalitis, migraine, other acute emergency.    Medical Decision Making Discussion:    Patient's vitals are reviewed and are normal.    EKG  is obtained based on my independent reading demonstrates normal sinus rhythm without acute ischemic changes.    Patient's labs are reviewed and demonstrated elevated D-dimer.  Her labs are otherwise unremarkable.  Pregnancy test is negative.    CT PE protocol was obtained secondary to elevated D-dimer and recent postoperative state and is negative for evidence of PE per radiology.    Regarding her headache, nausea and vomiting she was given IV fluids, Toradol and droperidol with complete resolution of pain.    Repeat examination she remained well-appearing and nontoxic.  She is discharged home with instructions to follow closely with primary care and to return to the emerged apartment before then for any concerning symptoms, worsening symptoms or new concerns.    Additional sources:    - Discussed/ obtained information from independent historians:  n/a    - External (non-ED) record review:  Op note 2/2024    - Chronic or social conditions impacting care:  n/a    Shared Decision Making:  After my consideration of clinical presentation and any laboratory/radiology studies obtained, I discussed the findings with the patient/patient representative who is in agreement with the treatment plan and the final disposition.   Risks and benefits of discharge and/or observation/admission were discussed.    Orders placed during this visit:  Orders Placed This Encounter   Procedures    COVID-19 and FLU A/B PCR, 1 HR TAT - Swab, Nasopharynx    XR Chest 1 View    CT Angiogram Chest Pulmonary Embolism    Comprehensive Metabolic Panel    CBC Auto Differential    Lipase    hCG, Serum, Qualitative    D-dimer, Quantitative    ECG 12 Lead Chest Pain         AS OF 03:37 EST VITALS:    BP - 120/60  HR - 83  TEMP - 97.7 °F (36.5 °C) (Oral)  O2 SATS - 99%                  DIAGNOSIS  Final diagnoses:   Nonintractable headache, unspecified chronicity pattern, unspecified headache type   Nausea and vomiting, unspecified vomiting type   Chest  pain, unspecified type         DISPOSITION  Discharge      Please note that portions of this document were completed with voice recognition software.        Lukas Harrison MD  02/28/24 8796

## 2024-03-01 ENCOUNTER — OFFICE VISIT (OUTPATIENT)
Dept: SURGERY | Facility: CLINIC | Age: 24
End: 2024-03-01
Payer: COMMERCIAL

## 2024-03-01 VITALS
DIASTOLIC BLOOD PRESSURE: 76 MMHG | HEART RATE: 98 BPM | HEIGHT: 66 IN | OXYGEN SATURATION: 98 % | RESPIRATION RATE: 10 BRPM | SYSTOLIC BLOOD PRESSURE: 110 MMHG | BODY MASS INDEX: 26.03 KG/M2 | WEIGHT: 162 LBS | TEMPERATURE: 98 F

## 2024-03-01 DIAGNOSIS — Z98.890 STATUS POST REPAIR OF VENTRAL HERNIA: Primary | ICD-10-CM

## 2024-03-01 DIAGNOSIS — Z87.19 STATUS POST REPAIR OF VENTRAL HERNIA: Primary | ICD-10-CM

## 2024-03-01 PROCEDURE — 1159F MED LIST DOCD IN RCRD: CPT | Performed by: STUDENT IN AN ORGANIZED HEALTH CARE EDUCATION/TRAINING PROGRAM

## 2024-03-01 PROCEDURE — 1160F RVW MEDS BY RX/DR IN RCRD: CPT | Performed by: STUDENT IN AN ORGANIZED HEALTH CARE EDUCATION/TRAINING PROGRAM

## 2024-03-01 PROCEDURE — 99024 POSTOP FOLLOW-UP VISIT: CPT | Performed by: STUDENT IN AN ORGANIZED HEALTH CARE EDUCATION/TRAINING PROGRAM

## 2024-03-01 NOTE — PROGRESS NOTES
"Patient: Romy Langley    YOB: 2000    Date: 03/01/2024    Primary Care Provider: Nadya Hidalgo MD    Chief Complaint   Patient presents with    Follow-up     Umbilical hernia repair        History of present illness:  I saw the patient in the office today as a followup from their recent revision of umbilical hernia repair 2/21/24. They state that they have done well, but she is still having localized pain at the surgical site that feels the same as before. No other issues or complaints.     Review of Systems:    Review of Systems - General ROS: negative for - chills, fatigue, fever, hot flashes, malaise or night sweats  Psychological ROS: negative for - behavioral disorder, disorientation, hallucinations, hostility or mood swings  ENT ROS: negative for - nasal polyps, oral lesions, sinus pain, sneezing or sore throat  Breast ROS: negative for - galactorrhea or new or changing breast lumps  Respiratory ROS: negative for - hemoptysis, orthopnea, pleuritic pain, sputum changes or stridor  Cardiovascular ROS: negative for - dyspnea on exertion, edema, irregular heartbeat, murmur, orthopnea, palpitations or rapid heart rate  Gastrointestinal ROS: negative for - change in stools, gas/bloating, hematemesis, melena or stool incontinence. Positive for abdominal pain.  Genito-Urinary ROS: negative for - dysuria, genital ulcers, nocturia or pelvic pain  Musculoskeletal ROS: negative for - gait disturbance or muscle pain  Neurological ROS: negative for - dizziness, gait disturbance, memory loss, numbness/tingling or seizures      Vital Signs:   Vitals:    03/01/24 1101   BP: 110/76   Pulse: 98   Resp: 10   Temp: 98 °F (36.7 °C)   TempSrc: Temporal   SpO2: 98%   Weight: 73.5 kg (162 lb)   Height: 167.6 cm (66\")       Physical Exam:   General Appearance:    Alert, cooperative, in no acute distress   Abdomen:     no masses, no organomegaly, soft non-tender, non-distended, no guarding, wounds are " well healed, no evidence of recurrent hernia     Assessment / Plan:    1. Status post repair of ventral hernia      I did discuss the situation with the patient today in the office and they have done well from their recent herniorraphy. I have released the patient back to normal activity, they understand that they need to be careful about heavy lifting.  She is still complaining of localized pain at the site that is unchanged from prior to surgery. I do not feel the previous nodule anymore, so the pain may just be related to incisional pain. I will see her again in 4 weeks after things have healed. If she is still complaining of pain, we may proceed with a CT abdomen. Patient is agreeable to this plan and all questions were answered.     Electronically signed by Mery Cadet DO  03/01/24

## 2024-03-20 NOTE — PROGRESS NOTES
"Patient: Romy Langley    YOB: 2000    Date: 03/29/2024    Primary Care Provider: Nadya Hidalgo MD    Chief Complaint   Patient presents with    Follow-up     umbilical hernia repair revision       History of present illness:  I saw the patient in the office today as a followup from their umbilical hernia repair revision 2/21/24.  They state that she still has a lot of pain in mid abdomen that is worse than prior to surgery. The pain is all localized to the previous surgical site. She does not notice a swelling in the area. She has been having normal bowel movements and denies nausea/vomiting.      Review of Systems:    Review of Systems - General ROS: negative for - chills, fatigue, fever, hot flashes, malaise or night sweats  Psychological ROS: negative for - behavioral disorder, disorientation, hallucinations, hostility or mood swings  ENT ROS: negative for - nasal polyps, oral lesions, sinus pain, sneezing or sore throat  Breast ROS: negative for - galactorrhea or new or changing breast lumps  Respiratory ROS: negative for - hemoptysis, orthopnea, pleuritic pain, sputum changes or stridor  Cardiovascular ROS: negative for - dyspnea on exertion, edema, irregular heartbeat, murmur, orthopnea, palpitations or rapid heart rate  Gastrointestinal ROS: positive for abdominal pain  Genito-Urinary ROS: negative for - dysuria, genital ulcers, nocturia or pelvic pain  Musculoskeletal ROS: negative for - gait disturbance or muscle pain  Neurological ROS: negative for - dizziness, gait disturbance, memory loss, numbness/tingling or seizures      Vital Signs:   Vitals:    03/29/24 0911   BP: 106/62   Pulse: 97   Resp: 18   Temp: 97.1 °F (36.2 °C)   TempSrc: Temporal   SpO2: 100%   Weight: 73.8 kg (162 lb 12.8 oz)   Height: 167.6 cm (66\")       Physical Exam:   General Appearance:    Alert, cooperative, in no acute distress   Abdomen:     no masses, no organomegaly, soft non-tender, " non-distended, no guarding, wounds are well healed, no evidence of recurrent hernia with Valsalva     Assessment / Plan:    1. Periumbilical abdominal pain    2. Status post repair of recurrent ventral hernia      Patient was seen today for persistent periumbilical abdominal pain status post umbilical hernia repair revision in February. We were proceeding with watchful waiting, but unfortunately symptoms have not improved. Will proceed with CT abdomen and pelvis to evaluate for hernia recurrence or another abnormality in the surgical site. If there is evidence of recurrence, will proceed with laparoscopic repair instead. Patient is agreeable to this plan and all questions were answered today.     Electronically signed by Mery Cadet DO  03/29/24

## 2024-03-29 ENCOUNTER — OFFICE VISIT (OUTPATIENT)
Dept: SURGERY | Facility: CLINIC | Age: 24
End: 2024-03-29
Payer: COMMERCIAL

## 2024-03-29 VITALS
HEART RATE: 97 BPM | SYSTOLIC BLOOD PRESSURE: 106 MMHG | OXYGEN SATURATION: 100 % | HEIGHT: 66 IN | TEMPERATURE: 97.1 F | BODY MASS INDEX: 26.16 KG/M2 | RESPIRATION RATE: 18 BRPM | WEIGHT: 162.8 LBS | DIASTOLIC BLOOD PRESSURE: 62 MMHG

## 2024-03-29 DIAGNOSIS — R10.33 PERIUMBILICAL ABDOMINAL PAIN: Primary | ICD-10-CM

## 2024-03-29 DIAGNOSIS — Z98.890 STATUS POST REPAIR OF RECURRENT VENTRAL HERNIA: ICD-10-CM

## 2024-03-29 DIAGNOSIS — Z87.19 STATUS POST REPAIR OF RECURRENT VENTRAL HERNIA: ICD-10-CM

## 2024-03-29 PROCEDURE — 1160F RVW MEDS BY RX/DR IN RCRD: CPT | Performed by: STUDENT IN AN ORGANIZED HEALTH CARE EDUCATION/TRAINING PROGRAM

## 2024-03-29 PROCEDURE — 1159F MED LIST DOCD IN RCRD: CPT | Performed by: STUDENT IN AN ORGANIZED HEALTH CARE EDUCATION/TRAINING PROGRAM

## 2024-03-29 PROCEDURE — 99024 POSTOP FOLLOW-UP VISIT: CPT | Performed by: STUDENT IN AN ORGANIZED HEALTH CARE EDUCATION/TRAINING PROGRAM

## 2024-04-03 ENCOUNTER — TELEPHONE (OUTPATIENT)
Dept: SURGERY | Facility: CLINIC | Age: 24
End: 2024-04-03
Payer: COMMERCIAL

## 2024-04-03 NOTE — TELEPHONE ENCOUNTER
CT scan is scheduled for 4/16/24. Follow up appointment has been changed from 4/8/24 to 4/22/24 with Dr. Cadet.

## 2024-04-03 NOTE — TELEPHONE ENCOUNTER
Spoke with patient, advised her of central scheduling [phone] to schedule the CT scan. She understood.

## 2024-04-03 NOTE — TELEPHONE ENCOUNTER
"Pt called the office, she stated \"I am calling because I have not heard anything about my CT scan yet.\"  "

## 2024-04-12 ENCOUNTER — TELEPHONE (OUTPATIENT)
Dept: SURGERY | Facility: CLINIC | Age: 24
End: 2024-04-12

## 2024-04-12 NOTE — TELEPHONE ENCOUNTER
Patient called stating that she no longer has insurance. She is scheduled for a CT abdomen pelvis on 4/16/24, which she cancelled. She is needing a release to return to work with no restrictions. Please advise at [phone].

## 2024-04-15 NOTE — TELEPHONE ENCOUNTER
Spoke with patient, letter printed to return to work with no restrictions. She understood, she advised she will pick the letter up at [facility].

## 2024-04-24 ENCOUNTER — TELEPHONE (OUTPATIENT)
Dept: SURGERY | Facility: CLINIC | Age: 24
End: 2024-04-24

## 2024-04-24 NOTE — TELEPHONE ENCOUNTER
CALLED PATIENT TO FOLLOW UP ON CANCELLED APPOINTMENT. PATIENT SAID SHE STILL DOES NOT HAVE INSURANCE AND THAT SHE WILL CALL NEXT MONDAY TO RESCHEDULE APPT. VERIFIED THAT PATIENT HAS OFFICE NUMBER.

## 2024-06-03 ENCOUNTER — TELEPHONE (OUTPATIENT)
Dept: SURGERY | Facility: CLINIC | Age: 24
End: 2024-06-03
Payer: COMMERCIAL

## 2024-06-03 NOTE — TELEPHONE ENCOUNTER
CALLED LVM TO RESCHEDULE FOLLOW UP APPOINTMENT SCHEDULED FOR 6/7/24. NEW REFERRAL FOR CT SCAN WAS PLACED AND THE F/U APPOINTMENT NEEDS TO BE AFTER CT IS COMPLETED.

## 2024-06-07 ENCOUNTER — APPOINTMENT (OUTPATIENT)
Dept: CT IMAGING | Facility: HOSPITAL | Age: 24
End: 2024-06-07
Payer: COMMERCIAL

## 2024-06-07 ENCOUNTER — TRANSCRIBE ORDERS (OUTPATIENT)
Dept: ADMINISTRATIVE | Facility: HOSPITAL | Age: 24
End: 2024-06-07
Payer: COMMERCIAL

## 2024-06-07 ENCOUNTER — HOSPITAL ENCOUNTER (EMERGENCY)
Facility: HOSPITAL | Age: 24
Discharge: HOME | End: 2024-06-07
Attending: EMERGENCY MEDICINE
Payer: COMMERCIAL

## 2024-06-07 VITALS
OXYGEN SATURATION: 99 % | DIASTOLIC BLOOD PRESSURE: 62 MMHG | TEMPERATURE: 98.2 F | HEART RATE: 93 BPM | HEIGHT: 66 IN | RESPIRATION RATE: 18 BRPM | WEIGHT: 156.8 LBS | SYSTOLIC BLOOD PRESSURE: 94 MMHG | BODY MASS INDEX: 25.2 KG/M2

## 2024-06-07 DIAGNOSIS — R10.10 UPPER ABDOMINAL PAIN: Primary | ICD-10-CM

## 2024-06-07 DIAGNOSIS — N83.202 LEFT OVARIAN CYST: ICD-10-CM

## 2024-06-07 DIAGNOSIS — R10.11 RIGHT UPPER QUADRANT PAIN: Primary | ICD-10-CM

## 2024-06-07 LAB
ALBUMIN SERPL-MCNC: 4.3 G/DL (ref 3.5–5.2)
ALBUMIN/GLOB SERPL: 1.3 G/DL
ALP SERPL-CCNC: 72 U/L (ref 39–117)
ALT SERPL W P-5'-P-CCNC: 9 U/L (ref 1–33)
ANION GAP SERPL CALCULATED.3IONS-SCNC: 9.4 MMOL/L (ref 5–15)
AST SERPL-CCNC: 14 U/L (ref 1–32)
B-HCG UR QL: NEGATIVE
BACTERIA UR QL AUTO: ABNORMAL /HPF
BASOPHILS # BLD AUTO: 0.04 10*3/MM3 (ref 0–0.2)
BASOPHILS NFR BLD AUTO: 0.5 % (ref 0–1.5)
BILIRUB SERPL-MCNC: 0.5 MG/DL (ref 0–1.2)
BILIRUB UR QL STRIP: NEGATIVE
BUN SERPL-MCNC: 9 MG/DL (ref 6–20)
BUN/CREAT SERPL: 8.5 (ref 7–25)
CALCIUM SPEC-SCNC: 9.2 MG/DL (ref 8.6–10.5)
CHLORIDE SERPL-SCNC: 102 MMOL/L (ref 98–107)
CLARITY UR: CLEAR
CO2 SERPL-SCNC: 26.6 MMOL/L (ref 22–29)
COLOR UR: YELLOW
CREAT SERPL-MCNC: 1.06 MG/DL (ref 0.57–1)
DEPRECATED RDW RBC AUTO: 38.9 FL (ref 37–54)
EGFRCR SERPLBLD CKD-EPI 2021: 75.9 ML/MIN/1.73
EOSINOPHIL # BLD AUTO: 0.03 10*3/MM3 (ref 0–0.4)
EOSINOPHIL NFR BLD AUTO: 0.4 % (ref 0.3–6.2)
ERYTHROCYTE [DISTWIDTH] IN BLOOD BY AUTOMATED COUNT: 13.2 % (ref 12.3–15.4)
GLOBULIN UR ELPH-MCNC: 3.2 GM/DL
GLUCOSE SERPL-MCNC: 87 MG/DL (ref 65–99)
GLUCOSE UR STRIP-MCNC: NEGATIVE MG/DL
HCT VFR BLD AUTO: 36.8 % (ref 34–46.6)
HGB BLD-MCNC: 12 G/DL (ref 12–15.9)
HGB UR QL STRIP.AUTO: ABNORMAL
HOLD SPECIMEN: NORMAL
HOLD SPECIMEN: NORMAL
HYALINE CASTS UR QL AUTO: ABNORMAL /LPF
IMM GRANULOCYTES # BLD AUTO: 0.03 10*3/MM3 (ref 0–0.05)
IMM GRANULOCYTES NFR BLD AUTO: 0.4 % (ref 0–0.5)
KETONES UR QL STRIP: NEGATIVE
LEUKOCYTE ESTERASE UR QL STRIP.AUTO: NEGATIVE
LIPASE SERPL-CCNC: 38 U/L (ref 13–60)
LYMPHOCYTES # BLD AUTO: 1.94 10*3/MM3 (ref 0.7–3.1)
LYMPHOCYTES NFR BLD AUTO: 24.6 % (ref 19.6–45.3)
MCH RBC QN AUTO: 26.4 PG (ref 26.6–33)
MCHC RBC AUTO-ENTMCNC: 32.6 G/DL (ref 31.5–35.7)
MCV RBC AUTO: 81.1 FL (ref 79–97)
MONOCYTES # BLD AUTO: 0.53 10*3/MM3 (ref 0.1–0.9)
MONOCYTES NFR BLD AUTO: 6.7 % (ref 5–12)
NEUTROPHILS NFR BLD AUTO: 5.31 10*3/MM3 (ref 1.7–7)
NEUTROPHILS NFR BLD AUTO: 67.4 % (ref 42.7–76)
NITRITE UR QL STRIP: NEGATIVE
NRBC BLD AUTO-RTO: 0 /100 WBC (ref 0–0.2)
PH UR STRIP.AUTO: 6 [PH] (ref 5–8)
PLATELET # BLD AUTO: 320 10*3/MM3 (ref 140–450)
PMV BLD AUTO: 9.5 FL (ref 6–12)
POTASSIUM SERPL-SCNC: 3.8 MMOL/L (ref 3.5–5.2)
PROT SERPL-MCNC: 7.5 G/DL (ref 6–8.5)
PROT UR QL STRIP: NEGATIVE
RBC # BLD AUTO: 4.54 10*6/MM3 (ref 3.77–5.28)
RBC # UR STRIP: ABNORMAL /HPF
REF LAB TEST METHOD: ABNORMAL
SODIUM SERPL-SCNC: 138 MMOL/L (ref 136–145)
SP GR UR STRIP: 1.01 (ref 1–1.03)
SQUAMOUS #/AREA URNS HPF: ABNORMAL /HPF
UROBILINOGEN UR QL STRIP: ABNORMAL
WBC # UR STRIP: ABNORMAL /HPF
WBC NRBC COR # BLD AUTO: 7.88 10*3/MM3 (ref 3.4–10.8)
WHOLE BLOOD HOLD COAG: NORMAL
WHOLE BLOOD HOLD SPECIMEN: NORMAL

## 2024-06-07 PROCEDURE — 25510000001 IOPAMIDOL 61 % SOLUTION: Performed by: EMERGENCY MEDICINE

## 2024-06-07 PROCEDURE — 74177 CT ABD & PELVIS W/CONTRAST: CPT

## 2024-06-07 PROCEDURE — 83690 ASSAY OF LIPASE: CPT

## 2024-06-07 PROCEDURE — 81001 URINALYSIS AUTO W/SCOPE: CPT

## 2024-06-07 PROCEDURE — 80053 COMPREHEN METABOLIC PANEL: CPT

## 2024-06-07 PROCEDURE — 99285 EMERGENCY DEPT VISIT HI MDM: CPT

## 2024-06-07 PROCEDURE — 99283 EMERGENCY DEPT VISIT LOW MDM: CPT

## 2024-06-07 PROCEDURE — 25010000002 KETOROLAC TROMETHAMINE PER 15 MG: Performed by: PHYSICIAN ASSISTANT

## 2024-06-07 PROCEDURE — 63710000001 ONDANSETRON ODT 4 MG TABLET DISPERSIBLE: Performed by: PHYSICIAN ASSISTANT

## 2024-06-07 PROCEDURE — 96374 THER/PROPH/DIAG INJ IV PUSH: CPT

## 2024-06-07 PROCEDURE — 81025 URINE PREGNANCY TEST: CPT | Performed by: PHYSICIAN ASSISTANT

## 2024-06-07 PROCEDURE — 25810000003 SODIUM CHLORIDE 0.9 % SOLUTION: Performed by: PHYSICIAN ASSISTANT

## 2024-06-07 PROCEDURE — 96375 TX/PRO/DX INJ NEW DRUG ADDON: CPT

## 2024-06-07 PROCEDURE — 85025 COMPLETE CBC W/AUTO DIFF WBC: CPT

## 2024-06-07 RX ORDER — PANTOPRAZOLE SODIUM 40 MG/1
40 TABLET, DELAYED RELEASE ORAL DAILY
COMMUNITY

## 2024-06-07 RX ORDER — SODIUM CHLORIDE 0.9 % (FLUSH) 0.9 %
10 SYRINGE (ML) INJECTION AS NEEDED
Status: DISCONTINUED | OUTPATIENT
Start: 2024-06-07 | End: 2024-06-07 | Stop reason: HOSPADM

## 2024-06-07 RX ORDER — ONDANSETRON 4 MG/1
4 TABLET, ORALLY DISINTEGRATING ORAL ONCE
Status: COMPLETED | OUTPATIENT
Start: 2024-06-07 | End: 2024-06-07

## 2024-06-07 RX ORDER — FAMOTIDINE 10 MG/ML
20 INJECTION, SOLUTION INTRAVENOUS ONCE
Status: COMPLETED | OUTPATIENT
Start: 2024-06-07 | End: 2024-06-07

## 2024-06-07 RX ORDER — KETOROLAC TROMETHAMINE 30 MG/ML
30 INJECTION, SOLUTION INTRAMUSCULAR; INTRAVENOUS ONCE
Status: COMPLETED | OUTPATIENT
Start: 2024-06-07 | End: 2024-06-07

## 2024-06-07 RX ORDER — ONDANSETRON 4 MG/1
4 TABLET, FILM COATED ORAL EVERY 8 HOURS PRN
COMMUNITY

## 2024-06-07 RX ADMIN — KETOROLAC TROMETHAMINE 30 MG: 30 INJECTION, SOLUTION INTRAMUSCULAR; INTRAVENOUS at 20:21

## 2024-06-07 RX ADMIN — FAMOTIDINE 20 MG: 10 INJECTION, SOLUTION INTRAVENOUS at 20:23

## 2024-06-07 RX ADMIN — IOPAMIDOL 100 ML: 612 INJECTION, SOLUTION INTRAVENOUS at 20:40

## 2024-06-07 RX ADMIN — ONDANSETRON 4 MG: 4 TABLET, ORALLY DISINTEGRATING ORAL at 20:20

## 2024-06-07 RX ADMIN — SODIUM CHLORIDE 1000 ML: 900 INJECTION, SOLUTION INTRAVENOUS at 20:24

## 2024-06-08 NOTE — DISCHARGE INSTRUCTIONS
Continue pantoprazole as prescribed.  Follow-up with your gastroenterologist for further outpatient evaluation if symptoms persist.  Follow-up with OB/GYN regarding left ovarian cyst.  Follow-up with your PCP as needed.  Return to the ER for new or worsening symptoms or acute concerns.

## 2024-06-08 NOTE — ED PROVIDER NOTES
Subjective:  Chief Complaint:  Abdominal pain    History of Present Illness:  Patient is a 24-year-old female with history of IBS, lactose intolerance, thyroid disease, among others presenting to the ER with complaints of bilateral quadrant abdominal pain, nausea, vomiting that began in April but has worsened over the last 2 to 3 days.  Patient states her pain is a 9 out of 10 in severity at this time.  Has not taken anything prior to arrival for pain.  She states symptoms are worse after eating.  Denies any history of cholecystectomy.  It does appear patient has had umbilical hernia repairs in the past.  She denies chance of pregnancy and denies any additional symptoms or complaints at this time.      Nurses Notes reviewed and agree, including vitals, allergies, social history and prior medical history.     REVIEW OF SYSTEMS: All systems reviewed and not pertinent unless noted.  Review of Systems   Gastrointestinal:  Positive for abdominal pain, nausea and vomiting.   All other systems reviewed and are negative.      Past Medical History:   Diagnosis Date    Anemia     Glaucoma     left eye    Irritable bowel syndrome     Lactose intolerance     Seasonal allergies     Thyroid activity decreased     Umbilical hernia     Urinary tract infection     Wears glasses        Allergies:    Latex      Past Surgical History:   Procedure Laterality Date     SECTION N/A 2022    Procedure:  SECTION PRIMARY;  Surgeon: Lukas Rodriguez MD;  Location: [facility];  Service: Obstetrics/Gynecology;  Laterality: N/A;    COLONOSCOPY      FOOT/TOE TENDON REPAIR Left 2020    Procedure: RECONSTRUCTION PT TENDON LEFT;  Surgeon: Uziel Garcia DPM;  Location: [facility] OR;  Service: Podiatry    TARSAL TUNNEL RELEASE Left 2020    Procedure: EXCISION TARSAL BONE LEFT;  Surgeon: Uziel Garcia DPM;  Location: [facility] OR;  Service: Podiatry    UMBILICAL HERNIA REPAIR N/A 2023     "Procedure: OPEN UMBILICAL HERNIA REPAIR;  Surgeon: Mery Cadet DO;  Location: [facility] OR;  Service: General;  Laterality: N/A;    UMBILICAL HERNIA REPAIR N/A 2/21/2024    Procedure: UMBILICAL HERNIA REVISION;  Surgeon: Mery Cadet DO;  Location: [facility] OR;  Service: General;  Laterality: N/A;    WISDOM TOOTH EXTRACTION           Social History     Socioeconomic History    Marital status: Single   Tobacco Use    Smoking status: Never     Passive exposure: Never    Smokeless tobacco: Never   Vaping Use    Vaping status: Never Used   Substance and Sexual Activity    Alcohol use: No    Drug use: No    Sexual activity: Defer         Family History   Problem Relation Age of Onset    Hypertension Mother     Diabetes Father     Heart attack Maternal Grandfather     Diabetes Maternal Grandfather        Objective  Physical Exam:  BP 94/62   Pulse 93   Temp 98.2 °F (36.8 °C) (Oral)   Resp 18   Ht 167.6 cm (66\")   Wt 71.1 kg (156 lb 12.8 oz)   LMP 06/04/2024 (Exact Date)   SpO2 99%   BMI 25.31 kg/m²      Physical Exam  Vitals and nursing note reviewed.   Constitutional:       General: She is not in acute distress.     Appearance: She is not toxic-appearing.   HENT:      Head: Normocephalic and atraumatic.   Eyes:      Extraocular Movements: Extraocular movements intact.   Cardiovascular:      Rate and Rhythm: Normal rate.      Heart sounds: Normal heart sounds.   Pulmonary:      Effort: Pulmonary effort is normal. No respiratory distress.   Abdominal:      General: There is no distension.      Palpations: Abdomen is soft.      Tenderness: There is abdominal tenderness in the right upper quadrant and epigastric area. There is no guarding.   Skin:     General: Skin is warm and dry.   Neurological:      General: No focal deficit present.      Mental Status: She is alert and oriented to person, place, and time.   Psychiatric:         Mood and Affect: Mood normal.         Behavior: Behavior normal. "         Procedures    ED Course:         Lab Results (last 24 hours)       Procedure Component Value Units Date/Time    CBC & Differential [[id number]]  (Abnormal) Collected: 06/07/24 1941    Specimen: Blood Updated: 06/07/24 1951    Narrative:      The following orders were created for panel order CBC & Differential.  Procedure                               Abnormality         Status                     ---------                               -----------         ------                     CBC Auto Differential[[id number]]        Abnormal            Final result                 Please view results for these tests on the individual orders.    Comprehensive Metabolic Panel [[id number]]  (Abnormal) Collected: 06/07/24 1941    Specimen: Blood Updated: 06/07/24 2011     Glucose 87 mg/dL      BUN 9 mg/dL      Creatinine 1.06 mg/dL      Sodium 138 mmol/L      Potassium 3.8 mmol/L      Chloride 102 mmol/L      CO2 26.6 mmol/L      Calcium 9.2 mg/dL      Total Protein 7.5 g/dL      Albumin 4.3 g/dL      ALT (SGPT) 9 U/L      AST (SGOT) 14 U/L      Alkaline Phosphatase 72 U/L      Total Bilirubin 0.5 mg/dL      Globulin 3.2 gm/dL      A/G Ratio 1.3 g/dL      BUN/Creatinine Ratio 8.5     Anion Gap 9.4 mmol/L      eGFR 75.9 mL/min/1.73     Narrative:      GFR Normal >60  Chronic Kidney Disease <60  Kidney Failure <15      Lipase [[id number]]  (Normal) Collected: 06/07/24 1941    Specimen: Blood Updated: 06/07/24 2011     Lipase 38 U/L     CBC Auto Differential [[id number]]  (Abnormal) Collected: 06/07/24 1941    Specimen: Blood Updated: 06/07/24 1951     WBC 7.88 10*3/mm3      RBC 4.54 10*6/mm3      Hemoglobin 12.0 g/dL      Hematocrit 36.8 %      MCV 81.1 fL      MCH 26.4 pg      MCHC 32.6 g/dL      RDW 13.2 %      RDW-SD 38.9 fl      MPV 9.5 fL      Platelets 320 10*3/mm3      Neutrophil % 67.4 %      Lymphocyte % 24.6 %      Monocyte % 6.7 %      Eosinophil % 0.4 %      Basophil % 0.5 %      Immature Grans % 0.4 %       Neutrophils, Absolute 5.31 10*3/mm3      Lymphocytes, Absolute 1.94 10*3/mm3      Monocytes, Absolute 0.53 10*3/mm3      Eosinophils, Absolute 0.03 10*3/mm3      Basophils, Absolute 0.04 10*3/mm3      Immature Grans, Absolute 0.03 10*3/mm3      nRBC 0.0 /100 WBC     Urinalysis With Microscopic If Indicated (No Culture) - Urine, Clean Catch [[id number]]  (Abnormal) Collected: 06/07/24 1942    Specimen: Urine, Clean Catch Updated: 06/07/24 1957     Color, UA Yellow     Appearance, UA Clear     pH, UA 6.0     Specific Gravity, UA 1.011     Glucose, UA Negative     Ketones, UA Negative     Bilirubin, UA Negative     Blood, UA Large (3+)     Protein, UA Negative     Leuk Esterase, UA Negative     Nitrite, UA Negative     Urobilinogen, UA 1.0 E.U./dL    Urinalysis, Microscopic Only - Urine, Clean Catch [[id number]]  (Abnormal) Collected: 06/07/24 1942    Specimen: Urine, Clean Catch Updated: 06/07/24 2014     RBC, UA 3-5 /HPF      WBC, UA 0-2 /HPF      Bacteria, UA None Seen /HPF      Squamous Epithelial Cells, UA 0-2 /HPF      Hyaline Casts, UA None Seen /LPF      Methodology Manual Light Microscopy    Pregnancy, Urine - Urine, Clean Catch [[id number]]  (Normal) Collected: 06/07/24 1942    Specimen: Urine, Clean Catch Updated: 06/07/24 2025     HCG, Urine QL Negative             CT Abdomen Pelvis With Contrast    Result Date: 6/7/2024  PRELIMINARY REPORT TECHNIQUE: Routine axial images through the abdomen and pelvis were obtained following IV contrast administration. CLINICAL HISTORY: RUQ abdominal pain since April, worse over last 2-3 days, N/V COMPARISON: 7/2/2023 FINDINGS: Abdomen: The gallbladder is normal.  The solid abdominal organs and ureters are unremarkable.  The GI tract is unremarkable with no sign of appendicitis.  Pelvis: The urinary bladder is unremarkable.  There is a 5 cm left ovarian cyst.  The uterus and ovaries are otherwise unremarkable.  There is no pelvic or abdominal ascites, adenopathy or acute  osseous abnormality.     Impression: 5 cm left ovarian cyst.  Otherwise unremarkable. This is a preliminary report.        MDM  Patient evaluated in the ER for right upper quadrant abdominal pain, nausea, vomiting that started in April but has worsened over the last 2 to 3 days.  She is hemodynamically stable, afebrile, nontoxic-appearing on exam.  Differential diagnosis includes but is not limited to cholecystitis, choledocholithiasis, pancreatitis, gastritis, peptic ulcer disease, among others.  Initial plan includes CBC, CMP, lipase, urinalysis, urine pregnancy test, CT abdomen and pelvis with contrast, and initial treatment with 1 L IV fluids, 30 mg IV Toradol, 20 mg IV Pepcid, and 4 mg IV Zofran.    Labs acutely unremarkable with normal liver function, bilirubin.  Creatinine essentially normal at 1.06.  Urinalysis not indicative of infectious process.  Urine pregnancy negative.  CT abdomen and pelvis does reveal a 5 cm left ovarian cyst which is likely not the etiology of the patient's symptoms that she is having right upper quadrant and epigastric pain.  No left lower quadrant pain on exam.  Ambulatory referral was placed for OB/GYN, Dr. Rodriguez.  Patient advised to continue Pantoprazole and follow up with her GI specialist for further evaluation as she may warrant an endoscopy or HIDA scan if symptoms persist.  Recommended follow-up with PCP as needed.  Precautions were given for return to the ER for any new or worsening symptoms.    Final diagnoses:   Upper abdominal pain   Left ovarian cyst          Kemi Dc, DIMITRY  06/07/24 2438

## 2024-06-11 ENCOUNTER — OFFICE VISIT (OUTPATIENT)
Dept: OBSTETRICS AND GYNECOLOGY | Facility: CLINIC | Age: 24
End: 2024-06-11
Payer: COMMERCIAL

## 2024-06-11 VITALS
BODY MASS INDEX: 24.64 KG/M2 | DIASTOLIC BLOOD PRESSURE: 60 MMHG | HEIGHT: 67 IN | SYSTOLIC BLOOD PRESSURE: 112 MMHG | WEIGHT: 157 LBS

## 2024-06-11 DIAGNOSIS — R10.11 RIGHT UPPER QUADRANT PAIN: ICD-10-CM

## 2024-06-11 DIAGNOSIS — N92.1 MENORRHAGIA WITH IRREGULAR CYCLE: ICD-10-CM

## 2024-06-11 DIAGNOSIS — R10.32 LEFT LOWER QUADRANT PAIN: ICD-10-CM

## 2024-06-11 DIAGNOSIS — N83.202 CYST OF LEFT OVARY: Primary | ICD-10-CM

## 2024-06-11 PROCEDURE — 1160F RVW MEDS BY RX/DR IN RCRD: CPT | Performed by: OBSTETRICS & GYNECOLOGY

## 2024-06-11 PROCEDURE — 1159F MED LIST DOCD IN RCRD: CPT | Performed by: OBSTETRICS & GYNECOLOGY

## 2024-06-11 PROCEDURE — 99214 OFFICE O/P EST MOD 30 MIN: CPT | Performed by: OBSTETRICS & GYNECOLOGY

## 2024-06-11 NOTE — PROGRESS NOTES
Chief Complaint  Pelvic Pain (Patient complains of left ovarian pain since end of April. Seen in ER on 24 and diagnosed with left ovarian cyst. )     History of Present Illness:  Patient is 24 y.o.  who presents to [facility] here for evaluation of left ovarian cyst.  Patient reports having left lower quadrant pain over the last month.  She reports it is a dull ache which has been consistent with occasional sharp stabbing pain as well.  She had previously been seen on  through the ER.  She had a CT scan showing a 5 cm cyst.  She reports her menstrual cycles have been varying.  She had a menstrual cycle on  and not again until .  The last 1 was lighter in nature lasting only 3 days.  They previously have been heavy.  She does have cramping associated with her menstrual cycles as well.  She has also been having episodes of right upper quadrant pain associated with nausea.  She is scheduled for right upper quadrant ultrasound in 2 weeks.    History  Past Medical History:   Diagnosis Date    Anemia     Glaucoma     left eye    Irritable bowel syndrome     Lactose intolerance     Seasonal allergies     Thyroid activity decreased     Umbilical hernia     Urinary tract infection     Wears glasses      Current Outpatient Medications on File Prior to Visit   Medication Sig Dispense Refill    ondansetron (ZOFRAN) 4 MG tablet Take 1 tablet by mouth Every 8 (Eight) Hours As Needed for Nausea or Vomiting.      pantoprazole (PROTONIX) 40 MG EC tablet Take 1 tablet by mouth Daily.       No current facility-administered medications on file prior to visit.     Allergies   Allergen Reactions    Latex Rash     Past Surgical History:   Procedure Laterality Date     SECTION N/A 2022    Procedure:  SECTION PRIMARY;  Surgeon: Lukas Rodriguez MD;  Location: [facility];  Service: Obstetrics/Gynecology;  Laterality: N/A;    COLONOSCOPY      FOOT/TOE TENDON  "REPAIR Left 01/30/2020    Procedure: RECONSTRUCTION PT TENDON LEFT;  Surgeon: Uziel Garcia DPM;  Location: [facility] OR;  Service: Podiatry    TARSAL TUNNEL RELEASE Left 01/30/2020    Procedure: EXCISION TARSAL BONE LEFT;  Surgeon: Uziel Garcia DPM;  Location: [facility] OR;  Service: Podiatry    UMBILICAL HERNIA REPAIR N/A 01/19/2023    Procedure: OPEN UMBILICAL HERNIA REPAIR;  Surgeon: Mery Cadet DO;  Location: [facility] OR;  Service: General;  Laterality: N/A;    UMBILICAL HERNIA REPAIR N/A 2/21/2024    Procedure: UMBILICAL HERNIA REVISION;  Surgeon: Mery Cadet DO;  Location: [facility] OR;  Service: General;  Laterality: N/A;    WISDOM TOOTH EXTRACTION       Family History   Problem Relation Age of Onset    Hypertension Mother     Diabetes Father     Heart attack Maternal Grandfather     Diabetes Maternal Grandfather      Social History     Socioeconomic History    Marital status: Single   Tobacco Use    Smoking status: Never     Passive exposure: Never    Smokeless tobacco: Never   Vaping Use    Vaping status: Never Used   Substance and Sexual Activity    Alcohol use: No    Drug use: No    Sexual activity: Defer       Physical Examination:  Vital Signs: /60   Ht 170.2 cm (67\")   Wt 71.2 kg (157 lb)   BMI 24.59 kg/m²     General Appearance: alert, appears stated age, and cooperative  Breasts: Not performed.  Abdomen: no masses, no hepatomegaly, no splenomegaly, soft non-tender, no guarding, and no rebound tenderness  Pelvic: Not performed.    Data Review:  The following data was reviewed by: Suni Oquendo MD on 06/11/2024:     Labs:  Urinalysis, Microscopic Only - (06/04/2024 00:02)  LIPASE (06/04/2024 00:02)  COMPREHENSIVE METABOLIC PANEL (06/04/2024 00:02)  CBC with Auto Diff (06/04/2024 00:02)  Urinalysis, Reflex Microscopic and Culture If Indicated (06/04/2024 00:02)  Pregnancy, Urine - (06/04/2024 00:02)  CBC & Differential (06/07/2024 19:41)  Comprehensive Metabolic Panel " (06/07/2024 19:41)  Lipase (06/07/2024 19:41)  Urinalysis With Microscopic If Indicated (No Culture) - Urine, Clean Catch (06/07/2024 19:42)  Urinalysis, Microscopic Only - Urine, Clean Catch (06/07/2024 19:42)  Pregnancy, Urine - Urine, Clean Catch (06/07/2024 19:42)  Imaging:  CT Abdomen Pelvis With Contrast (06/07/2024 20:40)  US Non-ob Transvaginal (06/11/2024 11:01)  Medical Records:  None    Assessment and Plan   1. Cyst of left ovary  Patient was cyst of the left ovary is noted.  She has been informed regarding the findings on ultrasound.  Instructions and precautions have been given.  Patient is to follow-up with repeat imaging as discussed.  - US Non-ob Transvaginal  - US Non-ob Transvaginal; Future    2. Left lower quadrant pain  Patient with left lower quadrant pain as noted.  Transvaginal ultrasound has been obtained.  Patient has been informed regarding those findings.  She did have previous CT scan as well.  - US Non-ob Transvaginal  - US Non-ob Transvaginal; Future    3. Menorrhagia with irregular cycle  I discussed with the patient the various options for management of her irregular menstrual cycles.  Patient will follow-up with repeat imaging as discussed.  Labs as noted.  - US Non-ob Transvaginal  - US Non-ob Transvaginal; Future    4. Right upper quadrant pain  Patient is to keep her appointment for scheduled right upper quadrant ultrasound.  Plan pending results.    Follow Up/Instructions:  Follow up as noted.  Patient was given instructions and counseling regarding her condition or for health maintenance advice. Please see specific information pulled into the AVS if appropriate.     Note: Speech recognition transcription software may have been used to dictate portions of this document.  An attempt at proofreading has been made though minor errors in transcription may still be present.    This note was electronically signed.  Suni Oquendo M.D.

## 2024-06-14 ENCOUNTER — TRANSCRIBE ORDERS (OUTPATIENT)
Dept: ADMINISTRATIVE | Facility: HOSPITAL | Age: 24
End: 2024-06-14
Payer: COMMERCIAL

## 2024-06-14 DIAGNOSIS — R11.2 NAUSEA AND VOMITING, UNSPECIFIED VOMITING TYPE: Primary | ICD-10-CM

## 2024-06-19 NOTE — H&P (VIEW-ONLY)
Patient: Romy Langley  YOB: 2000    Date: 06/07/2024    Primary Care Provider: Nadya Hidalgo MD    Chief Complaint   Patient presents with    Follow-up       History: Patient was seen today as a follow up for an ED visit for abdominal pain, nausea, and vomiting. Patient states that most of her pain was in the LLQ, but also in the RUQ. It gets so severe she doubles over and can not move. It is associated with nausea and vomiting. Patient states that eating any food makes it worse. No foods in particular bring it on. This pain has been ongoing since April of this year, but worsened over the past two weeks. She reports having a bowel movement twice weekly which is improved from how she was previously. In the ED she was given protonix and has been taking this for the past 2 weeks. CT abdomen pelvis was performed on 6/7/24 which showed, Abdomen: gallbladder is normal.  The solid abdominal organs and ureters are unremarkable.  The GI tract is unremarkable with no sign of appendicitis.  Pelvis: The urinary bladder is unremarkable. There is a 5 cm left ovarian cyst.  The uterus and ovaries are otherwise unremarkable.  There is no pelvic or abdominal ascites, adenopathy or acute osseous abnormality. Patient did see OBGYN 6/11/24 and has a follow up appointment next week. Patient's PCP did order a HIDA scan which she is waiting to schedule.     The following portions of the patient's history were reviewed and updated as appropriate: allergies, current medications, past family history, past medical history, past social history, past surgical history and problem list.      Review of Systems:  Constitutional:  Negative for chills, fever, and unexpected weight change.  HENT: Negative for trouble swallowing and voice change.  Eyes:  Negative for visual disturbance.  Respiratory:  Negative for apnea, cough, chest tightness, shortness of breath, and wheezing.  Cardiovascular:  Negative for chest  "pain, palpitations, and leg swelling.  Gastrointestinal:  Positive for abdominal pain, nausea, vomiting  Musculoskeletal:  Negative for back pain, gait problem, and joint swelling.  Skin:  Negative for color change, rash, and wound  Neurological:  Negative for dizziness, syncope, speech difficulty, weakness, numbness, and headaches.  Hematological:  Negative for adenopathy.  Does not bruise/bleed easily.  Psychiatric/Behavioral:  Negative for confusion.  The patient is not nervous/anxious.    Vital Signs  Vitals:    06/24/24 1248   BP: 118/64   Pulse: 92   Resp: 16   Temp: 98.4 °F (36.9 °C)   TempSrc: Temporal   SpO2: 100%   Weight: 70.8 kg (156 lb)   Height: 170.2 cm (67\")       Allergies:  Allergies   Allergen Reactions    Latex Rash       Medications:    Current Outpatient Medications:     ondansetron (ZOFRAN) 4 MG tablet, Take 1 tablet by mouth Every 8 (Eight) Hours As Needed for Nausea or Vomiting., Disp: , Rfl:     pantoprazole (PROTONIX) 40 MG EC tablet, Take 1 tablet by mouth Daily., Disp: , Rfl:     Physical Exam:   Results Review:   I reviewed the patient's new clinical results.  I reviewed the patient's new imaging results and agree with the interpretation.     Review of Systems was reviewed and confirmed as accurate as documented by the MA.    ASSESSMENT/PLAN:    1. RUQ abdominal pain    2. LLQ abdominal pain    3. Cyst of left ovary       Patient was referred to me today for RUQ abdominal pain in addition to her LLQ abdominal pain. She is being worked up by OBGYN currently for her left ovarian cyst which may be contributing to her abdominal pain. Her RUQ pain could certainly be related to her gallbladder, however, it was normal on CT abdomen. There is a HIDA scan ordered for her which would be my next recommendation to rule out biliary dyskinesia as there was no evidence of cholelithiasis. I will also add a GBUS to further delineate anatomy. In the meantime, I recommend continuing a bland low fat diet. " We did discuss robotic assisted laparoscopic cholecystectomy in office today as well as the risks including bleeding, infection, damage to surrounding structures, biliary leak, CBD injury, and need for additional procedures. Patient expresses understanding. Once her HIDA is complete, she will call me back with the results and we will discuss how to proceed. Patient is agreeable to the plan and all questions were answered today.     Electronically signed by Mery Cadet DO  06/24/24

## 2024-06-23 ENCOUNTER — APPOINTMENT (OUTPATIENT)
Dept: ULTRASOUND IMAGING | Facility: HOSPITAL | Age: 24
End: 2024-06-23
Payer: COMMERCIAL

## 2024-06-23 ENCOUNTER — HOSPITAL ENCOUNTER (EMERGENCY)
Facility: HOSPITAL | Age: 24
Discharge: HOME | End: 2024-06-23
Attending: EMERGENCY MEDICINE | Admitting: EMERGENCY MEDICINE
Payer: COMMERCIAL

## 2024-06-23 VITALS
WEIGHT: 157 LBS | SYSTOLIC BLOOD PRESSURE: 97 MMHG | OXYGEN SATURATION: 98 % | TEMPERATURE: 98 F | BODY MASS INDEX: 24.64 KG/M2 | HEIGHT: 67 IN | RESPIRATION RATE: 18 BRPM | DIASTOLIC BLOOD PRESSURE: 56 MMHG | HEART RATE: 68 BPM

## 2024-06-23 DIAGNOSIS — N83.202 CYST OF LEFT OVARY: Primary | ICD-10-CM

## 2024-06-23 LAB
ALBUMIN SERPL-MCNC: 4.7 G/DL (ref 3.5–5.2)
ALBUMIN/GLOB SERPL: 1.3 G/DL
ALP SERPL-CCNC: 75 U/L (ref 39–117)
ALT SERPL W P-5'-P-CCNC: 8 U/L (ref 1–33)
ANION GAP SERPL CALCULATED.3IONS-SCNC: 10 MMOL/L (ref 5–15)
AST SERPL-CCNC: 16 U/L (ref 1–32)
B-HCG UR QL: NEGATIVE
BACTERIA UR QL AUTO: ABNORMAL /HPF
BASOPHILS # BLD AUTO: 0.06 10*3/MM3 (ref 0–0.2)
BASOPHILS NFR BLD AUTO: 0.7 % (ref 0–1.5)
BILIRUB SERPL-MCNC: 0.3 MG/DL (ref 0–1.2)
BILIRUB UR QL STRIP: NEGATIVE
BUN SERPL-MCNC: 9 MG/DL (ref 6–20)
BUN/CREAT SERPL: 9.9 (ref 7–25)
CALCIUM SPEC-SCNC: 10 MG/DL (ref 8.6–10.5)
CHLORIDE SERPL-SCNC: 100 MMOL/L (ref 98–107)
CLARITY UR: ABNORMAL
CO2 SERPL-SCNC: 27 MMOL/L (ref 22–29)
COLOR UR: YELLOW
CREAT SERPL-MCNC: 0.91 MG/DL (ref 0.57–1)
D-LACTATE SERPL-SCNC: 0.8 MMOL/L (ref 0.5–2)
DEPRECATED RDW RBC AUTO: 39.7 FL (ref 37–54)
EGFRCR SERPLBLD CKD-EPI 2021: 91.1 ML/MIN/1.73
EOSINOPHIL # BLD AUTO: 0.12 10*3/MM3 (ref 0–0.4)
EOSINOPHIL NFR BLD AUTO: 1.4 % (ref 0.3–6.2)
ERYTHROCYTE [DISTWIDTH] IN BLOOD BY AUTOMATED COUNT: 13.4 % (ref 12.3–15.4)
GLOBULIN UR ELPH-MCNC: 3.6 GM/DL
GLUCOSE SERPL-MCNC: 116 MG/DL (ref 65–99)
GLUCOSE UR STRIP-MCNC: NEGATIVE MG/DL
HCT VFR BLD AUTO: 39.5 % (ref 34–46.6)
HGB BLD-MCNC: 12.9 G/DL (ref 12–15.9)
HGB UR QL STRIP.AUTO: NEGATIVE
HOLD SPECIMEN: NORMAL
HOLD SPECIMEN: NORMAL
HYALINE CASTS UR QL AUTO: ABNORMAL /LPF
IMM GRANULOCYTES # BLD AUTO: 0.02 10*3/MM3 (ref 0–0.05)
IMM GRANULOCYTES NFR BLD AUTO: 0.2 % (ref 0–0.5)
KETONES UR QL STRIP: NEGATIVE
LEUKOCYTE ESTERASE UR QL STRIP.AUTO: ABNORMAL
LIPASE SERPL-CCNC: 54 U/L (ref 13–60)
LYMPHOCYTES # BLD AUTO: 3.14 10*3/MM3 (ref 0.7–3.1)
LYMPHOCYTES NFR BLD AUTO: 35.9 % (ref 19.6–45.3)
MCH RBC QN AUTO: 26.7 PG (ref 26.6–33)
MCHC RBC AUTO-ENTMCNC: 32.7 G/DL (ref 31.5–35.7)
MCV RBC AUTO: 81.8 FL (ref 79–97)
MONOCYTES # BLD AUTO: 0.65 10*3/MM3 (ref 0.1–0.9)
MONOCYTES NFR BLD AUTO: 7.4 % (ref 5–12)
NEUTROPHILS NFR BLD AUTO: 4.76 10*3/MM3 (ref 1.7–7)
NEUTROPHILS NFR BLD AUTO: 54.4 % (ref 42.7–76)
NITRITE UR QL STRIP: NEGATIVE
NRBC BLD AUTO-RTO: 0 /100 WBC (ref 0–0.2)
PH UR STRIP.AUTO: 7 [PH] (ref 5–8)
PLATELET # BLD AUTO: 318 10*3/MM3 (ref 140–450)
PMV BLD AUTO: 9.8 FL (ref 6–12)
POTASSIUM SERPL-SCNC: 4 MMOL/L (ref 3.5–5.2)
PROT SERPL-MCNC: 8.3 G/DL (ref 6–8.5)
PROT UR QL STRIP: NEGATIVE
RBC # BLD AUTO: 4.83 10*6/MM3 (ref 3.77–5.28)
RBC # UR STRIP: ABNORMAL /HPF
REF LAB TEST METHOD: ABNORMAL
SODIUM SERPL-SCNC: 137 MMOL/L (ref 136–145)
SP GR UR STRIP: 1.02 (ref 1–1.03)
SQUAMOUS #/AREA URNS HPF: ABNORMAL /HPF
UROBILINOGEN UR QL STRIP: ABNORMAL
WBC # UR STRIP: ABNORMAL /HPF
WBC NRBC COR # BLD AUTO: 8.75 10*3/MM3 (ref 3.4–10.8)
WHOLE BLOOD HOLD COAG: NORMAL
WHOLE BLOOD HOLD SPECIMEN: NORMAL

## 2024-06-23 PROCEDURE — 80053 COMPREHEN METABOLIC PANEL: CPT | Performed by: EMERGENCY MEDICINE

## 2024-06-23 PROCEDURE — 96375 TX/PRO/DX INJ NEW DRUG ADDON: CPT

## 2024-06-23 PROCEDURE — 76830 TRANSVAGINAL US NON-OB: CPT

## 2024-06-23 PROCEDURE — 25010000002 KETOROLAC TROMETHAMINE PER 15 MG: Performed by: EMERGENCY MEDICINE

## 2024-06-23 PROCEDURE — 96374 THER/PROPH/DIAG INJ IV PUSH: CPT

## 2024-06-23 PROCEDURE — 25010000002 ONDANSETRON PER 1 MG: Performed by: EMERGENCY MEDICINE

## 2024-06-23 PROCEDURE — 25810000003 SODIUM CHLORIDE 0.9 % SOLUTION: Performed by: EMERGENCY MEDICINE

## 2024-06-23 PROCEDURE — 25010000002 MORPHINE PER 10 MG: Performed by: EMERGENCY MEDICINE

## 2024-06-23 PROCEDURE — 81025 URINE PREGNANCY TEST: CPT | Performed by: EMERGENCY MEDICINE

## 2024-06-23 PROCEDURE — 83690 ASSAY OF LIPASE: CPT | Performed by: EMERGENCY MEDICINE

## 2024-06-23 PROCEDURE — 83605 ASSAY OF LACTIC ACID: CPT | Performed by: EMERGENCY MEDICINE

## 2024-06-23 PROCEDURE — 85025 COMPLETE CBC W/AUTO DIFF WBC: CPT | Performed by: EMERGENCY MEDICINE

## 2024-06-23 PROCEDURE — 81001 URINALYSIS AUTO W/SCOPE: CPT | Performed by: EMERGENCY MEDICINE

## 2024-06-23 PROCEDURE — 99284 EMERGENCY DEPT VISIT MOD MDM: CPT

## 2024-06-23 RX ORDER — ONDANSETRON 2 MG/ML
4 INJECTION INTRAMUSCULAR; INTRAVENOUS ONCE
Status: COMPLETED | OUTPATIENT
Start: 2024-06-23 | End: 2024-06-23

## 2024-06-23 RX ORDER — KETOROLAC TROMETHAMINE 30 MG/ML
15 INJECTION, SOLUTION INTRAMUSCULAR; INTRAVENOUS ONCE
Status: COMPLETED | OUTPATIENT
Start: 2024-06-23 | End: 2024-06-23

## 2024-06-23 RX ORDER — SODIUM CHLORIDE 0.9 % (FLUSH) 0.9 %
10 SYRINGE (ML) INJECTION AS NEEDED
Status: DISCONTINUED | OUTPATIENT
Start: 2024-06-23 | End: 2024-06-23 | Stop reason: HOSPADM

## 2024-06-23 RX ADMIN — ONDANSETRON 4 MG: 2 INJECTION INTRAMUSCULAR; INTRAVENOUS at 02:51

## 2024-06-23 RX ADMIN — KETOROLAC TROMETHAMINE 15 MG: 30 INJECTION, SOLUTION INTRAMUSCULAR; INTRAVENOUS at 02:51

## 2024-06-23 RX ADMIN — MORPHINE SULFATE 4 MG: 4 INJECTION, SOLUTION INTRAMUSCULAR; INTRAVENOUS at 02:52

## 2024-06-23 RX ADMIN — SODIUM CHLORIDE 1000 ML: 9 INJECTION, SOLUTION INTRAVENOUS at 02:52

## 2024-06-23 NOTE — ED PROVIDER NOTES
EMERGENCY DEPARTMENT ENCOUNTER    Pt Name: Romy Langley  MRN: [id number]  Pt :   2000  Room Number:    Date of encounter:  2024  PCP: Nadya Hidalgo MD  ED Provider: Lukas Harrison MD    Historian: Patient      HPI:  Chief Complaint: Abdominal pain        Context: Romy Langley is a 24 y.o. female who presents to the ED c/o abdominal pain.  Patient has a past history significant for known left ovarian cyst.  Patient evaluated here on  and had CT scan and ultrasound performed which demonstrated a 5 cm left ovarian cyst.    Patient says she has been having abdominal pain essentially since April.  She says that the pain has been getting progressively worse.  Patient says that tonight she was having left lower quadrant abdominal pain that radiates to her left flank.  She denies any associated vomiting but says she has felt nauseous.  She says the pain was worse with deep inspiration.  She denies having any associated chest pain.      PAST MEDICAL HISTORY  Past Medical History:   Diagnosis Date    Anemia     Glaucoma     left eye    Irritable bowel syndrome     Lactose intolerance     Seasonal allergies     Thyroid activity decreased     Umbilical hernia     Urinary tract infection     Wears glasses          PAST SURGICAL HISTORY  Past Surgical History:   Procedure Laterality Date     SECTION N/A 2022    Procedure:  SECTION PRIMARY;  Surgeon: Lukas Rodriguez MD;  Location: [facility] OR;  Service: Obstetrics/Gynecology;  Laterality: N/A;    COLONOSCOPY      FOOT/TOE TENDON REPAIR Left 2020    Procedure: RECONSTRUCTION PT TENDON LEFT;  Surgeon: Uziel Garcia DPM;  Location: [facility] OR;  Service: Podiatry    TARSAL TUNNEL RELEASE Left 2020    Procedure: EXCISION TARSAL BONE LEFT;  Surgeon: Uziel Garcia DPM;  Location: [facility] OR;  Service: Podiatry    UMBILICAL HERNIA REPAIR N/A 2023    Procedure: OPEN  UMBILICAL HERNIA REPAIR;  Surgeon: Mery Cadet DO;  Location: [facility] OR;  Service: General;  Laterality: N/A;    UMBILICAL HERNIA REPAIR N/A 2/21/2024    Procedure: UMBILICAL HERNIA REVISION;  Surgeon: Mery Cadet DO;  Location: [facility] OR;  Service: General;  Laterality: N/A;    WISDOM TOOTH EXTRACTION           FAMILY HISTORY  Family History   Problem Relation Age of Onset    Hypertension Mother     Diabetes Father     Heart attack Maternal Grandfather     Diabetes Maternal Grandfather          SOCIAL HISTORY  Social History     Socioeconomic History    Marital status: Single   Tobacco Use    Smoking status: Never     Passive exposure: Never    Smokeless tobacco: Never   Vaping Use    Vaping status: Never Used   Substance and Sexual Activity    Alcohol use: No    Drug use: No    Sexual activity: Defer         ALLERGIES  Latex        REVIEW OF SYSTEMS    All systems reviewed and negative except for those discussed in HPI.       PHYSICAL EXAM    I have reviewed the triage vital signs and nursing notes.    ED Triage Vitals [06/23/24 0212]   Temp Heart Rate Resp BP SpO2   97.6 °F (36.4 °C) 88 18 111/67 97 %      Temp src Heart Rate Source Patient Position BP Location FiO2 (%)   Oral Monitor Sitting Left arm --         General: Moderate acute distress secondary to pain  Skin: normal color, warm and dry  Head: normocephalic, atraumatic  Nose: normal nasal mucosa, no visible deformity.  Mouth: moist mucous membranes.  Neck: supple.  Chest: no retractions, no visible deformity  Cardiovascular: Regular rate and rhythm.  Lungs: clear to auscultation bilaterally.  Back: no contusions, wounds or abrasions.  No CVA tenderness bilaterally.  Abdomen: soft, slightly tender to palpation in the left lower quadrant, non-distended. No rebound tenderness, no guarding.  No peritonitis.  Neuro:  alert and oriented x3, no focal neurological deficits.  Psych:  appropriate mood and behavior.        LAB RESULTS  Recent Results  (from the past 24 hour(s))   Comprehensive Metabolic Panel    Collection Time: 06/23/24  2:22 AM    Specimen: Blood   Result Value Ref Range    Glucose 116 (H) 65 - 99 mg/dL    BUN 9 6 - 20 mg/dL    Creatinine 0.91 0.57 - 1.00 mg/dL    Sodium 137 136 - 145 mmol/L    Potassium 4.0 3.5 - 5.2 mmol/L    Chloride 100 98 - 107 mmol/L    CO2 27.0 22.0 - 29.0 mmol/L    Calcium 10.0 8.6 - 10.5 mg/dL    Total Protein 8.3 6.0 - 8.5 g/dL    Albumin 4.7 3.5 - 5.2 g/dL    ALT (SGPT) 8 1 - 33 U/L    AST (SGOT) 16 1 - 32 U/L    Alkaline Phosphatase 75 39 - 117 U/L    Total Bilirubin 0.3 0.0 - 1.2 mg/dL    Globulin 3.6 gm/dL    A/G Ratio 1.3 g/dL    BUN/Creatinine Ratio 9.9 7.0 - 25.0    Anion Gap 10.0 5.0 - 15.0 mmol/L    eGFR 91.1 >60.0 mL/min/1.73   Lipase    Collection Time: 06/23/24  2:22 AM    Specimen: Blood   Result Value Ref Range    Lipase 54 13 - 60 U/L   Green Top (Gel)    Collection Time: 06/23/24  2:22 AM   Result Value Ref Range    Extra Tube Hold for add-ons.    Lavender Top    Collection Time: 06/23/24  2:22 AM   Result Value Ref Range    Extra Tube hold for add-on    Gold Top - SST    Collection Time: 06/23/24  2:22 AM   Result Value Ref Range    Extra Tube Hold for add-ons.    Light Blue Top    Collection Time: 06/23/24  2:22 AM   Result Value Ref Range    Extra Tube Hold for add-ons.    CBC Auto Differential    Collection Time: 06/23/24  2:22 AM    Specimen: Blood   Result Value Ref Range    WBC 8.75 3.40 - 10.80 10*3/mm3    RBC 4.83 3.77 - 5.28 10*6/mm3    Hemoglobin 12.9 12.0 - 15.9 g/dL    Hematocrit 39.5 34.0 - 46.6 %    MCV 81.8 79.0 - 97.0 fL    MCH 26.7 26.6 - 33.0 pg    MCHC 32.7 31.5 - 35.7 g/dL    RDW 13.4 12.3 - 15.4 %    RDW-SD 39.7 37.0 - 54.0 fl    MPV 9.8 6.0 - 12.0 fL    Platelets 318 140 - 450 10*3/mm3    Neutrophil % 54.4 42.7 - 76.0 %    Lymphocyte % 35.9 19.6 - 45.3 %    Monocyte % 7.4 5.0 - 12.0 %    Eosinophil % 1.4 0.3 - 6.2 %    Basophil % 0.7 0.0 - 1.5 %    Immature Grans % 0.2 0.0 - 0.5  %    Neutrophils, Absolute 4.76 1.70 - 7.00 10*3/mm3    Lymphocytes, Absolute 3.14 (H) 0.70 - 3.10 10*3/mm3    Monocytes, Absolute 0.65 0.10 - 0.90 10*3/mm3    Eosinophils, Absolute 0.12 0.00 - 0.40 10*3/mm3    Basophils, Absolute 0.06 0.00 - 0.20 10*3/mm3    Immature Grans, Absolute 0.02 0.00 - 0.05 10*3/mm3    nRBC 0.0 0.0 - 0.2 /100 WBC   Lactic Acid, Plasma    Collection Time: 06/23/24  2:22 AM    Specimen: Blood   Result Value Ref Range    Lactate 0.8 0.5 - 2.0 mmol/L   Urinalysis With Microscopic If Indicated (No Culture) - Urine, Clean Catch    Collection Time: 06/23/24  2:30 AM    Specimen: Urine, Clean Catch   Result Value Ref Range    Color, UA Yellow Yellow, Straw    Appearance, UA Cloudy (A) Clear    pH, UA 7.0 5.0 - 8.0    Specific Gravity, UA 1.016 1.005 - 1.030    Glucose, UA Negative Negative    Ketones, UA Negative Negative    Bilirubin, UA Negative Negative    Blood, UA Negative Negative    Protein, UA Negative Negative    Leuk Esterase, UA Small (1+) (A) Negative    Nitrite, UA Negative Negative    Urobilinogen, UA 0.2 E.U./dL 0.2 - 1.0 E.U./dL   Pregnancy, Urine - Urine, Clean Catch    Collection Time: 06/23/24  2:30 AM    Specimen: Urine, Clean Catch   Result Value Ref Range    HCG, Urine QL Negative Negative   Urinalysis, Microscopic Only - Urine, Clean Catch    Collection Time: 06/23/24  2:30 AM    Specimen: Urine, Clean Catch   Result Value Ref Range    RBC, UA None Seen None Seen, 0-2 /HPF    WBC, UA 6-10 (A) None Seen, 0-2 /HPF    Bacteria, UA 1+ (A) None Seen /HPF    Squamous Epithelial Cells, UA 3-6 (A) None Seen, 0-2 /HPF    Hyaline Casts, UA None Seen None Seen /LPF    Methodology Manual Light Microscopy        If labs were ordered, I independently reviewed the results and considered them in treating the patient.  See medical decision making discussion section for my interpretation of lab results.        RADIOLOGY  US Non-ob Transvaginal    Result Date: 6/23/2024  FINAL REPORT  TECHNIQUE: null CLINICAL HISTORY: LLQ abd pain, HX left ovarian cyst COMPARISON: null FINDINGS: EXAMS: US Pelvis, Transvaginal US Duplex Arterial/Venous of the Pelvis, limited. Comparison: CT/SR - CT ABDOMEN PELVIS W CONTRAST - 06/07/2024 08:39 PM EDT TECHNIQUE: Transvaginal imaging was used for better evaluation of the endometrium and adnexa. Real-time duplex ultrasound scan of the pelvis integrating B-mode two-dimensional vascular structure, Doppler spectral analysis and color flow Doppler imaging. FINDINGS: Uterus: 9.0 x 4.0 x 6.4 cm. Normal endometrial stripe thickness at 11 mm. No myometrial mass. Right ovary: 4.7 x 2.2 x 3.4 cm. Normal blood flow on color and spectral Doppler imaging. Left ovary: 5.3 x 4.3 x 5.7 cm. 3.8 x 3.5 x 4.1 cm left ovarian cyst with lace like internal echoes typical for hemorrhagic cyst. Normal blood flow on color and spectral Doppler imaging. Free fluid: No free fluid.     IMPRESSION: 3.8 x 3.5 x 4.1 cm left ovarian cyst with lace like internal echoes typical for hemorrhagic cyst. Normal arterial/venous waveforms associated with each ovary. No uterine or endometrial mass. Authenticated and Electronically Signed by Sonal Panda MD on 06/23/2024 05:02:44 AM       PROCEDURES    Procedures    No orders to display       MEDICATIONS GIVEN IN ER    Medications   sodium chloride 0.9 % flush 10 mL (has no administration in time range)   sodium chloride 0.9 % bolus 1,000 mL (0 mL Intravenous Stopped 6/23/24 0322)   ondansetron (ZOFRAN) injection 4 mg (4 mg Intravenous Given 6/23/24 0251)   morphine injection 4 mg (4 mg Intravenous Given 6/23/24 0252)   ketorolac (TORADOL) injection 15 mg (15 mg Intravenous Given 6/23/24 0251)         MEDICAL DECISION MAKING, PROGRESS, and CONSULTS    All labs, if obtained, have been independently reviewed by me.  All radiology studies, if obtained, have been reviewed by me and the radiologist dictating the report.  All EKG's, if obtained, have been  independently viewed and interpreted by me/my attending physician.      Discussion below represents my analysis of pertinent findings related to patient's condition, differential diagnosis, treatment plan and final disposition.                         Differential diagnosis:    Differential diagnosis for this patient includes hepatitis, cholangitis, cholecystitis, pancreatitis, gastritis, enteritis, colitis, gastroenteritis, appendicitis, volvulus, obstruction, ischemia, torsion, cystitis, pyelonephritis, nephrolithiasis, uretolithiasis, ectopic pregnancy, cervicitis, PID, other acute emergency.    Medical Decision Making Discussion:    Patient vitals are reviewed and are normal.    Labs are reviewed and are unremarkable.  Urinalysis is contaminated with squamous epithelial cells.  Pregnancy test is negative.    Given her known history of left ovarian cyst with worsening left lower quadrant abdominal pain ultrasound has been obtained to make sure that she does not have sonographic evidence of torsion.    Patient given IV pain medication.  On repeat examination she was resting comfortably texting on her phone and reports complete resolution of her pain.    I considered pulmonary embolism given her reported pain with deep inspiration however she denies having any associated chest pain and says that her abdominal pain was made worse with deep inspiration.  Patient denies history of DVT, PE, recent long distance travel, recent surgery, exogenous hormone use, unilateral lower extremity swelling or erythema, or hemoptysis.  She is therefore low risk per Wells criteria and PERC score negative and I think this is sufficient to exclude PE.    Ultrasound was obtained and her known left ovarian cyst has decreased in size.  Per radiology left ovarian cyst has the appearance of a hemorrhagic cyst.  There are normal arterial/venous waveforms about the ovaries bilaterally.    Patient on repeat examination was sleeping comfortably.   On repeat abdominal examination her abdomen is soft and nontender.  Patient discharged home with instructions to follow-up with OB/GYN.  She is instructed to return for persistent or worsening pain, fever, vomiting, inability to eat or drink or for any concerning symptoms, worsening symptoms or new concerns.      Additional sources:    - External (non-ED) record review: CT abdomen pelvis from June 7, 2024 documenting 5 cm left ovarian cyst.  Ultrasound from June 11, 2024 documenting 4.6 cm cyst with 2.1 cm hyperechoic solid area noted.  Normal vascular flow noted.    OB note from June 11, 2024.  Patient evaluated for the left ovarian cyst.  Noted to have left lower quadrant abdominal pain over the last month.    Shared Decision Making:  After my consideration of clinical presentation and any laboratory/radiology studies obtained, I discussed the findings with the patient/patient representative who is in agreement with the treatment plan and the final disposition.   Risks and benefits of discharge and/or observation/admission were discussed.    Orders placed during this visit:  Orders Placed This Encounter   Procedures    US Non-ob Transvaginal    [place] Draw    Comprehensive Metabolic Panel    Lipase    Urinalysis With Microscopic If Indicated (No Culture) - Urine, Clean Catch    CBC Auto Differential    Lactic Acid, Plasma    Pregnancy, Urine - Urine, Clean Catch    Urinalysis, Microscopic Only - Urine, Clean Catch    NPO Diet NPO Type: Strict NPO    Undress & Gown    Insert Peripheral IV    CBC & Differential    Green Top (Gel)    Lavender Top    Gold Top - SST    Light Blue Top         Additional orders considered but not ordered:  Considered CT imaging of the abdomen pelvis however the patient has a known left ovarian cyst and her pain is on the left.  She has no right lower quadrant abdominal tenderness to palpation.  She just had CT imaging on the seventh.  At this time I think the risk associated with  repeat radiation exposure from CT imaging outweighs benefit.        AS OF 05:08 EDT VITALS:    BP - 97/56  HR - 68  TEMP - 97.6 °F (36.4 °C) (Oral)  O2 SATS - 98%                  DIAGNOSIS  Final diagnoses:   Cyst of left ovary         DISPOSITION  Discharge      Please note that portions of this document were completed with voice recognition software.        Lukas Harrison MD  06/23/24 [id number]

## 2024-06-23 NOTE — DISCHARGE INSTRUCTIONS
You should follow-up with OB/GYN.  You should return to the emerged department before then for persistent or worsening pain, fever, vomiting, concerning symptoms, worsening symptoms or new concerns.

## 2024-06-24 ENCOUNTER — OFFICE VISIT (OUTPATIENT)
Dept: SURGERY | Facility: CLINIC | Age: 24
End: 2024-06-24
Payer: COMMERCIAL

## 2024-06-24 VITALS
TEMPERATURE: 98.4 F | HEIGHT: 67 IN | OXYGEN SATURATION: 100 % | DIASTOLIC BLOOD PRESSURE: 64 MMHG | WEIGHT: 156 LBS | SYSTOLIC BLOOD PRESSURE: 118 MMHG | BODY MASS INDEX: 24.48 KG/M2 | RESPIRATION RATE: 16 BRPM | HEART RATE: 92 BPM

## 2024-06-24 DIAGNOSIS — N83.202 CYST OF LEFT OVARY: ICD-10-CM

## 2024-06-24 DIAGNOSIS — R10.32 LLQ ABDOMINAL PAIN: ICD-10-CM

## 2024-06-24 DIAGNOSIS — R10.11 RUQ ABDOMINAL PAIN: Primary | ICD-10-CM

## 2024-06-24 PROCEDURE — 1159F MED LIST DOCD IN RCRD: CPT | Performed by: STUDENT IN AN ORGANIZED HEALTH CARE EDUCATION/TRAINING PROGRAM

## 2024-06-24 PROCEDURE — 99214 OFFICE O/P EST MOD 30 MIN: CPT | Performed by: STUDENT IN AN ORGANIZED HEALTH CARE EDUCATION/TRAINING PROGRAM

## 2024-06-24 PROCEDURE — 1160F RVW MEDS BY RX/DR IN RCRD: CPT | Performed by: STUDENT IN AN ORGANIZED HEALTH CARE EDUCATION/TRAINING PROGRAM

## 2024-06-26 ENCOUNTER — OFFICE VISIT (OUTPATIENT)
Dept: OBSTETRICS AND GYNECOLOGY | Facility: CLINIC | Age: 24
End: 2024-06-26
Payer: COMMERCIAL

## 2024-06-26 VITALS
BODY MASS INDEX: 24.33 KG/M2 | DIASTOLIC BLOOD PRESSURE: 60 MMHG | HEIGHT: 67 IN | SYSTOLIC BLOOD PRESSURE: 118 MMHG | WEIGHT: 155 LBS

## 2024-06-26 DIAGNOSIS — R10.11 RIGHT UPPER QUADRANT PAIN: ICD-10-CM

## 2024-06-26 DIAGNOSIS — N83.202 CYST OF LEFT OVARY: ICD-10-CM

## 2024-06-26 DIAGNOSIS — N92.1 MENORRHAGIA WITH IRREGULAR CYCLE: ICD-10-CM

## 2024-06-26 DIAGNOSIS — R11.0 NAUSEA WITHOUT VOMITING: ICD-10-CM

## 2024-06-26 DIAGNOSIS — R10.32 LEFT LOWER QUADRANT ABDOMINAL PAIN: Primary | ICD-10-CM

## 2024-06-26 PROCEDURE — 1159F MED LIST DOCD IN RCRD: CPT | Performed by: OBSTETRICS & GYNECOLOGY

## 2024-06-26 PROCEDURE — 1160F RVW MEDS BY RX/DR IN RCRD: CPT | Performed by: OBSTETRICS & GYNECOLOGY

## 2024-06-26 PROCEDURE — 99214 OFFICE O/P EST MOD 30 MIN: CPT | Performed by: OBSTETRICS & GYNECOLOGY

## 2024-06-26 RX ORDER — ACETAMINOPHEN 500 MG
1000 TABLET ORAL EVERY 8 HOURS PRN
Qty: 60 TABLET | Refills: 0 | Status: SHIPPED | OUTPATIENT
Start: 2024-06-26

## 2024-06-26 RX ORDER — OXYCODONE HYDROCHLORIDE 5 MG/1
5 TABLET ORAL EVERY 8 HOURS PRN
Qty: 15 TABLET | Refills: 0 | Status: SHIPPED | OUTPATIENT
Start: 2024-06-26

## 2024-06-26 RX ORDER — IBUPROFEN 800 MG/1
800 TABLET ORAL EVERY 8 HOURS PRN
Qty: 30 TABLET | Refills: 0 | Status: SHIPPED | OUTPATIENT
Start: 2024-06-26

## 2024-06-26 RX ORDER — ONDANSETRON 8 MG/1
8 TABLET, ORALLY DISINTEGRATING ORAL EVERY 8 HOURS PRN
Qty: 15 TABLET | Refills: 0 | Status: SHIPPED | OUTPATIENT
Start: 2024-06-26

## 2024-06-27 NOTE — PROGRESS NOTES
Chief Complaint  Pelvic Pain (ER follow up, seen 2024, left ovarian cyst. )     History of Present Illness:  Patient is 24 y.o.  who presents to [facility] O[facility]YN here with her mother for follow-up evaluation of left lower quadrant pain.  Patient reports having worsening of her pelvic pain.  She was seen in the emergency room.  She had an ultrasound as noted.  She reports having continued pain in the left lower quadrant.  She has been having nausea as well.  She has been taking Motrin with no relief.  She does have an appointment on Saturday for her right upper quadrant ultrasound.  She has continued to have pain in the right upper quadrant as well.  She did see Dr. Cadet.  She is also to have a HIDA scan.    History  Past Medical History:   Diagnosis Date    Anemia     Glaucoma     left eye    Irritable bowel syndrome     Lactose intolerance     Seasonal allergies     Thyroid activity decreased     Umbilical hernia     Urinary tract infection     Wears glasses      Current Outpatient Medications on File Prior to Visit   Medication Sig Dispense Refill    ondansetron (ZOFRAN) 4 MG tablet Take 1 tablet by mouth Every 8 (Eight) Hours As Needed for Nausea or Vomiting.      pantoprazole (PROTONIX) 40 MG EC tablet Take 1 tablet by mouth Daily.       No current facility-administered medications on file prior to visit.     Allergies   Allergen Reactions    Latex Rash     Past Surgical History:   Procedure Laterality Date     SECTION N/A 2022    Procedure:  SECTION PRIMARY;  Surgeon: Lukas Rodirguez MD;  Location: [facility];  Service: Obstetrics/Gynecology;  Laterality: N/A;    COLONOSCOPY      FOOT/TOE TENDON REPAIR Left 2020    Procedure: RECONSTRUCTION PT TENDON LEFT;  Surgeon: Uziel Garcia DPM;  Location: [facility] OR;  Service: Podiatry    TARSAL TUNNEL RELEASE Left 2020    Procedure: EXCISION TARSAL BONE LEFT;  Surgeon: Uziel Garcia  "ISAIAH DPM;  Location: [facility] OR;  Service: Podiatry    UMBILICAL HERNIA REPAIR N/A 01/19/2023    Procedure: OPEN UMBILICAL HERNIA REPAIR;  Surgeon: Mery Cadet DO;  Location: [facility] OR;  Service: General;  Laterality: N/A;    UMBILICAL HERNIA REPAIR N/A 2/21/2024    Procedure: UMBILICAL HERNIA REVISION;  Surgeon: Mery Cadet DO;  Location: [facility] OR;  Service: General;  Laterality: N/A;    WISDOM TOOTH EXTRACTION       Family History   Problem Relation Age of Onset    Hypertension Mother     Diabetes Father     Heart attack Maternal Grandfather     Diabetes Maternal Grandfather      Social History     Socioeconomic History    Marital status: Single   Tobacco Use    Smoking status: Never     Passive exposure: Never    Smokeless tobacco: Never   Vaping Use    Vaping status: Never Used   Substance and Sexual Activity    Alcohol use: No    Drug use: No    Sexual activity: Defer       Physical Examination:  Vital Signs: /60   Ht 170.2 cm (67\")   Wt 70.3 kg (155 lb)   BMI 24.28 kg/m²     General Appearance: alert, appears stated age, and cooperative  Breasts: Not performed.  Abdomen: no masses, no hepatomegaly, no splenomegaly, soft non-tender, no guarding, and no rebound tenderness  Pelvic: Not performed.    Data Review:  The following data was reviewed by: Suni Oquendo MD on 06/26/2024:     Labs:  CBC & Differential (06/23/2024 02:22)  Comprehensive Metabolic Panel (06/23/2024 02:22)  Lipase (06/23/2024 02:22)  Lactic Acid, Plasma (06/23/2024 02:22)  Urinalysis With Microscopic If Indicated (No Culture) - Urine, Clean Catch (06/23/2024 02:30)  Pregnancy, Urine - Urine, Clean Catch (06/23/2024 02:30)  Urinalysis, Microscopic Only - Urine, Clean Catch (06/23/2024 02:30)  Imaging:  US Non-ob Transvaginal (06/23/2024 03:55)  -images reviewed and agree with interpretation  Medical Records:  ED Provider Notes by Lukas Harrison MD (06/23/2024 02:20)  Progress Notes by Mery Cadet DO (06/24/2024 " 12:40)    Assessment and Plan   1. Cyst of left ovary  Patient with complex cyst of the left ovary.  The cyst appears to be a hemorrhagic ovarian cyst.  I discussed with the patient and the mother the various options for management of her symptoms.  Patient may also need a cholecystectomy.  Prescriptions are given as noted for pain management.  Patient is to keep her ultrasound as scheduled on Saturday.  Patient is to call or go to the emergency room if worsening or changes in her symptoms.  Patient will otherwise follow-up as discussed.  Will await the results of her further testing to see if patient will need a cholecystectomy in plan a combined procedure as discussed.  Patient is in agreement with the plan.  - oxyCODONE (ROXICODONE) 5 MG immediate release tablet; Take 1 tablet by mouth Every 8 (Eight) Hours As Needed for Moderate Pain or Severe Pain.  Dispense: 15 tablet; Refill: 0    2. Left lower quadrant abdominal pain  Prescriptions are given as noted.  Instructions and precautions have been given.  Patient is to follow-up as discussed.  - ibuprofen (ADVIL,MOTRIN) 800 MG tablet; Take 1 tablet by mouth Every 8 (Eight) Hours As Needed for Mild Pain or Moderate Pain.  Dispense: 30 tablet; Refill: 0  - oxyCODONE (ROXICODONE) 5 MG immediate release tablet; Take 1 tablet by mouth Every 8 (Eight) Hours As Needed for Moderate Pain or Severe Pain.  Dispense: 15 tablet; Refill: 0  - acetaminophen (TYLENOL) 500 MG tablet; Take 2 tablets by mouth Every 8 (Eight) Hours As Needed for Mild Pain or Moderate Pain.  Dispense: 60 tablet; Refill: 0  - ondansetron ODT (ZOFRAN-ODT) 8 MG disintegrating tablet; Take 1 tablet by mouth Every 8 (Eight) Hours As Needed for Nausea or Vomiting.  Dispense: 15 tablet; Refill: 0    3. Menorrhagia with irregular cycle  Patient had repeat imaging as noted.  She is due for her menstrual cycle next week.  Patient will follow-up as discussed.    4. Right upper quadrant pain  Right upper quadrant  ultrasound as scheduled.  HIDA scan to be scheduled as well.    5. Nausea without vomiting  Prescription given for Zofran as noted.  - ondansetron ODT (ZOFRAN-ODT) 8 MG disintegrating tablet; Take 1 tablet by mouth Every 8 (Eight) Hours As Needed for Nausea or Vomiting.  Dispense: 15 tablet; Refill: 0    Follow Up/Instructions:  Follow up as noted.  Patient was given instructions and counseling regarding her condition or for health maintenance advice. Please see specific information pulled into the AVS if appropriate.     Note: Speech recognition transcription software may have been used to dictate portions of this document.  An attempt at proofreading has been made though minor errors in transcription may still be present.    This note was electronically signed.  Suni Oquendo M.D.

## 2024-06-29 ENCOUNTER — HOSPITAL ENCOUNTER (OUTPATIENT)
Facility: HOSPITAL | Age: 24
Discharge: HOME | End: 2024-06-29
Payer: COMMERCIAL

## 2024-06-29 ENCOUNTER — APPOINTMENT (OUTPATIENT)
Dept: CT IMAGING | Facility: HOSPITAL | Age: 24
End: 2024-06-29
Payer: COMMERCIAL

## 2024-06-29 ENCOUNTER — HOSPITAL ENCOUNTER (EMERGENCY)
Facility: HOSPITAL | Age: 24
Discharge: HOME | End: 2024-06-29
Attending: EMERGENCY MEDICINE
Payer: COMMERCIAL

## 2024-06-29 VITALS
OXYGEN SATURATION: 100 % | HEART RATE: 78 BPM | BODY MASS INDEX: 24.33 KG/M2 | SYSTOLIC BLOOD PRESSURE: 100 MMHG | HEIGHT: 67 IN | WEIGHT: 155 LBS | DIASTOLIC BLOOD PRESSURE: 73 MMHG | RESPIRATION RATE: 18 BRPM | TEMPERATURE: 98.4 F

## 2024-06-29 DIAGNOSIS — N83.202 LEFT OVARIAN CYST: ICD-10-CM

## 2024-06-29 DIAGNOSIS — R31.9 URINARY TRACT INFECTION WITH HEMATURIA, SITE UNSPECIFIED: Primary | ICD-10-CM

## 2024-06-29 DIAGNOSIS — N39.0 URINARY TRACT INFECTION WITH HEMATURIA, SITE UNSPECIFIED: Primary | ICD-10-CM

## 2024-06-29 DIAGNOSIS — R10.11 RIGHT UPPER QUADRANT PAIN: ICD-10-CM

## 2024-06-29 LAB
ALBUMIN SERPL-MCNC: 4.1 G/DL (ref 3.5–5.2)
ALBUMIN/GLOB SERPL: 1.3 G/DL
ALP SERPL-CCNC: 79 U/L (ref 39–117)
ALT SERPL W P-5'-P-CCNC: 60 U/L (ref 1–33)
ANION GAP SERPL CALCULATED.3IONS-SCNC: 8.4 MMOL/L (ref 5–15)
AST SERPL-CCNC: 21 U/L (ref 1–32)
B-HCG UR QL: NEGATIVE
BACTERIA UR QL AUTO: ABNORMAL /HPF
BASOPHILS # BLD AUTO: 0.05 10*3/MM3 (ref 0–0.2)
BASOPHILS NFR BLD AUTO: 0.6 % (ref 0–1.5)
BILIRUB SERPL-MCNC: 0.3 MG/DL (ref 0–1.2)
BILIRUB UR QL STRIP: NEGATIVE
BUN SERPL-MCNC: 10 MG/DL (ref 6–20)
BUN/CREAT SERPL: 11.2 (ref 7–25)
CALCIUM SPEC-SCNC: 8.8 MG/DL (ref 8.6–10.5)
CHLORIDE SERPL-SCNC: 107 MMOL/L (ref 98–107)
CLARITY UR: ABNORMAL
CO2 SERPL-SCNC: 24.6 MMOL/L (ref 22–29)
COLOR UR: YELLOW
CREAT SERPL-MCNC: 0.89 MG/DL (ref 0.57–1)
DEPRECATED RDW RBC AUTO: 40.6 FL (ref 37–54)
EGFRCR SERPLBLD CKD-EPI 2021: 93.6 ML/MIN/1.73
EOSINOPHIL # BLD AUTO: 0.11 10*3/MM3 (ref 0–0.4)
EOSINOPHIL NFR BLD AUTO: 1.3 % (ref 0.3–6.2)
ERYTHROCYTE [DISTWIDTH] IN BLOOD BY AUTOMATED COUNT: 13.6 % (ref 12.3–15.4)
GLOBULIN UR ELPH-MCNC: 3.1 GM/DL
GLUCOSE SERPL-MCNC: 103 MG/DL (ref 65–99)
GLUCOSE UR STRIP-MCNC: NEGATIVE MG/DL
HCT VFR BLD AUTO: 34.4 % (ref 34–46.6)
HGB BLD-MCNC: 11.5 G/DL (ref 12–15.9)
HGB UR QL STRIP.AUTO: NEGATIVE
HYALINE CASTS UR QL AUTO: ABNORMAL /LPF
IMM GRANULOCYTES # BLD AUTO: 0.02 10*3/MM3 (ref 0–0.05)
IMM GRANULOCYTES NFR BLD AUTO: 0.2 % (ref 0–0.5)
KETONES UR QL STRIP: NEGATIVE
LEUKOCYTE ESTERASE UR QL STRIP.AUTO: ABNORMAL
LIPASE SERPL-CCNC: 55 U/L (ref 13–60)
LYMPHOCYTES # BLD AUTO: 2.42 10*3/MM3 (ref 0.7–3.1)
LYMPHOCYTES NFR BLD AUTO: 29.5 % (ref 19.6–45.3)
MCH RBC QN AUTO: 27.4 PG (ref 26.6–33)
MCHC RBC AUTO-ENTMCNC: 33.4 G/DL (ref 31.5–35.7)
MCV RBC AUTO: 82.1 FL (ref 79–97)
MONOCYTES # BLD AUTO: 0.57 10*3/MM3 (ref 0.1–0.9)
MONOCYTES NFR BLD AUTO: 7 % (ref 5–12)
MUCOUS THREADS URNS QL MICRO: ABNORMAL /HPF
NEUTROPHILS NFR BLD AUTO: 5.03 10*3/MM3 (ref 1.7–7)
NEUTROPHILS NFR BLD AUTO: 61.4 % (ref 42.7–76)
NITRITE UR QL STRIP: NEGATIVE
NRBC BLD AUTO-RTO: 0 /100 WBC (ref 0–0.2)
PH UR STRIP.AUTO: 6.5 [PH] (ref 5–8)
PLATELET # BLD AUTO: 276 10*3/MM3 (ref 140–450)
PMV BLD AUTO: 9.8 FL (ref 6–12)
POTASSIUM SERPL-SCNC: 4.1 MMOL/L (ref 3.5–5.2)
PROT SERPL-MCNC: 7.2 G/DL (ref 6–8.5)
PROT UR QL STRIP: NEGATIVE
RBC # BLD AUTO: 4.19 10*6/MM3 (ref 3.77–5.28)
RBC # UR STRIP: ABNORMAL /HPF
REF LAB TEST METHOD: ABNORMAL
SODIUM SERPL-SCNC: 140 MMOL/L (ref 136–145)
SP GR UR STRIP: 1.02 (ref 1–1.03)
SQUAMOUS #/AREA URNS HPF: ABNORMAL /HPF
UROBILINOGEN UR QL STRIP: ABNORMAL
WBC # UR STRIP: ABNORMAL /HPF
WBC NRBC COR # BLD AUTO: 8.2 10*3/MM3 (ref 3.4–10.8)

## 2024-06-29 PROCEDURE — 96374 THER/PROPH/DIAG INJ IV PUSH: CPT

## 2024-06-29 PROCEDURE — 99285 EMERGENCY DEPT VISIT HI MDM: CPT

## 2024-06-29 PROCEDURE — 83690 ASSAY OF LIPASE: CPT | Performed by: NURSE PRACTITIONER

## 2024-06-29 PROCEDURE — 76705 ECHO EXAM OF ABDOMEN: CPT

## 2024-06-29 PROCEDURE — 85025 COMPLETE CBC W/AUTO DIFF WBC: CPT | Performed by: NURSE PRACTITIONER

## 2024-06-29 PROCEDURE — 74177 CT ABD & PELVIS W/CONTRAST: CPT

## 2024-06-29 PROCEDURE — 25010000002 KETOROLAC TROMETHAMINE PER 15 MG: Performed by: NURSE PRACTITIONER

## 2024-06-29 PROCEDURE — 25510000001 IOPAMIDOL 61 % SOLUTION: Performed by: EMERGENCY MEDICINE

## 2024-06-29 PROCEDURE — 80053 COMPREHEN METABOLIC PANEL: CPT | Performed by: NURSE PRACTITIONER

## 2024-06-29 PROCEDURE — 36415 COLL VENOUS BLD VENIPUNCTURE: CPT

## 2024-06-29 PROCEDURE — 81001 URINALYSIS AUTO W/SCOPE: CPT | Performed by: NURSE PRACTITIONER

## 2024-06-29 PROCEDURE — 81025 URINE PREGNANCY TEST: CPT | Performed by: NURSE PRACTITIONER

## 2024-06-29 RX ORDER — KETOROLAC TROMETHAMINE 30 MG/ML
30 INJECTION, SOLUTION INTRAMUSCULAR; INTRAVENOUS ONCE
Status: COMPLETED | OUTPATIENT
Start: 2024-06-29 | End: 2024-06-29

## 2024-06-29 RX ORDER — CEFDINIR 300 MG/1
300 CAPSULE ORAL 2 TIMES DAILY
Qty: 14 CAPSULE | Refills: 0 | Status: SHIPPED | OUTPATIENT
Start: 2024-06-29 | End: 2024-07-06

## 2024-06-29 RX ADMIN — IOPAMIDOL 100 ML: 612 INJECTION, SOLUTION INTRAVENOUS at 20:23

## 2024-06-29 RX ADMIN — KETOROLAC TROMETHAMINE 30 MG: 30 INJECTION, SOLUTION INTRAMUSCULAR; INTRAVENOUS at 21:08

## 2024-06-30 NOTE — ED PROVIDER NOTES
Subjective:  History of Present Illness:    Patient is a 24-year-old female with history of left ovarian cyst.  Patient has been seen at this ER multiple times and outlying ERs for abdominal pain over the last couple weeks.  She reports that she had a right upper quadrant ultrasound earlier today for gallbladder evaluation.  However shortly after that appointment she started having left lower quadrant pain and has been having pain in the left lower quadrant that is different than regular left lower quadrant pain for the last 2 hours.  She denies fever.  Denies changes in bowel habit.  Does report dysuria denies frequency or hematuria.  Denies OTC medication home remedy.    Nurses Notes reviewed and agree, including vitals, allergies, social history and prior medical history.     REVIEW OF SYSTEMS: All systems reviewed and not pertinent unless noted.  Review of Systems   Gastrointestinal:  Positive for abdominal pain.   All other systems reviewed and are negative.      Past Medical History:   Diagnosis Date    Anemia     Glaucoma     left eye    Irritable bowel syndrome     Lactose intolerance     Seasonal allergies     Thyroid activity decreased     Umbilical hernia     Urinary tract infection     Wears glasses        Allergies:    Latex      Past Surgical History:   Procedure Laterality Date     SECTION N/A 2022    Procedure:  SECTION PRIMARY;  Surgeon: Lukas Rodriguez MD;  Location: [facility] OR;  Service: Obstetrics/Gynecology;  Laterality: N/A;    COLONOSCOPY      FOOT/TOE TENDON REPAIR Left 2020    Procedure: RECONSTRUCTION PT TENDON LEFT;  Surgeon: Uziel Garcia DPM;  Location: [facility] OR;  Service: Podiatry    TARSAL TUNNEL RELEASE Left 2020    Procedure: EXCISION TARSAL BONE LEFT;  Surgeon: Uziel Garcia DPM;  Location: [facility] OR;  Service: Podiatry    UMBILICAL HERNIA REPAIR N/A 2023    Procedure: OPEN UMBILICAL HERNIA REPAIR;  Surgeon: Helio  "Mery BRICENO DO;  Location: [facility] OR;  Service: General;  Laterality: N/A;    UMBILICAL HERNIA REPAIR N/A 2/21/2024    Procedure: UMBILICAL HERNIA REVISION;  Surgeon: Mery Cadet DO;  Location: [facility] OR;  Service: General;  Laterality: N/A;    WISDOM TOOTH EXTRACTION           Social History     Socioeconomic History    Marital status: Single   Tobacco Use    Smoking status: Never     Passive exposure: Never    Smokeless tobacco: Never   Vaping Use    Vaping status: Never Used   Substance and Sexual Activity    Alcohol use: No    Drug use: No    Sexual activity: Defer         Family History   Problem Relation Age of Onset    Hypertension Mother     Diabetes Father     Heart attack Maternal Grandfather     Diabetes Maternal Grandfather        Objective  Physical Exam:  /73 (BP Location: Left arm, Patient Position: Sitting)   Pulse 78   Temp 98.4 °F (36.9 °C) (Oral)   Resp 18   Ht 170.2 cm (67\")   Wt 70.3 kg (155 lb)   LMP 06/04/2024 (Exact Date)   SpO2 100%   BMI 24.28 kg/m²      Physical Exam  Vitals and nursing note reviewed.   Constitutional:       Appearance: She is well-developed and normal weight.   HENT:      Head: Normocephalic and atraumatic.      Mouth/Throat:      Mouth: Mucous membranes are moist.      Pharynx: Oropharynx is clear.   Eyes:      Extraocular Movements: Extraocular movements intact.      Pupils: Pupils are equal, round, and reactive to light.   Cardiovascular:      Rate and Rhythm: Normal rate and regular rhythm.   Pulmonary:      Effort: Pulmonary effort is normal.      Breath sounds: Normal breath sounds.   Abdominal:      General: Abdomen is flat. Bowel sounds are normal.      Palpations: Abdomen is soft.      Tenderness:  in the left lower quadrant   Skin:     General: Skin is warm and dry.      Capillary Refill: Capillary refill takes less than 2 seconds.   Neurological:      General: No focal deficit present.      Mental Status: She is alert and oriented to person, " place, and time.   Psychiatric:         Mood and Affect: Mood normal.         Behavior: Behavior normal.         Procedures    ED Course:    ED Course as of 06/29/24 2154   Sat Jun 29, 2024 2128 Left lower quadrant abdominal pain, patient has known history of cyst, is been imaged multiple times.  Patient's been here for abdominal pain multiple visits.  Patient is improving cyst.  Do feel the abdominal pain is most likely secondary to the cyst.  Patient does demonstrate concerns for WBCs and trace bacteria with leukocyte esterases and urine, granted there is large amount of squamous epithelium, do feel this is more contaminated than infectious process.  Will treat patient due to her concerns of dysuria and clinical findings of UTI. [CR]      ED Course User Index  [CR] Uziel Zhang,        Lab Results (last 24 hours)       Procedure Component Value Units Date/Time    CBC Auto Differential [[id number]]  (Abnormal) Collected: 06/29/24 1943    Specimen: Blood Updated: 06/29/24 1956     WBC 8.20 10*3/mm3      RBC 4.19 10*6/mm3      Hemoglobin 11.5 g/dL      Hematocrit 34.4 %      MCV 82.1 fL      MCH 27.4 pg      MCHC 33.4 g/dL      RDW 13.6 %      RDW-SD 40.6 fl      MPV 9.8 fL      Platelets 276 10*3/mm3      Neutrophil % 61.4 %      Lymphocyte % 29.5 %      Monocyte % 7.0 %      Eosinophil % 1.3 %      Basophil % 0.6 %      Immature Grans % 0.2 %      Neutrophils, Absolute 5.03 10*3/mm3      Lymphocytes, Absolute 2.42 10*3/mm3      Monocytes, Absolute 0.57 10*3/mm3      Eosinophils, Absolute 0.11 10*3/mm3      Basophils, Absolute 0.05 10*3/mm3      Immature Grans, Absolute 0.02 10*3/mm3      nRBC 0.0 /100 WBC     Comprehensive Metabolic Panel [[id number]]  (Abnormal) Collected: 06/29/24 1943    Specimen: Blood Updated: 06/29/24 2016     Glucose 103 mg/dL      BUN 10 mg/dL      Creatinine 0.89 mg/dL      Sodium 140 mmol/L      Potassium 4.1 mmol/L      Chloride 107 mmol/L      CO2 24.6 mmol/L      Calcium  8.8 mg/dL      Total Protein 7.2 g/dL      Albumin 4.1 g/dL      ALT (SGPT) 60 U/L      AST (SGOT) 21 U/L      Alkaline Phosphatase 79 U/L      Total Bilirubin 0.3 mg/dL      Globulin 3.1 gm/dL      A/G Ratio 1.3 g/dL      BUN/Creatinine Ratio 11.2     Anion Gap 8.4 mmol/L      eGFR 93.6 mL/min/1.73     Narrative:      GFR Normal >60  Chronic Kidney Disease <60  Kidney Failure <15      Lipase [[id number]]  (Normal) Collected: 06/29/24 1943    Specimen: Blood Updated: 06/29/24 2016     Lipase 55 U/L     Urinalysis With Microscopic If Indicated (No Culture) - Urine, Clean Catch [[id number]]  (Abnormal) Collected: 06/29/24 1945    Specimen: Urine, Clean Catch Updated: 06/29/24 2000     Color, UA Yellow     Appearance, UA Cloudy     pH, UA 6.5     Specific Gravity, UA 1.020     Glucose, UA Negative     Ketones, UA Negative     Bilirubin, UA Negative     Blood, UA Negative     Protein, UA Negative     Leuk Esterase, UA Moderate (2+)     Nitrite, UA Negative     Urobilinogen, UA 1.0 E.U./dL    Pregnancy, Urine - Urine, Clean Catch [[id number]]  (Normal) Collected: 06/29/24 1945    Specimen: Urine, Clean Catch Updated: 06/29/24 2002     HCG, Urine QL Negative    Urinalysis, Microscopic Only - Urine, Clean Catch [[id number]]  (Abnormal) Collected: 06/29/24 1945    Specimen: Urine, Clean Catch Updated: 06/29/24 2023     RBC, UA 0-2 /HPF      WBC, UA 6-10 /HPF      Bacteria, UA Trace /HPF      Squamous Epithelial Cells, UA 13-20 /HPF      Hyaline Casts, UA None Seen /LPF      Mucus, UA Trace /HPF      Methodology Manual Light Microscopy             CT Abdomen Pelvis With Contrast    Result Date: 6/29/2024  PROCEDURE: CT ABDOMEN PELVIS W CONTRAST-  TECHNIQUE: IV contrast enhanced exam  HISTORY: Left lower quadrant abdominal pain  COMPARISON: 6/7/2024.  FINDINGS:  ABDOMEN: Solid organs are normal. Gallbladder is contracted. There is no bowel obstruction. There is no free fluid.  PELVIS: Pelvic bowel loops have a normal appearance.  Uterus is mildly heterogeneous compatible with small fibroids at the uterine fundus. Right ovary is normal. A dominant benign cyst of the left ovary is noted. The left ovarian lesion measures 36 mm. Previously the left ovarian lesion measured 49 mm. There is no free fluid. Appendix is not visualized.      Impression: 1. Decreased size of dominant benign-appearing follicular cyst of the left ovary compatible with benign etiology. This has been recently evaluated by ultrasound on 2 separate occasions in the past month. No further ultrasound is considered necessary based on interval size reduction 2. No evidence of bowel obstruction or acute inflammatory change   This study was performed with techniques to keep radiation doses as low as reasonably achievable (ALARA). Individualized dose reduction techniques using automated exposure control or adjustment of vA and/or kV according to the patient size were employed.  This report was signed and finalized on 2024 8:45 PM by Olman Heller MD.      US Abdomen Limited    Result Date: 2024  US ABDOMEN LIMITED Date of Exam: 2024 1:27 PM EDT Indication: RUQ PAIN. Comparison: CT 2024 and prior CTs and ultrasounds Technique: Grayscale and color Doppler ultrasound evaluation of the right upper quadrant was performed. FINDINGS: Liver: The liver demonstrates normal echogenicity without evidence of intrahepatic biliary ductal dilatation. Hepatic vein and portal vein are patent with normal directional flow Biliary System: Gallbladder demonstrates dependent echogenicities compatible with small stones and debris, sludge. No significant wall thickening or para cholecystic fluid. Patient reports no sonographic Tavera sign. No pericholecystic fluid noted. Common bile duct is within normal limits measurin mm Right Kidney: The right kidney is grossly unremarkable without evidence of hydronephrosis. Pancreas: Visualized portions of the pancreas are unremarkable. Other: No  evidence of right upper quadrant ascites.     Impression: 1. Cholelithiasis without sonographic evidence of acute cholecystitis. 2. Otherwise unremarkable right upper quadrant ultrasound. Electronically Signed: Adams Zazueta MD  6/29/2024 2:40 PM EDT  Workstation ID: [id number]        MDM      Initial impression of presenting illness: Patient is a 24-year-old female with history of left ovarian cyst.  Patient has been seen at this ER multiple times and outlying ERs for abdominal pain over the last couple weeks.  She reports that she had a right upper quadrant ultrasound earlier today for gallbladder evaluation.  However shortly after that appointment she started having left lower quadrant pain and has been having pain in the left lower quadrant that is different than regular left lower quadrant pain for the last 2 hours.  She denies fever.  Denies changes in bowel habit.  Does report dysuria denies frequency or hematuria.  Denies OTC medication home remedy.    DDX: includes but is not limited to: Ovarian cyst, constipation, diverticulitis, colitis or other    Patient arrives stable with vitals interpreted by myself.     Pertinent features from physical exam: Lung sounds are clear bilaterally throughout.  Abdomen is soft.  There is tenderness with palpation left lower quadrant.  Bowel sounds normal.  Heart sounds normal..    Initial diagnostic plan: CBC, CMP, lipase, urinalysis, CT abdomen pelvis with contrast    Results from initial plan were reviewed and interpreted by me revealing CBC is within appropriate range.  CMP is within appropriate range.  Urinalysis is with leuks and trace bacteria.  There is quite a bit of squamous epithelial cells.  Lipase is negative.  CT abdomen pelvis with the following impression decreased size of dominant benign-appearing follicular cyst of the left ovary compatible with benign etiology.  This is been recently evaluated by ultrasound on 2 separate occasions in the past month.  No  further ultrasound is considered necessary based on interval reduction.  No evidence of bowel obstruction or acute inflammatory change.  Urine pregnancy is negative.    Diagnostic information from other sources: Chart review    Interventions / Re-evaluation: Vital signs stable throughout encounter.  Patient received 30 mg Toradol.    Results/clinical rationale were discussed with patient    Consultations/Discussion of results with other physicians: dr dahl    Disposition plan: Patient is hemodynamically stable nontoxic-appearing appropriate discharge.  Will send patient home with cefdinir.  Have her follow-up with her PCP within the week.  Follow-up ER for new or worsening symptoms.  -----        Final diagnoses:   Urinary tract infection with hematuria, site unspecified   Left ovarian cyst          Bunny Arita, APRN  06/29/24 [id number]

## 2024-06-30 NOTE — DISCHARGE INSTRUCTIONS
Follow-up with your PCP within the week.  Follow-up ER for new or worse symptoms.  CT results show improving/decreasing size of left ovarian cyst.  Otherwise unremarkable.  Urinalysis was consistent with urinary tract infection.  Other lab work is benign.

## 2024-07-01 ENCOUNTER — TELEPHONE (OUTPATIENT)
Dept: OBSTETRICS AND GYNECOLOGY | Facility: CLINIC | Age: 24
End: 2024-07-01

## 2024-07-01 ENCOUNTER — TELEPHONE (OUTPATIENT)
Dept: OBSTETRICS AND GYNECOLOGY | Facility: CLINIC | Age: 24
End: 2024-07-01
Payer: COMMERCIAL

## 2024-07-01 NOTE — TELEPHONE ENCOUNTER
Patient was told to call you this morning to discuss test results re: ovarian cyst and Gallbladder. The gallbladder ultrasound was done on 06/29/2024 at [facility] but the results are not in the chart.

## 2024-07-01 NOTE — TELEPHONE ENCOUNTER
Provider: DR. GREENFIELD    Caller: ARIELLE DOAN    Relationship to Patient: SELF    Pharmacy:     Phone Number: [phone]    Reason for Call: PT WOULD LIKE DR. GREENFIELD TO KNOW THAT THE RESULTS FROM THE U/S OF GALLBLADDER ARE UNKNOWN AND OFFICE ADV THEY DO NOT KNOW WHEN THEY WILL BE READY. HOWEVER, PT IS HAVING SEVERE OVARY PAIN    When was the patient last seen: 06-26-24

## 2024-07-03 ENCOUNTER — TELEPHONE (OUTPATIENT)
Dept: SURGERY | Facility: CLINIC | Age: 24
End: 2024-07-03
Payer: COMMERCIAL

## 2024-07-03 ENCOUNTER — TELEPHONE (OUTPATIENT)
Dept: OBSTETRICS AND GYNECOLOGY | Facility: CLINIC | Age: 24
End: 2024-07-03
Payer: COMMERCIAL

## 2024-07-03 NOTE — TELEPHONE ENCOUNTER
Patient called stating that she had a gallbladder US performed on 6/29/24. Please review per patient request.

## 2024-07-03 NOTE — TELEPHONE ENCOUNTER
Patient called this morning stating she received her gallbladder results, and would like you to review them.

## 2024-07-08 DIAGNOSIS — R10.11 RUQ ABDOMINAL PAIN: Primary | ICD-10-CM

## 2024-07-08 DIAGNOSIS — K80.20 SYMPTOMATIC CHOLELITHIASIS: ICD-10-CM

## 2024-07-08 RX ORDER — SODIUM CHLORIDE 9 MG/ML
40 INJECTION, SOLUTION INTRAVENOUS AS NEEDED
OUTPATIENT
Start: 2024-07-08

## 2024-07-08 RX ORDER — SODIUM CHLORIDE 0.9 % (FLUSH) 0.9 %
10 SYRINGE (ML) INJECTION EVERY 12 HOURS SCHEDULED
OUTPATIENT
Start: 2024-07-08

## 2024-07-08 RX ORDER — HEPARIN SODIUM 5000 [USP'U]/ML
5000 INJECTION, SOLUTION INTRAVENOUS; SUBCUTANEOUS ONCE
OUTPATIENT
Start: 2024-07-08

## 2024-07-08 RX ORDER — SODIUM CHLORIDE, SODIUM LACTATE, POTASSIUM CHLORIDE, CALCIUM CHLORIDE 600; 310; 30; 20 MG/100ML; MG/100ML; MG/100ML; MG/100ML
50 INJECTION, SOLUTION INTRAVENOUS CONTINUOUS
OUTPATIENT
Start: 2024-07-08

## 2024-07-08 RX ORDER — INDOCYANINE GREEN AND WATER 25 MG
2.5 KIT INJECTION ONCE
OUTPATIENT
Start: 2024-07-08 | End: 2024-07-08

## 2024-07-08 RX ORDER — SODIUM CHLORIDE 0.9 % (FLUSH) 0.9 %
10 SYRINGE (ML) INJECTION AS NEEDED
OUTPATIENT
Start: 2024-07-08

## 2024-07-08 NOTE — TELEPHONE ENCOUNTER
Spoke with patient, she stated that she wants to do robo lap jaxon ASAP. She is unable to hold food or liquids down.

## 2024-07-08 NOTE — TELEPHONE ENCOUNTER
SPOKE WITH PT GAVE HER THE SURGERY DATE 07/16/2024 @ [facility], PAT IS 07/10/2024 @ 10:00.

## 2024-07-09 ENCOUNTER — OFFICE VISIT (OUTPATIENT)
Dept: OBSTETRICS AND GYNECOLOGY | Facility: CLINIC | Age: 24
End: 2024-07-09
Payer: COMMERCIAL

## 2024-07-09 ENCOUNTER — TELEPHONE (OUTPATIENT)
Dept: PREADMISSION TESTING | Facility: HOSPITAL | Age: 24
End: 2024-07-09

## 2024-07-09 VITALS
DIASTOLIC BLOOD PRESSURE: 60 MMHG | HEIGHT: 67 IN | SYSTOLIC BLOOD PRESSURE: 120 MMHG | WEIGHT: 153 LBS | BODY MASS INDEX: 24.01 KG/M2

## 2024-07-09 DIAGNOSIS — R10.32 LEFT LOWER QUADRANT ABDOMINAL PAIN: ICD-10-CM

## 2024-07-09 DIAGNOSIS — N83.202 CYST OF LEFT OVARY: Primary | ICD-10-CM

## 2024-07-09 DIAGNOSIS — R10.11 RIGHT UPPER QUADRANT PAIN: ICD-10-CM

## 2024-07-09 DIAGNOSIS — N92.1 MENORRHAGIA WITH IRREGULAR CYCLE: ICD-10-CM

## 2024-07-09 PROCEDURE — 1159F MED LIST DOCD IN RCRD: CPT | Performed by: OBSTETRICS & GYNECOLOGY

## 2024-07-09 PROCEDURE — 1160F RVW MEDS BY RX/DR IN RCRD: CPT | Performed by: OBSTETRICS & GYNECOLOGY

## 2024-07-09 PROCEDURE — 99214 OFFICE O/P EST MOD 30 MIN: CPT | Performed by: OBSTETRICS & GYNECOLOGY

## 2024-07-10 ENCOUNTER — TELEPHONE (OUTPATIENT)
Dept: SURGERY | Facility: CLINIC | Age: 24
End: 2024-07-10
Payer: COMMERCIAL

## 2024-07-10 NOTE — PROGRESS NOTES
Chief Complaint  Follow-up (Transvaginal ultrasound-left ovarian cyst. )     History of Present Illness:  Patient is 24 y.o.  who presents to [facility] for follow-up evaluation of her left lower quadrant pain and left ovarian cyst.  Patient reports she continues to have left lower quadrant pain but not as severe.  Patient did have another menstrual cycle on .  She is not bleeding at present.  She did have a telephone follow-up with Dr. Cadet.  She is scheduled to have cholecystectomy.  She did have her right upper quadrant ultrasound.  She has continued to have right upper quadrant pain.  Patient has been seen in the emergency room x 2 since her last visit as noted.  She did have CT scan as well.    History  Past Medical History:   Diagnosis Date    Anemia     Glaucoma     left eye    Irritable bowel syndrome     Lactose intolerance     Seasonal allergies     Thyroid activity decreased     Umbilical hernia     Urinary tract infection     Wears glasses      Current Outpatient Medications on File Prior to Visit   Medication Sig Dispense Refill    acetaminophen (TYLENOL) 500 MG tablet Take 2 tablets by mouth Every 8 (Eight) Hours As Needed for Mild Pain or Moderate Pain. 60 tablet 0    ibuprofen (ADVIL,MOTRIN) 800 MG tablet Take 1 tablet by mouth Every 8 (Eight) Hours As Needed for Mild Pain or Moderate Pain. 30 tablet 0    ondansetron (ZOFRAN) 4 MG tablet Take 1 tablet by mouth Every 8 (Eight) Hours As Needed for Nausea or Vomiting.      ondansetron ODT (ZOFRAN-ODT) 8 MG disintegrating tablet Take 1 tablet by mouth Every 8 (Eight) Hours As Needed for Nausea or Vomiting. 15 tablet 0    oxyCODONE (ROXICODONE) 5 MG immediate release tablet Take 1 tablet by mouth Every 8 (Eight) Hours As Needed for Moderate Pain or Severe Pain. 15 tablet 0    pantoprazole (PROTONIX) 40 MG EC tablet Take 1 tablet by mouth Daily.       No current facility-administered medications on file prior to  "visit.     Allergies   Allergen Reactions    Latex Rash     Past Surgical History:   Procedure Laterality Date     SECTION N/A 2022    Procedure:  SECTION PRIMARY;  Surgeon: Lukas Rodriguez MD;  Location: [facility] OR;  Service: Obstetrics/Gynecology;  Laterality: N/A;    COLONOSCOPY      FOOT/TOE TENDON REPAIR Left 2020    Procedure: RECONSTRUCTION PT TENDON LEFT;  Surgeon: Uziel Garcia DPM;  Location: [facility] OR;  Service: Podiatry    TARSAL TUNNEL RELEASE Left 2020    Procedure: EXCISION TARSAL BONE LEFT;  Surgeon: Uziel Garcia DPM;  Location: [facility] OR;  Service: Podiatry    UMBILICAL HERNIA REPAIR N/A 2023    Procedure: OPEN UMBILICAL HERNIA REPAIR;  Surgeon: Mery Cadet DO;  Location: [facility] OR;  Service: General;  Laterality: N/A;    UMBILICAL HERNIA REPAIR N/A 2024    Procedure: UMBILICAL HERNIA REVISION;  Surgeon: Mery Cadet DO;  Location: [facility] OR;  Service: General;  Laterality: N/A;    WISDOM TOOTH EXTRACTION       Family History   Problem Relation Age of Onset    Hypertension Mother     Diabetes Father     Heart attack Maternal Grandfather     Diabetes Maternal Grandfather      Social History     Socioeconomic History    Marital status: Single   Tobacco Use    Smoking status: Never     Passive exposure: Never    Smokeless tobacco: Never   Vaping Use    Vaping status: Never Used   Substance and Sexual Activity    Alcohol use: No    Drug use: No    Sexual activity: Defer       Physical Examination:  Vital Signs: /60   Ht 170.2 cm (67\")   Wt 69.4 kg (153 lb)   BMI 23.96 kg/m²     General Appearance: alert, appears stated age, and cooperative  Breasts: Not performed.  Abdomen: no masses, no hepatomegaly, no splenomegaly, soft non-tender, no guarding, and no rebound tenderness  Pelvic: Not performed.    Data Review:  The following data was reviewed by: Suni Oquendo MD on 2024:     Labs:    Imaging:  US " Abdomen Limited (2024 13:53)   CT Abdomen Pelvis With Contrast (2024 20:23)   US Non-ob Transvaginal (2024 14:42)   Medical Records:  ED Provider Notes by Bunny Arita APRN (2024 21:48)   Nisha Nicole PA-C  Physician Assistant  Emergency Medicine     ED Provider Notes     Signed     Date of Service: 24  Creation Time: 24  Note Received: 07/10/24 0718     Signed       Subjective   Chief Complaint: Pelvic Pain        24-year-old female with a known left ovarian cyst, uterine fibroids and gallstones here for acute left lower quadrant pain, leakage of pink-colored fluid and vaginal bleeding after having sex around 11:00 this morning.  She denies trauma to the vaginal area.  She is concerned that the ovarian cyst ruptured.  She was seen here several times for similar pain.  The last CT scan on 2024 revealed fibroids on her uterus, 36 mm left ovarian cyst.  She has been taking oxycodone 5 mg, Tylenol, ibuprofen 800 and Zofran at home.  She sees Dr. Oquendo in [facility] as her gynecologist.  She does complain of some burning with urination and states that her bleeding today is early.  She is supposed to start her period on .        History provided by:  Patient   used: No    Pelvic PainOnset time: Weeks, worse today around 11 AM.  Location:  Left lower quadrant  Quality:  Sharp  Severity:  Moderate  Onset quality:  Gradual  Duration: Weeks, worse today.  Timing:  Constant  Progression:  Worsening  Chronicity:  New  Context:  History of left ovarian cyst  Relieved by:  Nothing  Worsened by:  Palpation, sex  Ineffective treatments:  Oxycodone, ibuprofen  Associated symptoms: abdominal pain and nausea          Patient History        Past Medical History:   Diagnosis Date    Thyroid disease                 Past Surgical History:   Procedure Laterality Date     SECTION        FRACTURE SURGERY        HERNIA REPAIR             History  "reviewed. No pertinent family history.     Social History            Tobacco Use    Smoking status: Never       Passive exposure: Never    Smokeless tobacco: Never   Substance Use Topics    Alcohol use: Never         I reviewed the HPI, ROS and PFSH documentation recorded by others in the medical record and supplemented my note as needed.     Review of Systems   Review of Systems   Gastrointestinal: Positive for abdominal pain and nausea.   Genitourinary: Positive for dysuria, pelvic pain and vaginal bleeding.   All other systems reviewed and are negative.        Physical Exam       ED Triage Vitals [07/02/24 2130]   Enc Vitals Group      /70      Pulse 81      Resp 18      Temp 97.8 °F (36.6 °C)      Temp src Tympanic      SpO2 99 %      Weight 74.4 kg (164 lb)      Height 1.702 m (5' 7\")      Head Circumference        Peak Flow        Pain Score Eight      Pain Loc        Pain Edu?        Excl. in GC?        Physical Exam  Vitals and nursing note reviewed.   Constitutional:       General: She is not in acute distress.     Appearance: Normal appearance. She is not ill-appearing.   HENT:      Head: Normocephalic and atraumatic.      Nose: Nose normal.   Eyes:      Extraocular Movements: Extraocular movements intact.      Pupils: Pupils are equal, round, and reactive to light.   Cardiovascular:      Rate and Rhythm: Normal rate and regular rhythm.      Heart sounds: Normal heart sounds.   Pulmonary:      Effort: Pulmonary effort is normal.      Breath sounds: Normal breath sounds.   Abdominal:      Palpations: Abdomen is soft.      Tenderness: There is abdominal tenderness.      Comments: Left lower quadrant is tender to palpation   Musculoskeletal:         General: Normal range of motion.      Cervical back: Normal range of motion and neck supple.   Skin:     General: Skin is warm and dry.   Neurological:      General: No focal deficit present.      Mental Status: She is alert and oriented to person, place, " and time. Mental status is at baseline.   Psychiatric:         Mood and Affect: Mood normal.         Thought Content: Thought content normal.         Judgment: Judgment normal.         Neurologic Exam      Mental Status   Oriented to person, place, and time.      Cranial Nerves      CN III, IV, VI   Pupils are equal, round, and reactive to light.     Ortho Exam     ED Course & MDM   Medications   ketorolac (TORADOL) injection 30 mg (30 mg intravenous Given 7/2/24 2202)            Results for orders placed or performed during the hospital encounter of 07/02/24   Pregnancy Screen, urine   Result Value Ref Range     Preg Test, Ur Negative Negative, Inconclusive   Urinalysis, Reflex Microscopic and Culture If Indicated   Result Value Ref Range     Color, UA Yellow       Clarity, UA Clear       Specific Gravity, UA 1.020 1.001 - 1.030     pH, UA 6.5 6.0 - 7.5     Leukocytes, UA Negative Negative     Nitrite, UA Negative Negative     Protein, UA Negative Negative     Glucose, UA Negative Negative     Ketones, UA Negative Negative     Bilirubin, UA Negative Negative     Blood, UA 3+ (A) Negative     Urobilinogen, UA 1.0 mg/dL Normal     Specimen Source Urine, Voided     Urinalysis Microscopic Only   Result Value Ref Range     WBC, UA 0-5 None Seen, 0-5 /HPF     RBC, UA 20-50 (A) None Seen, 0-2 /HPF     Mucus 2+ (A) (none)     SQUAMOUS EPITHELIAL 2-5 (A) None Seen /HPF   CBC with Auto Diff   Result Value Ref Range     WBC 7.0 4.0 - 10.0 K/µL     RBC 4.21 3.93 - 5.22 M/µL     Hemoglobin 11.0 (L) 11.2 - 15.7 GM/DL     Hematocrit 34.7 34.1 - 44.9 %     MCV 82 79 - 95 fL     MCH 26.1 25.6 - 32.2 pg     MCHC 31.7 (L) 32.3 - 36.5 GM/DL     RDW 13.4 11.5 - 14.5 %     Platelets 297 182 - 369 K/CU MM     MPV 9.6 9.4 - 12.4 fL     Nucleated Red Blood Cell 0.0 0 - 0.2 %     % Neutros 65 42 - 75 %     % Lymphs 27 19 - 52 %     % Monos 7 5 - 13 %     % Eos 1 1 - 7 %     % Baso 1 0 - 2 %     NRBC Absolute  <0.01 0 - 0.012 K/ul     #  Neutros 4.55 1.56 - 6.13 K/µL     # Lymphs 1.92 K/µL     # Monos 0.46 0.24 - 0.82 K/µL     # Eos 0.04 0.00 - 6.00 K/µL     # Baso 0.04 0.01 - 0.08 K/µL     Immature Granulocytes-Relative 0.10 %     # IG 0.01 0.00 - 0.05 K/uL   Comprehensive metabolic panel   Result Value Ref Range     Sodium 138 136 - 145 meq/L     Potassium 3.8 3.5 - 5.1 meq/L     Chloride 102 98 - 107 meq/L     CO2 28 21 - 32 meq/L     Calcium 8.9 8.5 - 10.1 mg/dL     Glucose 106 (H) 74 - 99 mg/dL     BUN 10 7 - 18 mg/dL     Creatinine 1.00 0.55 - 1.02 mg/dL     Albumin 3.8 3.4 - 5.0 g/dL     Alkaline Phosphatase 74 50 - 136 U/L     ALT 46 12 - 78 U/L     AST 15 15 - 37 U/L     Total Bilirubin 0.4 0.2 - 1.0 mg/dL     Protein, Total 7.7 6.4 - 8.2 gm/dL     Anion Gap 12 11 - 21     Globulin 3.9 0.4 - 4.9 g/dL     Osmolality Calc 275.1       eGFR (mL/min/1.73m2) >60 >=60 mL/min/1.73m2      No orders to display          ED Course as of 07/02/24 2232   Tue Jul 02, 2024 2229 Dr. Talbot: I saw the patient face-to-face.  I performed a substantive portion of the MDM.  My differential diagnosis includes ovarian cyst, ovarian torsion, UTI, ectopic.  Pt is well appearing with normal vitals.  I reviewed [facility] from [facility].  She has had multiple CT scans and ultrasounds for this ovarian cyst.  She is laying comfortably in the bed and the cyst was only 3 cm.  Low suspicion for torsion.  Pain improved with toradol.  Will dc.  Patient already has follow up   [CD]       ED Course User Index  [CD] Mariposa Talbot MD            Procedures  Medical Decision Making  Left lower quadrant abdominal pain: acute illness or injury  Left ovarian cyst: acute illness or injury  Vaginal bleeding: acute illness or injury  Amount and/or Complexity of Data Reviewed  Labs: ordered. Decision-making details documented in ED Course.        Risk  Prescription drug management.                   Assessment & Plan            Clinical Impression       Diagnosis  "Comment Added By Time Added     Left lower quadrant abdominal pain   Nisha Nicole PA-C 7/2/2024 10:29 PM     Left ovarian cyst   Nisha Nicole PA-C 7/2/2024 10:30 PM     Vaginal bleeding   Nisha Nicole PA-C 7/2/2024 10:30 PM                Disposition  Discharge [1] - 7/2/2024 10:31 PM         New Prescriptions     No medications on file              Contact information for follow-up      Suni Oquendo MD   Specialty: Obstetrics and Gynecology    [place] [id number] [facility]  [id number]  [facility] [id number]   Phone: [phone]        Next Steps: Follow up in 1 day(s)                \"Electronically Signed By\"     Nisha Nicole PA-C  07/02/24 2232               Electronically signed by Mariposa Rodriguez MD at 07/02/24 2246      Hospital Encounter on 7/2/2024 Note shared with patient in the original system    Received From: [facility]     Assessment and Plan   1. Cyst of left ovary  Patient with resolving left ovarian cyst.  Transvaginal ultrasound is obtained today.  Patient has been informed regarding those findings.  Instructions and precautions have been given.  Patient is to follow-up with repeat imaging as discussed.  - US Non-ob Transvaginal; Future    2. Left lower quadrant abdominal pain  Improving left lower quadrant pain.  Instructions and precautions have been given.  Patient is to follow-up with repeat imaging as discussed.  I have informed the patient regarding her resolving left ovarian cyst.  - US Non-ob Transvaginal; Future    3. Menorrhagia with irregular cycle  Discussed with patient the various options for management of her menorrhagia.  Instructions and precautions have been given.  Patient did have labs as noted.  Patient is to follow-up as discussed.    4. Right upper quadrant pain  Continued right upper quadrant pain.  Her ultrasound did show cholelithiasis.  She is scheduled for cholecystectomy as noted.    Follow Up/Instructions:  Follow up as noted.  Patient " was given instructions and counseling regarding her condition or for health maintenance advice. Please see specific information pulled into the AVS if appropriate.     Note: Speech recognition transcription software may have been used to dictate portions of this document.  An attempt at proofreading has been made though minor errors in transcription may still be present.    This note was electronically signed.  Suni Oquendo M.D.

## 2024-07-12 ENCOUNTER — TELEPHONE (OUTPATIENT)
Dept: SURGERY | Facility: CLINIC | Age: 24
End: 2024-07-12
Payer: COMMERCIAL

## 2024-07-18 ENCOUNTER — TELEPHONE (OUTPATIENT)
Dept: SURGERY | Facility: CLINIC | Age: 24
End: 2024-07-18
Payer: COMMERCIAL

## 2024-07-24 ENCOUNTER — HOSPITAL ENCOUNTER (OUTPATIENT)
Facility: HOSPITAL | Age: 24
Setting detail: HOSPITAL OUTPATIENT SURGERY
Discharge: HOME | End: 2024-07-24
Attending: STUDENT IN AN ORGANIZED HEALTH CARE EDUCATION/TRAINING PROGRAM | Admitting: STUDENT IN AN ORGANIZED HEALTH CARE EDUCATION/TRAINING PROGRAM
Payer: COMMERCIAL

## 2024-07-24 ENCOUNTER — ANESTHESIA EVENT (OUTPATIENT)
Dept: PERIOP | Facility: HOSPITAL | Age: 24
End: 2024-07-24
Payer: COMMERCIAL

## 2024-07-24 ENCOUNTER — ANESTHESIA (OUTPATIENT)
Dept: PERIOP | Facility: HOSPITAL | Age: 24
End: 2024-07-24
Payer: COMMERCIAL

## 2024-07-24 VITALS
TEMPERATURE: 97.6 F | OXYGEN SATURATION: 96 % | DIASTOLIC BLOOD PRESSURE: 61 MMHG | RESPIRATION RATE: 16 BRPM | SYSTOLIC BLOOD PRESSURE: 100 MMHG | HEART RATE: 75 BPM

## 2024-07-24 DIAGNOSIS — R10.11 RUQ ABDOMINAL PAIN: ICD-10-CM

## 2024-07-24 DIAGNOSIS — K80.20 SYMPTOMATIC CHOLELITHIASIS: ICD-10-CM

## 2024-07-24 PROCEDURE — 25010000002 MIDAZOLAM PER 1MG: Performed by: NURSE ANESTHETIST, CERTIFIED REGISTERED

## 2024-07-24 PROCEDURE — 81025 URINE PREGNANCY TEST: CPT | Performed by: STUDENT IN AN ORGANIZED HEALTH CARE EDUCATION/TRAINING PROGRAM

## 2024-07-24 PROCEDURE — 25010000002 SUGAMMADEX 200 MG/2ML SOLUTION: Performed by: NURSE ANESTHETIST, CERTIFIED REGISTERED

## 2024-07-24 PROCEDURE — S2900 ROBOTIC SURGICAL SYSTEM: HCPCS | Performed by: STUDENT IN AN ORGANIZED HEALTH CARE EDUCATION/TRAINING PROGRAM

## 2024-07-24 PROCEDURE — 47562 LAPAROSCOPIC CHOLECYSTECTOMY: CPT | Performed by: STUDENT IN AN ORGANIZED HEALTH CARE EDUCATION/TRAINING PROGRAM

## 2024-07-24 PROCEDURE — 25810000003 LACTATED RINGERS PER 1000 ML: Performed by: STUDENT IN AN ORGANIZED HEALTH CARE EDUCATION/TRAINING PROGRAM

## 2024-07-24 PROCEDURE — 25010000002 DEXAMETHASONE PER 1 MG: Performed by: NURSE ANESTHETIST, CERTIFIED REGISTERED

## 2024-07-24 PROCEDURE — 25010000002 INDOCYANINE GREEN 25 MG RECONSTITUTED SOLUTION: Performed by: STUDENT IN AN ORGANIZED HEALTH CARE EDUCATION/TRAINING PROGRAM

## 2024-07-24 PROCEDURE — 25010000002 KETOROLAC TROMETHAMINE PER 15 MG: Performed by: NURSE ANESTHETIST, CERTIFIED REGISTERED

## 2024-07-24 PROCEDURE — 25010000002 FENTANYL CITRATE (PF) 50 MCG/ML SOLUTION PREFILLED SYRINGE: Performed by: NURSE ANESTHETIST, CERTIFIED REGISTERED

## 2024-07-24 PROCEDURE — 25010000002 LIDOCAINE 1 % SOLUTION: Performed by: STUDENT IN AN ORGANIZED HEALTH CARE EDUCATION/TRAINING PROGRAM

## 2024-07-24 PROCEDURE — 25010000002 PROPOFOL 10 MG/ML EMULSION: Performed by: NURSE ANESTHETIST, CERTIFIED REGISTERED

## 2024-07-24 PROCEDURE — 25010000002 HYDROMORPHONE 1 MG/ML SOLUTION: Performed by: NURSE ANESTHETIST, CERTIFIED REGISTERED

## 2024-07-24 PROCEDURE — 25010000002 HEPARIN (PORCINE) PER 1000 UNITS: Performed by: STUDENT IN AN ORGANIZED HEALTH CARE EDUCATION/TRAINING PROGRAM

## 2024-07-24 PROCEDURE — 25010000002 BUPIVACAINE (PF) 0.25 % SOLUTION: Performed by: STUDENT IN AN ORGANIZED HEALTH CARE EDUCATION/TRAINING PROGRAM

## 2024-07-24 PROCEDURE — 0 LABETALOL 5 MG/ML SOLUTION: Performed by: NURSE ANESTHETIST, CERTIFIED REGISTERED

## 2024-07-24 PROCEDURE — 25010000002 ONDANSETRON PER 1 MG: Performed by: NURSE ANESTHETIST, CERTIFIED REGISTERED

## 2024-07-24 DEVICE — CLIP LIG HEMOLOK PA LG 6CT PRP: Type: IMPLANTABLE DEVICE | Site: ABDOMEN | Status: FUNCTIONAL

## 2024-07-24 RX ORDER — PROCHLORPERAZINE EDISYLATE 5 MG/ML
10 INJECTION INTRAMUSCULAR; INTRAVENOUS ONCE AS NEEDED
Status: DISCONTINUED | OUTPATIENT
Start: 2024-07-24 | End: 2024-07-24 | Stop reason: HOSPADM

## 2024-07-24 RX ORDER — HEPARIN SODIUM 5000 [USP'U]/ML
5000 INJECTION, SOLUTION INTRAVENOUS; SUBCUTANEOUS ONCE
Status: COMPLETED | OUTPATIENT
Start: 2024-07-24 | End: 2024-07-24

## 2024-07-24 RX ORDER — MIDAZOLAM HYDROCHLORIDE 2 MG/2ML
INJECTION, SOLUTION INTRAMUSCULAR; INTRAVENOUS AS NEEDED
Status: DISCONTINUED | OUTPATIENT
Start: 2024-07-24 | End: 2024-07-24 | Stop reason: SURG

## 2024-07-24 RX ORDER — DEXAMETHASONE SODIUM PHOSPHATE 4 MG/ML
INJECTION, SOLUTION INTRA-ARTICULAR; INTRALESIONAL; INTRAMUSCULAR; INTRAVENOUS; SOFT TISSUE AS NEEDED
Status: DISCONTINUED | OUTPATIENT
Start: 2024-07-24 | End: 2024-07-24 | Stop reason: SURG

## 2024-07-24 RX ORDER — KETOROLAC TROMETHAMINE 30 MG/ML
INJECTION, SOLUTION INTRAMUSCULAR; INTRAVENOUS AS NEEDED
Status: DISCONTINUED | OUTPATIENT
Start: 2024-07-24 | End: 2024-07-24 | Stop reason: SURG

## 2024-07-24 RX ORDER — SODIUM CHLORIDE 9 MG/ML
40 INJECTION, SOLUTION INTRAVENOUS AS NEEDED
Status: DISCONTINUED | OUTPATIENT
Start: 2024-07-24 | End: 2024-07-24 | Stop reason: HOSPADM

## 2024-07-24 RX ORDER — ROCURONIUM BROMIDE 50 MG/5 ML
SYRINGE (ML) INTRAVENOUS AS NEEDED
Status: DISCONTINUED | OUTPATIENT
Start: 2024-07-24 | End: 2024-07-24 | Stop reason: SURG

## 2024-07-24 RX ORDER — LORAZEPAM 2 MG/ML
1 INJECTION INTRAMUSCULAR
Status: DISCONTINUED | OUTPATIENT
Start: 2024-07-24 | End: 2024-07-24 | Stop reason: HOSPADM

## 2024-07-24 RX ORDER — IPRATROPIUM BROMIDE AND ALBUTEROL SULFATE 2.5; .5 MG/3ML; MG/3ML
3 SOLUTION RESPIRATORY (INHALATION) ONCE AS NEEDED
Status: DISCONTINUED | OUTPATIENT
Start: 2024-07-24 | End: 2024-07-24 | Stop reason: HOSPADM

## 2024-07-24 RX ORDER — BUPIVACAINE HYDROCHLORIDE 2.5 MG/ML
INJECTION, SOLUTION EPIDURAL; INFILTRATION; INTRACAUDAL AS NEEDED
Status: DISCONTINUED | OUTPATIENT
Start: 2024-07-24 | End: 2024-07-24 | Stop reason: HOSPADM

## 2024-07-24 RX ORDER — INDOCYANINE GREEN AND WATER 25 MG
2.5 KIT INJECTION ONCE
Status: COMPLETED | OUTPATIENT
Start: 2024-07-24 | End: 2024-07-24

## 2024-07-24 RX ORDER — ONDANSETRON 2 MG/ML
4 INJECTION INTRAMUSCULAR; INTRAVENOUS ONCE AS NEEDED
Status: DISCONTINUED | OUTPATIENT
Start: 2024-07-24 | End: 2024-07-24 | Stop reason: HOSPADM

## 2024-07-24 RX ORDER — FENTANYL CITRATE 50 UG/ML
INJECTION, SOLUTION INTRAMUSCULAR; INTRAVENOUS AS NEEDED
Status: DISCONTINUED | OUTPATIENT
Start: 2024-07-24 | End: 2024-07-24 | Stop reason: SURG

## 2024-07-24 RX ORDER — BUPIVACAINE HYDROCHLORIDE AND EPINEPHRINE 5; 5 MG/ML; UG/ML
INJECTION, SOLUTION EPIDURAL; INTRACAUDAL; PERINEURAL AS NEEDED
Status: DISCONTINUED | OUTPATIENT
Start: 2024-07-24 | End: 2024-07-24 | Stop reason: HOSPADM

## 2024-07-24 RX ORDER — ONDANSETRON 2 MG/ML
INJECTION INTRAMUSCULAR; INTRAVENOUS AS NEEDED
Status: DISCONTINUED | OUTPATIENT
Start: 2024-07-24 | End: 2024-07-24 | Stop reason: SURG

## 2024-07-24 RX ORDER — PROPOFOL 10 MG/ML
VIAL (ML) INTRAVENOUS AS NEEDED
Status: DISCONTINUED | OUTPATIENT
Start: 2024-07-24 | End: 2024-07-24

## 2024-07-24 RX ORDER — SODIUM CHLORIDE 0.9 % (FLUSH) 0.9 %
10 SYRINGE (ML) INJECTION EVERY 12 HOURS SCHEDULED
Status: DISCONTINUED | OUTPATIENT
Start: 2024-07-24 | End: 2024-07-24 | Stop reason: HOSPADM

## 2024-07-24 RX ORDER — LABETALOL HYDROCHLORIDE 5 MG/ML
INJECTION, SOLUTION INTRAVENOUS AS NEEDED
Status: DISCONTINUED | OUTPATIENT
Start: 2024-07-24 | End: 2024-07-24 | Stop reason: SURG

## 2024-07-24 RX ORDER — MEPERIDINE HYDROCHLORIDE 25 MG/ML
12.5 INJECTION INTRAMUSCULAR; INTRAVENOUS; SUBCUTANEOUS
Status: DISCONTINUED | OUTPATIENT
Start: 2024-07-24 | End: 2024-07-24 | Stop reason: HOSPADM

## 2024-07-24 RX ORDER — HYDROCODONE BITARTRATE AND ACETAMINOPHEN 5; 325 MG/1; MG/1
1-2 TABLET ORAL EVERY 4 HOURS PRN
Qty: 10 TABLET | Refills: 0 | Status: SHIPPED | OUTPATIENT
Start: 2024-07-24

## 2024-07-24 RX ORDER — LIDOCAINE HYDROCHLORIDE 10 MG/ML
INJECTION, SOLUTION INFILTRATION; PERINEURAL AS NEEDED
Status: DISCONTINUED | OUTPATIENT
Start: 2024-07-24 | End: 2024-07-24 | Stop reason: HOSPADM

## 2024-07-24 RX ORDER — SODIUM CHLORIDE, SODIUM LACTATE, POTASSIUM CHLORIDE, CALCIUM CHLORIDE 600; 310; 30; 20 MG/100ML; MG/100ML; MG/100ML; MG/100ML
50 INJECTION, SOLUTION INTRAVENOUS CONTINUOUS
Status: DISCONTINUED | OUTPATIENT
Start: 2024-07-24 | End: 2024-07-24 | Stop reason: HOSPADM

## 2024-07-24 RX ORDER — SODIUM CHLORIDE 0.9 % (FLUSH) 0.9 %
10 SYRINGE (ML) INJECTION AS NEEDED
Status: DISCONTINUED | OUTPATIENT
Start: 2024-07-24 | End: 2024-07-24 | Stop reason: HOSPADM

## 2024-07-24 RX ORDER — PROPOFOL 10 MG/ML
VIAL (ML) INTRAVENOUS AS NEEDED
Status: DISCONTINUED | OUTPATIENT
Start: 2024-07-24 | End: 2024-07-24 | Stop reason: SURG

## 2024-07-24 RX ADMIN — FENTANYL CITRATE 50 MCG: 50 INJECTION, SOLUTION INTRAMUSCULAR; INTRAVENOUS at 15:01

## 2024-07-24 RX ADMIN — HYDROMORPHONE HYDROCHLORIDE 0.5 MG: 1 INJECTION, SOLUTION INTRAMUSCULAR; INTRAVENOUS; SUBCUTANEOUS at 16:00

## 2024-07-24 RX ADMIN — PROPOFOL 180 MG: 10 INJECTION, EMULSION INTRAVENOUS at 14:10

## 2024-07-24 RX ADMIN — PROPOFOL 50 MG: 10 INJECTION, EMULSION INTRAVENOUS at 15:04

## 2024-07-24 RX ADMIN — LABETALOL HYDROCHLORIDE 5 MG: 5 INJECTION, SOLUTION INTRAVENOUS at 14:21

## 2024-07-24 RX ADMIN — DEXAMETHASONE SODIUM PHOSPHATE 4 MG: 4 INJECTION, SOLUTION INTRA-ARTICULAR; INTRALESIONAL; INTRAMUSCULAR; INTRAVENOUS; SOFT TISSUE at 14:21

## 2024-07-24 RX ADMIN — Medication 50 MG: at 14:10

## 2024-07-24 RX ADMIN — LABETALOL HYDROCHLORIDE 5 MG: 5 INJECTION, SOLUTION INTRAVENOUS at 14:27

## 2024-07-24 RX ADMIN — SUGAMMADEX 200 MG: 100 INJECTION, SOLUTION INTRAVENOUS at 14:57

## 2024-07-24 RX ADMIN — ONDANSETRON 4 MG: 2 INJECTION INTRAMUSCULAR; INTRAVENOUS at 14:10

## 2024-07-24 RX ADMIN — HYDROMORPHONE HYDROCHLORIDE 1 MG: 1 INJECTION, SOLUTION INTRAMUSCULAR; INTRAVENOUS; SUBCUTANEOUS at 15:33

## 2024-07-24 RX ADMIN — DEXAMETHASONE SODIUM PHOSPHATE 4 MG: 4 INJECTION, SOLUTION INTRA-ARTICULAR; INTRALESIONAL; INTRAMUSCULAR; INTRAVENOUS; SOFT TISSUE at 14:10

## 2024-07-24 RX ADMIN — MIDAZOLAM HYDROCHLORIDE 2 MG: 1 INJECTION, SOLUTION INTRAMUSCULAR; INTRAVENOUS at 14:05

## 2024-07-24 RX ADMIN — FENTANYL CITRATE 50 MCG: 50 INJECTION, SOLUTION INTRAMUSCULAR; INTRAVENOUS at 14:20

## 2024-07-24 RX ADMIN — HEPARIN SODIUM 5000 UNITS: 5000 INJECTION INTRAVENOUS; SUBCUTANEOUS at 14:01

## 2024-07-24 RX ADMIN — KETOROLAC TROMETHAMINE 30 MG: 30 INJECTION, SOLUTION INTRAMUSCULAR at 15:24

## 2024-07-24 RX ADMIN — INDOCYANINE GREEN AND WATER 2.5 MG: KIT at 13:37

## 2024-07-24 RX ADMIN — SODIUM CHLORIDE, POTASSIUM CHLORIDE, SODIUM LACTATE AND CALCIUM CHLORIDE 50 ML/HR: 600; 310; 30; 20 INJECTION, SOLUTION INTRAVENOUS at 13:01

## 2024-07-24 RX ADMIN — LIDOCAINE HYDROCHLORIDE 60 MG: 20 INJECTION, SOLUTION INTRAVENOUS at 14:10

## 2024-07-24 NOTE — OP NOTE
PATIENT:     Romy Langley    DATE OF SURGERY:     7/24/2024    PHYSICIAN:   Mery Cadet DO    REFERRING PHYSICIAN:  Nadya Hidalgo MD    YOB: 2000    PREOPERATIVE DIAGNOSIS:  Acute and chronic cholecystitis due to cholelithiasis    POSTOPERATIVE DIAGNOSIS:  Acute and chronic cholecystitis due to cholelithiasis    PROCEDURE:  Robotic assisted laparoscopic cholecystectomy with ICG    EBL:  Less than 50 cc    COMPLICATIONS:  None    ANESTHESIA:  General endotracheal.    SPECIMEN: Gallbladder    HISTORY:  The patient was sent to me as a consultation via Nadya Hidalgo MD for evaluation and treatment of acute and chronic right upper quadrant and mid epigastric abdominal discomfort.  Workup was begun and the patient was subsequently found to have acute and chronic cholecystitis due to cholelithiasis.  The patient is here now today for elective robotic assisted laparoscopic cholecystectomy for treatment of chronic cholecystitis.  The procedure was discussed with the patient preoperatively who understands, agrees, and wishes to proceed with the above-mentioned procedure in an elective outpatient fashion.      OPERATIVE PROCEDURE:  The patient was seen in the preoperative area. History and physical was reviewed, consent was verified, and all questions were answered. IV ICG was injected preoperatively. The patient was taken to the operating room, placed in the supine position, and given general endotracheal anesthesia.  The patient was prepped and draped in the normal sterile fashion, and also received preoperative IV antibiotics.  An appropriate timeout was performed by the nursing staff prior to the incision.    A periumbilical incision was made and deepened to the fascia. Utilizing a modified Julio Cesar technique, the abdomen was entered under direct visualization. An 8mm robotic trocar was inserted and the abdomen was insufflated to a pressure of 15 mmHg. Patient tolerated  insufflation well. A laparoscope was inserted and the abdomen was inspected. No injury from initial port placement was identified. Additional ports were placed under direct visualization in the following fashion: two 8mm ports in the left upper abdomen and one 8 mm port in the upper right abdomen. The [employer] robot was draped in sterile fashion and was docked to the ports in standard fashion.     The fundus of the gallbladder was grasped and retracted toward the patient's right shoulder. The gallbladder was visualized completely and noted to be chronically inflamed appearing. The gallbladder infundibulum was grasped and retracted toward the right lower quadrant of the abdomen to expose the hilar structures. Dissection in the triangle of Calot was carried out until the critical view of safety was obtained, identifying the cystic duct and artery as the only two structures entering the gallbladder. ICG was used to visualize the biliary anatomy. Two hemoclips were placed proximally on the cystic duct and one distally. The duct was divided. The cystic artery was similarly clipped and divided. Bovie electrocautery was then used to remove the gallbladder from the liver bed.  It was then placed into a laparoscopic retrieval bag and removed via the periumbilical port.     The gallbladder fossa was inspected. Hemostasis was excellent and there was no evidence of biliary leakage. All trocar sites were injected with a local anesthetic mixture.  Trocars were removed under direct vision and the skin was closed with 4-0 Vicryl subcuticular stitch. The skin was dressed with surgical glue.    The patient was stable at this point and subsequently transferred back to the recovery room in stable condition.    Mery Cadet DO

## 2024-07-24 NOTE — INTERVAL H&P NOTE
H&P reviewed. The patient was examined and there are no changes to the H&P.      I did have a detailed and extensive discussion with the patient in the hospital today and they understand that they need to undergo robotic assisted laparoscopic cholecystectomy with ICG cholangiography or possible open cholecystectomy. Full risks and benefits of operative versus nonoperative intervention were discussed with the patient and these included things such as nonresolution of symptoms and possible worsening of symptoms without surgical intervention versus infection, bleeding, open cholecystectomy, common bile duct injury, postoperative biliary leakage, need for drain placement, possible inability to perform cholangiography due to inflammation, postoperative abscess, etc with surgical intervention. The patient understands, agrees, and wishes to proceed with the surgical treatment plan as mentioned above. The patient had no questions for me at the end of the discussion.       I discussed the patients findings and my recommendations with patient.

## 2024-07-24 NOTE — ANESTHESIA PREPROCEDURE EVALUATION
Anesthesia Evaluation     Patient summary reviewed, Nursing notes reviewed and pregnancy test completed   no history of anesthetic complications:   NPO Solid Status: > 8 hours  NPO Liquid Status: > 8 hours           Airway   Mallampati: II  TM distance: <3 FB  Neck ROM: full  No difficulty expected  Dental    (+) poor dentition    Pulmonary     breath sounds clear to auscultation  (+) pneumonia resolved ,  (-) not a smoker  Cardiovascular - negative cardio ROS    ECG reviewed  Rhythm: regular  Rate: normal      ROS comment: 2-24  NSR    Neuro/Psych- negative ROS  GI/Hepatic/Renal/Endo    (+) thyroid problem hypothyroidism    Musculoskeletal     Abdominal  - normal exam    Abdomen: soft.  Bowel sounds: normal.   Substance History - negative use     OB/GYN negative ob/gyn ROS   (-)  Pregnant        Other   blood dyscrasia anemia,                       Anesthesia Plan    ASA 2     general     (Risks and benefits discussed including risk of aspiration, recall and dental damage. All patient questions answered. Will continue with POC.d. )  intravenous induction     Anesthetic plan, risks, benefits, and alternatives have been provided, discussed and informed consent has been obtained with: patient.  Pre-procedure education provided  Plan discussed with CRNA.        CODE STATUS:

## 2024-07-25 ENCOUNTER — TELEPHONE (OUTPATIENT)
Dept: SURGERY | Facility: CLINIC | Age: 24
End: 2024-07-25
Payer: COMMERCIAL

## 2024-07-25 NOTE — TELEPHONE ENCOUNTER
Patient mother marcello called and scheduled pt a post op apt. She also stated that the pain medication wasn't helping with her pain, let her know she could take Tylenol and ibuprofen for break through pain with the pain meds, also to call us back if the pain got worse or she needed to see us sooner. she is aware and verbally understood. MR

## 2024-07-26 LAB — REF LAB TEST METHOD: NORMAL

## 2024-07-26 NOTE — ANESTHESIA POSTPROCEDURE EVALUATION
Patient: Romy Langley    Procedure Summary       Date: 07/24/24 Room / Location: [facility] OR 2 /  [facility] OR    Anesthesia Start: 1405 Anesthesia Stop: 1515    Procedure: CHOLECYSTECTOMY LAPAROSCOPIC WITH DAVINCI ROBOT (Abdomen) Diagnosis:       RUQ abdominal pain      Symptomatic cholelithiasis      (RUQ abdominal pain [R10.11])      (Symptomatic cholelithiasis [K80.20])    Surgeons: Mery Cadet DO Provider: Ela Verdin CRNA    Anesthesia Type: general ASA Status: 2            Anesthesia Type: general    Vitals  Vitals Value Taken Time   /62 07/24/24 1630   Temp 97.6 °F (36.4 °C) 07/24/24 1630   Pulse 58 07/24/24 1633   Resp 16 07/24/24 1630   SpO2 97 % 07/24/24 1633   Vitals shown include unfiled device data.        Post Anesthesia Care and Evaluation    Patient location during evaluation: PACU  Patient participation: complete - patient participated  Level of consciousness: awake and alert  Pain management: satisfactory to patient    Airway patency: patent  Anesthetic complications: No anesthetic complications  PONV Status: none  Cardiovascular status: acceptable and stable  Respiratory status: acceptable  Hydration status: acceptable    Comments: Vitals signs as noted in nursing documentation as per protocol.

## 2024-07-30 NOTE — PROGRESS NOTES
"Patient: Romy Langley    YOB: 2000    Date: 08/02/2024    Primary Care Provider: aNdya Hidalgo MD    Chief Complaint   Patient presents with    Post-op Follow-up     Lap jaxon       History of present illness:  I saw the patient in the office today as a followup from their recent robotic assisted laparoscopic cholecystectomy with ICG. Pathology was reviewed and indicated Chronic cholecystitis with cholelithiasis. Negative for dysplasia or carcinoma. Patient states her nausea and vomiting has resolved and she's been able to tolerate a diet. She has abdominal pain localized around her incisions still. She states she has not had a bowel movement since prior to surgery and has been taking dulcolax and miralax. She is not scheduled to see her gastroenterologist again until September.    Vital Signs:   Vitals:    08/02/24 0850   BP: 104/60   Pulse: 79   Temp: 97.8 °F (36.6 °C)   SpO2: 97%   Weight: 69.5 kg (153 lb 3.2 oz)   Height: 170.2 cm (67.01\")     Physical Exam:     General Appearance:    Alert, cooperative, in no acute distress   Head:    Normocephalic, without obvious abnormality, atraumatic   Eyes:            Lids and lashes normal, conjunctivae and sclerae normal, no   icterus, no pallor, corneas clear, PERRLA   Throat:   No oral lesions, no thrush, oral mucosa moist   Lungs:     Clear to auscultation,respirations regular, even and                  unlabored    Heart:    Regular rhythm and normal rate, normal S1 and S2, no            murmur, no gallop, no rub, no click   Abdomen:    Soft, appropriately tender to palpation, non-distended, incisions are well healing and clean, dry, and intact   Extremities:   Moves all extremities well, no edema, no cyanosis, no             redness   Pulses:   Pulses palpable and equal bilaterally   Skin:   No bleeding, bruising or rash   Neurologic:   Cranial nerves 2 - 12 grossly intact, sensation intact, DTR       present and equal bilaterally "       Assessment / Plan :    1. Status post laparoscopic cholecystectomy    2. Chronic idiopathic constipation      Patient doing well post operatively from their recent robotic cholecystectomy . All relevant pathology was discussed in office today. The patient may return to normal activity, but should continue with weight lifting restrictions for a total of 4-6 weeks after surgery. Pertaining to her constipation, she sees GI for this and it has been a chronic problem for her. It was medically managed with Linzess and Prucoalopride, but these were stopped after improvement of her symptoms. I recommend she follow up with GI to see if she needs to go back on these medicines for a short time. Relapse of her constipation was likely instigated by narcotic use and anesthesia post operatively. I do not need to see the patient back in office unless they have questions or concerns. If the patient has any, I would be happy to see them back in office at any time and they know to call and make an appointment. The patient expresses understanding and all of their questions were answered today.    Electronically signed by Mery Cadet DO  08/02/24

## 2024-08-02 ENCOUNTER — OFFICE VISIT (OUTPATIENT)
Dept: SURGERY | Facility: CLINIC | Age: 24
End: 2024-08-02
Payer: COMMERCIAL

## 2024-08-02 VITALS
DIASTOLIC BLOOD PRESSURE: 60 MMHG | WEIGHT: 153.2 LBS | HEART RATE: 79 BPM | SYSTOLIC BLOOD PRESSURE: 104 MMHG | TEMPERATURE: 97.8 F | HEIGHT: 67 IN | BODY MASS INDEX: 24.04 KG/M2 | OXYGEN SATURATION: 97 %

## 2024-08-02 DIAGNOSIS — K59.04 CHRONIC IDIOPATHIC CONSTIPATION: ICD-10-CM

## 2024-08-02 DIAGNOSIS — Z90.49 STATUS POST LAPAROSCOPIC CHOLECYSTECTOMY: Primary | ICD-10-CM

## 2024-08-02 PROCEDURE — 99024 POSTOP FOLLOW-UP VISIT: CPT | Performed by: STUDENT IN AN ORGANIZED HEALTH CARE EDUCATION/TRAINING PROGRAM

## 2024-08-09 ENCOUNTER — OFFICE VISIT (OUTPATIENT)
Dept: SURGERY | Facility: CLINIC | Age: 24
End: 2024-08-09
Payer: COMMERCIAL

## 2024-08-09 VITALS
HEART RATE: 96 BPM | BODY MASS INDEX: 23.89 KG/M2 | WEIGHT: 152.2 LBS | TEMPERATURE: 98.6 F | DIASTOLIC BLOOD PRESSURE: 62 MMHG | SYSTOLIC BLOOD PRESSURE: 102 MMHG | HEIGHT: 67 IN | OXYGEN SATURATION: 99 %

## 2024-08-09 DIAGNOSIS — R10.11 RIGHT UPPER QUADRANT ABDOMINAL PAIN: ICD-10-CM

## 2024-08-09 DIAGNOSIS — Z90.49 STATUS POST LAPAROSCOPIC CHOLECYSTECTOMY: Primary | ICD-10-CM

## 2024-08-09 PROCEDURE — 1159F MED LIST DOCD IN RCRD: CPT | Performed by: STUDENT IN AN ORGANIZED HEALTH CARE EDUCATION/TRAINING PROGRAM

## 2024-08-09 PROCEDURE — 1160F RVW MEDS BY RX/DR IN RCRD: CPT | Performed by: STUDENT IN AN ORGANIZED HEALTH CARE EDUCATION/TRAINING PROGRAM

## 2024-08-09 PROCEDURE — 99024 POSTOP FOLLOW-UP VISIT: CPT | Performed by: STUDENT IN AN ORGANIZED HEALTH CARE EDUCATION/TRAINING PROGRAM

## 2024-08-09 NOTE — PROGRESS NOTES
Patient: Romy Langley  YOB: 2000    Date: 08/09/2024    Primary Care Provider: Nadya Hidalgo MD    Chief Complaint   Patient presents with    Follow-up     Lap jaxon/abdominal pain        History: I saw the patient in the office today as a followup from their recent robotic assisted laparoscopic cholecystectomy with ICG 7/24/24. Patient states she is having increased sensitivity/discomfort on her right side for 3 days. Worse when her child sits on her or when driving in a car. She was started back on Linzess by her PCP and has been having normal daily bowel movements. She has been tolerating a regular diet. She denies nausea or vomiting.    The following portions of the patient's history were reviewed and updated as appropriate: allergies, current medications, past family history, past medical history, past social history, past surgical history and problem list.    Review of Systems   Constitutional:  Negative for chills, fever and unexpected weight change.   HENT:  Negative for hearing loss, trouble swallowing and voice change.    Eyes:  Negative for visual disturbance.   Respiratory:  Negative for apnea, cough, chest tightness, shortness of breath and wheezing.    Cardiovascular:  Negative for chest pain, palpitations and leg swelling.   Gastrointestinal:  Positive for abdominal pain. Negative for abdominal distention, anal bleeding, blood in stool, constipation, diarrhea, nausea, rectal pain and vomiting.   Endocrine: Negative for cold intolerance and heat intolerance.   Genitourinary:  Negative for difficulty urinating, dysuria and flank pain.   Musculoskeletal:  Negative for back pain and gait problem.   Skin:  Negative for color change, rash and wound.   Neurological:  Negative for dizziness, syncope, speech difficulty, weakness, light-headedness, numbness and headaches.   Hematological:  Negative for adenopathy. Does not bruise/bleed easily.   Psychiatric/Behavioral:  Negative  "for confusion. The patient is not nervous/anxious.        Vital Signs  Vitals:    08/09/24 1047   BP: 102/62   Pulse: 96   Temp: 98.6 °F (37 °C)   SpO2: 99%   Weight: 69 kg (152 lb 3.2 oz)   Height: 170.2 cm (67.01\")       Allergies:  Allergies   Allergen Reactions    Latex Rash       Medications:    Current Outpatient Medications:     acetaminophen (TYLENOL) 500 MG tablet, Take 2 tablets by mouth Every 8 (Eight) Hours As Needed for Mild Pain or Moderate Pain., Disp: 60 tablet, Rfl: 0    ibuprofen (ADVIL,MOTRIN) 800 MG tablet, Take 1 tablet by mouth Every 8 (Eight) Hours As Needed for Mild Pain or Moderate Pain., Disp: 30 tablet, Rfl: 0    ondansetron (ZOFRAN) 4 MG tablet, Take 1 tablet by mouth Every 8 (Eight) Hours As Needed for Nausea or Vomiting., Disp: , Rfl:     ondansetron ODT (ZOFRAN-ODT) 8 MG disintegrating tablet, Take 1 tablet by mouth Every 8 (Eight) Hours As Needed for Nausea or Vomiting., Disp: 15 tablet, Rfl: 0    HYDROcodone-acetaminophen (NORCO) 5-325 MG per tablet, Take 1-2 tablets by mouth Every 4 (Four) Hours As Needed (Pain). (Patient not taking: Reported on 8/2/2024), Disp: 10 tablet, Rfl: 0    pantoprazole (PROTONIX) 40 MG EC tablet, Take 1 tablet by mouth Daily. (Patient not taking: Reported on 8/9/2024), Disp: , Rfl:     Physical Exam:     General Appearance:    Alert, cooperative, in no acute distress   Head:    Normocephalic, without obvious abnormality, atraumatic   Eyes:            Lids and lashes normal, conjunctivae and sclerae normal, no   icterus, no pallor, corneas clear, PERRLA   Throat:   No oral lesions, no thrush, oral mucosa moist   Lungs:     Clear to auscultation,respirations regular, even and                  unlabored    Heart:    Regular rhythm and normal rate, normal S1 and S2, no            murmur, no gallop, no rub, no click   Abdomen:    Soft, mildly tender to palpation in RUQ, non-distended, incisions are well healing   Extremities:   Moves all extremities well, no " edema, no cyanosis, no             redness   Pulses:   Pulses palpable and equal bilaterally   Skin:   No bleeding, bruising or rash   Neurologic:   Cranial nerves 2 - 12 grossly intact, sensation intact, DTR       present and equal bilaterally          Results Review:   None     Review of Systems was reviewed and confirmed as accurate as documented by the MA.    ASSESSMENT/PLAN:    1. Status post laparoscopic cholecystectomy    2. Right upper quadrant abdominal pain       Patient was seen today as a follow up from recent robotic cholecystectomy. She has been doing better in terms of having return of bowel function, but she still has some persistent RUQ discomfort. I will order an abdominal ultrasound to evaluate for fluid collection. Her physical exam is relatively benign however. Vital signs are unremarkable. I offered reassurance. I will call patient with results of ultrasound. Otherwise, she is requesting a return to work note today which was provided. Patient is agreeable to the plan and all of her questions were answered.     Electronically signed by Mery Cadet DO  08/09/24

## 2024-08-19 ENCOUNTER — OFFICE VISIT (OUTPATIENT)
Dept: SURGERY | Facility: CLINIC | Age: 24
End: 2024-08-19
Payer: COMMERCIAL

## 2024-08-19 VITALS
DIASTOLIC BLOOD PRESSURE: 64 MMHG | WEIGHT: 151.6 LBS | HEIGHT: 67 IN | BODY MASS INDEX: 23.79 KG/M2 | TEMPERATURE: 97.7 F | HEART RATE: 72 BPM | RESPIRATION RATE: 18 BRPM | OXYGEN SATURATION: 98 % | SYSTOLIC BLOOD PRESSURE: 122 MMHG

## 2024-08-19 DIAGNOSIS — R11.2 NAUSEA AND VOMITING, UNSPECIFIED VOMITING TYPE: Primary | ICD-10-CM

## 2024-08-19 PROCEDURE — 1160F RVW MEDS BY RX/DR IN RCRD: CPT | Performed by: STUDENT IN AN ORGANIZED HEALTH CARE EDUCATION/TRAINING PROGRAM

## 2024-08-19 PROCEDURE — 99214 OFFICE O/P EST MOD 30 MIN: CPT | Performed by: STUDENT IN AN ORGANIZED HEALTH CARE EDUCATION/TRAINING PROGRAM

## 2024-08-19 PROCEDURE — 1159F MED LIST DOCD IN RCRD: CPT | Performed by: STUDENT IN AN ORGANIZED HEALTH CARE EDUCATION/TRAINING PROGRAM

## 2024-08-19 RX ORDER — ONDANSETRON 4 MG/1
TABLET, ORALLY DISINTEGRATING ORAL
COMMUNITY
Start: 2024-08-18

## 2024-08-19 RX ORDER — PRUCALOPRIDE 2 MG/1
TABLET, FILM COATED ORAL
COMMUNITY
Start: 2024-08-15

## 2024-08-19 NOTE — PROGRESS NOTES
"Patient: Romy Langley    YOB: 2000    Date: 08/19/2024    Primary Care Provider: Nadya Hidalgo MD    Chief Complaint   Patient presents with    Post-op     Lap jaxon       History of present illness:  I saw the patient in the office today as a followup from their recent robotic assisted laparoscopic cholecystectomy with ICG. Patient has continued to have nausea, vomiting, and right upper quadrant pain despite cholecystectomy 7/24/24. She states that she will eat just a few bites of food and it immediately causes nausea. The type of food does not matter. She has been taking Zofran which helps somewhat. She has been having normal bowel movements. She does also report some bloating. At her last office visit, we did order an abdominal ultrasound which has not been completed yet.      Review of Systems:  Constitutional:  Negative for chills, fever, and unexpected weight change.  HENT: Negative for trouble swallowing and voice change.  Eyes:  Negative for visual disturbance.  Respiratory:  Negative for apnea, cough, chest tightness, shortness of breath, and wheezing.  Cardiovascular:  Negative for chest pain, palpitations, and leg swelling.  Gastrointestinal:  Positive for abdominal pain, nausea, and vomiting  Musculoskeletal:  Negative for back pain, gait problem, and joint swelling.  Skin:  Negative for color change, rash, and wound  Neurological:  Negative for dizziness, syncope, speech difficulty, weakness, numbness, and headaches.  Hematological:  Negative for adenopathy.  Does not bruise/bleed easily.  Psychiatric/Behavioral:  Negative for confusion.  The patient is not nervous/anxious.      Vital Signs:   Vitals:    08/19/24 1334   BP: 122/64   Pulse: 72   Resp: 18   Temp: 97.7 °F (36.5 °C)   TempSrc: Temporal   SpO2: 98%   Weight: 68.8 kg (151 lb 9.6 oz)   Height: 170.2 cm (67\")       Physical Exam:     General Appearance:    Alert, cooperative, in no acute distress   Head:    " Normocephalic, without obvious abnormality, atraumatic   Eyes:            Lids and lashes normal, conjunctivae and sclerae normal, no   icterus, no pallor, corneas clear, PERRLA   Throat:   No oral lesions, no thrush, oral mucosa moist   Lungs:     Clear to auscultation,respirations regular, even and                  unlabored    Heart:    Regular rhythm and normal rate, normal S1 and S2, no            murmur, no gallop, no rub, no click   Abdomen:    Soft, mildly tender to palpation in the RUQ   Extremities:   Moves all extremities well, no edema, no cyanosis, no             redness   Pulses:   Pulses palpable and equal bilaterally   Skin:   No bleeding, bruising or rash   Neurologic:   Cranial nerves 2 - 12 grossly intact, sensation intact, DTR       present and equal bilaterally         Assessment / Plan :    1. Nausea and vomiting, unspecified vomiting type      Patient was seen today for continued nausea and vomiting post operatively from her cholecystectomy 7/24/24. Patient reports nausea and vomiting after eating only a few bites of food. At this point, I recommend moving forward with an EGD to rule out any anatomic abnormalities, GERD, or H. Pylori. If this is normal, will likely refer her back to gastroenterology for further work up or move forward with a gastric emptying study. The procedure and risks were discussed with the patient. I advised the patient to call the radiology department to schedule her abdominal ultrasound as well. She is agreeable to the plan and all questions were answered.     Electronically signed by Mery Cadet DO  08/19/24

## 2024-08-22 ENCOUNTER — OUTSIDE FACILITY SERVICE (OUTPATIENT)
Dept: SURGERY | Facility: CLINIC | Age: 24
End: 2024-08-22
Payer: COMMERCIAL

## 2024-08-22 PROCEDURE — 43239 EGD BIOPSY SINGLE/MULTIPLE: CPT | Performed by: STUDENT IN AN ORGANIZED HEALTH CARE EDUCATION/TRAINING PROGRAM

## 2024-08-23 ENCOUNTER — TELEPHONE (OUTPATIENT)
Dept: SURGERY | Facility: CLINIC | Age: 24
End: 2024-08-23
Payer: COMMERCIAL

## 2024-08-23 RX ORDER — ONDANSETRON 4 MG/1
4 TABLET, FILM COATED ORAL DAILY PRN
Qty: 10 TABLET | Refills: 1 | Status: SHIPPED | OUTPATIENT
Start: 2024-08-23 | End: 2025-08-23

## 2024-08-23 RX ORDER — OMEPRAZOLE 20 MG/1
20 CAPSULE, DELAYED RELEASE ORAL EVERY MORNING
Qty: 60 CAPSULE | Refills: 1 | Status: SHIPPED | OUTPATIENT
Start: 2024-08-23

## 2024-08-23 NOTE — TELEPHONE ENCOUNTER
"Patient called stating that she had an EGD on 8/22/24. She has a low-grade fever of 99.9 F with burning in her chest and \"sharp\" stabbing pain in her chest. Please advise.  "

## 2024-08-26 ENCOUNTER — TELEPHONE (OUTPATIENT)
Dept: SURGERY | Facility: CLINIC | Age: 24
End: 2024-08-26
Payer: COMMERCIAL

## 2024-08-26 DIAGNOSIS — R11.2 NAUSEA AND VOMITING, UNSPECIFIED VOMITING TYPE: Primary | ICD-10-CM

## 2024-08-26 NOTE — TELEPHONE ENCOUNTER
Called to discuss EGD results with patient. She had mild inactive gastritis and esophagitis so I recommend she continue taking her PPI. We discussed prior to endoscopy that if everything was normal we would move forward with a gastric emptying study. I ordered this for the patient this morning and will call her with the results once completed. She expressed understanding and all questions were answered.

## 2024-09-16 ENCOUNTER — TELEPHONE (OUTPATIENT)
Dept: OBSTETRICS AND GYNECOLOGY | Facility: CLINIC | Age: 24
End: 2024-09-16

## 2024-09-16 NOTE — TELEPHONE ENCOUNTER
Provider: DR GREENFIELD     Caller: ARIELLE DOAN     Relationship to Patient: SELF     Pharmacy: [facility]     Phone Number: [phone]    Reason for Call: PT IS PREGANNCT AND HAVING SEVERE NAUSEA AND CANNOT HOLD ANYTHING DOWN PLEASE CALL PT TO ADVISE

## 2024-09-16 NOTE — TELEPHONE ENCOUNTER
Patient is pregnant and has a new OB appointment 10/22. She is requesting something for nausea and vomiting. States she is taking Zofran, but it is not working.

## 2024-09-18 PROBLEM — J02.9 SORE THROAT: Status: ACTIVE | Noted: 2024-09-18

## 2024-09-20 ENCOUNTER — HOSPITAL ENCOUNTER (EMERGENCY)
Facility: HOSPITAL | Age: 24
Discharge: HOME | End: 2024-09-21
Attending: STUDENT IN AN ORGANIZED HEALTH CARE EDUCATION/TRAINING PROGRAM
Payer: MEDICAID

## 2024-09-20 ENCOUNTER — APPOINTMENT (OUTPATIENT)
Dept: ULTRASOUND IMAGING | Facility: HOSPITAL | Age: 24
End: 2024-09-20
Payer: COMMERCIAL

## 2024-09-20 VITALS
HEIGHT: 66 IN | WEIGHT: 148 LBS | OXYGEN SATURATION: 100 % | DIASTOLIC BLOOD PRESSURE: 70 MMHG | RESPIRATION RATE: 16 BRPM | BODY MASS INDEX: 23.78 KG/M2 | HEART RATE: 78 BPM | TEMPERATURE: 97.6 F | SYSTOLIC BLOOD PRESSURE: 105 MMHG

## 2024-09-20 DIAGNOSIS — R31.9 URINARY TRACT INFECTION WITH HEMATURIA, SITE UNSPECIFIED: Primary | ICD-10-CM

## 2024-09-20 DIAGNOSIS — N39.0 URINARY TRACT INFECTION WITH HEMATURIA, SITE UNSPECIFIED: Primary | ICD-10-CM

## 2024-09-20 DIAGNOSIS — O20.8 SUBCHORIONIC HEMORRHAGE OF PLACENTA IN FIRST TRIMESTER: ICD-10-CM

## 2024-09-20 DIAGNOSIS — O26.859 SPOTTING DURING PREGNANCY: ICD-10-CM

## 2024-09-20 LAB
ABO GROUP BLD: NORMAL
ALBUMIN SERPL-MCNC: 4.7 G/DL (ref 3.5–5.2)
ALBUMIN/GLOB SERPL: 1.4 G/DL
ALP SERPL-CCNC: 64 U/L (ref 39–117)
ALT SERPL W P-5'-P-CCNC: 8 U/L (ref 1–33)
ANION GAP SERPL CALCULATED.3IONS-SCNC: 9.3 MMOL/L (ref 5–15)
AST SERPL-CCNC: 14 U/L (ref 1–32)
BACTERIA UR QL AUTO: ABNORMAL /HPF
BASOPHILS # BLD AUTO: 0.03 10*3/MM3 (ref 0–0.2)
BASOPHILS NFR BLD AUTO: 0.4 % (ref 0–1.5)
BILIRUB SERPL-MCNC: 0.4 MG/DL (ref 0–1.2)
BILIRUB UR QL STRIP: NEGATIVE
BUN SERPL-MCNC: 10 MG/DL (ref 6–20)
BUN/CREAT SERPL: 12.2 (ref 7–25)
CALCIUM SPEC-SCNC: 9.7 MG/DL (ref 8.6–10.5)
CHLORIDE SERPL-SCNC: 101 MMOL/L (ref 98–107)
CLARITY UR: ABNORMAL
CO2 SERPL-SCNC: 24.7 MMOL/L (ref 22–29)
COLOR UR: YELLOW
CREAT SERPL-MCNC: 0.82 MG/DL (ref 0.57–1)
DEPRECATED RDW RBC AUTO: 40 FL (ref 37–54)
EGFRCR SERPLBLD CKD-EPI 2021: 103.2 ML/MIN/1.73
EOSINOPHIL # BLD AUTO: 0.06 10*3/MM3 (ref 0–0.4)
EOSINOPHIL NFR BLD AUTO: 0.9 % (ref 0.3–6.2)
ERYTHROCYTE [DISTWIDTH] IN BLOOD BY AUTOMATED COUNT: 13.1 % (ref 12.3–15.4)
GLOBULIN UR ELPH-MCNC: 3.4 GM/DL
GLUCOSE SERPL-MCNC: 100 MG/DL (ref 65–99)
GLUCOSE UR STRIP-MCNC: NEGATIVE MG/DL
HCG INTACT+B SERPL-ACNC: 9874 MIU/ML
HCT VFR BLD AUTO: 36.9 % (ref 34–46.6)
HGB BLD-MCNC: 12.2 G/DL (ref 12–15.9)
HGB UR QL STRIP.AUTO: NEGATIVE
HOLD SPECIMEN: NORMAL
HOLD SPECIMEN: NORMAL
HYALINE CASTS UR QL AUTO: ABNORMAL /LPF
IMM GRANULOCYTES # BLD AUTO: 0.02 10*3/MM3 (ref 0–0.05)
IMM GRANULOCYTES NFR BLD AUTO: 0.3 % (ref 0–0.5)
KETONES UR QL STRIP: NEGATIVE
LEUKOCYTE ESTERASE UR QL STRIP.AUTO: ABNORMAL
LIPASE SERPL-CCNC: 44 U/L (ref 13–60)
LYMPHOCYTES # BLD AUTO: 2.37 10*3/MM3 (ref 0.7–3.1)
LYMPHOCYTES NFR BLD AUTO: 35.3 % (ref 19.6–45.3)
MCH RBC QN AUTO: 27.5 PG (ref 26.6–33)
MCHC RBC AUTO-ENTMCNC: 33.1 G/DL (ref 31.5–35.7)
MCV RBC AUTO: 83.3 FL (ref 79–97)
MONOCYTES # BLD AUTO: 0.5 10*3/MM3 (ref 0.1–0.9)
MONOCYTES NFR BLD AUTO: 7.4 % (ref 5–12)
MUCOUS THREADS URNS QL MICRO: ABNORMAL /HPF
NEUTROPHILS NFR BLD AUTO: 3.74 10*3/MM3 (ref 1.7–7)
NEUTROPHILS NFR BLD AUTO: 55.7 % (ref 42.7–76)
NITRITE UR QL STRIP: NEGATIVE
NRBC BLD AUTO-RTO: 0 /100 WBC (ref 0–0.2)
PH UR STRIP.AUTO: 7 [PH] (ref 5–8)
PLATELET # BLD AUTO: 262 10*3/MM3 (ref 140–450)
PMV BLD AUTO: 9.5 FL (ref 6–12)
POTASSIUM SERPL-SCNC: 3.5 MMOL/L (ref 3.5–5.2)
PROT SERPL-MCNC: 8.1 G/DL (ref 6–8.5)
PROT UR QL STRIP: ABNORMAL
RBC # BLD AUTO: 4.43 10*6/MM3 (ref 3.77–5.28)
RBC # UR STRIP: ABNORMAL /HPF
REF LAB TEST METHOD: ABNORMAL
RH BLD: POSITIVE
SODIUM SERPL-SCNC: 135 MMOL/L (ref 136–145)
SP GR UR STRIP: 1.02 (ref 1–1.03)
SQUAMOUS #/AREA URNS HPF: ABNORMAL /HPF
UROBILINOGEN UR QL STRIP: ABNORMAL
WBC # UR STRIP: ABNORMAL /HPF
WBC NRBC COR # BLD AUTO: 6.72 10*3/MM3 (ref 3.4–10.8)
WHOLE BLOOD HOLD COAG: NORMAL
WHOLE BLOOD HOLD SPECIMEN: NORMAL

## 2024-09-20 PROCEDURE — 83690 ASSAY OF LIPASE: CPT | Performed by: STUDENT IN AN ORGANIZED HEALTH CARE EDUCATION/TRAINING PROGRAM

## 2024-09-20 PROCEDURE — 81001 URINALYSIS AUTO W/SCOPE: CPT | Performed by: STUDENT IN AN ORGANIZED HEALTH CARE EDUCATION/TRAINING PROGRAM

## 2024-09-20 PROCEDURE — 80053 COMPREHEN METABOLIC PANEL: CPT | Performed by: STUDENT IN AN ORGANIZED HEALTH CARE EDUCATION/TRAINING PROGRAM

## 2024-09-20 PROCEDURE — 86901 BLOOD TYPING SEROLOGIC RH(D): CPT | Performed by: NURSE PRACTITIONER

## 2024-09-20 PROCEDURE — 86900 BLOOD TYPING SEROLOGIC ABO: CPT | Performed by: NURSE PRACTITIONER

## 2024-09-20 PROCEDURE — 76817 TRANSVAGINAL US OBSTETRIC: CPT

## 2024-09-20 PROCEDURE — 84702 CHORIONIC GONADOTROPIN TEST: CPT | Performed by: NURSE PRACTITIONER

## 2024-09-20 PROCEDURE — 99284 EMERGENCY DEPT VISIT MOD MDM: CPT

## 2024-09-20 PROCEDURE — 85025 COMPLETE CBC W/AUTO DIFF WBC: CPT | Performed by: STUDENT IN AN ORGANIZED HEALTH CARE EDUCATION/TRAINING PROGRAM

## 2024-09-20 RX ORDER — SODIUM CHLORIDE 0.9 % (FLUSH) 0.9 %
10 SYRINGE (ML) INJECTION AS NEEDED
Status: DISCONTINUED | OUTPATIENT
Start: 2024-09-20 | End: 2024-09-21 | Stop reason: HOSPADM

## 2024-09-21 RX ORDER — CEPHALEXIN 500 MG/1
500 CAPSULE ORAL 4 TIMES DAILY
Qty: 28 CAPSULE | Refills: 0 | Status: SHIPPED | OUTPATIENT
Start: 2024-09-21 | End: 2024-09-23

## 2024-09-23 ENCOUNTER — TELEPHONE (OUTPATIENT)
Dept: OBSTETRICS AND GYNECOLOGY | Facility: CLINIC | Age: 24
End: 2024-09-23

## 2024-09-23 ENCOUNTER — TELEPHONE (OUTPATIENT)
Dept: EMERGENCY DEPT | Facility: HOSPITAL | Age: 24
End: 2024-09-23
Payer: MEDICAID

## 2024-09-23 ENCOUNTER — LAB (OUTPATIENT)
Dept: LAB | Facility: HOSPITAL | Age: 24
End: 2024-09-23
Payer: COMMERCIAL

## 2024-09-23 ENCOUNTER — HOSPITAL ENCOUNTER (EMERGENCY)
Facility: HOSPITAL | Age: 24
Discharge: HOME | End: 2024-09-23
Attending: EMERGENCY MEDICINE | Admitting: STUDENT IN AN ORGANIZED HEALTH CARE EDUCATION/TRAINING PROGRAM
Payer: COMMERCIAL

## 2024-09-23 VITALS
SYSTOLIC BLOOD PRESSURE: 113 MMHG | TEMPERATURE: 98.2 F | HEART RATE: 90 BPM | DIASTOLIC BLOOD PRESSURE: 70 MMHG | BODY MASS INDEX: 23.78 KG/M2 | RESPIRATION RATE: 18 BRPM | OXYGEN SATURATION: 100 % | WEIGHT: 148 LBS | HEIGHT: 66 IN

## 2024-09-23 DIAGNOSIS — O46.90 VAGINAL BLEEDING IN PREGNANCY: Primary | ICD-10-CM

## 2024-09-23 DIAGNOSIS — O26.859 SPOTTING DURING PREGNANCY: ICD-10-CM

## 2024-09-23 LAB
ALBUMIN SERPL-MCNC: 4.2 G/DL (ref 3.5–5.2)
ALBUMIN/GLOB SERPL: 1.4 G/DL
ALP SERPL-CCNC: 60 U/L (ref 39–117)
ALT SERPL W P-5'-P-CCNC: 9 U/L (ref 1–33)
ANION GAP SERPL CALCULATED.3IONS-SCNC: 10.5 MMOL/L (ref 5–15)
AST SERPL-CCNC: 15 U/L (ref 1–32)
BACTERIA UR QL AUTO: ABNORMAL /HPF
BASOPHILS # BLD AUTO: 0.04 10*3/MM3 (ref 0–0.2)
BASOPHILS NFR BLD AUTO: 0.6 % (ref 0–1.5)
BILIRUB SERPL-MCNC: 0.3 MG/DL (ref 0–1.2)
BILIRUB UR QL STRIP: NEGATIVE
BUN SERPL-MCNC: 11 MG/DL (ref 6–20)
BUN/CREAT SERPL: 13.8 (ref 7–25)
CALCIUM SPEC-SCNC: 9.4 MG/DL (ref 8.6–10.5)
CHLORIDE SERPL-SCNC: 104 MMOL/L (ref 98–107)
CLARITY UR: ABNORMAL
CO2 SERPL-SCNC: 24.5 MMOL/L (ref 22–29)
COLOR UR: YELLOW
CREAT SERPL-MCNC: 0.8 MG/DL (ref 0.57–1)
DEPRECATED RDW RBC AUTO: 39.7 FL (ref 37–54)
EGFRCR SERPLBLD CKD-EPI 2021: 106.3 ML/MIN/1.73
EOSINOPHIL # BLD AUTO: 0.05 10*3/MM3 (ref 0–0.4)
EOSINOPHIL NFR BLD AUTO: 0.7 % (ref 0.3–6.2)
ERYTHROCYTE [DISTWIDTH] IN BLOOD BY AUTOMATED COUNT: 13 % (ref 12.3–15.4)
GLOBULIN UR ELPH-MCNC: 3 GM/DL
GLUCOSE SERPL-MCNC: 92 MG/DL (ref 65–99)
GLUCOSE UR STRIP-MCNC: NEGATIVE MG/DL
HCG INTACT+B SERPL-ACNC: NORMAL MIU/ML
HCT VFR BLD AUTO: 33.9 % (ref 34–46.6)
HGB BLD-MCNC: 11.4 G/DL (ref 12–15.9)
HGB UR QL STRIP.AUTO: NEGATIVE
HYALINE CASTS UR QL AUTO: ABNORMAL /LPF
IMM GRANULOCYTES # BLD AUTO: 0.02 10*3/MM3 (ref 0–0.05)
IMM GRANULOCYTES NFR BLD AUTO: 0.3 % (ref 0–0.5)
KETONES UR QL STRIP: NEGATIVE
LEUKOCYTE ESTERASE UR QL STRIP.AUTO: ABNORMAL
LYMPHOCYTES # BLD AUTO: 2.17 10*3/MM3 (ref 0.7–3.1)
LYMPHOCYTES NFR BLD AUTO: 31.3 % (ref 19.6–45.3)
MCH RBC QN AUTO: 27.9 PG (ref 26.6–33)
MCHC RBC AUTO-ENTMCNC: 33.6 G/DL (ref 31.5–35.7)
MCV RBC AUTO: 83.1 FL (ref 79–97)
MONOCYTES # BLD AUTO: 0.56 10*3/MM3 (ref 0.1–0.9)
MONOCYTES NFR BLD AUTO: 8.1 % (ref 5–12)
NEUTROPHILS NFR BLD AUTO: 4.09 10*3/MM3 (ref 1.7–7)
NEUTROPHILS NFR BLD AUTO: 59 % (ref 42.7–76)
NITRITE UR QL STRIP: NEGATIVE
NRBC BLD AUTO-RTO: 0 /100 WBC (ref 0–0.2)
PH UR STRIP.AUTO: 6.5 [PH] (ref 5–8)
PLATELET # BLD AUTO: 242 10*3/MM3 (ref 140–450)
PMV BLD AUTO: 9.9 FL (ref 6–12)
POTASSIUM SERPL-SCNC: 3.9 MMOL/L (ref 3.5–5.2)
PROT SERPL-MCNC: 7.2 G/DL (ref 6–8.5)
PROT UR QL STRIP: NEGATIVE
RBC # BLD AUTO: 4.08 10*6/MM3 (ref 3.77–5.28)
RBC # UR STRIP: ABNORMAL /HPF
REF LAB TEST METHOD: ABNORMAL
SODIUM SERPL-SCNC: 139 MMOL/L (ref 136–145)
SP GR UR STRIP: 1.02 (ref 1–1.03)
SQUAMOUS #/AREA URNS HPF: ABNORMAL /HPF
UNIDENT CRYS URNS QL MICRO: ABNORMAL /HPF
UROBILINOGEN UR QL STRIP: ABNORMAL
WBC # UR STRIP: ABNORMAL /HPF
WBC NRBC COR # BLD AUTO: 6.93 10*3/MM3 (ref 3.4–10.8)

## 2024-09-23 PROCEDURE — 36415 COLL VENOUS BLD VENIPUNCTURE: CPT

## 2024-09-23 PROCEDURE — 84702 CHORIONIC GONADOTROPIN TEST: CPT

## 2024-09-23 PROCEDURE — 80053 COMPREHEN METABOLIC PANEL: CPT | Performed by: PHYSICIAN ASSISTANT

## 2024-09-23 PROCEDURE — 81001 URINALYSIS AUTO W/SCOPE: CPT | Performed by: PHYSICIAN ASSISTANT

## 2024-09-23 PROCEDURE — 85025 COMPLETE CBC W/AUTO DIFF WBC: CPT | Performed by: PHYSICIAN ASSISTANT

## 2024-09-23 PROCEDURE — 99283 EMERGENCY DEPT VISIT LOW MDM: CPT

## 2024-09-23 RX ORDER — SODIUM CHLORIDE 0.9 % (FLUSH) 0.9 %
10 SYRINGE (ML) INJECTION AS NEEDED
Status: DISCONTINUED | OUTPATIENT
Start: 2024-09-23 | End: 2024-09-24 | Stop reason: HOSPADM

## 2024-09-23 NOTE — TELEPHONE ENCOUNTER
Provider: DR MONSIVAIS    Caller: ARIELLE DOAN    Relationship to Patient: SELF    Phone Number: [phone]    Reason for Call: NEW OB PT CALLED HER NEW OB APPT IS ON 10/22; PT WENT TO  ER OVER THE WEEKEND; PT HAS SMALL SUBCHORIONIC BLEED. PLEASE REVIEW ER PN 09/20 IN CHART; TV U/S 09/20; LABS 09/20 IN CHART. PT NEEDS TO BE SEEN SOONER DUE TO ER NOTE.    sac diameter corresponds to 5 weeks and 3 days. Correlation with serial beta hCG levels is advised. Follow-up ultrasound in 1 to 2 weeks can be considered to assess for viability. There is a small subchorionic bleed measuring 1.1 x 0.4 x 0.5 cm.     PLEASE CALL PT TO SCHEDULE.

## 2024-09-23 NOTE — TELEPHONE ENCOUNTER
PROVIDER: DR MONSIVAIS     CALLER: ARIELLE DOAN     PHONE NUMBER: [phone] / LVM     REASON FOR CALL: PT CALLING FOR AN UPDATE TO SEE IF SHE CAN BE SEEN TODAY - PLEASE CALL THE PT TO CONFIRM - HUB UNABLE TO WT TO OFFICE    THANK YOU!

## 2024-09-24 NOTE — TELEPHONE ENCOUNTER
Her HCG level doubled in amount which is very reassuring. Need to see if we can get her in for appt in 1-2 weeks per ED recommendation.

## 2024-09-26 ENCOUNTER — HOSPITAL ENCOUNTER (EMERGENCY)
Facility: HOSPITAL | Age: 24
Discharge: HOME | End: 2024-09-26
Attending: EMERGENCY MEDICINE
Payer: COMMERCIAL

## 2024-09-26 ENCOUNTER — APPOINTMENT (OUTPATIENT)
Dept: GENERAL RADIOLOGY | Facility: HOSPITAL | Age: 24
End: 2024-09-26
Payer: COMMERCIAL

## 2024-09-26 VITALS
WEIGHT: 151.8 LBS | RESPIRATION RATE: 16 BRPM | TEMPERATURE: 98.2 F | HEIGHT: 66 IN | BODY MASS INDEX: 24.4 KG/M2 | OXYGEN SATURATION: 100 % | DIASTOLIC BLOOD PRESSURE: 50 MMHG | SYSTOLIC BLOOD PRESSURE: 102 MMHG | HEART RATE: 82 BPM

## 2024-09-26 DIAGNOSIS — J06.9 VIRAL URI WITH COUGH: ICD-10-CM

## 2024-09-26 DIAGNOSIS — R09.1 PLEURISY: Primary | ICD-10-CM

## 2024-09-26 LAB
ALBUMIN SERPL-MCNC: 4.8 G/DL (ref 3.5–5.2)
ALBUMIN/GLOB SERPL: 1.4 G/DL
ALP SERPL-CCNC: 68 U/L (ref 39–117)
ALT SERPL W P-5'-P-CCNC: 10 U/L (ref 1–33)
ANION GAP SERPL CALCULATED.3IONS-SCNC: 10.8 MMOL/L (ref 5–15)
AST SERPL-CCNC: 18 U/L (ref 1–32)
BASOPHILS # BLD AUTO: 0.04 10*3/MM3 (ref 0–0.2)
BASOPHILS NFR BLD AUTO: 0.5 % (ref 0–1.5)
BILIRUB SERPL-MCNC: 0.4 MG/DL (ref 0–1.2)
BUN SERPL-MCNC: 6 MG/DL (ref 6–20)
BUN/CREAT SERPL: 7.5 (ref 7–25)
CALCIUM SPEC-SCNC: 9.6 MG/DL (ref 8.6–10.5)
CHLORIDE SERPL-SCNC: 102 MMOL/L (ref 98–107)
CO2 SERPL-SCNC: 25.2 MMOL/L (ref 22–29)
CREAT SERPL-MCNC: 0.8 MG/DL (ref 0.57–1)
DEPRECATED RDW RBC AUTO: 39.9 FL (ref 37–54)
EGFRCR SERPLBLD CKD-EPI 2021: 106.3 ML/MIN/1.73
EOSINOPHIL # BLD AUTO: 0.04 10*3/MM3 (ref 0–0.4)
EOSINOPHIL NFR BLD AUTO: 0.5 % (ref 0.3–6.2)
ERYTHROCYTE [DISTWIDTH] IN BLOOD BY AUTOMATED COUNT: 13 % (ref 12.3–15.4)
FLUAV RNA RESP QL NAA+PROBE: NOT DETECTED
FLUBV RNA RESP QL NAA+PROBE: NOT DETECTED
GLOBULIN UR ELPH-MCNC: 3.4 GM/DL
GLUCOSE SERPL-MCNC: 75 MG/DL (ref 65–99)
HCT VFR BLD AUTO: 36.7 % (ref 34–46.6)
HGB BLD-MCNC: 12.1 G/DL (ref 12–15.9)
HOLD SPECIMEN: NORMAL
HOLD SPECIMEN: NORMAL
IMM GRANULOCYTES # BLD AUTO: 0.03 10*3/MM3 (ref 0–0.05)
IMM GRANULOCYTES NFR BLD AUTO: 0.4 % (ref 0–0.5)
LYMPHOCYTES # BLD AUTO: 1.78 10*3/MM3 (ref 0.7–3.1)
LYMPHOCYTES NFR BLD AUTO: 23.2 % (ref 19.6–45.3)
MCH RBC QN AUTO: 27.8 PG (ref 26.6–33)
MCHC RBC AUTO-ENTMCNC: 33 G/DL (ref 31.5–35.7)
MCV RBC AUTO: 84.4 FL (ref 79–97)
MONOCYTES # BLD AUTO: 0.56 10*3/MM3 (ref 0.1–0.9)
MONOCYTES NFR BLD AUTO: 7.3 % (ref 5–12)
NEUTROPHILS NFR BLD AUTO: 5.21 10*3/MM3 (ref 1.7–7)
NEUTROPHILS NFR BLD AUTO: 68.1 % (ref 42.7–76)
NRBC BLD AUTO-RTO: 0 /100 WBC (ref 0–0.2)
PLATELET # BLD AUTO: 252 10*3/MM3 (ref 140–450)
PMV BLD AUTO: 9.6 FL (ref 6–12)
POTASSIUM SERPL-SCNC: 3.5 MMOL/L (ref 3.5–5.2)
PROT SERPL-MCNC: 8.2 G/DL (ref 6–8.5)
RBC # BLD AUTO: 4.35 10*6/MM3 (ref 3.77–5.28)
SARS-COV-2 RNA RESP QL NAA+PROBE: NOT DETECTED
SODIUM SERPL-SCNC: 138 MMOL/L (ref 136–145)
TROPONIN T SERPL HS-MCNC: <6 NG/L
WBC NRBC COR # BLD AUTO: 7.66 10*3/MM3 (ref 3.4–10.8)
WHOLE BLOOD HOLD COAG: NORMAL
WHOLE BLOOD HOLD SPECIMEN: NORMAL

## 2024-09-26 PROCEDURE — 71045 X-RAY EXAM CHEST 1 VIEW: CPT

## 2024-09-26 PROCEDURE — 93005 ELECTROCARDIOGRAM TRACING: CPT | Performed by: STUDENT IN AN ORGANIZED HEALTH CARE EDUCATION/TRAINING PROGRAM

## 2024-09-26 PROCEDURE — 80053 COMPREHEN METABOLIC PANEL: CPT | Performed by: STUDENT IN AN ORGANIZED HEALTH CARE EDUCATION/TRAINING PROGRAM

## 2024-09-26 PROCEDURE — 87636 SARSCOV2 & INF A&B AMP PRB: CPT | Performed by: PHYSICIAN ASSISTANT

## 2024-09-26 PROCEDURE — 36415 COLL VENOUS BLD VENIPUNCTURE: CPT

## 2024-09-26 PROCEDURE — 93005 ELECTROCARDIOGRAM TRACING: CPT

## 2024-09-26 PROCEDURE — 85025 COMPLETE CBC W/AUTO DIFF WBC: CPT

## 2024-09-26 PROCEDURE — 99284 EMERGENCY DEPT VISIT MOD MDM: CPT

## 2024-09-26 PROCEDURE — 84484 ASSAY OF TROPONIN QUANT: CPT | Performed by: STUDENT IN AN ORGANIZED HEALTH CARE EDUCATION/TRAINING PROGRAM

## 2024-09-26 RX ORDER — ACETAMINOPHEN 500 MG
1000 TABLET ORAL ONCE
Status: COMPLETED | OUTPATIENT
Start: 2024-09-26 | End: 2024-09-26

## 2024-09-26 RX ORDER — SODIUM CHLORIDE 0.9 % (FLUSH) 0.9 %
10 SYRINGE (ML) INJECTION AS NEEDED
Status: DISCONTINUED | OUTPATIENT
Start: 2024-09-26 | End: 2024-09-26 | Stop reason: HOSPADM

## 2024-09-26 RX ADMIN — ACETAMINOPHEN 1000 MG: 500 TABLET, FILM COATED ORAL at 17:56

## 2024-09-26 NOTE — DISCHARGE INSTRUCTIONS
Take Tylenol as needed per directions on the package.  Drink plenty of fluids.  Rest.  Follow-up with your PCP for further outpatient evaluation as needed.  Follow-up with OB for upcoming scheduled appointment.  Return to the ER for new or worsening symptoms or acute concerns.

## 2024-09-26 NOTE — ED PROVIDER NOTES
Subjective:  Chief Complaint:  Chest pain    History of Present Illness:  Patient is a 24-year-old female who reports that she is about 6 weeks gestation presenting to the ER with complaints of cough, congestion, chest pain, shortness of breath.  Patient states that symptoms have been going on since Friday, 6 days ago.  States that she seen her PCP and they thought they heard a heart murmur.  States that they did a chest x-ray and advised her to come to the ER.  She denies any leg swelling or history of blood clots.  States that she did have her gallbladder removed in July, 2-3 months ago.  States that pain is worse with deep breaths.  Denies any additional symptoms or complaints at this time.  Patient has been seen in the ER twice this pregnancy, first time diagnosed with a UTI and subchorionic hemorrhage.  Has follow-up with OB/GYN on 10/9/24.      Nurses Notes reviewed and agree, including vitals, allergies, social history and prior medical history.     REVIEW OF SYSTEMS: All systems reviewed and not pertinent unless noted.  Review of Systems   HENT:  Positive for congestion.    Respiratory:  Positive for cough and shortness of breath.    Cardiovascular:  Positive for chest pain.   All other systems reviewed and are negative.      Past Medical History:   Diagnosis Date    Anemia     Glaucoma     left eye    Irritable bowel syndrome     Lactose intolerance     Seasonal allergies     Slow to wake up after anesthesia     Thyroid activity decreased     Umbilical hernia     Urinary tract infection     Wears glasses        Allergies:    Latex      Past Surgical History:   Procedure Laterality Date     SECTION N/A 2022    Procedure:  SECTION PRIMARY;  Surgeon: Lukas Rodriguez MD;  Location: [facility];  Service: Obstetrics/Gynecology;  Laterality: N/A;    CHOLECYSTECTOMY N/A 2024    Procedure: CHOLECYSTECTOMY LAPAROSCOPIC WITH DAVINCI ROBOT;  Surgeon: Mery Cadet DO;  Location:  "[facility] OR;  Service: Robotics - DaVinci;  Laterality: N/A;    COLONOSCOPY      FOOT/TOE TENDON REPAIR Left 01/30/2020    Procedure: RECONSTRUCTION PT TENDON LEFT;  Surgeon: Uziel Garcia DPM;  Location: [facility] OR;  Service: Podiatry    TARSAL TUNNEL RELEASE Left 01/30/2020    Procedure: EXCISION TARSAL BONE LEFT;  Surgeon: Uziel Garcia DPM;  Location: [facility] OR;  Service: Podiatry    UMBILICAL HERNIA REPAIR N/A 01/19/2023    Procedure: OPEN UMBILICAL HERNIA REPAIR;  Surgeon: Mery Cadet DO;  Location: [facility] OR;  Service: General;  Laterality: N/A;    UMBILICAL HERNIA REPAIR N/A 2/21/2024    Procedure: UMBILICAL HERNIA REVISION;  Surgeon: Mery Cadet DO;  Location: [facility] OR;  Service: General;  Laterality: N/A;    WISDOM TOOTH EXTRACTION           Social History     Socioeconomic History    Marital status: Single   Tobacco Use    Smoking status: Never     Passive exposure: Never    Smokeless tobacco: Never   Vaping Use    Vaping status: Never Used   Substance and Sexual Activity    Alcohol use: Never    Drug use: Never    Sexual activity: Defer         Family History   Problem Relation Age of Onset    Hypertension Mother     Diabetes Father     Heart attack Maternal Grandfather     Diabetes Maternal Grandfather        Objective  Physical Exam:  /48 (BP Location: Left arm, Patient Position: Sitting)   Pulse 88   Temp 97.8 °F (36.6 °C) (Oral)   Resp 16   Ht 167.6 cm (66\")   Wt 68.9 kg (151 lb 12.8 oz)   LMP 08/04/2024 (Exact Date) Comment: Due date sometime in May per pt.  SpO2 100%   BMI 24.50 kg/m²      Physical Exam  Vitals and nursing note reviewed.   Constitutional:       General: She is not in acute distress.     Appearance: She is not toxic-appearing.   HENT:      Head: Normocephalic and atraumatic.   Eyes:      Extraocular Movements: Extraocular movements intact.   Cardiovascular:      Rate and Rhythm: Normal rate.      Heart sounds: Normal heart sounds. "   Pulmonary:      Effort: Pulmonary effort is normal.      Breath sounds: Normal breath sounds.   Abdominal:      Palpations: Abdomen is soft.   Musculoskeletal:         General: Normal range of motion.      Cervical back: Normal range of motion and neck supple.      Right lower leg: No edema.      Left lower leg: No edema.   Skin:     General: Skin is warm and dry.   Neurological:      General: No focal deficit present.      Mental Status: She is alert and oriented to person, place, and time.   Psychiatric:         Mood and Affect: Mood normal.         Behavior: Behavior normal.         Procedures    ED Course:         Lab Results (last 24 hours)       Procedure Component Value Units Date/Time    CBC & Differential [[id number]]  (Normal) Collected: 09/26/24 1505    Specimen: Blood Updated: 09/26/24 1515    Narrative:      The following orders were created for panel order CBC & Differential.  Procedure                               Abnormality         Status                     ---------                               -----------         ------                     CBC Auto Differential[[id number]]        Normal              Final result                 Please view results for these tests on the individual orders.    Comprehensive Metabolic Panel [[id number]] Collected: 09/26/24 1505    Specimen: Blood Updated: 09/26/24 1531     Glucose 75 mg/dL      BUN 6 mg/dL      Creatinine 0.80 mg/dL      Sodium 138 mmol/L      Potassium 3.5 mmol/L      Chloride 102 mmol/L      CO2 25.2 mmol/L      Calcium 9.6 mg/dL      Total Protein 8.2 g/dL      Albumin 4.8 g/dL      ALT (SGPT) 10 U/L      AST (SGOT) 18 U/L      Alkaline Phosphatase 68 U/L      Total Bilirubin 0.4 mg/dL      Globulin 3.4 gm/dL      A/G Ratio 1.4 g/dL      BUN/Creatinine Ratio 7.5     Anion Gap 10.8 mmol/L      eGFR 106.3 mL/min/1.73     Narrative:      GFR Normal >60  Chronic Kidney Disease <60  Kidney Failure <15      High Sensitivity Troponin T [[id number]]   (Normal) Collected: 09/26/24 1505    Specimen: Blood Updated: 09/26/24 1533     HS Troponin T <6 ng/L     Narrative:      High Sensitive Troponin T Reference Range:  <14.0 ng/L- Negative Female for AMI  <22.0 ng/L- Negative Male for AMI  >=14 - Abnormal Female indicating possible myocardial injury.  >=22 - Abnormal Male indicating possible myocardial injury.   Clinicians would have to utilize clinical acumen, EKG, Troponin, and serial changes to determine if it is an Acute Myocardial Infarction or myocardial injury due to an underlying chronic condition.         CBC Auto Differential [[id number]]  (Normal) Collected: 09/26/24 1505    Specimen: Blood Updated: 09/26/24 1515     WBC 7.66 10*3/mm3      RBC 4.35 10*6/mm3      Hemoglobin 12.1 g/dL      Hematocrit 36.7 %      MCV 84.4 fL      MCH 27.8 pg      MCHC 33.0 g/dL      RDW 13.0 %      RDW-SD 39.9 fl      MPV 9.6 fL      Platelets 252 10*3/mm3      Neutrophil % 68.1 %      Lymphocyte % 23.2 %      Monocyte % 7.3 %      Eosinophil % 0.5 %      Basophil % 0.5 %      Immature Grans % 0.4 %      Neutrophils, Absolute 5.21 10*3/mm3      Lymphocytes, Absolute 1.78 10*3/mm3      Monocytes, Absolute 0.56 10*3/mm3      Eosinophils, Absolute 0.04 10*3/mm3      Basophils, Absolute 0.04 10*3/mm3      Immature Grans, Absolute 0.03 10*3/mm3      nRBC 0.0 /100 WBC     COVID-19 and FLU A/B PCR, 1 HR TAT - Swab, Nasopharynx [[id number]]  (Normal) Collected: 09/26/24 1612    Specimen: Swab from Nasopharynx Updated: 09/26/24 1649     COVID19 Not Detected     Influenza A PCR Not Detected     Influenza B PCR Not Detected    Narrative:      Fact sheet for providers: https://www.fda.gov/media/384238/download    Fact sheet for patients: https://www.fda.gov/media/323933/download    Test performed by PCR.             XR Chest 1 View    Result Date: 9/26/2024  PROCEDURE: XR CHEST 1 VW-  HISTORY: Chest Pain Triage Protocol  COMPARISON: February 28, 2024..  FINDINGS: The heart is normal in  size. The lungs are clear. The mediastinum is unremarkable. There is no pneumothorax.  There are no acute osseous abnormalities. Abdominal shielding used.      Impression: No acute cardiopulmonary process.      This report was signed and finalized on 9/26/2024 4:42 PM by Ginger Juan MD.      X-ray chest 3 views    Result Date: 9/25/2024  PA, LATERAL AND SUPINE DECUBITUS CHEST CLINICAL HISTORY: Chest pain, dyspnea. COMPARISON: None. FINDINGS: The lungs are clear. The heart and vasculature are unremarkable. There is no pleural disease, adenopathy or significant osseous abnormality.     Impression: Unremarkable. Images reviewed, interpreted, and dictated by Dr. Kamari Flores. Transcribed by Carlo Li.        MDM  Patient evaluated in the ER for cough, congestion x 1 week with chest pain and shortness of breath.  Patient not currently having chest pain but states it is intermittent and worse with deep breaths.  She is hemodynamically stable, afebrile, nontoxic-appearing on exam.  Oxygen saturation of 100% on room air.  Differential diagnosis includes but is not limited to pleurisy, COVID, flu, other viral illness, ACS, cardiac arrhythmia, pneumonia.  Initial plan includes CBC, CMP, troponins, EKG, chest x-ray placed by triage protocol.  Will add COVID/flu swab.  Patient states she has had these previously and it was negative.  Discussed with patient that we cannot rule out a PE without D-dimer/possible CT chest PE protocol.  Discussed with her that clinically I feel this is unlikely given that she is not tachycardic, not hypoxic, no leg swelling, no history of blood clots and given that she had cough and congestion prior to symptoms.  Feel this is likely viral upper respiratory infection and possibly pleurisy.  Offered to obtain D-dimer but advised patient that this very well may be positive given that she is pregnant and if positive would require CT chest PE protocol which would involve contrast and some radiation.   Patient agreeable with holding off on D-dimer at this time.    Chest x-ray unremarkable.  Labs are nonactionable.  Troponin within normal limits.  Symptoms started greater than 3 hours ago so second troponin does not appear to be necessary.  EKG sinus rhythm with no obvious acute ischemic changes.  Patient agreeable with plan for discharge.  Recommended Tylenol as needed per directions on the package.  Recommended follow-up with PCP and OB/GYN for further outpatient evaluation.  Did give strict return precautions for any new or worsening symptoms or acute concerns.    Final diagnoses:   Pleurisy   Viral URI with cough          Kemi Dc, PA-C  09/26/24 4229

## 2024-10-09 ENCOUNTER — INITIAL PRENATAL (OUTPATIENT)
Dept: OBSTETRICS AND GYNECOLOGY | Facility: CLINIC | Age: 24
End: 2024-10-09
Payer: MEDICAID

## 2024-10-09 VITALS — DIASTOLIC BLOOD PRESSURE: 78 MMHG | SYSTOLIC BLOOD PRESSURE: 110 MMHG | BODY MASS INDEX: 24.4 KG/M2 | WEIGHT: 151.2 LBS

## 2024-10-09 DIAGNOSIS — O36.80X0 ENCOUNTER TO DETERMINE FETAL VIABILITY OF PREGNANCY, SINGLE OR UNSPECIFIED FETUS: ICD-10-CM

## 2024-10-09 DIAGNOSIS — Z34.81 ENCOUNTER FOR SUPERVISION OF OTHER NORMAL PREGNANCY IN FIRST TRIMESTER: Primary | ICD-10-CM

## 2024-10-09 DIAGNOSIS — O21.9 NAUSEA/VOMITING IN PREGNANCY: ICD-10-CM

## 2024-10-09 RX ORDER — PROMETHAZINE HYDROCHLORIDE 25 MG/1
25 TABLET ORAL EVERY 6 HOURS PRN
Qty: 30 TABLET | Refills: 2 | Status: SHIPPED | OUTPATIENT
Start: 2024-10-09

## 2024-10-09 RX ORDER — DEXTROMETHORPHAN HYDROBROMIDE, GUAIFENESIN 20; 400 MG/20ML; MG/20ML
SOLUTION ORAL
COMMUNITY
Start: 2024-09-23

## 2024-10-09 RX ORDER — DIPHENHYDRAMINE HYDROCHLORIDE 25 MG/1
25 CAPSULE ORAL NIGHTLY
Qty: 30 TABLET | Refills: 5 | Status: SHIPPED | OUTPATIENT
Start: 2024-10-09

## 2024-10-09 RX ORDER — ALBUTEROL SULFATE 90 UG/1
AEROSOL, METERED RESPIRATORY (INHALATION) SEE ADMIN INSTRUCTIONS
COMMUNITY
Start: 2024-09-25

## 2024-10-09 NOTE — PROGRESS NOTES
New Pregnancy Visit    Subjective   Chief Complaint   Patient presents with    Initial Prenatal Visit     LMP 24     Romy Langley is a 24 y.o.  at 8w1d, Estimated Date of Delivery: 25 by US today not c/w LMP. She presents to initiate prenatal care with our group today.     She reports ongoing N/V. Zofran is not helping. She has had bleeding earlier in pregnancy that has since resolved. It was mainly spotting with wiping and in the setting of a UTI, for which she received antibiotics.    OB/GYN Hx:   OB History    Para Term  AB Living   2 1 1     1   SAB IAB Ectopic Molar Multiple Live Births           0 1      # Outcome Date GA Lbr Jay/2nd Weight Sex Type Anes PTL Lv   2 Current            1 Term 23 39w4d  3714 g (8 lb 3 oz) M CS-LTranv EPI N CHEMA      Complications: Fetal Intolerance      Other pertinent history:   Past Medical History:   Diagnosis Date    Anemia     Glaucoma     left eye    Irritable bowel syndrome     Lactose intolerance     Seasonal allergies     Slow to wake up after anesthesia     Thyroid activity decreased     Umbilical hernia     Urinary tract infection     Wears glasses       Past Surgical History:   Procedure Laterality Date     SECTION N/A 2022    Procedure:  SECTION PRIMARY;  Surgeon: Lukas Rodriguez MD;  Location: [facility] OR;  Service: Obstetrics/Gynecology;  Laterality: N/A;    CHOLECYSTECTOMY N/A 2024    Procedure: CHOLECYSTECTOMY LAPAROSCOPIC WITH DAVINCI ROBOT;  Surgeon: Mery Cadet DO;  Location: [facility] OR;  Service: Robotics - DaVinci;  Laterality: N/A;    COLONOSCOPY      FOOT/TOE TENDON REPAIR Left 2020    Procedure: RECONSTRUCTION PT TENDON LEFT;  Surgeon: Uziel Garcia DPM;  Location: [facility] OR;  Service: Podiatry    TARSAL TUNNEL RELEASE Left 2020    Procedure: EXCISION TARSAL BONE LEFT;  Surgeon: Uziel Garcia DPM;  Location: [facility] OR;  Service: Podiatry     UMBILICAL HERNIA REPAIR N/A 01/19/2023    Procedure: OPEN UMBILICAL HERNIA REPAIR;  Surgeon: Mery Cadet DO;  Location: [facility] OR;  Service: General;  Laterality: N/A;    UMBILICAL HERNIA REPAIR N/A 2/21/2024    Procedure: UMBILICAL HERNIA REVISION;  Surgeon: Mery Cadet DO;  Location: [facility] OR;  Service: General;  Laterality: N/A;    WISDOM TOOTH EXTRACTION        Social History    Tobacco Use      Smoking status: Never        Passive exposure: Never      Smokeless tobacco: Never    Current Outpatient Medications on File Prior to Visit   Medication Sig Dispense Refill    Robitussin Cough+Chest Fabian DM  MG/20ML liquid TAKE 10 ML BY MOUTH FOUR TIMES DAILY AS NEEDED      Ventolin  (90 Base) MCG/ACT inhaler Inhale See Admin Instructions. Inhale 2 puffs by mouth every 4 to 6 hours as needed      acetaminophen (TYLENOL) 500 MG tablet Take 2 tablets by mouth Every 8 (Eight) Hours As Needed for Mild Pain or Moderate Pain. 60 tablet 0    calcium carbonate (Tums) 500 MG chewable tablet Chew 1 tablet 2 (Two) Times a Day. 60 tablet 0    HYDROcodone-acetaminophen (NORCO) 5-325 MG per tablet Take 1-2 tablets by mouth Every 4 (Four) Hours As Needed (Pain). (Patient not taking: Reported on 8/19/2024) 10 tablet 0    ibuprofen (ADVIL,MOTRIN) 800 MG tablet Take 1 tablet by mouth Every 8 (Eight) Hours As Needed for Mild Pain or Moderate Pain. 30 tablet 0    Motegrity 2 MG tablet       omeprazole (priLOSEC) 20 MG capsule Take 1 capsule by mouth Every Morning. 60 capsule 1    ondansetron (Zofran) 4 MG tablet Take 1 tablet by mouth Daily As Needed for Nausea or Vomiting. 10 tablet 1    vitamin B-6 (PYRIDOXINE) 25 MG tablet Take 1 tablet by mouth Every Night. 30 tablet 0     No current facility-administered medications on file prior to visit.        Objective   /78   Wt 68.6 kg (151 lb 3.2 oz)   LMP 08/04/2024 (Exact Date) Comment: Due date sometime in May per pt.  BMI 24.40 kg/m²   Physical  Exam:  See prenatal physical     Lab Review:    Latest Reference Range & Units 09/26/24 15:05   Sodium 136 - 145 mmol/L 138   Potassium 3.5 - 5.2 mmol/L 3.5   Chloride 98 - 107 mmol/L 102   CO2 22.0 - 29.0 mmol/L 25.2   Anion Gap 5.0 - 15.0 mmol/L 10.8   BUN 6 - 20 mg/dL 6   Creatinine 0.57 - 1.00 mg/dL 0.80   BUN/Creatinine Ratio 7.0 - 25.0  7.5   eGFR >60.0 mL/min/1.73 106.3   Glucose 65 - 99 mg/dL 75   Calcium 8.6 - 10.5 mg/dL 9.6   Alkaline Phosphatase 39 - 117 U/L 68   Total Protein 6.0 - 8.5 g/dL 8.2   Albumin 3.5 - 5.2 g/dL 4.8   Globulin gm/dL 3.4   A/G Ratio g/dL 1.4   AST (SGOT) 1 - 32 U/L 18   ALT (SGPT) 1 - 33 U/L 10   Total Bilirubin 0.0 - 1.2 mg/dL 0.4   WBC 3.40 - 10.80 10*3/mm3 7.66   RBC 3.77 - 5.28 10*6/mm3 4.35   Hemoglobin 12.0 - 15.9 g/dL 12.1   Hematocrit 34.0 - 46.6 % 36.7   Platelets 140 - 450 10*3/mm3 252   RDW 12.3 - 15.4 % 13.0   MCV 79.0 - 97.0 fL 84.4   MCH 26.6 - 33.0 pg 27.8   MCHC 31.5 - 35.7 g/dL 33.0   MPV 6.0 - 12.0 fL 9.6   RDW-SD 37.0 - 54.0 fl 39.9        Imaging Review:   US performed today -   IUP measuring 8w1d with appropriate fetal cardiac activity. DEQUAN is set at 5/20/25. The cervix and bilateral ovaries are normal in appearance. No free fluid in the cul-de-sac.      Assessment & Plan     IUP at 8w1d  Routine OB -   Prenatal labs ordered today  Pap smear is up to date - completed 9/8/22, NILM  Genetic testing reviewed:  declined  Information reviewed/ provided: exercise in pregnancy, nutrition in pregnancy, weight gain in pregnancy, work and travel restrictions during pregnancy, list of OTC medications acceptable in pregnancy, and call coverage groups  N/V: phenergan, Unisom and B6 sent to pharmacy  H/o umbilical hernia repair w/ subsequent revision: discussed that unfortunately she will be at increased risk of hernia recurrence due to pregnancy. Will monitor for s/sx.     Diagnosis Plan   1. Encounter for supervision of other normal pregnancy in first trimester  OB Panel  With HIV and RPR    Urine Drug Screen - Urine, Clean Catch    Chlamydia trachomatis, Neisseria gonorrhoeae, Trichomonas vaginalis, PCR - , Urine, Clean Catch      2. Encounter to determine fetal viability of pregnancy, single or unspecified fetus  US Ob Transvaginal      3. Nausea/vomiting in pregnancy  vitamin B-6 (PYRIDOXINE) 25 MG tablet    doxylamine (UNISOM) 25 MG tablet    promethazine (PHENERGAN) 25 MG tablet        Follow up: 4 week(s)    Tasha Ureña MD  Obstetrics and Gynecology  [facility]

## 2024-10-11 LAB
ABO GROUP BLD: NORMAL
AMPHETAMINES UR QL SCN: NEGATIVE NG/ML
BARBITURATES UR QL SCN: NEGATIVE NG/ML
BASOPHILS # BLD AUTO: 0.1 X10E3/UL (ref 0–0.2)
BASOPHILS NFR BLD AUTO: 1 %
BENZODIAZ UR QL SCN: NEGATIVE NG/ML
BLD GP AB SCN SERPL QL: NEGATIVE
BZE UR QL SCN: NEGATIVE NG/ML
C TRACH RRNA SPEC QL NAA+PROBE: NEGATIVE
CANNABINOIDS UR QL SCN: NEGATIVE NG/ML
CREAT UR-MCNC: 100.1 MG/DL (ref 20–300)
EOSINOPHIL # BLD AUTO: 0.1 X10E3/UL (ref 0–0.4)
EOSINOPHIL NFR BLD AUTO: 1 %
ERYTHROCYTE [DISTWIDTH] IN BLOOD BY AUTOMATED COUNT: 12.8 % (ref 11.7–15.4)
HBV SURFACE AG SERPL QL IA: NEGATIVE
HCT VFR BLD AUTO: 36.3 % (ref 34–46.6)
HCV AB SERPL QL IA: NORMAL
HCV IGG SERPL QL IA: NON REACTIVE
HGB BLD-MCNC: 11.7 G/DL (ref 11.1–15.9)
HIV 1+2 AB+HIV1 P24 AG SERPL QL IA: NON REACTIVE
IMM GRANULOCYTES # BLD AUTO: 0 X10E3/UL (ref 0–0.1)
IMM GRANULOCYTES NFR BLD AUTO: 0 %
LABORATORY COMMENT REPORT: NORMAL
LYMPHOCYTES # BLD AUTO: 2.2 X10E3/UL (ref 0.7–3.1)
LYMPHOCYTES NFR BLD AUTO: 23 %
MCH RBC QN AUTO: 27.9 PG (ref 26.6–33)
MCHC RBC AUTO-ENTMCNC: 32.2 G/DL (ref 31.5–35.7)
MCV RBC AUTO: 87 FL (ref 79–97)
METHADONE UR QL SCN: NEGATIVE NG/ML
MONOCYTES # BLD AUTO: 0.6 X10E3/UL (ref 0.1–0.9)
MONOCYTES NFR BLD AUTO: 6 %
N GONORRHOEA RRNA SPEC QL NAA+PROBE: NEGATIVE
NEUTROPHILS # BLD AUTO: 6.8 X10E3/UL (ref 1.4–7)
NEUTROPHILS NFR BLD AUTO: 69 %
OPIATES UR QL SCN: NEGATIVE NG/ML
OXYCODONE+OXYMORPHONE UR QL SCN: NEGATIVE NG/ML
PCP UR QL: NEGATIVE NG/ML
PH UR: 7.7 [PH] (ref 4.5–8.9)
PLATELET # BLD AUTO: 304 X10E3/UL (ref 150–450)
PROPOXYPH UR QL SCN: NEGATIVE NG/ML
RBC # BLD AUTO: 4.19 X10E6/UL (ref 3.77–5.28)
RH BLD: POSITIVE
RPR SER QL: NON REACTIVE
RUBV IGG SERPL IA-ACNC: 1.7 INDEX
T VAGINALIS RRNA SPEC QL NAA+PROBE: NEGATIVE
WBC # BLD AUTO: 9.7 X10E3/UL (ref 3.4–10.8)

## 2024-11-04 ENCOUNTER — ROUTINE PRENATAL (OUTPATIENT)
Dept: OBSTETRICS AND GYNECOLOGY | Facility: CLINIC | Age: 24
End: 2024-11-04
Payer: COMMERCIAL

## 2024-11-04 VITALS — SYSTOLIC BLOOD PRESSURE: 108 MMHG | WEIGHT: 148 LBS | DIASTOLIC BLOOD PRESSURE: 70 MMHG | BODY MASS INDEX: 23.89 KG/M2

## 2024-11-04 DIAGNOSIS — Z34.81 ENCOUNTER FOR SUPERVISION OF OTHER NORMAL PREGNANCY IN FIRST TRIMESTER: Primary | ICD-10-CM

## 2024-11-04 PROCEDURE — 99213 OFFICE O/P EST LOW 20 MIN: CPT | Performed by: MIDWIFE

## 2024-11-04 RX ORDER — FAMOTIDINE 20 MG/1
20 TABLET, FILM COATED ORAL 2 TIMES DAILY PRN
Qty: 60 TABLET | Refills: 3 | Status: SHIPPED | OUTPATIENT
Start: 2024-11-04 | End: 2025-11-04

## 2024-11-04 NOTE — PROGRESS NOTES
Chief Complaint   Patient presents with    Routine Prenatal Visit     C/o nausea and vomiting        HPI: Romy is a  currently at 11w6d here for prenatal visit who reports the following:  Continues to have nausea and vomiting. She is taking Phenergan and sometimes Zofran. She wants a repeat CSection and a tubal.                EXAM:     Vitals:    24 1305   BP: 108/70      Abdomen:   See prenatal flowsheet as noted and reviewed, soft, nontender   Pelvic:  See prenatal flowsheet as noted and reviewed   Urine:  See prenatal flowsheet as noted and reviewed    Lab Results   Component Value Date    ABO O 10/09/2024    RH Positive 10/09/2024    ABSCRN Negative 10/09/2024       MDM:  Impression: Previous C/S with planned repeat C/S   Tests done today: none   Topics discussed: Repeat CSection and tubal  Reviewed OB labs   Tests next visit: none                RTO:                        4 weeks    This note was electronically signed.  JASPAL Ivy  2024

## 2024-11-13 ENCOUNTER — TELEPHONE (OUTPATIENT)
Dept: OBSTETRICS AND GYNECOLOGY | Facility: CLINIC | Age: 24
End: 2024-11-13

## 2024-11-13 ENCOUNTER — APPOINTMENT (OUTPATIENT)
Dept: ULTRASOUND IMAGING | Facility: HOSPITAL | Age: 24
End: 2024-11-13
Payer: COMMERCIAL

## 2024-11-13 ENCOUNTER — HOSPITAL ENCOUNTER (EMERGENCY)
Facility: HOSPITAL | Age: 24
Discharge: HOME | End: 2024-11-13
Attending: STUDENT IN AN ORGANIZED HEALTH CARE EDUCATION/TRAINING PROGRAM | Admitting: STUDENT IN AN ORGANIZED HEALTH CARE EDUCATION/TRAINING PROGRAM
Payer: COMMERCIAL

## 2024-11-13 VITALS
WEIGHT: 150 LBS | RESPIRATION RATE: 17 BRPM | TEMPERATURE: 97.7 F | DIASTOLIC BLOOD PRESSURE: 57 MMHG | BODY MASS INDEX: 23.54 KG/M2 | HEIGHT: 67 IN | SYSTOLIC BLOOD PRESSURE: 94 MMHG | OXYGEN SATURATION: 100 % | HEART RATE: 95 BPM

## 2024-11-13 DIAGNOSIS — O46.90 VAGINAL BLEEDING IN PREGNANCY: ICD-10-CM

## 2024-11-13 DIAGNOSIS — Z3A.13 13 WEEKS GESTATION OF PREGNANCY: ICD-10-CM

## 2024-11-13 DIAGNOSIS — B37.9 YEAST INFECTION: ICD-10-CM

## 2024-11-13 DIAGNOSIS — N39.0 URINARY TRACT INFECTION WITHOUT HEMATURIA, SITE UNSPECIFIED: Primary | ICD-10-CM

## 2024-11-13 LAB
ALBUMIN SERPL-MCNC: 3.8 G/DL (ref 3.5–5.2)
ALBUMIN/GLOB SERPL: 1.2 G/DL
ALP SERPL-CCNC: 62 U/L (ref 39–117)
ALT SERPL W P-5'-P-CCNC: 5 U/L (ref 1–33)
ANION GAP SERPL CALCULATED.3IONS-SCNC: 11.5 MMOL/L (ref 5–15)
AST SERPL-CCNC: 15 U/L (ref 1–32)
BACTERIA UR QL AUTO: ABNORMAL /HPF
BASOPHILS # BLD AUTO: 0.04 10*3/MM3 (ref 0–0.2)
BASOPHILS NFR BLD AUTO: 0.6 % (ref 0–1.5)
BILIRUB SERPL-MCNC: 0.3 MG/DL (ref 0–1.2)
BILIRUB UR QL STRIP: NEGATIVE
BUN SERPL-MCNC: 8 MG/DL (ref 6–20)
BUN/CREAT SERPL: 12.1 (ref 7–25)
CALCIUM SPEC-SCNC: 9 MG/DL (ref 8.6–10.5)
CHLORIDE SERPL-SCNC: 101 MMOL/L (ref 98–107)
CLARITY UR: ABNORMAL
CO2 SERPL-SCNC: 20.5 MMOL/L (ref 22–29)
COLOR UR: YELLOW
CREAT SERPL-MCNC: 0.66 MG/DL (ref 0.57–1)
DEPRECATED RDW RBC AUTO: 40.1 FL (ref 37–54)
EGFRCR SERPLBLD CKD-EPI 2021: 126.6 ML/MIN/1.73
EOSINOPHIL # BLD AUTO: 0.05 10*3/MM3 (ref 0–0.4)
EOSINOPHIL NFR BLD AUTO: 0.8 % (ref 0.3–6.2)
ERYTHROCYTE [DISTWIDTH] IN BLOOD BY AUTOMATED COUNT: 13.2 % (ref 12.3–15.4)
GLOBULIN UR ELPH-MCNC: 3.2 GM/DL
GLUCOSE SERPL-MCNC: 77 MG/DL (ref 65–99)
GLUCOSE UR STRIP-MCNC: NEGATIVE MG/DL
HCG INTACT+B SERPL-ACNC: NORMAL MIU/ML
HCT VFR BLD AUTO: 32.6 % (ref 34–46.6)
HGB BLD-MCNC: 11.1 G/DL (ref 12–15.9)
HGB UR QL STRIP.AUTO: NEGATIVE
HYALINE CASTS UR QL AUTO: ABNORMAL /LPF
IMM GRANULOCYTES # BLD AUTO: 0.03 10*3/MM3 (ref 0–0.05)
IMM GRANULOCYTES NFR BLD AUTO: 0.5 % (ref 0–0.5)
KETONES UR QL STRIP: NEGATIVE
LEUKOCYTE ESTERASE UR QL STRIP.AUTO: ABNORMAL
LIPASE SERPL-CCNC: 35 U/L (ref 13–60)
LYMPHOCYTES # BLD AUTO: 1.93 10*3/MM3 (ref 0.7–3.1)
LYMPHOCYTES NFR BLD AUTO: 29.2 % (ref 19.6–45.3)
MCH RBC QN AUTO: 28.2 PG (ref 26.6–33)
MCHC RBC AUTO-ENTMCNC: 34 G/DL (ref 31.5–35.7)
MCV RBC AUTO: 82.7 FL (ref 79–97)
MONOCYTES # BLD AUTO: 0.5 10*3/MM3 (ref 0.1–0.9)
MONOCYTES NFR BLD AUTO: 7.6 % (ref 5–12)
NEUTROPHILS NFR BLD AUTO: 4.07 10*3/MM3 (ref 1.7–7)
NEUTROPHILS NFR BLD AUTO: 61.3 % (ref 42.7–76)
NITRITE UR QL STRIP: NEGATIVE
NRBC BLD AUTO-RTO: 0 /100 WBC (ref 0–0.2)
PH UR STRIP.AUTO: 6.5 [PH] (ref 5–8)
PLATELET # BLD AUTO: 233 10*3/MM3 (ref 140–450)
PMV BLD AUTO: 9.4 FL (ref 6–12)
POTASSIUM SERPL-SCNC: 3.6 MMOL/L (ref 3.5–5.2)
PROT SERPL-MCNC: 7 G/DL (ref 6–8.5)
PROT UR QL STRIP: NEGATIVE
RBC # BLD AUTO: 3.94 10*6/MM3 (ref 3.77–5.28)
RBC # UR STRIP: ABNORMAL /HPF
REF LAB TEST METHOD: ABNORMAL
SODIUM SERPL-SCNC: 133 MMOL/L (ref 136–145)
SP GR UR STRIP: 1.02 (ref 1–1.03)
SQUAMOUS #/AREA URNS HPF: ABNORMAL /HPF
UROBILINOGEN UR QL STRIP: ABNORMAL
WBC # UR STRIP: ABNORMAL /HPF
WBC NRBC COR # BLD AUTO: 6.62 10*3/MM3 (ref 3.4–10.8)
YEAST URNS QL MICRO: ABNORMAL /HPF

## 2024-11-13 PROCEDURE — 96374 THER/PROPH/DIAG INJ IV PUSH: CPT

## 2024-11-13 PROCEDURE — 85025 COMPLETE CBC W/AUTO DIFF WBC: CPT

## 2024-11-13 PROCEDURE — 87147 CULTURE TYPE IMMUNOLOGIC: CPT

## 2024-11-13 PROCEDURE — 25010000002 ONDANSETRON PER 1 MG

## 2024-11-13 PROCEDURE — 99284 EMERGENCY DEPT VISIT MOD MDM: CPT | Performed by: STUDENT IN AN ORGANIZED HEALTH CARE EDUCATION/TRAINING PROGRAM

## 2024-11-13 PROCEDURE — 76817 TRANSVAGINAL US OBSTETRIC: CPT

## 2024-11-13 PROCEDURE — 83690 ASSAY OF LIPASE: CPT

## 2024-11-13 PROCEDURE — 80053 COMPREHEN METABOLIC PANEL: CPT

## 2024-11-13 PROCEDURE — 84702 CHORIONIC GONADOTROPIN TEST: CPT

## 2024-11-13 PROCEDURE — 81001 URINALYSIS AUTO W/SCOPE: CPT

## 2024-11-13 PROCEDURE — 87086 URINE CULTURE/COLONY COUNT: CPT

## 2024-11-13 RX ORDER — CLOTRIMAZOLE 1 %
1 CREAM (GRAM) TOPICAL DAILY
Qty: 40 G | Refills: 0 | Status: SHIPPED | OUTPATIENT
Start: 2024-11-13 | End: 2024-11-20

## 2024-11-13 RX ORDER — ACETAMINOPHEN 325 MG/1
975 TABLET ORAL ONCE
Status: COMPLETED | OUTPATIENT
Start: 2024-11-13 | End: 2024-11-13

## 2024-11-13 RX ORDER — ONDANSETRON 2 MG/ML
4 INJECTION INTRAMUSCULAR; INTRAVENOUS ONCE
Status: COMPLETED | OUTPATIENT
Start: 2024-11-13 | End: 2024-11-13

## 2024-11-13 RX ORDER — ONDANSETRON 4 MG/1
4 TABLET, ORALLY DISINTEGRATING ORAL EVERY 8 HOURS PRN
Qty: 12 TABLET | Refills: 0 | Status: SHIPPED | OUTPATIENT
Start: 2024-11-13

## 2024-11-13 RX ORDER — CEPHALEXIN 500 MG/1
500 CAPSULE ORAL 2 TIMES DAILY
Qty: 14 CAPSULE | Refills: 0 | Status: SHIPPED | OUTPATIENT
Start: 2024-11-13 | End: 2024-11-20

## 2024-11-13 RX ADMIN — ONDANSETRON 4 MG: 2 INJECTION INTRAMUSCULAR; INTRAVENOUS at 12:52

## 2024-11-13 RX ADMIN — ACETAMINOPHEN 975 MG: 325 TABLET, FILM COATED ORAL at 12:53

## 2024-11-13 NOTE — DISCHARGE INSTRUCTIONS
Please make sure to follow-up with your OB/GYN physician as soon as possible.  I have sent medication to treat yeast infection as well as a UTI.  I have additionally sent nausea medicine.  Return for worsening symptoms

## 2024-11-13 NOTE — ED PROVIDER NOTES
Subjective  History of Present Illness:    This is a 24-year-old female presenting for evaluation of pregnancy problem, she is G2, P1, currently 13 weeks pregnant.  Has had vaginal spotting throughout her pregnancy which continues.  She reports lower pelvic cramping, no fevers, associated nausea and vomiting at times.  Denies chest pain denies shortness of breath.  She denies any vaginal discharge, reports some dysuria, no frequency or urgency.  No flank pain.  Called her OB/GYN who reportedly told her that they were too busy to see her today and to come to the ER.  Her cramping worsened last night.      Nurses Notes reviewed and agree, including vitals, allergies, social history and prior medical history.     REVIEW OF SYSTEMS: All systems reviewed and not pertinent unless noted.  Review of Systems   Constitutional:  Negative for fever.   Respiratory:  Negative for shortness of breath.    Cardiovascular:  Negative for chest pain.   Gastrointestinal:  Positive for nausea and vomiting. Negative for diarrhea.        Lower abdominal cramping   Genitourinary:  Positive for dysuria, pelvic pain and vaginal bleeding. Negative for flank pain, frequency, genital sores, hematuria and urgency.       Past Medical History:   Diagnosis Date    Anemia     Glaucoma     left eye    Irritable bowel syndrome     Lactose intolerance     Seasonal allergies     Slow to wake up after anesthesia     Thyroid activity decreased     Umbilical hernia     Urinary tract infection     Wears glasses        Allergies:    Latex      Past Surgical History:   Procedure Laterality Date     SECTION N/A 2022    Procedure:  SECTION PRIMARY;  Surgeon: Lukas Rodriguez MD;  Location: [facility];  Service: Obstetrics/Gynecology;  Laterality: N/A;    CHOLECYSTECTOMY N/A 2024    Procedure: CHOLECYSTECTOMY LAPAROSCOPIC WITH DAVINCI ROBOT;  Surgeon: Mery Cadet DO;  Location: [facility];  Service: Robotics - DaVinci;   "Laterality: N/A;    COLONOSCOPY      FOOT/TOE TENDON REPAIR Left 01/30/2020    Procedure: RECONSTRUCTION PT TENDON LEFT;  Surgeon: Uziel Garcia DPM;  Location: [facility] OR;  Service: Podiatry    TARSAL TUNNEL RELEASE Left 01/30/2020    Procedure: EXCISION TARSAL BONE LEFT;  Surgeon: Uziel Garcia DPM;  Location: [facility] OR;  Service: Podiatry    UMBILICAL HERNIA REPAIR N/A 01/19/2023    Procedure: OPEN UMBILICAL HERNIA REPAIR;  Surgeon: Mery Cadet DO;  Location: [facility] OR;  Service: General;  Laterality: N/A;    UMBILICAL HERNIA REPAIR N/A 2/21/2024    Procedure: UMBILICAL HERNIA REVISION;  Surgeon: Mery Cadet DO;  Location: [facility] OR;  Service: General;  Laterality: N/A;    WISDOM TOOTH EXTRACTION           Social History     Socioeconomic History    Marital status: Single   Tobacco Use    Smoking status: Never     Passive exposure: Never    Smokeless tobacco: Never   Vaping Use    Vaping status: Never Used   Substance and Sexual Activity    Alcohol use: Never    Drug use: Never    Sexual activity: Defer         Family History   Problem Relation Age of Onset    Hypertension Mother     Diabetes Father     Heart attack Maternal Grandfather     Diabetes Maternal Grandfather        Objective  Physical Exam:  /73 (BP Location: Left arm, Patient Position: Sitting)   Pulse 107   Temp 97.7 °F (36.5 °C) (Oral)   Resp 17   Ht 170.2 cm (67\")   Wt 68 kg (150 lb)   LMP 08/04/2024 (Exact Date) Comment: Due date sometime in May per pt.  SpO2 100%   BMI 23.49 kg/m²      Physical Exam  Vitals and nursing note reviewed.   Constitutional:       General: She is not in acute distress.     Appearance: Normal appearance. She is not ill-appearing, toxic-appearing or diaphoretic.   HENT:      Head: Normocephalic and atraumatic.      Nose: Nose normal.      Mouth/Throat:      Mouth: Mucous membranes are moist.      Pharynx: Oropharynx is clear.   Eyes:      Extraocular Movements: Extraocular " movements intact.      Pupils: Pupils are equal, round, and reactive to light.   Cardiovascular:      Pulses: Normal pulses.      Comments: Appears well-perfused  Pulmonary:      Effort: Pulmonary effort is normal. No respiratory distress.   Abdominal:      General: Abdomen is flat. There is no distension.      Tenderness: There is abdominal tenderness. There is no guarding or rebound.   Musculoskeletal:         General: Normal range of motion.      Cervical back: Normal range of motion.   Skin:     General: Skin is warm and dry.      Capillary Refill: Capillary refill takes less than 2 seconds.   Neurological:      General: No focal deficit present.      Mental Status: She is alert and oriented to person, place, and time.   Psychiatric:         Mood and Affect: Mood normal.         Behavior: Behavior normal.         Thought Content: Thought content normal.         Judgment: Judgment normal.               Procedures    ED Course:    ED Course as of 11/13/24 1437   Wed Nov 13, 2024   1436 Yeast(!): Small/1+ Budding Yeast w/Hyphae [JR]   1437 WBC, UA(!): 6-10 [JR]   1437 Leukocytes, UA(!): Moderate (2+) [JR]      ED Course User Index  [JR] Adams Ascencio PA-C       Lab Results (last 24 hours)       Procedure Component Value Units Date/Time    CBC Auto Differential [[id number]]  (Abnormal) Collected: 11/13/24 1240    Specimen: Blood Updated: 11/13/24 1256     WBC 6.62 10*3/mm3      RBC 3.94 10*6/mm3      Hemoglobin 11.1 g/dL      Hematocrit 32.6 %      MCV 82.7 fL      MCH 28.2 pg      MCHC 34.0 g/dL      RDW 13.2 %      RDW-SD 40.1 fl      MPV 9.4 fL      Platelets 233 10*3/mm3      Neutrophil % 61.3 %      Lymphocyte % 29.2 %      Monocyte % 7.6 %      Eosinophil % 0.8 %      Basophil % 0.6 %      Immature Grans % 0.5 %      Neutrophils, Absolute 4.07 10*3/mm3      Lymphocytes, Absolute 1.93 10*3/mm3      Monocytes, Absolute 0.50 10*3/mm3      Eosinophils, Absolute 0.05 10*3/mm3      Basophils, Absolute 0.04  10*3/mm3      Immature Grans, Absolute 0.03 10*3/mm3      nRBC 0.0 /100 WBC     Comprehensive Metabolic Panel [[id number]]  (Abnormal) Collected: 11/13/24 1240    Specimen: Blood Updated: 11/13/24 1307     Glucose 77 mg/dL      BUN 8 mg/dL      Creatinine 0.66 mg/dL      Sodium 133 mmol/L      Potassium 3.6 mmol/L      Comment: Slight hemolysis detected by analyzer. Result may be falsely elevated.        Chloride 101 mmol/L      CO2 20.5 mmol/L      Calcium 9.0 mg/dL      Total Protein 7.0 g/dL      Albumin 3.8 g/dL      ALT (SGPT) 5 U/L      AST (SGOT) 15 U/L      Alkaline Phosphatase 62 U/L      Total Bilirubin 0.3 mg/dL      Globulin 3.2 gm/dL      A/G Ratio 1.2 g/dL      BUN/Creatinine Ratio 12.1     Anion Gap 11.5 mmol/L      eGFR 126.6 mL/min/1.73     Narrative:      GFR Normal >60  Chronic Kidney Disease <60  Kidney Failure <15      Lipase [[id number]]  (Normal) Collected: 11/13/24 1240    Specimen: Blood Updated: 11/13/24 1307     Lipase 35 U/L     hCG, Quantitative, Pregnancy [[id number]] Collected: 11/13/24 1240    Specimen: Blood Updated: 11/13/24 1413     HCG Quantitative 87,171.00 mIU/mL     Narrative:      HCG Ranges by Gestational Age    Females - non-pregnant premenopausal   </= 1mIU/mL HCG  Females - postmenopausal               </= 7mIU/mL HCG    3 Weeks         5.8 -    71.2 mIU/mL  4 Weeks         9.5 -     750 mIU/mL  5 Weeks         217 -   7,138 mIU/mL  6 Weeks         158 -  31,795 mIU/mL  7 Weeks       3,697 - 163,563 mIU/mL  8 Weeks      32,065 - 149,571 mIU/mL  9 Weeks      63,803 - 151,410 mIU/mL  10 Weeks     46,509 - 186,977 mIU/mL  12 Weeks     27,832 - 210,612 mIU/mL  14 Weeks     13,950 -  62,530 mIU/mL  15 Weeks     12,039 -  70,971 mIU/mL  16 Weeks      9,040 -  56,451 mIU/mL  17 Weeks      8,175 -  55,868 mIU/mL  18 Weeks      8,099 -  58,176 mIU/mL    Urinalysis With Culture If Indicated - Urine, Clean Catch [[id number]]  (Abnormal) Collected: 11/13/24 1241    Specimen: Urine, Clean  Catch Updated: 11/13/24 1304     Color, UA Yellow     Appearance, UA Cloudy     pH, UA 6.5     Specific Gravity, UA 1.022     Glucose, UA Negative     Ketones, UA Negative     Bilirubin, UA Negative     Blood, UA Negative     Protein, UA Negative     Leuk Esterase, UA Moderate (2+)     Nitrite, UA Negative     Urobilinogen, UA 0.2 E.U./dL    Narrative:      In absence of clinical symptoms, the presence of pyuria, bacteria, and/or nitrites on the urinalysis result does not correlate with infection.    Urinalysis, Microscopic Only - Urine, Clean Catch [[id number]]  (Abnormal) Collected: 11/13/24 1241    Specimen: Urine, Clean Catch Updated: 11/13/24 1330     RBC, UA None Seen /HPF      WBC, UA 6-10 /HPF      Bacteria, UA Trace /HPF      Squamous Epithelial Cells, UA 13-20 /HPF      Yeast, UA       Small/1+ Budding Yeast w/Hyphae     /HPF     Hyaline Casts, UA None Seen /LPF      Methodology Manual Light Microscopy    Urine Culture - Urine, Urine, Clean Catch [[id number]] Collected: 11/13/24 1241    Specimen: Urine, Clean Catch Updated: 11/13/24 1330             US Ob Transvaginal    Result Date: 11/13/2024  PELVIC ULTRASOUND  HISTORY: Pelvic pain.  PROCEDURE: Transabdominal images of the pelvis were obtained.  FINDINGS: There is a single IUP with measurements corresponding to a gestational age of 13 weeks 5 days and estimated delivery date of May 11, 2025. This is appropriate compared to the prior exam of 10/9/2024 that indicated a intrauterine gestation of 8 weeks 1 day gestational age and estimated delivery date of 5/20/2025. Placenta is posterior. There is no free fluid.  There is no adnexal mass. Fetal heart rate is 138 bpm..      Impression: Single intrauterine gestation 13 weeks 5 days with documented fetal heart motion.    This report was signed and finalized on 11/13/2024 2:23 PM by Ginger Juan MD.          MDM      Initial impression of presenting illness: This is a 24-year-old female, G2, P1 currently 13  weeks pregnant presenting today for evaluation of vaginal spotting, pelvic cramping.    DDX: includes but is not limited to: Fetal demise, threatened miscarriage, urinary tract infection, incomplete , cystitis, hyperemesis, others    Patient arrives hemodynamically stable afebrile mildly tachycardic at a rate of 107 not given nonhypoxic with vitals interpreted by myself.     Pertinent features from physical exam: Abdomen soft, essentially nonreactive, very minimal tenderness palpated at the upper pelvic region of the abdomen.  No McBurney's point tenderness.  Alert and orient x 4, moist mucous membranes,.    Initial diagnostic plan: CBC CMP lipase hCG urinalysis and a transvaginal ultrasound    Results from initial plan were reviewed and interpreted by me revealing urinalysis cloudy appearance moderate leukocytes trace bacteria 6-10 white blood cells small budding yeast and 13-20 squamous cells.  Will treat as UTI and yeast infection.  This likely explains the patient's dysuria.  Urine culture pending.  CBC unremarkable, CMP unremarkable.  Lipase normal.  hCG quantitative 87,171.  All labs reassuring.  Transvaginal ultrasound per radiology IMPRESSION:  Single intrauterine gestation 13 weeks 5 days with  documented fetal heart motion.  Fetal heart rate of 138.    Diagnostic information from other sources: Record reviewed.  No indication for RhoGAM, previously documented O positive    Interventions / Re-evaluation: Zofran, Tylenol.  Stable for discharge.    Results/clinical rationale were discussed with patient at bedside.  Do not suspect appendicitis given no tenderness to the right lower quadrant of the abdomen, and essentially nonreactive abdominal exam with no leukocytosis and no fevers.    Consultations/Discussion of results with other physicians: Discussed with ED attending physician    Disposition plan: Discharge, follow-up with OB/GYN.  Sent Lotrimin 1% for yeast infection, Keflex for UTI and  pregnancy.  Sent Zofran as needed for nausea.  Return precautions given.  -----    Final diagnoses:   Urinary tract infection without hematuria, site unspecified   Yeast infection   13 weeks gestation of pregnancy   Vaginal bleeding in pregnancy          Adams Ascencio PA-C  11/13/24 [id number]

## 2024-11-13 NOTE — TELEPHONE ENCOUNTER
ARIELLE DOAN    [phone]    PT IS 13WKS     WAS SEEN IN ER TODAY DX SEVERE STOMACH CRAMPS    NEEDS AN ER F/U

## 2024-11-13 NOTE — TELEPHONE ENCOUNTER
Caller: Romy Langley    Relationship: Self    Best call back number: [phone]    What is the best time to reach you: ANY    Who are you requesting to speak with (clinical staff, provider,  specific staff member): CLINICAL         What was the call regarding: PT HAS BEEN UP ALL NIGHT WITH STOMACH CRAMPS; REQUESTING A CALL BACK TO DISCUSS. UNABLE TO WT

## 2024-11-13 NOTE — Clinical Note
[facility] EMERGENCY DEPARTMENT  [address] [address]  Phone: [phone]    Romy Langley was seen and treated in our emergency department on 11/13/2024.  She may return to work on 11/15/2024.         Thank you for choosing [facility].    Adams Ascencio PA-C

## 2024-11-13 NOTE — TELEPHONE ENCOUNTER
Spoke with patient , advised patient to go to the ER, she said she would . Cramping was severe

## 2024-11-14 LAB — BACTERIA SPEC AEROBE CULT: ABNORMAL

## 2024-11-15 ENCOUNTER — ROUTINE PRENATAL (OUTPATIENT)
Dept: OBSTETRICS AND GYNECOLOGY | Facility: CLINIC | Age: 24
End: 2024-11-15
Payer: COMMERCIAL

## 2024-11-15 VITALS — WEIGHT: 150 LBS | SYSTOLIC BLOOD PRESSURE: 112 MMHG | BODY MASS INDEX: 23.49 KG/M2 | DIASTOLIC BLOOD PRESSURE: 60 MMHG

## 2024-11-15 DIAGNOSIS — L29.2 VULVAR ITCHING: ICD-10-CM

## 2024-11-15 DIAGNOSIS — R10.2 PELVIC CRAMPING: ICD-10-CM

## 2024-11-15 DIAGNOSIS — N89.8 VAGINAL ITCHING: ICD-10-CM

## 2024-11-15 DIAGNOSIS — O09.91 ENCOUNTER FOR SUPERVISION OF HIGH RISK PREGNANCY IN FIRST TRIMESTER, ANTEPARTUM: Primary | ICD-10-CM

## 2024-11-15 DIAGNOSIS — R10.13 EPIGASTRIC PAIN: ICD-10-CM

## 2024-11-15 DIAGNOSIS — R11.2 NAUSEA AND VOMITING, UNSPECIFIED VOMITING TYPE: ICD-10-CM

## 2024-11-15 DIAGNOSIS — K21.9 GASTROESOPHAGEAL REFLUX DISEASE WITHOUT ESOPHAGITIS: ICD-10-CM

## 2024-11-15 DIAGNOSIS — R30.0 DYSURIA: ICD-10-CM

## 2024-11-15 RX ORDER — CLOTRIMAZOLE AND BETAMETHASONE DIPROPIONATE 10; .64 MG/G; MG/G
1 CREAM TOPICAL 2 TIMES DAILY
Qty: 1 EACH | Refills: 3 | Status: SHIPPED | OUTPATIENT
Start: 2024-11-15

## 2024-11-15 RX ORDER — ONDANSETRON 8 MG/1
8 TABLET, ORALLY DISINTEGRATING ORAL EVERY 8 HOURS PRN
Qty: 30 TABLET | Refills: 0 | Status: SHIPPED | OUTPATIENT
Start: 2024-11-15

## 2024-11-15 RX ORDER — METOCLOPRAMIDE 10 MG/1
10 TABLET ORAL
Qty: 80 TABLET | Refills: 3 | Status: SHIPPED | OUTPATIENT
Start: 2024-11-15

## 2024-11-15 NOTE — PROGRESS NOTES
Chief Complaint  Routine Prenatal Visit (ER follow up, patient complains of cramping. )    History of Present Illness:  Romy is a  currently at 13w3d who presents today with complaints of continued cramping.  Patient was seen in the emergency room 2 days ago.  She reports having cramping like a menstrual cycle.  She is also been having difficulty voiding.  She does have dysuria as well as urinary frequency and hesitancy.  She also has been having vaginal itching and irritation.  She is on antibiotics for UTI.  She was also given prescription for yeast medication but has continued having symptoms.  She denies any vaginal bleeding or spotting.  She continues to have nausea and emesis as well.  He has been taking Phenergan and Zofran.  Taking Unisom and vitamin B6.  She has been taking her Pepcid.  She also has complaints of epigastric pain.  She feels like the food is not going down.  She denies any changes with her bowel movements.    Exam:  Vitals:  See prenatal flowsheet as noted and reviewed  General: Alert, cooperative, and does not appear in any distress  Abdomen:   See prenatal flowsheet as noted and reviewed    Uterus gravid, non-tender; no palpable masses    No guarding or rebound tenderness  Pelvic:  See prenatal flowsheet as noted and reviewed  Ext:  See prenatal flowsheet as noted and reviewed    Moves extremities well, no cyanosis and no redness  Urine:  See prenatal flowsheet as noted and reviewed    Data Review:  The following data was reviewed by: Suni Oquendo MD on 11/15/2024:  Prenatal Labs:  Lab Results   Component Value Date    HGB 11.1 (L) 2024    RUBELLAABIGG 1.70 10/09/2024    HEPBSAG Negative 10/09/2024    ABO O 10/09/2024    RH Positive 10/09/2024    ABSCRN Negative 10/09/2024    JOH8KME0 Non Reactive 10/09/2024    HEPCVIRUSABY 0.2 2022    STREPGPB Positive (A) 2022    URINECX 25,000 CFU/mL Streptococcus agalactiae (Group B) (A) 2024       Admission on  11/13/2024, Discharged on 11/13/2024   Component Date Value    WBC 11/13/2024 6.62     RBC 11/13/2024 3.94     Hemoglobin 11/13/2024 11.1 (L)     Hematocrit 11/13/2024 32.6 (L)     MCV 11/13/2024 82.7     MCH 11/13/2024 28.2     MCHC 11/13/2024 34.0     RDW 11/13/2024 13.2     RDW-SD 11/13/2024 40.1     MPV 11/13/2024 9.4     Platelets 11/13/2024 233     Neutrophil % 11/13/2024 61.3     Lymphocyte % 11/13/2024 29.2     Monocyte % 11/13/2024 7.6     Eosinophil % 11/13/2024 0.8     Basophil % 11/13/2024 0.6     Immature Grans % 11/13/2024 0.5     Neutrophils, Absolute 11/13/2024 4.07     Lymphocytes, Absolute 11/13/2024 1.93     Monocytes, Absolute 11/13/2024 0.50     Eosinophils, Absolute 11/13/2024 0.05     Basophils, Absolute 11/13/2024 0.04     Immature Grans, Absolute 11/13/2024 0.03     nRBC 11/13/2024 0.0     Glucose 11/13/2024 77     BUN 11/13/2024 8     Creatinine 11/13/2024 0.66     Sodium 11/13/2024 133 (L)     Potassium 11/13/2024 3.6     Chloride 11/13/2024 101     CO2 11/13/2024 20.5 (L)     Calcium 11/13/2024 9.0     Total Protein 11/13/2024 7.0     Albumin 11/13/2024 3.8     ALT (SGPT) 11/13/2024 5     AST (SGOT) 11/13/2024 15     Alkaline Phosphatase 11/13/2024 62     Total Bilirubin 11/13/2024 0.3     Globulin 11/13/2024 3.2     A/G Ratio 11/13/2024 1.2     BUN/Creatinine Ratio 11/13/2024 12.1     Anion Gap 11/13/2024 11.5     eGFR 11/13/2024 126.6     Lipase 11/13/2024 35     HCG Quantitative 11/13/2024 87,171.00     Color, UA 11/13/2024 Yellow     Appearance, UA 11/13/2024 Cloudy (A)     pH, UA 11/13/2024 6.5     Specific Guin, UA 11/13/2024 1.022     Glucose, UA 11/13/2024 Negative     Ketones, UA 11/13/2024 Negative     Bilirubin, UA 11/13/2024 Negative     Blood, UA 11/13/2024 Negative     Protein, UA 11/13/2024 Negative     Leuk Esterase, UA 11/13/2024 Moderate (2+) (A)     Nitrite, UA 11/13/2024 Negative     Urobilinogen, UA 11/13/2024 0.2 E.U./dL     RBC, UA 11/13/2024 None Seen     WBC,  UA 11/13/2024 6-10 (A)     Bacteria, UA 11/13/2024 Trace (A)     Squamous Epithelial Cell* 11/13/2024 13-20 (A)     Yeast, UA 11/13/2024 Small/1+ Budding Yeast w/Hyphae (A)     Hyaline Casts, UA 11/13/2024 None Seen     Methodology 11/13/2024 Manual Light Microscopy     Urine Culture 11/13/2024 25,000 CFU/mL Streptococcus agalactiae (Group B) (A)      CBC Auto Differential (11/13/2024 12:40)  Comprehensive Metabolic Panel (11/13/2024 12:40)  Lipase (11/13/2024 12:40)  hCG, Quantitative, Pregnancy (11/13/2024 12:40)  Urinalysis With Culture If Indicated - Urine, Clean Catch (11/13/2024 12:41)  Urinalysis, Microscopic Only - Urine, Clean Catch (11/13/2024 12:41)  Urine Culture - Urine, Urine, Clean Catch (11/13/2024 12:41)      Imaging:  US Ob Transvaginal  Narrative: PELVIC ULTRASOUND     HISTORY: Pelvic pain.     PROCEDURE: Transabdominal images of the pelvis were obtained.     FINDINGS: There is a single IUP with measurements corresponding to a  gestational age of 13 weeks 5 days and estimated delivery date of May  11, 2025. This is appropriate compared to the prior exam of 10/9/2024  that indicated a intrauterine gestation of 8 weeks 1 day gestational age  and estimated delivery date of 5/20/2025. Placenta is posterior. There  is no free fluid.  There is no adnexal mass. Fetal heart rate is 138  bpm..     Impression: Single intrauterine gestation 13 weeks 5 days with  documented fetal heart motion.           This report was signed and finalized on 11/13/2024 2:23 PM by Ginger Juan MD.        Medical Records:  ED Provider Notes by Adams Ascencio PA-C (11/13/2024 12:30)     Assessment and Plan:  Problem List Items Addressed This Visit    None  Visit Diagnoses       Encounter for supervision of high risk pregnancy in first trimester, antepartum    -  Primary  Topics discussed:     ab precautions  GERD management  iron supplementation  PIH precautions      Pelvic cramping      AB precautions given.  Patient to  increase her p.o. fluids as discussed.    Dysuria      Patient to complete her Keflex as noted.    Epigastric pain      Prescriptions given for Reglan.  Patient is instructed not to take the Phenergan.  She is to continue her Pepcid as well.    Relevant Medications    metoclopramide (Reglan) 10 MG tablet    Nausea and vomiting, unspecified vomiting type      Prescription is given for Zofran.  Will plan a trial with Reglan as well.    Relevant Medications    metoclopramide (Reglan) 10 MG tablet    ondansetron ODT (ZOFRAN-ODT) 8 MG disintegrating tablet    Gastroesophageal reflux disease without esophagitis      Patient to continue her Pepcid twice daily.    Relevant Medications    metoclopramide (Reglan) 10 MG tablet    Vaginal itching      Patient to continue with her treatment as discussed.    Vulvar itching      Prescription given for Lotrisone as noted.    Relevant Medications    clotrimazole-betamethasone (LOTRISONE) 1-0.05 % cream          Follow Up/Instructions:  Follow up as scheduled.  Patient was given instructions and counseling regarding her condition or for health maintenance advice. Please see specific information pulled into the AVS if appropriate.     Note: Speech recognition transcription software may have been used to dictate portions of this document.  An attempt at proofreading has been made though minor errors in transcription may still be present.    This note was electronically signed.  Suni Oquendo M.D.

## 2024-11-18 ENCOUNTER — ROUTINE PRENATAL (OUTPATIENT)
Dept: OBSTETRICS AND GYNECOLOGY | Facility: CLINIC | Age: 24
End: 2024-11-18
Payer: COMMERCIAL

## 2024-11-18 VITALS — BODY MASS INDEX: 23.81 KG/M2 | DIASTOLIC BLOOD PRESSURE: 64 MMHG | SYSTOLIC BLOOD PRESSURE: 112 MMHG | WEIGHT: 152 LBS

## 2024-11-18 DIAGNOSIS — R30.0 DYSURIA: ICD-10-CM

## 2024-11-18 DIAGNOSIS — B37.31 VULVOVAGINAL CANDIDIASIS: ICD-10-CM

## 2024-11-18 DIAGNOSIS — R82.71 GBS BACTERIURIA: ICD-10-CM

## 2024-11-18 DIAGNOSIS — Z34.92 PRENATAL CARE IN SECOND TRIMESTER: Primary | ICD-10-CM

## 2024-11-18 RX ORDER — NITROFURANTOIN 25; 75 MG/1; MG/1
100 CAPSULE ORAL
COMMUNITY
Start: 2024-11-16 | End: 2024-11-21

## 2024-11-18 RX ORDER — HYDROXYZINE HYDROCHLORIDE 25 MG/1
TABLET, FILM COATED ORAL
COMMUNITY
End: 2024-11-25

## 2024-11-18 RX ORDER — FLUCONAZOLE 150 MG/1
TABLET ORAL
Qty: 2 TABLET | Refills: 0 | Status: SHIPPED | OUTPATIENT
Start: 2024-11-18 | End: 2024-11-20

## 2024-11-19 PROBLEM — R82.71 GBS BACTERIURIA: Status: ACTIVE | Noted: 2024-11-19

## 2024-11-19 NOTE — PROGRESS NOTES
Prenatal Care Visit    Subjective   Chief Complaint   Patient presents with    Routine Prenatal Visit     ER follow-up. Patient complains of vaginal burning and burning with urination. States the [facility] ER took her off all of her medication for her UTI, and the cream for the yeast infection has not worked. (LOTRISONE). Patient is asking to go back on those antibiotics.        History:   Romy is a  currently at 13w6d who presents for a prenatal care visit today.    Ongoing issues with dysuria and irritation at the vaginal introitus.    Social History    Tobacco Use      Smoking status: Never        Passive exposure: Never      Smokeless tobacco: Never       Objective   /64   Wt 68.9 kg (152 lb)   LMP 2024 (Exact Date) Comment: Due date sometime in May per pt.  BMI 23.81 kg/m²   Physical Exam:  Normal, gestational age-appropriate exam today   Vaginal discharge noted on exam       Plan   Medical Decision Making:    I have reviewed the prenatal labs and ultrasound(s) today. I have reviewed the most recent prenatal progress note(s).    Diagnosis: Supervision of low risk pregnancy  GBS bacteriuria  Vaginal irritation  Vulvovaginal candidiasis   Tests/Orders/Rx today: Orders Placed This Encounter   Procedures    NuSwab VG+ - Swab, Vagina     Order Specific Question:   Release to patient     Answer:   Routine Release [[id number]]    Urinalysis With Culture If Indicated - Urine, Clean Catch     Order Specific Question:   Release to patient     Answer:   Routine Release [[id number]]       Medication Management: Diflucan     Topics discussed: Prenatal care milestones  Recent urine testing and vaginal cultures   Tests next visit: none   Next visit: 1 week(s)     Parvez Galindo MD  Obstetrics and Gynecology  [facility]

## 2024-11-20 ENCOUNTER — TELEPHONE (OUTPATIENT)
Dept: OBSTETRICS AND GYNECOLOGY | Facility: CLINIC | Age: 24
End: 2024-11-20

## 2024-11-20 LAB
A VAGINAE DNA VAG QL NAA+PROBE: ABNORMAL SCORE
APPEARANCE UR: CLEAR
BACTERIA #/AREA URNS HPF: NORMAL /[HPF]
BACTERIA UR CULT: ABNORMAL
BILIRUB UR QL STRIP: NEGATIVE
BVAB2 DNA VAG QL NAA+PROBE: ABNORMAL SCORE
C ALBICANS DNA VAG QL NAA+PROBE: POSITIVE
C GLABRATA DNA VAG QL NAA+PROBE: NEGATIVE
C TRACH DNA SPEC QL NAA+PROBE: NEGATIVE
CASTS URNS QL MICRO: NORMAL /LPF
COLOR UR: YELLOW
EPI CELLS #/AREA URNS HPF: NORMAL /HPF (ref 0–10)
GLUCOSE UR QL STRIP: NEGATIVE
HGB UR QL STRIP: NEGATIVE
KETONES UR QL STRIP: NEGATIVE
LEUKOCYTE ESTERASE UR QL STRIP: ABNORMAL
MEGA1 DNA VAG QL NAA+PROBE: ABNORMAL SCORE
MICRO URNS: ABNORMAL
N GONORRHOEA DNA VAG QL NAA+PROBE: NEGATIVE
NITRITE UR QL STRIP: NEGATIVE
PH UR STRIP: 7 [PH] (ref 5–7.5)
PROT UR QL STRIP: ABNORMAL
RBC #/AREA URNS HPF: NORMAL /HPF (ref 0–2)
SP GR UR STRIP: 1.02 (ref 1–1.03)
T VAGINALIS DNA VAG QL NAA+PROBE: NEGATIVE
URINALYSIS REFLEX: ABNORMAL
UROBILINOGEN UR STRIP-MCNC: 1 MG/DL (ref 0.2–1)
WBC #/AREA URNS HPF: NORMAL /HPF (ref 0–5)

## 2024-11-20 RX ORDER — AMOXICILLIN 500 MG/1
500 CAPSULE ORAL 2 TIMES DAILY
Qty: 10 CAPSULE | Refills: 0 | Status: SHIPPED | OUTPATIENT
Start: 2024-11-20 | End: 2024-11-25

## 2024-11-20 RX ORDER — FLUCONAZOLE 150 MG/1
150 TABLET ORAL DAILY
Qty: 1 TABLET | Refills: 0 | Status: SHIPPED | OUTPATIENT
Start: 2024-11-20 | End: 2024-11-25

## 2024-11-20 NOTE — TELEPHONE ENCOUNTER
Patient went to PCP today and they said she had an upper respiratory infection and was diagnosed with UTI in the ER . No one prescribed medication because she is pregnant.  Only afrin for respiratory infection. They wanted her to check with us for medication      Thank You

## 2024-11-20 NOTE — TELEPHONE ENCOUNTER
Caller: Romy Langley    Relationship: Self    Best call back number: [phone]    What is the best time to reach you: ANY    Who are you requesting to speak with (clinical staff, provider,  specific staff member): KENRICK    Do you know the name of the person who called: KENRICK    What was the call regarding: CALLBACK    Is it okay if the provider responds through MyChart: NA

## 2024-11-20 NOTE — TELEPHONE ENCOUNTER
ARIELLE DOAN    PT IS 14wks    PT WENT TO PCP & WAS TOLD SHE HAD AN UPPER RESPIRATORY INFECTION.    WAS GIVEN AFRIN & CALL OB FOR UTI  & GROUP B STREP     PHARMACY: [facility]

## 2024-11-25 ENCOUNTER — ROUTINE PRENATAL (OUTPATIENT)
Dept: OBSTETRICS AND GYNECOLOGY | Facility: CLINIC | Age: 24
End: 2024-11-25
Payer: COMMERCIAL

## 2024-11-25 VITALS — BODY MASS INDEX: 23.43 KG/M2 | SYSTOLIC BLOOD PRESSURE: 104 MMHG | WEIGHT: 149.6 LBS | DIASTOLIC BLOOD PRESSURE: 66 MMHG

## 2024-11-25 DIAGNOSIS — Z34.92 PRENATAL CARE IN SECOND TRIMESTER: Primary | ICD-10-CM

## 2024-11-25 DIAGNOSIS — R82.71 GBS BACTERIURIA: ICD-10-CM

## 2024-11-25 DIAGNOSIS — R30.0 DYSURIA: ICD-10-CM

## 2024-11-26 NOTE — PROGRESS NOTES
Prenatal Care Visit    Subjective   Chief Complaint   Patient presents with    Routine Prenatal Visit     Prenatal visit with no problems or concerns.       History:   Romy is a  currently at 14w6d who presents for a prenatal care visit today.    Ongoing issues with dysuria.    Social History    Tobacco Use      Smoking status: Never        Passive exposure: Never      Smokeless tobacco: Never       Objective   /66   Wt 67.9 kg (149 lb 9.6 oz)   LMP 2024 (Exact Date) Comment: Due date sometime in May per pt.  BMI 23.43 kg/m²   Physical Exam:  Normal, gestational age-appropriate exam today   Vaginal discharge noted on exam       Plan   Medical Decision Making:    I have reviewed the prenatal labs and ultrasound(s) today. I have reviewed the most recent prenatal progress note(s).    Diagnosis: Supervision of high risk pregnancy  GBS bacteriuria  UTI   Tests/Orders/Rx today: Orders Placed This Encounter   Procedures    Urinalysis With Culture If Indicated - Urine, Clean Catch     Order Specific Question:   Release to patient     Answer:   Routine Release [[id number]]       Medication Management: None     Topics discussed: Prenatal care milestones  Recent urine testing and vaginal cultures  Repeat urine testing today   Tests next visit: U/S for anatomic screening   Next visit: 3 week(s)     Parvez Galindo MD  Obstetrics and Gynecology  [facility]

## 2024-11-27 LAB
APPEARANCE UR: ABNORMAL
BACTERIA #/AREA URNS HPF: ABNORMAL /[HPF]
BACTERIA UR CULT: NORMAL
BACTERIA UR CULT: NORMAL
BILIRUB UR QL STRIP: NEGATIVE
CASTS URNS QL MICRO: ABNORMAL /LPF
COLOR UR: YELLOW
EPI CELLS #/AREA URNS HPF: >10 /HPF (ref 0–10)
GLUCOSE UR QL STRIP: NEGATIVE
HGB UR QL STRIP: NEGATIVE
KETONES UR QL STRIP: NEGATIVE
LEUKOCYTE ESTERASE UR QL STRIP: ABNORMAL
MICRO URNS: ABNORMAL
NITRITE UR QL STRIP: NEGATIVE
PH UR STRIP: 7.5 [PH] (ref 5–7.5)
PROT UR QL STRIP: ABNORMAL
RBC #/AREA URNS HPF: ABNORMAL /HPF (ref 0–2)
SP GR UR STRIP: 1.02 (ref 1–1.03)
URINALYSIS REFLEX: ABNORMAL
UROBILINOGEN UR STRIP-MCNC: 1 MG/DL (ref 0.2–1)
WBC #/AREA URNS HPF: ABNORMAL /HPF (ref 0–5)

## 2024-12-02 ENCOUNTER — HOSPITAL ENCOUNTER (OUTPATIENT)
Facility: HOSPITAL | Age: 24
Discharge: HOME | End: 2024-12-02
Attending: OBSTETRICS & GYNECOLOGY | Admitting: OBSTETRICS & GYNECOLOGY
Payer: COMMERCIAL

## 2024-12-02 VITALS
TEMPERATURE: 97.9 F | BODY MASS INDEX: 23.95 KG/M2 | RESPIRATION RATE: 16 BRPM | HEART RATE: 88 BPM | WEIGHT: 149 LBS | DIASTOLIC BLOOD PRESSURE: 58 MMHG | SYSTOLIC BLOOD PRESSURE: 108 MMHG | OXYGEN SATURATION: 100 % | HEIGHT: 66 IN

## 2024-12-02 LAB
BACTERIA UR QL AUTO: ABNORMAL /HPF
BILIRUB UR QL STRIP: NEGATIVE
CLARITY UR: ABNORMAL
COLOR UR: YELLOW
GLUCOSE UR STRIP-MCNC: NEGATIVE MG/DL
HGB UR QL STRIP.AUTO: NEGATIVE
HYALINE CASTS UR QL AUTO: ABNORMAL /LPF
KETONES UR QL STRIP: ABNORMAL
LEUKOCYTE ESTERASE UR QL STRIP.AUTO: ABNORMAL
MUCOUS THREADS URNS QL MICRO: ABNORMAL /HPF
NITRITE UR QL STRIP: NEGATIVE
PH UR STRIP.AUTO: 5.5 [PH] (ref 5–8)
PROT UR QL STRIP: ABNORMAL
RBC # UR STRIP: ABNORMAL /HPF
REF LAB TEST METHOD: ABNORMAL
SP GR UR STRIP: 1.02 (ref 1–1.03)
SQUAMOUS #/AREA URNS HPF: ABNORMAL /HPF
UROBILINOGEN UR QL STRIP: ABNORMAL
WBC # UR STRIP: ABNORMAL /HPF

## 2024-12-02 PROCEDURE — G0463 HOSPITAL OUTPT CLINIC VISIT: HCPCS

## 2024-12-02 PROCEDURE — 51702 INSERT TEMP BLADDER CATH: CPT

## 2024-12-02 PROCEDURE — 96365 THER/PROPH/DIAG IV INF INIT: CPT

## 2024-12-02 PROCEDURE — 25010000002 CEFTRIAXONE PER 250 MG: Performed by: OBSTETRICS & GYNECOLOGY

## 2024-12-02 PROCEDURE — 87086 URINE CULTURE/COLONY COUNT: CPT | Performed by: OBSTETRICS & GYNECOLOGY

## 2024-12-02 PROCEDURE — 81001 URINALYSIS AUTO W/SCOPE: CPT | Performed by: OBSTETRICS & GYNECOLOGY

## 2024-12-02 RX ORDER — SODIUM CHLORIDE 9 MG/ML
40 INJECTION, SOLUTION INTRAVENOUS AS NEEDED
Status: DISCONTINUED | OUTPATIENT
Start: 2024-12-02 | End: 2024-12-02 | Stop reason: HOSPADM

## 2024-12-02 RX ORDER — SODIUM CHLORIDE 0.9 % (FLUSH) 0.9 %
10 SYRINGE (ML) INJECTION EVERY 12 HOURS SCHEDULED
Status: DISCONTINUED | OUTPATIENT
Start: 2024-12-02 | End: 2024-12-02 | Stop reason: HOSPADM

## 2024-12-02 RX ORDER — SODIUM CHLORIDE 0.9 % (FLUSH) 0.9 %
10 SYRINGE (ML) INJECTION AS NEEDED
Status: DISCONTINUED | OUTPATIENT
Start: 2024-12-02 | End: 2024-12-02 | Stop reason: HOSPADM

## 2024-12-02 RX ADMIN — CEFTRIAXONE 1000 MG: 1 INJECTION, POWDER, FOR SOLUTION INTRAMUSCULAR; INTRAVENOUS at 13:55

## 2024-12-02 NOTE — NON STRESS TEST
Triage Note - Nursing Documentation  Labor and Delivery Admission Log    Romy Langley  : 2000  MRN: [id number]  CSN: [id number]    Date in / Time in:  2024  Time in: 1303    Date out / Time out:    Time out: 1441    Nurse: Deidra Aragon RN    Patient Info: She is a 24 y.o. year old  at 15w6d with an DEQUAN of 2025, by Ultrasound who was seen on the [facility].    Chief Complaint:   Chief Complaint   Patient presents with    Pelvic Pain     To receive antibiotics and cath for urine       Provider Instructions / Disposition:     Patient educated on fetal movement, UTIs, PO hydration and  labor. Patient verbalized understanding to instructions and signed. Patient to follow up in office with next scheduled appointment. Patient labs pending. OB/GYN will notify patient with results. Patient instructed if any change in condition; patient to return to [facility] for assessment/evaluation. Patient discharged home in stable condition. - Deidra Aragon RN.    Patient Active Problem List   Diagnosis    Umbilical hernia without obstruction and without gangrene    Iron deficiency anemia due to chronic blood loss    Generalized abdominal pain    Chronic idiopathic constipation    Recurrent umbilical hernia    RUQ abdominal pain    Symptomatic cholelithiasis    Sore throat    GBS bacteriuria       NST Documentation (Only applicable > 32 weeks):

## 2024-12-03 ENCOUNTER — PATIENT MESSAGE (OUTPATIENT)
Dept: OBSTETRICS AND GYNECOLOGY | Facility: CLINIC | Age: 24
End: 2024-12-03
Payer: COMMERCIAL

## 2024-12-03 ENCOUNTER — TELEPHONE (OUTPATIENT)
Dept: OBSTETRICS AND GYNECOLOGY | Facility: CLINIC | Age: 24
End: 2024-12-03
Payer: COMMERCIAL

## 2024-12-03 LAB — BACTERIA SPEC AEROBE CULT: NO GROWTH

## 2024-12-04 DIAGNOSIS — R39.89 BLADDER PAIN: Primary | ICD-10-CM

## 2024-12-04 RX ORDER — PHENAZOPYRIDINE HYDROCHLORIDE 200 MG/1
200 TABLET, FILM COATED ORAL 3 TIMES DAILY PRN
Qty: 30 TABLET | Refills: 5 | Status: SHIPPED | OUTPATIENT
Start: 2024-12-04

## 2024-12-16 ENCOUNTER — ROUTINE PRENATAL (OUTPATIENT)
Dept: OBSTETRICS AND GYNECOLOGY | Facility: CLINIC | Age: 24
End: 2024-12-16
Payer: COMMERCIAL

## 2024-12-16 VITALS — SYSTOLIC BLOOD PRESSURE: 108 MMHG | DIASTOLIC BLOOD PRESSURE: 70 MMHG | WEIGHT: 150 LBS | BODY MASS INDEX: 24.21 KG/M2

## 2024-12-16 DIAGNOSIS — R82.71 GBS BACTERIURIA: ICD-10-CM

## 2024-12-16 DIAGNOSIS — Z34.92 PRENATAL CARE IN SECOND TRIMESTER: Primary | ICD-10-CM

## 2024-12-16 DIAGNOSIS — Z36.89 ENCOUNTER FOR FETAL ANATOMIC SURVEY: ICD-10-CM

## 2024-12-17 NOTE — PROGRESS NOTES
Prenatal Care Visit    Subjective   Chief Complaint   Patient presents with    Routine Prenatal Visit     Anatomy scan w/ no complaints/concerns        History:   Romy is a  currently at 17w6d who presents for a prenatal care visit today.    Overall improvement in urinary function, though she still has some burning.  Most recent culture was negative.    Social History    Tobacco Use      Smoking status: Never        Passive exposure: Never      Smokeless tobacco: Never       Objective   /70   Wt 68 kg (150 lb)   LMP 2024 (Exact Date) Comment: Due date sometime in May per pt.  BMI 24.21 kg/m²   Physical Exam:  Normal, gestational age-appropriate exam today   Vaginal discharge noted on exam       Plan   Medical Decision Making:    I have reviewed the prenatal labs and ultrasound(s) today. I have reviewed the most recent prenatal progress note(s).    Diagnosis: Supervision of high risk pregnancy  GBS bacteriuria  Dysuria   Tests/Orders/Rx today: Orders Placed This Encounter   Procedures    US Ob 14 + Weeks Single or First Gestation     Order Specific Question:   Reason for Exam:     Answer:   anatomy scan     Order Specific Question:   Release to patient     Answer:   Routine Release [[id number]]       Medication Management: None     Topics discussed: Prenatal care milestones  Recent urine testing and vaginal cultures  U/S findings   Tests next visit: none   Next visit: 4 week(s)     Parvez Galindo MD  Obstetrics and Gynecology  [facility]

## 2024-12-28 ENCOUNTER — HOSPITAL ENCOUNTER (EMERGENCY)
Facility: HOSPITAL | Age: 24
Discharge: HOME | End: 2024-12-28
Attending: FAMILY MEDICINE
Payer: COMMERCIAL

## 2024-12-28 VITALS
HEIGHT: 66 IN | BODY MASS INDEX: 24.11 KG/M2 | TEMPERATURE: 98.3 F | RESPIRATION RATE: 18 BRPM | WEIGHT: 150 LBS | DIASTOLIC BLOOD PRESSURE: 64 MMHG | OXYGEN SATURATION: 99 % | HEART RATE: 88 BPM | SYSTOLIC BLOOD PRESSURE: 107 MMHG

## 2024-12-28 DIAGNOSIS — Z3A.20 20 WEEKS GESTATION OF PREGNANCY: ICD-10-CM

## 2024-12-28 DIAGNOSIS — R11.2 NAUSEA AND VOMITING, UNSPECIFIED VOMITING TYPE: Primary | ICD-10-CM

## 2024-12-28 LAB
ALBUMIN SERPL-MCNC: 3.6 G/DL (ref 3.5–5.2)
ALBUMIN/GLOB SERPL: 1.2 G/DL
ALP SERPL-CCNC: 70 U/L (ref 39–117)
ALT SERPL W P-5'-P-CCNC: 8 U/L (ref 1–33)
ANION GAP SERPL CALCULATED.3IONS-SCNC: 9.6 MMOL/L (ref 5–15)
AST SERPL-CCNC: 16 U/L (ref 1–32)
BACTERIA UR QL AUTO: ABNORMAL /HPF
BASOPHILS # BLD AUTO: 0.01 10*3/MM3 (ref 0–0.2)
BASOPHILS NFR BLD AUTO: 0.1 % (ref 0–1.5)
BILIRUB SERPL-MCNC: 0.3 MG/DL (ref 0–1.2)
BILIRUB UR QL STRIP: ABNORMAL
BUN SERPL-MCNC: 10 MG/DL (ref 6–20)
BUN/CREAT SERPL: 15.2 (ref 7–25)
CALCIUM SPEC-SCNC: 8.4 MG/DL (ref 8.6–10.5)
CHLORIDE SERPL-SCNC: 101 MMOL/L (ref 98–107)
CLARITY UR: ABNORMAL
CO2 SERPL-SCNC: 23.4 MMOL/L (ref 22–29)
COD CRY URNS QL: ABNORMAL /HPF
COLOR UR: ABNORMAL
CREAT SERPL-MCNC: 0.66 MG/DL (ref 0.57–1)
DEPRECATED RDW RBC AUTO: 40.2 FL (ref 37–54)
EGFRCR SERPLBLD CKD-EPI 2021: 126.6 ML/MIN/1.73
EOSINOPHIL # BLD AUTO: 0.06 10*3/MM3 (ref 0–0.4)
EOSINOPHIL NFR BLD AUTO: 0.8 % (ref 0.3–6.2)
ERYTHROCYTE [DISTWIDTH] IN BLOOD BY AUTOMATED COUNT: 13 % (ref 12.3–15.4)
GLOBULIN UR ELPH-MCNC: 3.1 GM/DL
GLUCOSE SERPL-MCNC: 135 MG/DL (ref 65–99)
GLUCOSE UR STRIP-MCNC: NEGATIVE MG/DL
HCG INTACT+B SERPL-ACNC: NORMAL MIU/ML
HCT VFR BLD AUTO: 31.4 % (ref 34–46.6)
HGB BLD-MCNC: 10.7 G/DL (ref 12–15.9)
HGB UR QL STRIP.AUTO: NEGATIVE
HOLD SPECIMEN: NORMAL
HYALINE CASTS UR QL AUTO: ABNORMAL /LPF
IMM GRANULOCYTES # BLD AUTO: 0.01 10*3/MM3 (ref 0–0.05)
IMM GRANULOCYTES NFR BLD AUTO: 0.1 % (ref 0–0.5)
KETONES UR QL STRIP: NEGATIVE
LEUKOCYTE ESTERASE UR QL STRIP.AUTO: ABNORMAL
LIPASE SERPL-CCNC: 33 U/L (ref 13–60)
LYMPHOCYTES # BLD AUTO: 1.67 10*3/MM3 (ref 0.7–3.1)
LYMPHOCYTES NFR BLD AUTO: 21.5 % (ref 19.6–45.3)
MCH RBC QN AUTO: 28.4 PG (ref 26.6–33)
MCHC RBC AUTO-ENTMCNC: 34.1 G/DL (ref 31.5–35.7)
MCV RBC AUTO: 83.3 FL (ref 79–97)
MONOCYTES # BLD AUTO: 0.46 10*3/MM3 (ref 0.1–0.9)
MONOCYTES NFR BLD AUTO: 5.9 % (ref 5–12)
NEUTROPHILS NFR BLD AUTO: 5.55 10*3/MM3 (ref 1.7–7)
NEUTROPHILS NFR BLD AUTO: 71.6 % (ref 42.7–76)
NITRITE UR QL STRIP: NEGATIVE
PH UR STRIP.AUTO: 6 [PH] (ref 5–8)
PLATELET # BLD AUTO: 271 10*3/MM3 (ref 140–450)
PMV BLD AUTO: 9.3 FL (ref 6–12)
POTASSIUM SERPL-SCNC: 3.1 MMOL/L (ref 3.5–5.2)
PROT SERPL-MCNC: 6.7 G/DL (ref 6–8.5)
PROT UR QL STRIP: NEGATIVE
RBC # BLD AUTO: 3.77 10*6/MM3 (ref 3.77–5.28)
RBC # UR STRIP: ABNORMAL /HPF
REF LAB TEST METHOD: ABNORMAL
SODIUM SERPL-SCNC: 134 MMOL/L (ref 136–145)
SP GR UR STRIP: >=1.03 (ref 1–1.03)
SQUAMOUS #/AREA URNS HPF: ABNORMAL /HPF
TRANS CELLS #/AREA URNS HPF: ABNORMAL /HPF
UROBILINOGEN UR QL STRIP: ABNORMAL
WBC # UR STRIP: ABNORMAL /HPF
WBC NRBC COR # BLD AUTO: 7.76 10*3/MM3 (ref 3.4–10.8)
WHOLE BLOOD HOLD COAG: NORMAL
WHOLE BLOOD HOLD SPECIMEN: NORMAL

## 2024-12-28 PROCEDURE — 84702 CHORIONIC GONADOTROPIN TEST: CPT | Performed by: FAMILY MEDICINE

## 2024-12-28 PROCEDURE — 25010000002 PROCHLORPERAZINE 10 MG/2ML SOLUTION: Performed by: FAMILY MEDICINE

## 2024-12-28 PROCEDURE — 85025 COMPLETE CBC W/AUTO DIFF WBC: CPT | Performed by: FAMILY MEDICINE

## 2024-12-28 PROCEDURE — 83690 ASSAY OF LIPASE: CPT | Performed by: FAMILY MEDICINE

## 2024-12-28 PROCEDURE — 25810000003 LACTATED RINGERS SOLUTION: Performed by: FAMILY MEDICINE

## 2024-12-28 PROCEDURE — 87086 URINE CULTURE/COLONY COUNT: CPT | Performed by: FAMILY MEDICINE

## 2024-12-28 PROCEDURE — 80053 COMPREHEN METABOLIC PANEL: CPT | Performed by: FAMILY MEDICINE

## 2024-12-28 PROCEDURE — 96374 THER/PROPH/DIAG INJ IV PUSH: CPT

## 2024-12-28 PROCEDURE — 99283 EMERGENCY DEPT VISIT LOW MDM: CPT

## 2024-12-28 PROCEDURE — 81001 URINALYSIS AUTO W/SCOPE: CPT | Performed by: FAMILY MEDICINE

## 2024-12-28 RX ORDER — SODIUM CHLORIDE 9 MG/ML
10 INJECTION, SOLUTION INTRAMUSCULAR; INTRAVENOUS; SUBCUTANEOUS AS NEEDED
Status: DISCONTINUED | OUTPATIENT
Start: 2024-12-28 | End: 2024-12-28 | Stop reason: HOSPADM

## 2024-12-28 RX ORDER — PROMETHAZINE HYDROCHLORIDE 12.5 MG/1
12.5 TABLET ORAL EVERY 8 HOURS PRN
Qty: 20 TABLET | Refills: 0 | Status: SHIPPED | OUTPATIENT
Start: 2024-12-28

## 2024-12-28 RX ORDER — PROCHLORPERAZINE EDISYLATE 5 MG/ML
5 INJECTION INTRAMUSCULAR; INTRAVENOUS ONCE
Status: COMPLETED | OUTPATIENT
Start: 2024-12-28 | End: 2024-12-28

## 2024-12-28 RX ORDER — POTASSIUM CHLORIDE 750 MG/1
40 CAPSULE, EXTENDED RELEASE ORAL ONCE
Status: COMPLETED | OUTPATIENT
Start: 2024-12-28 | End: 2024-12-28

## 2024-12-28 RX ADMIN — PROCHLORPERAZINE EDISYLATE 5 MG: 5 INJECTION INTRAMUSCULAR; INTRAVENOUS at 18:05

## 2024-12-28 RX ADMIN — POTASSIUM CHLORIDE 40 MEQ: 750 CAPSULE, EXTENDED RELEASE ORAL at 18:53

## 2024-12-28 RX ADMIN — SODIUM CHLORIDE, POTASSIUM CHLORIDE, SODIUM LACTATE AND CALCIUM CHLORIDE 1000 ML: 600; 310; 30; 20 INJECTION, SOLUTION INTRAVENOUS at 18:04

## 2024-12-28 NOTE — DISCHARGE INSTRUCTIONS
Please follow-up with your OB/GYN in the next 2 to 3 days to recheck your urinalysis and make sure symptoms have resolved.  Return to the emergency department if symptoms worsen in any way

## 2024-12-28 NOTE — FSED PROVIDER NOTE
Subjective   History of Present Illness  Patient is a 24-year-old female who presents to the emergency department complaining of nausea and vomiting that started 2 days ago.  The patient is currently 20 weeks pregnant.  She states that she has been able to keep no food or water down over the last 24 hours and feels like she is dehydrated.  She mentions that other people in her family have had a stomach bug over the last week or so when she just started having symptoms 2 days ago.  She denies fever but admits to some chills.  She also has had diarrhea.  She denies any cramping, bleeding or gush of fluid.  She has had an uncomplicated pregnancy but states that she has not been taking any prenatal vitamins.      Review of Systems   Constitutional:  Negative for activity change, appetite change, chills, diaphoresis, fatigue and fever.   HENT:  Negative for congestion, postnasal drip, rhinorrhea, sinus pressure, sinus pain, sneezing and sore throat.    Eyes:  Negative for pain, discharge and itching.   Respiratory:  Negative for apnea, cough, choking, shortness of breath and wheezing.    Cardiovascular:  Negative for chest pain, palpitations and leg swelling.   Gastrointestinal:  Positive for diarrhea, nausea and vomiting. Negative for abdominal distention, abdominal pain and constipation.   Genitourinary:  Negative for difficulty urinating and enuresis.   Musculoskeletal:  Negative for arthralgias and back pain.   Neurological:  Negative for dizziness and light-headedness.   Psychiatric/Behavioral:  Negative for agitation and confusion.        Past Medical History:   Diagnosis Date    Anemia     Glaucoma     left eye    Irritable bowel syndrome     Lactose intolerance     Seasonal allergies     Slow to wake up after anesthesia     Thyroid activity decreased     Umbilical hernia     Urinary tract infection     Wears glasses        Allergies   Allergen Reactions    Latex Rash       Past Surgical History:   Procedure  Laterality Date     SECTION N/A 2022    Procedure:  SECTION PRIMARY;  Surgeon: Lukas Rodriguez MD;  Location: [facility] OR;  Service: Obstetrics/Gynecology;  Laterality: N/A;    CHOLECYSTECTOMY N/A 2024    Procedure: CHOLECYSTECTOMY LAPAROSCOPIC WITH DAVINCI ROBOT;  Surgeon: Mery Cadet DO;  Location: [facility] OR;  Service: Robotics - DaVinci;  Laterality: N/A;    COLONOSCOPY      FOOT/TOE TENDON REPAIR Left 2020    Procedure: RECONSTRUCTION PT TENDON LEFT;  Surgeon: Uziel Garcia DPM;  Location: [facility] OR;  Service: Podiatry    TARSAL TUNNEL RELEASE Left 2020    Procedure: EXCISION TARSAL BONE LEFT;  Surgeon: Uziel Garcia DPM;  Location: [facility] OR;  Service: Podiatry    UMBILICAL HERNIA REPAIR N/A 2023    Procedure: OPEN UMBILICAL HERNIA REPAIR;  Surgeon: Mery Cadet DO;  Location: [facility] OR;  Service: General;  Laterality: N/A;    UMBILICAL HERNIA REPAIR N/A 2024    Procedure: UMBILICAL HERNIA REVISION;  Surgeon: Mery Cadet DO;  Location: [facility] OR;  Service: General;  Laterality: N/A;    WISDOM TOOTH EXTRACTION         Family History   Problem Relation Age of Onset    Hypertension Mother     Diabetes Father     Heart attack Maternal Grandfather     Diabetes Maternal Grandfather        Social History     Socioeconomic History    Marital status: Single   Tobacco Use    Smoking status: Never     Passive exposure: Never    Smokeless tobacco: Never   Vaping Use    Vaping status: Never Used   Substance and Sexual Activity    Alcohol use: Never    Drug use: Never    Sexual activity: Defer           Objective   Physical Exam  Vitals and nursing note reviewed.   Constitutional:       General: She is not in acute distress.     Appearance: Normal appearance. She is normal weight. She is not ill-appearing, toxic-appearing or diaphoretic.   HENT:      Head: Normocephalic.      Right Ear: There is no impacted cerumen.      Left Ear:  There is no impacted cerumen.      Nose: Nose normal. No congestion or rhinorrhea.      Mouth/Throat:      Mouth: Mucous membranes are moist.      Pharynx: No oropharyngeal exudate or posterior oropharyngeal erythema.   Eyes:      General: No scleral icterus.        Right eye: No discharge.         Left eye: No discharge.      Pupils: Pupils are equal, round, and reactive to light.   Neck:      Vascular: No carotid bruit.   Cardiovascular:      Rate and Rhythm: Normal rate and regular rhythm.      Pulses: Normal pulses.      Heart sounds: Normal heart sounds. No murmur heard.     No friction rub. No gallop.   Pulmonary:      Effort: Pulmonary effort is normal. No respiratory distress.      Breath sounds: Normal breath sounds. No stridor. No wheezing, rhonchi or rales.   Chest:      Chest wall: No tenderness.   Abdominal:      General: Abdomen is flat. There is no distension.      Palpations: There is no mass.      Tenderness: There is no abdominal tenderness. There is no guarding or rebound.      Hernia: No hernia is present.      Comments: Gravid uterus consistent with 20 weeks gestation   Musculoskeletal:      Cervical back: Normal range of motion and neck supple. No rigidity or tenderness.   Lymphadenopathy:      Cervical: No cervical adenopathy.   Skin:     General: Skin is warm and dry.      Capillary Refill: Capillary refill takes less than 2 seconds.      Coloration: Skin is not jaundiced or pale.      Findings: No bruising or erythema.   Neurological:      General: No focal deficit present.      Mental Status: She is alert and oriented to person, place, and time.   Psychiatric:         Mood and Affect: Mood normal.         Behavior: Behavior normal.         Procedures           ED Course  ED Course as of 12/28/24 1927   Sat Dec 28, 2024   1925 I performed bedside ultrasound which showed normal fetal movement, fetal heart rate of 152. [EG]   1926 Patient advised to follow-up on urinalysis with 13-20  epithelial cells likely contaminant.  No dysuria [EG]   1926 Urinalysis, Microscopic Only - Urine, Clean Catch(!) [EG]   1926 Comprehensive Metabolic Panel(!) [EG]   1926 hCG, Quantitative, Pregnancy [EG]      ED Course User Index  [EG] Daxa Olivo,                                            Medical Decision Making  Patient is a 24-year-old female who presented to the emergency department with nausea and vomiting.  She is 20 weeks pregnant.  Bedside ultrasound of the fetus was normal with normal heart rate.  She was given IV fluids, and potassium was replaced due to hypokalemia.  She was advised to follow-up with OB/GYN on Monday.  She was also advised to start taking prenatal vitamin.  She had improvement in symptoms with fluids and Compazine.  She was discharged home with prescription for Phenergan    Problems Addressed:  20 weeks gestation of pregnancy: complicated acute illness or injury  Nausea and vomiting, unspecified vomiting type: complicated acute illness or injury    Amount and/or Complexity of Data Reviewed  Labs: ordered.    Risk  Prescription drug management.        Final diagnoses:   Nausea and vomiting, unspecified vomiting type   20 weeks gestation of pregnancy       ED Disposition  ED Disposition       ED Disposition   Discharge    Condition   Stable    Comment   --               Nadya Hidalgo MD  [address] [place] [id number]  [phone]    Schedule an appointment as soon as possible for a visit in 2 days      OBGYN    Schedule an appointment as soon as possible for a visit in 2 days           Medication List        New Prescriptions      promethazine 12.5 MG tablet  Commonly known as: PHENERGAN  Take 1 tablet by mouth Every 8 (Eight) Hours As Needed for Nausea or Vomiting.               Where to Get Your Medications        These medications were sent to [facility] [address] [place] [address] - [address] [facility] [phone] [facility] [phone]  [address] [facility]  [address]      Phone: [phone]   promethazine 12.5 MG tablet

## 2024-12-30 ENCOUNTER — TELEPHONE (OUTPATIENT)
Dept: OBSTETRICS AND GYNECOLOGY | Facility: CLINIC | Age: 24
End: 2024-12-30

## 2024-12-30 LAB — BACTERIA SPEC AEROBE CULT: NO GROWTH

## 2024-12-30 NOTE — TELEPHONE ENCOUNTER
Caller: Romy Langley    Relationship:  Self    Best call back number: [phone] (home)     PATIENT CALLED REQUESTING TO CANCEL SAME DAY APPT.    Did the patient call AFTER the start time of their scheduled appointment?  []YES  [x]NO    Was the patient's appointment rescheduled? [x]YES  []NO    Any additional information: PT IS SICK

## 2025-01-04 ENCOUNTER — HOSPITAL ENCOUNTER (OUTPATIENT)
Facility: HOSPITAL | Age: 25
Discharge: HOME | End: 2025-01-04
Attending: OBSTETRICS & GYNECOLOGY | Admitting: MIDWIFE
Payer: COMMERCIAL

## 2025-01-04 VITALS
HEIGHT: 66 IN | WEIGHT: 154 LBS | DIASTOLIC BLOOD PRESSURE: 58 MMHG | HEART RATE: 78 BPM | SYSTOLIC BLOOD PRESSURE: 100 MMHG | RESPIRATION RATE: 16 BRPM | BODY MASS INDEX: 24.75 KG/M2 | OXYGEN SATURATION: 99 % | TEMPERATURE: 98.2 F

## 2025-01-04 LAB
BILIRUB BLD-MCNC: NEGATIVE MG/DL
CLARITY, POC: CLEAR
COLOR UR: NORMAL
GLUCOSE UR STRIP-MCNC: NEGATIVE MG/DL
KETONES UR QL: NEGATIVE
LEUKOCYTE EST, POC: NEGATIVE
NITRITE UR-MCNC: NEGATIVE MG/ML
PH UR: 6 [PH] (ref 5–8)
PROT UR STRIP-MCNC: NEGATIVE MG/DL
RBC # UR STRIP: NEGATIVE /UL
SP GR UR: 1.01 (ref 1–1.03)
UROBILINOGEN UR QL: NORMAL

## 2025-01-04 PROCEDURE — 84112 EVAL AMNIOTIC FLUID PROTEIN: CPT | Performed by: OBSTETRICS & GYNECOLOGY

## 2025-01-04 PROCEDURE — 81002 URINALYSIS NONAUTO W/O SCOPE: CPT | Performed by: OBSTETRICS & GYNECOLOGY

## 2025-01-04 PROCEDURE — G0463 HOSPITAL OUTPT CLINIC VISIT: HCPCS

## 2025-01-04 RX ORDER — ACETAMINOPHEN 500 MG
1000 TABLET ORAL ONCE
Status: COMPLETED | OUTPATIENT
Start: 2025-01-05 | End: 2025-01-04

## 2025-01-04 RX ORDER — SODIUM CHLORIDE 9 MG/ML
40 INJECTION, SOLUTION INTRAVENOUS AS NEEDED
Status: DISCONTINUED | OUTPATIENT
Start: 2025-01-04 | End: 2025-01-05 | Stop reason: HOSPADM

## 2025-01-04 RX ORDER — SODIUM CHLORIDE 0.9 % (FLUSH) 0.9 %
10 SYRINGE (ML) INJECTION EVERY 12 HOURS SCHEDULED
Status: DISCONTINUED | OUTPATIENT
Start: 2025-01-04 | End: 2025-01-05 | Stop reason: HOSPADM

## 2025-01-04 RX ADMIN — ACETAMINOPHEN 1000 MG: 500 TABLET, FILM COATED ORAL at 23:24

## 2025-01-05 LAB — A1 MICROGLOB PLACENTAL VAG QL: NEGATIVE

## 2025-01-05 NOTE — SIGNIFICANT NOTE
01/04/25 5313   Provider Notification   Reason for Communication Evaluate   Provider Name RN notified JASPAL Ivy pt arrived c/o gush of fluid x2. Pt amnisure negative, active fetal movement, no contractions palpated. PO Tylenol given and increased PO fluid intake. RN visualized white discharge when performing amnisure and pt reports burning. RN advised potentially yeast infection and to notify provider on Monday during appt.   Notification Route Phone call   Response See orders

## 2025-01-05 NOTE — NON STRESS TEST
"Triage Note - Nursing Documentation  Labor and Delivery Admission Log    Romy Langley  : 2000  MRN: [id number]  CSN: [id number]    Date in / Time in:  2025  Time in:     Date out / Time out:    Time out: 1596    Nurse: Kiana Vital RN    Patient Info: She is a 24 y.o. year old  at 20w4d with an DEQUAN of 2025, by Ultrasound who was seen on the [facility].    Chief Complaint:   Chief Complaint   Patient presents with    Leaking Fluid     \"I had 2 gushes of fluid after using the restroom.\"       Provider Instructions / Disposition: Pt arrived c/o gush of fluid x2. Pt amnisure negative, active fetal movement, no contractions palpated. PO Tylenol given and increased PO fluid intake. RN visualized white discharge when performing amnisure and pt reports burning. RN advised potentially yeast infection and to notify provider on Monday during OB visit. Pt educated on reasons to return to , pt understood. RN educated pt ways to treat yeast infection at home.      Patient Active Problem List   Diagnosis    Umbilical hernia without obstruction and without gangrene    Iron deficiency anemia due to chronic blood loss    Generalized abdominal pain    Chronic idiopathic constipation    Recurrent umbilical hernia    RUQ abdominal pain    Symptomatic cholelithiasis    Sore throat    GBS bacteriuria       NST Documentation (Only applicable > 32 weeks):  '    "

## 2025-01-13 ENCOUNTER — ROUTINE PRENATAL (OUTPATIENT)
Dept: OBSTETRICS AND GYNECOLOGY | Facility: CLINIC | Age: 25
End: 2025-01-13
Payer: COMMERCIAL

## 2025-01-13 VITALS — DIASTOLIC BLOOD PRESSURE: 60 MMHG | WEIGHT: 158 LBS | SYSTOLIC BLOOD PRESSURE: 112 MMHG | BODY MASS INDEX: 25.5 KG/M2

## 2025-01-13 DIAGNOSIS — Z34.82 ENCOUNTER FOR SUPERVISION OF OTHER NORMAL PREGNANCY IN SECOND TRIMESTER: Primary | ICD-10-CM

## 2025-01-13 RX ORDER — PANTOPRAZOLE SODIUM 20 MG/1
20 TABLET, DELAYED RELEASE ORAL DAILY PRN
Qty: 30 TABLET | Refills: 5 | Status: SHIPPED | OUTPATIENT
Start: 2025-01-13 | End: 2026-01-13

## 2025-01-13 NOTE — PROGRESS NOTES
Chief Complaint   Patient presents with    Routine Prenatal Visit     Patient has no concerns today.        HPI: Romy is a  currently at 21w6d here for prenatal visit who reports the following:  Baby is active. She went to  on  with leaking fluid, Amnisure negative. She continues to have heartburn, N/V, and headaches.                EXAM:     Vitals:    25 1453   BP: 112/60      Abdomen:   See prenatal flowsheet as noted and reviewed, soft, nontender   Pelvic:  See prenatal flowsheet as noted and reviewed   Urine:  See prenatal flowsheet as noted and reviewed    Lab Results   Component Value Date    ABO O 10/09/2024    RH Positive 10/09/2024    ABSCRN Negative 10/09/2024       MDM:  Impression: Previous C/S with planned repeat C/S  Headaches  GERD   Tests done today: none   Topics discussed: kick counts and fetal movement  Rx for Protonix daily PRN  Increase water and protein  Tylenol 1000 mg q 8 hrs PRN  Reviewed OB labs   Tests next visit: GCT  HgB                RTO:                        4 weeks    This note was electronically signed.  Karly Duarte, APRN  2025

## 2025-01-21 ENCOUNTER — ROUTINE PRENATAL (OUTPATIENT)
Dept: OBSTETRICS AND GYNECOLOGY | Facility: CLINIC | Age: 25
End: 2025-01-21
Payer: COMMERCIAL

## 2025-01-21 VITALS — BODY MASS INDEX: 25.02 KG/M2 | SYSTOLIC BLOOD PRESSURE: 116 MMHG | WEIGHT: 155 LBS | DIASTOLIC BLOOD PRESSURE: 60 MMHG

## 2025-01-21 DIAGNOSIS — N89.8 VAGINAL IRRITATION: ICD-10-CM

## 2025-01-21 DIAGNOSIS — R39.9 URINARY TRACT INFECTION SYMPTOMS: ICD-10-CM

## 2025-01-21 DIAGNOSIS — N39.0 FREQUENT UTI: ICD-10-CM

## 2025-01-21 DIAGNOSIS — R30.9 PAIN WITH URINATION: ICD-10-CM

## 2025-01-21 DIAGNOSIS — Z34.82 ENCOUNTER FOR SUPERVISION OF OTHER NORMAL PREGNANCY IN SECOND TRIMESTER: Primary | ICD-10-CM

## 2025-01-21 RX ORDER — CEPHALEXIN 500 MG/1
500 CAPSULE ORAL 4 TIMES DAILY
Qty: 28 CAPSULE | Refills: 0 | Status: SHIPPED | OUTPATIENT
Start: 2025-01-21 | End: 2025-01-25

## 2025-01-21 RX ORDER — FLUCONAZOLE 150 MG/1
TABLET ORAL
Qty: 2 TABLET | Refills: 0 | Status: SHIPPED | OUTPATIENT
Start: 2025-01-21

## 2025-01-22 NOTE — PROGRESS NOTES
Chief Complaint  Routine Prenatal Visit (Patient complains of vaginal irritation, and urinary tract infection symptoms. )    History of Present Illness:  Romy is a  currently at 23w1d who presents today with complaints of vaginal irritation and burning.  Patient reports she is having pain and burning after urination.  She reports it has been extremely painful.  She reports having similar symptoms earlier in her pregnancy.  Patient has been treated on multiple occasions for UTI.  She has also received IV antibiotics in November.  She is not on any suppression.  She denies any vulvar or vaginal lesions.  She denies any spotting or contractions.  She does report good fetal movement.    Exam:  Vitals:  See prenatal flowsheet as noted and reviewed  General: Alert, cooperative, and does not appear in any distress  Abdomen:   See prenatal flowsheet as noted and reviewed    Uterus gravid, non-tender; no palpable masses    No guarding or rebound tenderness  Pelvic:  See prenatal flowsheet as noted and reviewed    No visible lesions seen of the vulva.  Mild erythema at the vaginal introitus noted.    Cultures obtained.  Cervix closed and thick.  Ext:  See prenatal flowsheet as noted and reviewed    Moves extremities well, no cyanosis and no redness  Urine:  See prenatal flowsheet as noted and reviewed    Data Review:  The following data was reviewed by: Suni Oquendo MD on 2025:  Prenatal Labs:  Lab Results   Component Value Date    HGB 10.7 (L) 2024    RUBELLAABIGG 1.70 10/09/2024    HEPBSAG Negative 10/09/2024    ABO O 10/09/2024    RH Positive 10/09/2024    ABSCRN Negative 10/09/2024    IHG2KUR7 Non Reactive 10/09/2024    HEPCVIRUSABY 0.2 2022    STREPGPB Positive (A) 2022    URINECX No growth 2024       Admission on 2025, Discharged on 2025   Component Date Value    Color 2025 Straw     Clarity, UA 2025 Clear     Glucose, UA 2025 Negative     Bilirubin  01/04/2025 Negative     Ketones, UA 01/04/2025 Negative     Specific Gravity  01/04/2025 1.010     Blood, UA 01/04/2025 Negative     pH, Urine 01/04/2025 6.0     Protein, POC 01/04/2025 Negative     Urobilinogen, UA 01/04/2025 Normal     Leukocytes 01/04/2025 Negative     Nitrite, UA 01/04/2025 Negative     Rupture of Membranes 01/04/2025 Negative    Admission on 12/28/2024, Discharged on 12/28/2024   Component Date Value    Glucose 12/28/2024 135 (H)     BUN 12/28/2024 10     Creatinine 12/28/2024 0.66     Sodium 12/28/2024 134 (L)     Potassium 12/28/2024 3.1 (L)     Chloride 12/28/2024 101     CO2 12/28/2024 23.4     Calcium 12/28/2024 8.4 (L)     Total Protein 12/28/2024 6.7     Albumin 12/28/2024 3.6     ALT (SGPT) 12/28/2024 8     AST (SGOT) 12/28/2024 16     Alkaline Phosphatase 12/28/2024 70     Total Bilirubin 12/28/2024 0.3     Globulin 12/28/2024 3.1     A/G Ratio 12/28/2024 1.2     BUN/Creatinine Ratio 12/28/2024 15.2     Anion Gap 12/28/2024 9.6     eGFR 12/28/2024 126.6     Lipase 12/28/2024 33     HCG Quantitative 12/28/2024 15,208.00     Extra Tube 12/28/2024 Hold for add-ons.     Extra Tube 12/28/2024 hold for add-on     Extra Tube 12/28/2024 Hold for add-ons.     Extra Tube 12/28/2024 Hold for add-ons.     Extra Tube 12/28/2024 Hold for add-ons.     WBC 12/28/2024 7.76     RBC 12/28/2024 3.77     Hemoglobin 12/28/2024 10.7 (L)     Hematocrit 12/28/2024 31.4 (L)     MCV 12/28/2024 83.3     MCH 12/28/2024 28.4     MCHC 12/28/2024 34.1     RDW 12/28/2024 13.0     RDW-SD 12/28/2024 40.2     MPV 12/28/2024 9.3     Platelets 12/28/2024 271     Neutrophil % 12/28/2024 71.6     Lymphocyte % 12/28/2024 21.5     Monocyte % 12/28/2024 5.9     Eosinophil % 12/28/2024 0.8     Basophil % 12/28/2024 0.1     Immature Grans % 12/28/2024 0.1     Neutrophils, Absolute 12/28/2024 5.55     Lymphocytes, Absolute 12/28/2024 1.67     Monocytes, Absolute 12/28/2024 0.46     Eosinophils, Absolute 12/28/2024 0.06      Basophils, Absolute 2024 0.01     Immature Grans, Absolute 2024 0.01     Color, UA 2024 Dark Yellow (A)     Appearance, UA 2024 Slightly Cloudy (A)     pH, UA 2024 6.0     Specific Gravity, UA 2024 >=1.030     Glucose, UA 2024 Negative     Ketones, UA 2024 Negative     Bilirubin, UA 2024 Small (1+) (A)     Blood, UA 2024 Negative     Protein, UA 2024 Negative     Leuk Esterase, UA 2024 Small (1+) (A)     Nitrite, UA 2024 Negative     Urobilinogen, UA 2024 1.0 E.U./dL     RBC, UA 2024 0-2     WBC, UA 2024 6-10 (A)     Bacteria, UA 2024 1+ (A)     Squamous Epithelial Cell* 2024 13-20 (A)     Transitional Epithelial * 2024 0-2     Hyaline Casts, UA 2024 Unable to determine due to loaded field     Calcium Oxalate Crystals* 2024 Moderate/2+     Methodology 2024 Manual Light Microscopy     Urine Culture 2024 No growth      Imaging:  US Ob 14 + Weeks Single or First Gestation  Impression:   IUP at 17+6 weeks. Anatomy was completely cleared today and all organ   systems were found to be normal in appearance. EFW 235g(73%) AC 76%.   Cephalic presentation. Placenta is posterior. Amniotic fluid and fetal   heart rate are normal.    Parvez Galindo MD  Obstetrics and Gynecology  [facility]      Medical Records:  None    Assessment and Plan:  Problem List Items Addressed This Visit    None  Visit Diagnoses       Encounter for supervision of other normal pregnancy in second trimester    -  Primary  Topics discussed:     iron supplementation  kick counts and fetal movement  PIH precautions   labor signs and symptoms  Patient to follow-up for her Glucola and CBC    Urinary tract infection symptoms      Patient to increase her p.o. fluids.  Patient to call for her culture results.    Relevant Orders    Urinalysis With Microscopic - Urine, Clean Catch    Urine Culture - Urine,  Urine, Clean Catch    Vaginal irritation      Patient to call for her culture results.  Prescription is given for Diflucan.    Relevant Orders    NuSwab VG+ - Swab, Vagina    Pain with urination      Prescription is given for Keflex.  I have instructed the patient and the need for suppression following her Keflex.  Instructions and precautions have been given.  Patient to call for culture results.  Patient to follow-up in 1 week.    Relevant Medications    cephalexin (Keflex) 500 MG capsule    fluconazole (Diflucan) 150 MG tablet    Frequent UTI        Relevant Medications    cephalexin (Keflex) 500 MG capsule          Follow Up/Instructions:  Follow up as scheduled.  Patient was given instructions and counseling regarding her condition or for health maintenance advice. Please see specific information pulled into the AVS if appropriate.     Note: Speech recognition transcription software may have been used to dictate portions of this document.  An attempt at proofreading has been made though minor errors in transcription may still be present.    This note was electronically signed.  Suni Oquendo M.D.      Pt called [facility] on 1/25/25 reporting her urinary symptoms have not improved.  She has been taking her Keflex.  She denies any fever or chills.  She has not had any emesis.  Rx sent in for Ampicillin to [facility] given her culture results.  Instructions and precautions given.  Pt to go to [facility] or ER if worsening and/or changes in her symptoms.    Suni Oquendo M.D.

## 2025-01-24 LAB
A VAGINAE DNA VAG QL NAA+PROBE: ABNORMAL SCORE
APPEARANCE UR: ABNORMAL
BACTERIA #/AREA URNS HPF: ABNORMAL /HPF
BACTERIA UR CULT: ABNORMAL
BACTERIA UR CULT: ABNORMAL
BILIRUB UR QL STRIP: NEGATIVE
BVAB2 DNA VAG QL NAA+PROBE: ABNORMAL SCORE
C ALBICANS DNA VAG QL NAA+PROBE: NEGATIVE
C GLABRATA DNA VAG QL NAA+PROBE: NEGATIVE
C TRACH DNA SPEC QL NAA+PROBE: NEGATIVE
CASTS URNS MICRO: ABNORMAL
COLOR UR: YELLOW
EPI CELLS #/AREA URNS HPF: ABNORMAL /HPF
GLUCOSE UR QL STRIP: ABNORMAL
HGB UR QL STRIP: NEGATIVE
KETONES UR QL STRIP: NEGATIVE
LEUKOCYTE ESTERASE UR QL STRIP: ABNORMAL
MEGA1 DNA VAG QL NAA+PROBE: ABNORMAL SCORE
N GONORRHOEA DNA VAG QL NAA+PROBE: NEGATIVE
NITRITE UR QL STRIP: NEGATIVE
PH UR STRIP: 6 [PH] (ref 5–8)
PROT UR QL STRIP: ABNORMAL
RBC #/AREA URNS HPF: ABNORMAL /HPF
SP GR UR STRIP: 1.02 (ref 1–1.03)
T VAGINALIS DNA VAG QL NAA+PROBE: NEGATIVE
UROBILINOGEN UR STRIP-MCNC: ABNORMAL MG/DL
WBC #/AREA URNS HPF: ABNORMAL /HPF

## 2025-01-25 RX ORDER — AMPICILLIN 500 MG/1
500 CAPSULE ORAL 4 TIMES DAILY
Qty: 28 CAPSULE | Refills: 0 | Status: SHIPPED | OUTPATIENT
Start: 2025-01-25 | End: 2025-02-01

## 2025-01-27 ENCOUNTER — ROUTINE PRENATAL (OUTPATIENT)
Dept: OBSTETRICS AND GYNECOLOGY | Facility: CLINIC | Age: 25
End: 2025-01-27
Payer: COMMERCIAL

## 2025-01-27 VITALS — WEIGHT: 157.6 LBS | BODY MASS INDEX: 25.44 KG/M2 | DIASTOLIC BLOOD PRESSURE: 66 MMHG | SYSTOLIC BLOOD PRESSURE: 100 MMHG

## 2025-01-27 DIAGNOSIS — R10.9 RIGHT FLANK PAIN: Primary | ICD-10-CM

## 2025-01-27 NOTE — PROGRESS NOTES
Chief Complaint   Patient presents with    Routine Prenatal Visit     Prenatal visit with possible UTI and pain in right kidney. She was treated on Tuesday and is not any better.        HPI:   , 23w6d gestation reports doing well    ROS:  See Prenatal Episode/Flowsheet  /66   Wt 71.5 kg (157 lb 9.6 oz)   LMP 2024 (Exact Date) Comment: Due date sometime in May per pt.  BMI 25.44 kg/m²      EXAM:  EXTREMITIES:  No swelling-See Prenatal Episode/Flowsheet    ABDOMEN:  FHTs/Movement noted-See Prenatal Episode/Flowsheet    URINE GLUCOSE/PROTEIN:  See Prenatal Episode/Flowsheet    PELVIC EXAM:  See Prenatal Episode/Flowsheet  CV:  Lungs:  GYN:    MDM:    Lab Results   Component Value Date    HGB 10.7 (L) 2024    RUBELLAABIGG 1.70 10/09/2024    HEPBSAG Negative 10/09/2024    ABO O 10/09/2024    RH Positive 10/09/2024    ABSCRN Negative 10/09/2024    [id number] Non Reactive 10/09/2024    HEPCVIRUSABY 0.2 2022    STREPGPB Positive (A) 2022    URINECX Final report (A) 2025       U/S:US Ob 14 + Weeks Single or First Gestation (2024 11:56)     1. IUP 23w6d  2. Routine care   3.  Urine culture reviewed.  GBS 25-50,000 colony-forming units.  Started on ampicillin Saturday evening.  Will get a renal ultrasound

## 2025-01-28 ENCOUNTER — HOSPITAL ENCOUNTER (OUTPATIENT)
Dept: ULTRASOUND IMAGING | Facility: HOSPITAL | Age: 25
Discharge: HOME | End: 2025-01-28
Admitting: OBSTETRICS & GYNECOLOGY
Payer: COMMERCIAL

## 2025-01-28 DIAGNOSIS — R10.9 RIGHT FLANK PAIN: ICD-10-CM

## 2025-01-28 PROCEDURE — 76775 US EXAM ABDO BACK WALL LIM: CPT

## 2025-01-29 ENCOUNTER — PATIENT MESSAGE (OUTPATIENT)
Dept: OBSTETRICS AND GYNECOLOGY | Facility: CLINIC | Age: 25
End: 2025-01-29
Payer: COMMERCIAL

## 2025-01-29 RX ORDER — CYCLOBENZAPRINE HCL 10 MG
10 TABLET ORAL EVERY 8 HOURS PRN
Qty: 30 TABLET | Refills: 0 | Status: SHIPPED | OUTPATIENT
Start: 2025-01-29

## 2025-01-29 NOTE — TELEPHONE ENCOUNTER
Called and spoke with patient to inform of normal results. She advised she is having a lot of back pain, is taking Tylenol scheduled every 4-6 hours and isn't helping pain. She is wanting to know what else she can use for pain?

## 2025-02-02 ENCOUNTER — HOSPITAL ENCOUNTER (OUTPATIENT)
Facility: HOSPITAL | Age: 25
Discharge: HOME | End: 2025-02-02
Attending: OBSTETRICS & GYNECOLOGY | Admitting: OBSTETRICS & GYNECOLOGY
Payer: COMMERCIAL

## 2025-02-02 VITALS
WEIGHT: 160.6 LBS | BODY MASS INDEX: 25.81 KG/M2 | OXYGEN SATURATION: 99 % | HEART RATE: 92 BPM | DIASTOLIC BLOOD PRESSURE: 58 MMHG | HEIGHT: 66 IN | TEMPERATURE: 97.2 F | SYSTOLIC BLOOD PRESSURE: 98 MMHG | RESPIRATION RATE: 16 BRPM

## 2025-02-02 LAB
BILIRUB BLD-MCNC: NEGATIVE MG/DL
CLARITY, POC: CLEAR
COLOR UR: YELLOW
GLUCOSE UR STRIP-MCNC: NEGATIVE MG/DL
KETONES UR QL: NEGATIVE
LEUKOCYTE EST, POC: NEGATIVE
NITRITE UR-MCNC: NEGATIVE MG/ML
PH UR: 6.5 [PH] (ref 5–8)
PROT UR STRIP-MCNC: NEGATIVE MG/DL
RBC # UR STRIP: NEGATIVE /UL
SP GR UR: 1.02 (ref 1–1.03)
UROBILINOGEN UR QL: NORMAL

## 2025-02-02 PROCEDURE — 81002 URINALYSIS NONAUTO W/O SCOPE: CPT | Performed by: OBSTETRICS & GYNECOLOGY

## 2025-02-02 PROCEDURE — G0463 HOSPITAL OUTPT CLINIC VISIT: HCPCS

## 2025-02-02 RX ORDER — SODIUM CHLORIDE 9 MG/ML
40 INJECTION, SOLUTION INTRAVENOUS AS NEEDED
Status: DISCONTINUED | OUTPATIENT
Start: 2025-02-02 | End: 2025-02-03 | Stop reason: HOSPADM

## 2025-02-02 RX ORDER — SODIUM CHLORIDE 0.9 % (FLUSH) 0.9 %
10 SYRINGE (ML) INJECTION EVERY 12 HOURS SCHEDULED
Status: DISCONTINUED | OUTPATIENT
Start: 2025-02-03 | End: 2025-02-03 | Stop reason: HOSPADM

## 2025-02-02 RX ORDER — LIDOCAINE HYDROCHLORIDE 10 MG/ML
0.5 INJECTION, SOLUTION INFILTRATION; PERINEURAL ONCE AS NEEDED
Status: DISCONTINUED | OUTPATIENT
Start: 2025-02-02 | End: 2025-02-03 | Stop reason: HOSPADM

## 2025-02-02 RX ORDER — SODIUM CHLORIDE 0.9 % (FLUSH) 0.9 %
10 SYRINGE (ML) INJECTION AS NEEDED
Status: DISCONTINUED | OUTPATIENT
Start: 2025-02-02 | End: 2025-02-03 | Stop reason: HOSPADM

## 2025-02-02 NOTE — NON STRESS TEST
"Triage Note - Nursing Documentation  Labor and Delivery Admission Log    Romy Langley  : 2000  MRN: [id number]  CSN: [id number]    Date in / Time in:  2025  Time in: 1416    Date out / Time out:    Time out: 1500    Nurse: Constanza Almaraz, RN    Patient Info: She is a 24 y.o. year old  at 24w5d with an DEQUAN of 2025, by Ultrasound who was seen on the [facility].    Chief Complaint:   Chief Complaint   Patient presents with    Decreased Fetal Movement     NOT FELT SINCE 1 AM       Provider Instructions / Disposition: Patient arrived to  with c/o decreased FM since 0100. EFM and doppler used to obtain  bpm noted. Patient immediately states \"I feel her moving\". FM felt several times during monitoring. Patient positive for FM and orders to discharge home with inst. Keep OB appt as scheduled and return to  if any issues arise. Patient agreed. Discharged in stable condition.    Patient Active Problem List   Diagnosis    Umbilical hernia without obstruction and without gangrene    Iron deficiency anemia due to chronic blood loss    Generalized abdominal pain    Chronic idiopathic constipation    Recurrent umbilical hernia    RUQ abdominal pain    Symptomatic cholelithiasis    Sore throat    GBS bacteriuria       NST Documentation (Only applicable > 32 weeks): Interpretation A  Comments A: Positive FM felt by mother (25 1502 : Constanza Almaraz, RN)   "

## 2025-02-03 NOTE — NON STRESS TEST
"Triage Note - Nursing Documentation  Labor and Delivery Admission Log    Romy Langley  : 2000  MRN: [id number]  CSN: [id number]    Date in / Time in:  2025  Time in:     Date out / Time out:    Time out:     Nurse: Sheila Bush RN    Patient Info: She is a 24 y.o. year old  at 24w5d with an DEQUAN of 2025, by Ultrasound who was seen on the [facility].    Chief Complaint:   Chief Complaint   Patient presents with    Decreased Fetal Movement    Abdominal Cramping       Provider Instructions / Disposition: Pt reported feeling her baby move when the external monitor was applied. Pt stated she felt her baby \"move a lot\" while she was here. Pt reports she had some cramping earlier in the day around 4pm, but this has resolved and she rates her pain a 0. Pt agreeable to d/c home and will follow up in office next week. Pt given d/c instructions. Pt verbalized understanding.     Patient Active Problem List   Diagnosis    Umbilical hernia without obstruction and without gangrene    Iron deficiency anemia due to chronic blood loss    Generalized abdominal pain    Chronic idiopathic constipation    Recurrent umbilical hernia    RUQ abdominal pain    Symptomatic cholelithiasis    Sore throat    GBS bacteriuria       NST Documentation (Only applicable > 32 weeks):      "

## 2025-02-07 ENCOUNTER — HOSPITAL ENCOUNTER (OUTPATIENT)
Facility: HOSPITAL | Age: 25
Discharge: HOME | End: 2025-02-07
Attending: MIDWIFE | Admitting: MIDWIFE
Payer: COMMERCIAL

## 2025-02-07 VITALS
RESPIRATION RATE: 16 BRPM | SYSTOLIC BLOOD PRESSURE: 98 MMHG | BODY MASS INDEX: 25.23 KG/M2 | OXYGEN SATURATION: 99 % | TEMPERATURE: 98.2 F | DIASTOLIC BLOOD PRESSURE: 59 MMHG | HEART RATE: 79 BPM | HEIGHT: 66 IN | WEIGHT: 157 LBS

## 2025-02-07 LAB
BILIRUB BLD-MCNC: NEGATIVE MG/DL
CLARITY, POC: CLEAR
COLOR UR: YELLOW
GLUCOSE UR STRIP-MCNC: NEGATIVE MG/DL
KETONES UR QL: NEGATIVE
LEUKOCYTE EST, POC: NEGATIVE
NITRITE UR-MCNC: NEGATIVE MG/ML
PH UR: 7.5 [PH] (ref 5–8)
PROT UR STRIP-MCNC: ABNORMAL MG/DL
RBC # UR STRIP: NEGATIVE /UL
SP GR UR: 1.01 (ref 1–1.03)
UROBILINOGEN UR QL: NORMAL

## 2025-02-07 PROCEDURE — 81002 URINALYSIS NONAUTO W/O SCOPE: CPT | Performed by: MIDWIFE

## 2025-02-07 PROCEDURE — 25810000003 LACTATED RINGERS SOLUTION: Performed by: STUDENT IN AN ORGANIZED HEALTH CARE EDUCATION/TRAINING PROGRAM

## 2025-02-07 PROCEDURE — 96374 THER/PROPH/DIAG INJ IV PUSH: CPT

## 2025-02-07 PROCEDURE — G0463 HOSPITAL OUTPT CLINIC VISIT: HCPCS

## 2025-02-07 PROCEDURE — 25010000002 ONDANSETRON PER 1 MG: Performed by: STUDENT IN AN ORGANIZED HEALTH CARE EDUCATION/TRAINING PROGRAM

## 2025-02-07 RX ORDER — SODIUM CHLORIDE 0.9 % (FLUSH) 0.9 %
10 SYRINGE (ML) INJECTION AS NEEDED
Status: DISCONTINUED | OUTPATIENT
Start: 2025-02-07 | End: 2025-02-08 | Stop reason: HOSPADM

## 2025-02-07 RX ORDER — LIDOCAINE HYDROCHLORIDE 10 MG/ML
0.5 INJECTION, SOLUTION INFILTRATION; PERINEURAL ONCE AS NEEDED
Status: DISCONTINUED | OUTPATIENT
Start: 2025-02-07 | End: 2025-02-08 | Stop reason: HOSPADM

## 2025-02-07 RX ORDER — ONDANSETRON 2 MG/ML
4 INJECTION INTRAMUSCULAR; INTRAVENOUS EVERY 6 HOURS PRN
Status: DISCONTINUED | OUTPATIENT
Start: 2025-02-07 | End: 2025-02-08 | Stop reason: HOSPADM

## 2025-02-07 RX ORDER — SODIUM CHLORIDE 9 MG/ML
40 INJECTION, SOLUTION INTRAVENOUS AS NEEDED
Status: DISCONTINUED | OUTPATIENT
Start: 2025-02-07 | End: 2025-02-08 | Stop reason: HOSPADM

## 2025-02-07 RX ORDER — SODIUM CHLORIDE 0.9 % (FLUSH) 0.9 %
10 SYRINGE (ML) INJECTION EVERY 12 HOURS SCHEDULED
Status: DISCONTINUED | OUTPATIENT
Start: 2025-02-07 | End: 2025-02-08 | Stop reason: HOSPADM

## 2025-02-07 RX ORDER — ACETAMINOPHEN 500 MG
1000 TABLET ORAL ONCE
Status: COMPLETED | OUTPATIENT
Start: 2025-02-07 | End: 2025-02-07

## 2025-02-07 RX ADMIN — ONDANSETRON 4 MG: 2 INJECTION INTRAMUSCULAR; INTRAVENOUS at 19:47

## 2025-02-07 RX ADMIN — ACETAMINOPHEN 1000 MG: 500 TABLET, FILM COATED ORAL at 19:47

## 2025-02-08 NOTE — NURSING NOTE
Triage Note - Nursing Documentation  Labor and Delivery Admission Log    Romy Langley  : 2000  MRN: [id number]  CSN: [id number]    Date in / Time in:  2025  Time in:     Date out / Time out:  2025  Time out:     Nurse: Sarah Pérez RN    Patient Info: She is a 24 y.o. year old  at 25w3d with an DEQUAN of 2025, by Ultrasound who was seen on the [facility].    Chief Complaint:   Chief Complaint   Patient presents with    Vomiting       Provider Instructions / Disposition: EFM monitoring, PO hydration. Urine dip. VSS. LR 1000 ML bolus, Tylenol 1000 mg PO x1, Zofran 4 mg IV X1. Patient discharged to home with  and fetal movement take home instructions.    Patient Active Problem List   Diagnosis    Umbilical hernia without obstruction and without gangrene    Iron deficiency anemia due to chronic blood loss    Generalized abdominal pain    Chronic idiopathic constipation    Recurrent umbilical hernia    RUQ abdominal pain    Symptomatic cholelithiasis    Sore throat    GBS bacteriuria       NST Documentation (Only applicable > 32 weeks):

## 2025-02-08 NOTE — NURSING NOTE
This RN called and spoke with Evita SHAY regarding patient POC; update given to APRN; no new orders @ this time.

## 2025-02-08 NOTE — NURSING NOTE
This RN spoke with Dr. Ureña on [facility] regarding patient status; verbal orders received to start IV and bolus 1000 ml of LR; Tylenol 1000 mg PO x1; and Zofran 4 mg IVP x1; R/B/V.

## 2025-02-08 NOTE — NURSING NOTE
This RN spoke with Dr. Ureña on labor [facility] regarding patient status; verbal order received to discharge patient to home after IV bolus done with  and fetal movement take home instructions R/B/V.

## 2025-02-10 ENCOUNTER — ROUTINE PRENATAL (OUTPATIENT)
Dept: OBSTETRICS AND GYNECOLOGY | Facility: CLINIC | Age: 25
End: 2025-02-10
Payer: COMMERCIAL

## 2025-02-10 VITALS — WEIGHT: 159 LBS | SYSTOLIC BLOOD PRESSURE: 110 MMHG | DIASTOLIC BLOOD PRESSURE: 60 MMHG | BODY MASS INDEX: 25.66 KG/M2

## 2025-02-10 DIAGNOSIS — Z34.92 PRENATAL CARE IN SECOND TRIMESTER: ICD-10-CM

## 2025-02-10 DIAGNOSIS — K21.9 GASTROESOPHAGEAL REFLUX DISEASE WITHOUT ESOPHAGITIS: ICD-10-CM

## 2025-02-10 DIAGNOSIS — O09.92 ENCOUNTER FOR SUPERVISION OF HIGH RISK PREGNANCY IN SECOND TRIMESTER, ANTEPARTUM: Primary | ICD-10-CM

## 2025-02-10 DIAGNOSIS — O26.892 LOW BACK PAIN DURING PREGNANCY, SECOND TRIMESTER: ICD-10-CM

## 2025-02-10 DIAGNOSIS — O99.019 IRON DEFICIENCY ANEMIA DURING PREGNANCY: ICD-10-CM

## 2025-02-10 DIAGNOSIS — D50.9 IRON DEFICIENCY ANEMIA DURING PREGNANCY: ICD-10-CM

## 2025-02-10 DIAGNOSIS — R19.7 DIARRHEA, UNSPECIFIED TYPE: ICD-10-CM

## 2025-02-10 DIAGNOSIS — M54.50 LOW BACK PAIN DURING PREGNANCY, SECOND TRIMESTER: ICD-10-CM

## 2025-02-10 DIAGNOSIS — R11.2 NAUSEA AND VOMITING, UNSPECIFIED VOMITING TYPE: ICD-10-CM

## 2025-02-10 RX ORDER — PANTOPRAZOLE SODIUM 40 MG/1
40 TABLET, DELAYED RELEASE ORAL DAILY
Qty: 30 TABLET | Refills: 6 | Status: SHIPPED | OUTPATIENT
Start: 2025-02-10

## 2025-02-10 NOTE — PROGRESS NOTES
Chief Complaint  Routine Prenatal Visit (Glucola done today, no complaints. )    History of Present Illness:  Romy is a  currently at 25w6d who presents today with complaints of nausea vomiting and diarrhea.  Patient has been seen several times on labor and delivery.  She most recently was seen with nausea and vomiting.  Patient reports several family members have been ill.  She has been having vomiting with diarrhea.  She does have reflux as well.  She has been taking Pepcid with continued symptoms.  She continues to have low back pain as well.  He has been trying to take her prenatal vitamins and iron supplements.  She has been taking Tylenol and Flexeril for her back pain.  She has been taking Zofran ODT.    Exam:  Vitals:  See prenatal flowsheet as noted and reviewed  General: Alert, cooperative, and does not appear in any distress  Abdomen:   See prenatal flowsheet as noted and reviewed    Uterus gravid, non-tender; no palpable masses    No guarding or rebound tenderness  Pelvic:  See prenatal flowsheet as noted and reviewed  Ext:  See prenatal flowsheet as noted and reviewed    Moves extremities well, no cyanosis and no redness  Urine:  See prenatal flowsheet as noted and reviewed    Data Review:  The following data was reviewed by: Suni Oquendo MD on 02/10/2025:  Prenatal Labs:  Lab Results   Component Value Date    HGB 10.7 (L) 2024    RUBELLAABIGG 1.70 10/09/2024    HEPBSAG Negative 10/09/2024    ABO O 10/09/2024    RH Positive 10/09/2024    ABSCRN Negative 10/09/2024    [id number] Non Reactive 10/09/2024    HEPCVIRUSABY 0.2 2022    STREPGPB Positive (A) 2022    URINECX Final report (A) 2025       Admission on 2025, Discharged on 2025   Component Date Value    Color 2025 Yellow     Clarity, UA 2025 Clear     Glucose, UA 2025 Negative     Bilirubin 2025 Negative     Ketones, UA 2025 Negative     Specific Gravity  2025 1.010      Blood, UA 02/07/2025 Negative     pH, Urine 02/07/2025 7.5     Protein, POC 02/07/2025 Trace (A)     Urobilinogen, UA 02/07/2025 Normal     Leukocytes 02/07/2025 Negative     Nitrite, UA 02/07/2025 Negative    Admission on 02/02/2025, Discharged on 02/02/2025   Component Date Value    Color 02/02/2025 Yellow     Clarity, UA 02/02/2025 Clear     Glucose, UA 02/02/2025 Negative     Bilirubin 02/02/2025 Negative     Ketones, UA 02/02/2025 Negative     Specific Gravity  02/02/2025 1.020     Blood, UA 02/02/2025 Negative     pH, Urine 02/02/2025 6.5     Protein, POC 02/02/2025 Negative     Urobilinogen, UA 02/02/2025 Normal     Leukocytes 02/02/2025 Negative     Nitrite, UA 02/02/2025 Negative    Routine Prenatal on 01/21/2025   Component Date Value    Atopobium Vaginae 01/21/2025 Low - 0     BVAB 2 01/21/2025 Low - 0     Megasphaera 1 01/21/2025 High - 2 (A)     Bree Albicans, PAUL 01/21/2025 Negative     Bree Glabrata, PAUL 01/21/2025 Negative     Trichomonas vaginosis 01/21/2025 Negative     Chlamydia trachomatis, N* 01/21/2025 Negative     Neisseria gonorrhoeae, N* 01/21/2025 Negative     Specific Gravity, UA 01/21/2025 1.023     pH, UA 01/21/2025 6.0     Color, UA 01/21/2025 Yellow     Appearance, UA 01/21/2025 Cloudy (A)     Leukocytes, UA 01/21/2025 See below: (A)     Protein 01/21/2025 Trace (A)     Glucose, UA 01/21/2025 See below: (A)     Ketones 01/21/2025 Negative     Blood, UA 01/21/2025 Negative     Bilirubin, UA 01/21/2025 Negative     Urobilinogen, UA 01/21/2025 Comment     Nitrite, UA 01/21/2025 Negative     Urine Culture 01/21/2025 Final report (A)     Result 1 01/21/2025 Comment (A)     WBC, UA 01/21/2025 21-50 (A)     RBC, UA 01/21/2025 0-2     Epithelial Cells (non re* 01/21/2025 7-12 (A)     Cast Type 01/21/2025 Comment     Bacteria, UA 01/21/2025 2+ (A)      Imaging:  US Renal Bilateral  Narrative: PROCEDURE: US RENAL BILATERAL-     HISTORY: Right flank pain; R10.9-Unspecified abdominal pain,  patient is  24 weeks pregnant.     PROCEDURE: Ultrasound images of the kidneys were obtained.     FINDINGS:  Limited images of the liver parenchyma demonstrate normal   echogenicity.       The right kidney measures 10.78 cm .  It is normal echogenicity.  There  is no hydronephrosis. There is no cortical thinning. No mass identified.     The left kidney measures  10.94 cm.  It is normal echogenicity.  There  is no hydronephrosis. There is no cortical thinning.  No mass  identified.        Impression: Normal renal ultrasound.              This report was signed and finalized on 2025 3:39 PM by Ginger Juan MD.        Medical Records:  Nursing Note by Sarah Pérez RN (2025 20:55)   POC Urinalysis Dipstick (2025 23:23)  POC Urinalysis Dipstick (2025 18:22)  Assessment and Plan:  Problem List Items Addressed This Visit    None  Visit Diagnoses       Encounter for supervision of high risk pregnancy in second trimester, antepartum    -  Primary  Topics discussed:      section risks, benefits  GERD management  iron supplementation  kick counts and fetal movement  PIH precautions   labor signs and symptoms  tubal risks, benefits  Labs today as noted.  Patient to call for her results.    Relevant Orders    Gestational Screen 1 Hr ([facility])    CBC & Differential    Prenatal care in second trimester        Nausea and vomiting, unspecified vomiting type      Patient to continue her Zofran 8 mg ODT.  CMP today as well.  Patient to call for her results.  Patient to increase p.o. fluids.    Relevant Orders    Comprehensive Metabolic Panel    Diarrhea, unspecified type      I have discussed with the patient she may use Imodium if needed.  Will obtain CMP today.  Patient to increase her p.o. fluids as well.  Instructions and precautions have been given.    Relevant Orders    Comprehensive Metabolic Panel    Gastroesophageal reflux disease without esophagitis      Prescription given for  Protonix 40 mg daily.  Patient to call if no improvement and/or changes in her symptoms.    Relevant Medications    pantoprazole (PROTONIX) 40 MG EC tablet    Low back pain during pregnancy, second trimester      Patient instructed to continue with the use of her Tylenol as well as Flexeril as needed.   labor precautions and instructions have been given.          Follow Up/Instructions:  Follow up as scheduled.  Patient was given instructions and counseling regarding her condition or for health maintenance advice. Please see specific information pulled into the AVS if appropriate.     Note: Speech recognition transcription software may have been used to dictate portions of this document.  An attempt at proofreading has been made though minor errors in transcription may still be present.    This note was electronically signed.  Suni Oquendo M.D.

## 2025-02-11 LAB
ALBUMIN SERPL-MCNC: 3.4 G/DL (ref 3.5–5.2)
ALBUMIN/GLOB SERPL: 1.1 G/DL
ALP SERPL-CCNC: 75 U/L (ref 39–117)
ALT SERPL-CCNC: 7 U/L (ref 1–33)
AST SERPL-CCNC: 12 U/L (ref 1–32)
BASOPHILS # BLD AUTO: 0.04 10*3/MM3 (ref 0–0.2)
BASOPHILS NFR BLD AUTO: 0.4 % (ref 0–1.5)
BILIRUB SERPL-MCNC: <0.2 MG/DL (ref 0–1.2)
BUN SERPL-MCNC: 5 MG/DL (ref 6–20)
BUN/CREAT SERPL: 7.2 (ref 7–25)
CALCIUM SERPL-MCNC: 8.7 MG/DL (ref 8.6–10.5)
CHLORIDE SERPL-SCNC: 104 MMOL/L (ref 98–107)
CO2 SERPL-SCNC: 24.6 MMOL/L (ref 22–29)
CREAT SERPL-MCNC: 0.69 MG/DL (ref 0.57–1)
EGFRCR SERPLBLD CKD-EPI 2021: 125.2 ML/MIN/1.73
EOSINOPHIL # BLD AUTO: 0.09 10*3/MM3 (ref 0–0.4)
EOSINOPHIL NFR BLD AUTO: 0.9 % (ref 0.3–6.2)
ERYTHROCYTE [DISTWIDTH] IN BLOOD BY AUTOMATED COUNT: 12 % (ref 12.3–15.4)
GLOBULIN SER CALC-MCNC: 3.2 GM/DL
GLUCOSE 1H P 50 G GLC PO SERPL-MCNC: 118 MG/DL (ref 65–139)
GLUCOSE SERPL-MCNC: 118 MG/DL (ref 65–99)
HCT VFR BLD AUTO: 30.9 % (ref 34–46.6)
HGB BLD-MCNC: 9.7 G/DL (ref 12–15.9)
IMM GRANULOCYTES # BLD AUTO: 0.08 10*3/MM3 (ref 0–0.05)
IMM GRANULOCYTES NFR BLD AUTO: 0.8 % (ref 0–0.5)
LYMPHOCYTES # BLD AUTO: 1.88 10*3/MM3 (ref 0.7–3.1)
LYMPHOCYTES NFR BLD AUTO: 19.3 % (ref 19.6–45.3)
MCH RBC QN AUTO: 27.5 PG (ref 26.6–33)
MCHC RBC AUTO-ENTMCNC: 31.4 G/DL (ref 31.5–35.7)
MCV RBC AUTO: 87.5 FL (ref 79–97)
MONOCYTES # BLD AUTO: 0.64 10*3/MM3 (ref 0.1–0.9)
MONOCYTES NFR BLD AUTO: 6.6 % (ref 5–12)
NEUTROPHILS # BLD AUTO: 6.99 10*3/MM3 (ref 1.7–7)
NEUTROPHILS NFR BLD AUTO: 72 % (ref 42.7–76)
NRBC BLD AUTO-RTO: 0 /100 WBC (ref 0–0.2)
PLATELET # BLD AUTO: 294 10*3/MM3 (ref 140–450)
POTASSIUM SERPL-SCNC: 4 MMOL/L (ref 3.5–5.2)
PROT SERPL-MCNC: 6.6 G/DL (ref 6–8.5)
RBC # BLD AUTO: 3.53 10*6/MM3 (ref 3.77–5.28)
SODIUM SERPL-SCNC: 141 MMOL/L (ref 136–145)
WBC # BLD AUTO: 9.72 10*3/MM3 (ref 3.4–10.8)

## 2025-02-24 ENCOUNTER — ROUTINE PRENATAL (OUTPATIENT)
Dept: OBSTETRICS AND GYNECOLOGY | Facility: CLINIC | Age: 25
End: 2025-02-24
Payer: COMMERCIAL

## 2025-02-24 VITALS — BODY MASS INDEX: 26.79 KG/M2 | DIASTOLIC BLOOD PRESSURE: 70 MMHG | SYSTOLIC BLOOD PRESSURE: 114 MMHG | WEIGHT: 166 LBS

## 2025-02-24 DIAGNOSIS — N94.89 VAGINAL BURNING: Primary | ICD-10-CM

## 2025-02-24 DIAGNOSIS — Z34.82 ENCOUNTER FOR SUPERVISION OF OTHER NORMAL PREGNANCY IN SECOND TRIMESTER: ICD-10-CM

## 2025-02-24 RX ORDER — FERROUS SULFATE 324(65)MG
324 TABLET, DELAYED RELEASE (ENTERIC COATED) ORAL 2 TIMES DAILY WITH MEALS
Qty: 60 TABLET | Refills: 6 | Status: SHIPPED | OUTPATIENT
Start: 2025-02-24

## 2025-02-24 NOTE — PROGRESS NOTES
Chief Complaint   Patient presents with    Routine Prenatal Visit     No Complaints/concerns        HPI: Romy is a  currently at 27w6d here for prenatal visit who reports the following:  Baby is active. She has noticed a yellow green vaginal discharge with burning. Diarrhea has stopped.                EXAM:     Vitals:    25 1421   BP: 114/70      Abdomen:   See prenatal flowsheet as noted and reviewed, soft, nontender   Pelvic:  See prenatal flowsheet as noted and reviewed   Urine:  See prenatal flowsheet as noted and reviewed    Lab Results   Component Value Date    ABO O 10/09/2024    RH Positive 10/09/2024    ABSCRN Negative 10/09/2024       MDM:  Impression: Previous C/S with planned repeat C/S  Vaginal discharge with burning   Tests done today: NuSwab   Topics discussed: kick counts and fetal movement   labor signs and symptoms  Sign tubal papers  Reviewed OB labs   Tests next visit: none                RTO:                        2 weeks    This note was electronically signed.  Karly Duarte, JASPAL  2025

## 2025-02-26 LAB
A VAGINAE DNA VAG QL NAA+PROBE: ABNORMAL SCORE
BVAB2 DNA VAG QL NAA+PROBE: ABNORMAL SCORE
C ALBICANS DNA VAG QL NAA+PROBE: NEGATIVE
C GLABRATA DNA VAG QL NAA+PROBE: NEGATIVE
C TRACH DNA SPEC QL NAA+PROBE: NEGATIVE
MEGA1 DNA VAG QL NAA+PROBE: ABNORMAL SCORE
N GONORRHOEA DNA VAG QL NAA+PROBE: NEGATIVE
T VAGINALIS DNA VAG QL NAA+PROBE: NEGATIVE

## 2025-02-27 RX ORDER — METRONIDAZOLE 500 MG/1
500 TABLET ORAL 2 TIMES DAILY
Qty: 14 TABLET | Refills: 0 | Status: SHIPPED | OUTPATIENT
Start: 2025-02-27 | End: 2025-03-06

## 2025-03-10 ENCOUNTER — ROUTINE PRENATAL (OUTPATIENT)
Dept: OBSTETRICS AND GYNECOLOGY | Facility: CLINIC | Age: 25
End: 2025-03-10
Payer: COMMERCIAL

## 2025-03-10 VITALS — WEIGHT: 167.2 LBS | BODY MASS INDEX: 26.99 KG/M2 | DIASTOLIC BLOOD PRESSURE: 60 MMHG | SYSTOLIC BLOOD PRESSURE: 102 MMHG

## 2025-03-10 DIAGNOSIS — O99.019 IRON DEFICIENCY ANEMIA DURING PREGNANCY: ICD-10-CM

## 2025-03-10 DIAGNOSIS — R10.33 UMBILICAL PAIN: ICD-10-CM

## 2025-03-10 DIAGNOSIS — K21.9 GASTROESOPHAGEAL REFLUX DISEASE WITHOUT ESOPHAGITIS: ICD-10-CM

## 2025-03-10 DIAGNOSIS — D50.9 IRON DEFICIENCY ANEMIA DURING PREGNANCY: ICD-10-CM

## 2025-03-10 DIAGNOSIS — R11.2 NAUSEA AND VOMITING, UNSPECIFIED VOMITING TYPE: ICD-10-CM

## 2025-03-10 DIAGNOSIS — Z34.83 ENCOUNTER FOR SUPERVISION OF OTHER NORMAL PREGNANCY, THIRD TRIMESTER: Primary | ICD-10-CM

## 2025-03-10 RX ORDER — FAMOTIDINE 20 MG/1
20 TABLET, FILM COATED ORAL 2 TIMES DAILY PRN
Qty: 60 TABLET | Refills: 3 | Status: SHIPPED | OUTPATIENT
Start: 2025-03-10 | End: 2026-03-10

## 2025-03-10 RX ORDER — ONDANSETRON 8 MG/1
8 TABLET, ORALLY DISINTEGRATING ORAL EVERY 8 HOURS PRN
Qty: 30 TABLET | Refills: 0 | Status: SHIPPED | OUTPATIENT
Start: 2025-03-10

## 2025-03-10 NOTE — PROGRESS NOTES
Chief Complaint  Routine Prenatal Visit (C/o some pain in certain areas when baby kicks)    History of Present Illness:  Romy is a  currently at 29w6d who presents today with complaints of umbilical pain and left upper quadrant pain.  Patient reports having pain around her incision sites with fetal movement.  She reports several days ago noticing a nodule at her umbilicus for 3 days.  She has had significant nausea as well as continued emesis intermittently.  She has been taking her Zofran.  She continues to have severe reflux.  She has been taking her prenatal vitamins and iron supplements.    Exam:  Vitals:  See prenatal flowsheet as noted and reviewed  General: Alert, cooperative, and does not appear in any distress  Abdomen:   See prenatal flowsheet as noted and reviewed    Uterus gravid, non-tender; no palpable masses    No guarding or rebound tenderness    +tenderness umbilical incision site  Pelvic:  See prenatal flowsheet as noted and reviewed  Ext:  See prenatal flowsheet as noted and reviewed    Moves extremities well, no cyanosis and no redness  Urine:  See prenatal flowsheet as noted and reviewed    Data Review:  The following data was reviewed by: Suni Oquendo MD on 03/10/2025:  Prenatal Labs:  Lab Results   Component Value Date    HGB 9.7 (L) 02/10/2025    RUBELLAABIGG 1.70 10/09/2024    HEPBSAG Negative 10/09/2024    ABO O 10/09/2024    RH Positive 10/09/2024    ABSCRN Negative 10/09/2024    [id number] Non Reactive 10/09/2024    HEPCVIRUSABY 0.2 2022    STREPGPB Positive (A) 2022    URINECX Final report (A) 2025       Routine Prenatal on 2025   Component Date Value    Atopobium Vaginae 2025 High - 2 (A)     BVAB 2 2025 Low - 0     Megasphaera 1 2025 High - 2 (A)     Bree Albicans, PAUL 2025 Negative     Bree Glabrata, PAUL 2025 Negative     Trichomonas vaginosis 2025 Negative     Chlamydia trachomatis, N* 2025 Negative      Neisseria gonorrhoeae, N* 2025 Negative      Imaging:  US Renal Bilateral  Narrative: PROCEDURE: US RENAL BILATERAL-     HISTORY: Right flank pain; R10.9-Unspecified abdominal pain, patient is  24 weeks pregnant.     PROCEDURE: Ultrasound images of the kidneys were obtained.     FINDINGS:  Limited images of the liver parenchyma demonstrate normal   echogenicity.       The right kidney measures 10.78 cm .  It is normal echogenicity.  There  is no hydronephrosis. There is no cortical thinning. No mass identified.     The left kidney measures  10.94 cm.  It is normal echogenicity.  There  is no hydronephrosis. There is no cortical thinning.  No mass  identified.        Impression: Normal renal ultrasound.              This report was signed and finalized on 2025 3:39 PM by Ginger Juan MD.        Medical Records:  Op Note by Mery Cadet DO (2024 14:23)   Op Note by Mery Cadet DO (2024 09:21)   Op Note by Mery Cadet DO (2023 14:56)   Assessment and Plan:  Problem List Items Addressed This Visit    None  Visit Diagnoses         Encounter for supervision of other normal pregnancy, third trimester    -  Primary  Topics discussed:     GERD management  iron supplementation  kick counts and fetal movement  PIH precautions   labor signs and symptoms  Scan for growth next visit    Relevant Orders    US Ob Follow Up Transabdominal Approach      Nausea and vomiting, unspecified vomiting type      Prescription given for Zofran as noted.  Patient to call if worsening and/or changes in her symptoms.    Relevant Medications    ondansetron ODT (ZOFRAN-ODT) 8 MG disintegrating tablet      Iron deficiency anemia during pregnancy      Patient to continue her iron supplements as previously given.      Gastroesophageal reflux disease without esophagitis      Patient to continue the use of her Protonix 40 mg daily.  She may use Pepcid in the interim as needed.  Prescription given  as noted.    Relevant Medications    famotidine (Pepcid) 20 MG tablet      Umbilical pain      Patient with umbilical pain and concerned regarding possibility of recurrent hernia.  Will refer to Dr. Cadet is noted.  Instructions and precautions have been given.  Patient to follow-up as discussed.    Relevant Orders    Ambulatory Referral to General Surgery          Follow Up/Instructions:  Follow up as scheduled.  Patient was given instructions and counseling regarding her condition or for health maintenance advice. Please see specific information pulled into the AVS if appropriate.     Note: Speech recognition transcription software may have been used to dictate portions of this document.  An attempt at proofreading has been made though minor errors in transcription may still be present.    This note was electronically signed.  Suni Oquendo M.D.

## 2025-03-12 ENCOUNTER — PATIENT MESSAGE (OUTPATIENT)
Dept: OBSTETRICS AND GYNECOLOGY | Facility: CLINIC | Age: 25
End: 2025-03-12
Payer: COMMERCIAL

## 2025-03-13 NOTE — PROGRESS NOTES
Patient: Romy Langley    YOB: 2000    Date: 03/14/2025    Primary Care Provider: Nadya Hidalgo MD    Chief Complaint   Patient presents with    Hernia     umbilical       SUBJECTIVE: The patient is currently at 30w6d pregnant who presents today with complaints of umbilical pain and worsening reflux. She states that her DrStacia Jere has increased her protonix and she is taking Tums daily, but still has severe heartburn. EGD from 8/23/24 demonstrated mild chronic gastritis and mild esophagitis. Patient has been seen by GI previously for chronic constipation and has undergone multiple colonoscopies. She states that she is finally having normal bowel movements every to every other day. She also thinks that her umbilical hernia might be back. She is having localized pain around her periumbilical incision and previous hernia repair site. Patient is status post primary umbilical hernia repair 1/19/23 and with re-exploration of the area/scar revision 2/21/24. She states she has noticed a new nodule in this area over the past few weeks and it is painful.       The following portions of the patient's history were reviewed and updated as appropriate: allergies, current medications, past family history, past medical history, past social history, past surgical history and problem list.        Review of Systems:  Constitutional:  Negative for chills, fever, and unexpected weight change.  HENT: Negative for trouble swallowing and voice change.  Eyes:  Negative for visual disturbance.  Respiratory:  Negative for apnea, cough, chest tightness, shortness of breath, and wheezing.  Cardiovascular:  Negative for chest pain, palpitations, and leg swelling.  Gastrointestinal: Positive for abdominal pain and reflux.  Musculoskeletal:  Negative for back pain, gait problem, and joint swelling.  Skin:  Negative for color change, rash, and wound  Neurological:  Negative for dizziness, syncope, speech difficulty,  weakness, numbness, and headaches.  Hematological:  Negative for adenopathy.  Does not bruise/bleed easily.  Psychiatric/Behavioral:  Negative for confusion.  The patient is not nervous/anxious.    Allergies:  Allergies   Allergen Reactions    Latex Rash       Medications:    Current Outpatient Medications:     cyclobenzaprine (FLEXERIL) 10 MG tablet, Take 1 tablet by mouth Every 8 (Eight) Hours As Needed for Muscle Spasms., Disp: 30 tablet, Rfl: 0    doxylamine (UNISOM) 25 MG tablet, Take 1 tablet by mouth Every Night., Disp: 30 tablet, Rfl: 5    famotidine (Pepcid) 20 MG tablet, Take 1 tablet by mouth 2 (Two) Times a Day As Needed for Indigestion or Heartburn., Disp: 60 tablet, Rfl: 3    ferrous sulfate 324 (65 Fe) MG tablet delayed-release EC tablet, Take 1 tablet by mouth 2 (Two) Times a Day With Meals., Disp: 60 tablet, Rfl: 6    ondansetron ODT (ZOFRAN-ODT) 8 MG disintegrating tablet, Take 1 tablet by mouth Every 8 (Eight) Hours As Needed for Nausea or Vomiting., Disp: 30 tablet, Rfl: 0    pantoprazole (PROTONIX) 40 MG EC tablet, Take 1 tablet by mouth Daily., Disp: 30 tablet, Rfl: 6    promethazine (PHENERGAN) 12.5 MG tablet, Take 1 tablet by mouth Every 8 (Eight) Hours As Needed for Nausea or Vomiting., Disp: 20 tablet, Rfl: 0    vitamin B-6 (PYRIDOXINE) 25 MG tablet, Take 1 tablet by mouth Every Night., Disp: 30 tablet, Rfl: 5    History:  Past Medical History:   Diagnosis Date    Anemia     Glaucoma     left eye    Irritable bowel syndrome     Lactose intolerance     Seasonal allergies     Slow to wake up after anesthesia     Thyroid activity decreased     Umbilical hernia     Urinary tract infection     Wears glasses        Past Surgical History:   Procedure Laterality Date     SECTION N/A 2022    Procedure:  SECTION PRIMARY;  Surgeon: Lukas Rodriguez MD;  Location: [facility];  Service: Obstetrics/Gynecology;  Laterality: N/A;    CHOLECYSTECTOMY N/A 2024    Procedure:  "CHOLECYSTECTOMY LAPAROSCOPIC WITH DAVINCI ROBOT;  Surgeon: Mery Cadet DO;  Location: [facility] OR;  Service: Robotics - DaVinci;  Laterality: N/A;    COLONOSCOPY      FOOT/TOE TENDON REPAIR Left 01/30/2020    Procedure: RECONSTRUCTION PT TENDON LEFT;  Surgeon: Uziel Garcia DPM;  Location: [facility] OR;  Service: Podiatry    TARSAL TUNNEL RELEASE Left 01/30/2020    Procedure: EXCISION TARSAL BONE LEFT;  Surgeon: Uziel Garcia DPM;  Location: [facility] OR;  Service: Podiatry    UMBILICAL HERNIA REPAIR N/A 01/19/2023    Procedure: OPEN UMBILICAL HERNIA REPAIR;  Surgeon: Mery Cadet DO;  Location: [facility] OR;  Service: General;  Laterality: N/A;    UMBILICAL HERNIA REPAIR N/A 2/21/2024    Procedure: UMBILICAL HERNIA REVISION;  Surgeon: Mery Cadet DO;  Location: [facility] OR;  Service: General;  Laterality: N/A;    WISDOM TOOTH EXTRACTION         Family History   Problem Relation Age of Onset    Hypertension Mother     Diabetes Father     Heart attack Maternal Grandfather     Diabetes Maternal Grandfather        Social History     Tobacco Use    Smoking status: Never     Passive exposure: Never    Smokeless tobacco: Never   Vaping Use    Vaping status: Never Used   Substance Use Topics    Alcohol use: Never    Drug use: Never        OBJECTIVE:    Vital Signs:   Vitals:    03/17/25 1357   BP: 130/78   Pulse: 111   Resp: 18   Temp: 97.5 °F (36.4 °C)   TempSrc: Temporal   SpO2: 97%   Weight: 77.7 kg (171 lb 6.4 oz)   Height: 167.6 cm (66\")     Physical Exam:     General Appearance:    Alert, cooperative, in no acute distress   Head:    Normocephalic, without obvious abnormality, atraumatic   Eyes:            Lids and lashes normal, conjunctivae and sclerae normal, no   icterus, no pallor, corneas clear, PERRLA   Throat:   No oral lesions, no thrush, oral mucosa moist   Lungs:     Clear to auscultation,respirations regular, even and                  unlabored    Heart:    Regular rhythm and " normal rate, normal S1 and S2, no            murmur, no gallop, no rub, no click   Abdomen:    Soft, tender to palpation at periumbilical incision, underlying nodule that does not increase with Valsalva.   Extremities:   Moves all extremities well, no edema, no cyanosis, no             redness   Pulses:   Pulses palpable and equal bilaterally   Skin:   No bleeding, bruising or rash   Neurologic:   Cranial nerves 2 - 12 grossly intact, sensation intact, DTR       present and equal bilaterally       Results Review:   I reviewed the patient's new clinical results.    Review of Systems was reviewed and confirmed as accurate as documented by the MA.    ASSESSMENT/PLAN:    1. Periumbilical abdominal pain    2. 30 weeks gestation of pregnancy    3. Gastroesophageal reflux disease, unspecified whether esophagitis present      The patient was referred to me today for evaluation of a possible recurrent umbilical hernia and worsening acid reflux. We discussed that her pregnancy is likely contributing to this secondary to increased pressures within the abdomen. Patient is currently taking Protonix, Pepcid, and Tums. We discussed dietary and lifestyle modifications to help reduce symptoms. Patient states that all she drinks are caffeine/carbonated beverages. I discussed that this can cause relaxation of the lower esophageal sphincter and increased pressures within the stomach and will definitely worsen her reflux. I advised avoiding these and drinking only water if possible. She should also avoid acidic foods, tomatoes, citrus, chocolate, peppermint. Patient should avoid tight fitting clothes and should not eat anything within 4 hours of going to sleep at night. She expresses understanding and will try. Prior to her pregnancy, we were going to proceed with a gastric emptying study for ongoing bloating, nausea, and discomfort despite cholecystectomy and a relatively normal EGD. Will recommend proceeding with this after delivery  if she continues to have ongoing symptoms. We did complete an abdominal ultrasound in office today which demonstrated no evidence of recurrent hernia, but likely scar tissue/fat necrosis in this area. At this time, I do not recommend surgical intervention. Symptoms may improve after delivery when she does not have as much pressure in her abdomen. This was discussed with the patient and her mother. They express understanding, are agreeable to the plan, and all of their questions were answered.        Electronically signed by Mery Cadet DO  03/17/25

## 2025-03-17 ENCOUNTER — OFFICE VISIT (OUTPATIENT)
Dept: SURGERY | Facility: CLINIC | Age: 25
End: 2025-03-17
Payer: COMMERCIAL

## 2025-03-17 VITALS
OXYGEN SATURATION: 97 % | WEIGHT: 171.4 LBS | TEMPERATURE: 97.5 F | HEART RATE: 111 BPM | RESPIRATION RATE: 18 BRPM | HEIGHT: 66 IN | DIASTOLIC BLOOD PRESSURE: 78 MMHG | SYSTOLIC BLOOD PRESSURE: 130 MMHG | BODY MASS INDEX: 27.55 KG/M2

## 2025-03-17 DIAGNOSIS — Z3A.30 30 WEEKS GESTATION OF PREGNANCY: ICD-10-CM

## 2025-03-17 DIAGNOSIS — R10.33 PERIUMBILICAL ABDOMINAL PAIN: Primary | ICD-10-CM

## 2025-03-17 DIAGNOSIS — K21.9 GASTROESOPHAGEAL REFLUX DISEASE, UNSPECIFIED WHETHER ESOPHAGITIS PRESENT: ICD-10-CM

## 2025-03-17 PROCEDURE — 1160F RVW MEDS BY RX/DR IN RCRD: CPT | Performed by: STUDENT IN AN ORGANIZED HEALTH CARE EDUCATION/TRAINING PROGRAM

## 2025-03-17 PROCEDURE — 99213 OFFICE O/P EST LOW 20 MIN: CPT | Performed by: STUDENT IN AN ORGANIZED HEALTH CARE EDUCATION/TRAINING PROGRAM

## 2025-03-17 PROCEDURE — 1159F MED LIST DOCD IN RCRD: CPT | Performed by: STUDENT IN AN ORGANIZED HEALTH CARE EDUCATION/TRAINING PROGRAM

## 2025-03-24 ENCOUNTER — ROUTINE PRENATAL (OUTPATIENT)
Dept: OBSTETRICS AND GYNECOLOGY | Facility: CLINIC | Age: 25
End: 2025-03-24
Payer: COMMERCIAL

## 2025-03-24 ENCOUNTER — PREP FOR SURGERY (OUTPATIENT)
Dept: OTHER | Facility: HOSPITAL | Age: 25
End: 2025-03-24
Payer: COMMERCIAL

## 2025-03-24 VITALS — DIASTOLIC BLOOD PRESSURE: 60 MMHG | SYSTOLIC BLOOD PRESSURE: 112 MMHG | BODY MASS INDEX: 28.46 KG/M2 | WEIGHT: 176.3 LBS

## 2025-03-24 DIAGNOSIS — O26.892 LOW BACK PAIN DURING PREGNANCY, SECOND TRIMESTER: ICD-10-CM

## 2025-03-24 DIAGNOSIS — Z98.891 PREVIOUS CESAREAN SECTION: Primary | ICD-10-CM

## 2025-03-24 DIAGNOSIS — M54.50 LOW BACK PAIN DURING PREGNANCY, SECOND TRIMESTER: ICD-10-CM

## 2025-03-24 DIAGNOSIS — Z98.891 PREVIOUS CESAREAN SECTION: ICD-10-CM

## 2025-03-24 DIAGNOSIS — Z30.2 REQUEST FOR STERILIZATION: ICD-10-CM

## 2025-03-24 DIAGNOSIS — Z34.93 PRENATAL CARE, THIRD TRIMESTER: Primary | ICD-10-CM

## 2025-03-24 DIAGNOSIS — R10.33 UMBILICAL PAIN: ICD-10-CM

## 2025-03-24 DIAGNOSIS — O99.019 MATERNAL ANEMIA IN PREGNANCY, ANTEPARTUM: ICD-10-CM

## 2025-03-24 PROBLEM — Z34.90 PREGNANCY: Status: ACTIVE | Noted: 2025-03-24

## 2025-03-24 PROCEDURE — 99214 OFFICE O/P EST MOD 30 MIN: CPT | Performed by: STUDENT IN AN ORGANIZED HEALTH CARE EDUCATION/TRAINING PROGRAM

## 2025-03-24 RX ORDER — FAMOTIDINE 20 MG/1
20 TABLET, FILM COATED ORAL ONCE AS NEEDED
OUTPATIENT
Start: 2025-03-24

## 2025-03-24 RX ORDER — FAMOTIDINE 10 MG/ML
20 INJECTION, SOLUTION INTRAVENOUS ONCE AS NEEDED
OUTPATIENT
Start: 2025-03-24

## 2025-03-24 RX ORDER — MORPHINE SULFATE 2 MG/ML
2 INJECTION, SOLUTION INTRAMUSCULAR; INTRAVENOUS
OUTPATIENT
Start: 2025-03-24

## 2025-03-24 RX ORDER — MISOPROSTOL 200 UG/1
800 TABLET ORAL AS NEEDED
OUTPATIENT
Start: 2025-03-24

## 2025-03-24 RX ORDER — KETOROLAC TROMETHAMINE 30 MG/ML
30 INJECTION, SOLUTION INTRAMUSCULAR; INTRAVENOUS ONCE
OUTPATIENT
Start: 2025-03-24 | End: 2025-03-24

## 2025-03-24 RX ORDER — DIPHENHYDRAMINE HCL 25 MG
25 CAPSULE ORAL NIGHTLY PRN
OUTPATIENT
Start: 2025-03-24

## 2025-03-24 RX ORDER — METHYLERGONOVINE MALEATE 0.2 MG/ML
200 INJECTION INTRAVENOUS ONCE AS NEEDED
OUTPATIENT
Start: 2025-03-24

## 2025-03-24 RX ORDER — DIPHENHYDRAMINE HYDROCHLORIDE 50 MG/ML
25 INJECTION, SOLUTION INTRAMUSCULAR; INTRAVENOUS NIGHTLY PRN
OUTPATIENT
Start: 2025-03-24

## 2025-03-24 RX ORDER — CYCLOBENZAPRINE HCL 10 MG
10 TABLET ORAL EVERY 8 HOURS PRN
Qty: 30 TABLET | Refills: 1 | Status: SHIPPED | OUTPATIENT
Start: 2025-03-24

## 2025-03-24 RX ORDER — OXYTOCIN/0.9 % SODIUM CHLORIDE 30/500 ML
333 PLASTIC BAG, INJECTION (ML) INTRAVENOUS ONCE
OUTPATIENT
Start: 2025-03-24 | End: 2025-03-24

## 2025-03-24 RX ORDER — ACETAMINOPHEN 500 MG
1000 TABLET ORAL ONCE
OUTPATIENT
Start: 2025-03-24 | End: 2025-03-24

## 2025-03-24 RX ORDER — SODIUM CHLORIDE 0.9 % (FLUSH) 0.9 %
10 SYRINGE (ML) INJECTION EVERY 12 HOURS SCHEDULED
OUTPATIENT
Start: 2025-03-24

## 2025-03-24 RX ORDER — ONDANSETRON 4 MG/1
4 TABLET, ORALLY DISINTEGRATING ORAL EVERY 6 HOURS PRN
OUTPATIENT
Start: 2025-03-24

## 2025-03-24 RX ORDER — IBUPROFEN 600 MG/1
600 TABLET, FILM COATED ORAL EVERY 6 HOURS SCHEDULED
OUTPATIENT
Start: 2025-03-24

## 2025-03-24 RX ORDER — CITRIC ACID/SODIUM CITRATE 334-500MG
30 SOLUTION, ORAL ORAL ONCE
OUTPATIENT
Start: 2025-03-24 | End: 2025-03-24

## 2025-03-24 RX ORDER — OXYTOCIN/0.9 % SODIUM CHLORIDE 30/500 ML
42 PLASTIC BAG, INJECTION (ML) INTRAVENOUS AS NEEDED
OUTPATIENT
Start: 2025-03-24 | End: 2025-03-25

## 2025-03-24 RX ORDER — ACETAMINOPHEN 325 MG/1
650 TABLET ORAL EVERY 6 HOURS
OUTPATIENT
Start: 2025-03-24

## 2025-03-24 RX ORDER — TRANEXAMIC ACID 10 MG/ML
1000 INJECTION, SOLUTION INTRAVENOUS ONCE AS NEEDED
OUTPATIENT
Start: 2025-03-24

## 2025-03-24 RX ORDER — PROMETHAZINE HYDROCHLORIDE 12.5 MG/1
12.5 TABLET ORAL EVERY 6 HOURS PRN
OUTPATIENT
Start: 2025-03-24

## 2025-03-24 RX ORDER — SODIUM CHLORIDE 0.9 % (FLUSH) 0.9 %
10 SYRINGE (ML) INJECTION AS NEEDED
OUTPATIENT
Start: 2025-03-24

## 2025-03-24 RX ORDER — SODIUM CHLORIDE, SODIUM LACTATE, POTASSIUM CHLORIDE, CALCIUM CHLORIDE 600; 310; 30; 20 MG/100ML; MG/100ML; MG/100ML; MG/100ML
125 INJECTION, SOLUTION INTRAVENOUS CONTINUOUS
OUTPATIENT
Start: 2025-03-24 | End: 2025-03-26

## 2025-03-24 RX ORDER — SODIUM CHLORIDE 9 MG/ML
40 INJECTION, SOLUTION INTRAVENOUS AS NEEDED
OUTPATIENT
Start: 2025-03-24

## 2025-03-24 RX ORDER — CARBOPROST TROMETHAMINE 250 UG/ML
250 INJECTION, SOLUTION INTRAMUSCULAR AS NEEDED
OUTPATIENT
Start: 2025-03-24

## 2025-03-24 RX ORDER — ONDANSETRON 2 MG/ML
4 INJECTION INTRAMUSCULAR; INTRAVENOUS EVERY 6 HOURS PRN
OUTPATIENT
Start: 2025-03-24

## 2025-03-24 RX ORDER — LIDOCAINE HYDROCHLORIDE 10 MG/ML
0.5 INJECTION, SOLUTION INFILTRATION; PERINEURAL ONCE AS NEEDED
OUTPATIENT
Start: 2025-03-24

## 2025-03-24 RX ORDER — FAMOTIDINE 10 MG/ML
20 INJECTION, SOLUTION INTRAVENOUS ONCE
OUTPATIENT
Start: 2025-03-24 | End: 2025-03-24

## 2025-03-24 NOTE — PROGRESS NOTES
Prenatal Care Visit    Subjective   Chief Complaint   Patient presents with    Pregnancy Ultrasound     Growth scan done today.      History:   Romy is a  currently at 31w6d who presents for a prenatal care visit today.    Reports passing her mucus plug yesterday and some spotting this morning. No further bleeding today. She has had increased lower back pain and pelvic pressure. Denies regular CTX. Reports (+) FM. She is having ongoing umbilical pain - it is not relieved with tylenol but flexeril seems to help.        Objective   /60   Wt 80 kg (176 lb 4.8 oz)   LMP 2024 (Exact Date) Comment: Due date sometime in May per pt.  BMI 28.46 kg/m²   Physical Exam:  Normal, gestational age-appropriate exam today   CVX: fingertip/long/-4, posterior       Assessment & Plan     IUP @ 31w6d  Routine care: I have reviewed the prenatal labs and ultrasound(s) today. I have reviewed the most recent prenatal progress note(s). Growth scan today - EFW 1856g (40%), AC 59%, vertex, ANGEL 15.5 cm.  Prior x 1: desires rCS with BTL - case request placed  Request for sterilization: signed FF 25  Anemia: continue iron supplement BID  H/o umbilical hernia repair w/ subsequent revision: s/p recent eval with Dr. Cadet (Gen surg) with negative US for recurrent hernia.Flexeril refills sent.     Diagnosis Plan   1. Prenatal care, third trimester        2. Previous  section        3. Request for sterilization        4. Maternal anemia in pregnancy, antepartum        5. Umbilical pain  cyclobenzaprine (FLEXERIL) 10 MG tablet      6. Low back pain during pregnancy, second trimester  cyclobenzaprine (FLEXERIL) 10 MG tablet        Medication Management: continue PNV, iron. Flexeril refills sent.    Topics discussed: Prenatal care milestones  Kick counts and fetal movement   labor signs and symptoms   Tests next visit: none   Next visit: 2 week(s)     Tasha Ureña MD  Obstetrics and Gynecology  [facility]  [facility]

## 2025-03-26 ENCOUNTER — HOSPITAL ENCOUNTER (OUTPATIENT)
Facility: HOSPITAL | Age: 25
Discharge: HOME | End: 2025-03-27
Attending: STUDENT IN AN ORGANIZED HEALTH CARE EDUCATION/TRAINING PROGRAM | Admitting: STUDENT IN AN ORGANIZED HEALTH CARE EDUCATION/TRAINING PROGRAM
Payer: COMMERCIAL

## 2025-03-26 ENCOUNTER — PATIENT MESSAGE (OUTPATIENT)
Dept: OBSTETRICS AND GYNECOLOGY | Facility: CLINIC | Age: 25
End: 2025-03-26
Payer: COMMERCIAL

## 2025-03-26 LAB
BACTERIA UR QL AUTO: ABNORMAL /HPF
BILIRUB BLD-MCNC: NEGATIVE MG/DL
BILIRUB UR QL STRIP: NEGATIVE
CLARITY UR: CLEAR
CLARITY, POC: CLEAR
COLOR UR: YELLOW
COLOR UR: YELLOW
GLUCOSE UR STRIP-MCNC: NEGATIVE MG/DL
GLUCOSE UR STRIP-MCNC: NEGATIVE MG/DL
HGB UR QL STRIP.AUTO: NEGATIVE
HYALINE CASTS UR QL AUTO: ABNORMAL /LPF
KETONES UR QL STRIP: NEGATIVE
KETONES UR QL: ABNORMAL
LEUKOCYTE EST, POC: ABNORMAL
LEUKOCYTE ESTERASE UR QL STRIP.AUTO: ABNORMAL
NITRITE UR QL STRIP: NEGATIVE
NITRITE UR-MCNC: NEGATIVE MG/ML
PH UR STRIP.AUTO: 7.5 [PH] (ref 5–8)
PH UR: 7.5 [PH] (ref 5–8)
PROT UR QL STRIP: NEGATIVE
PROT UR STRIP-MCNC: NEGATIVE MG/DL
RBC # UR STRIP: ABNORMAL /HPF
RBC # UR STRIP: NEGATIVE /UL
REF LAB TEST METHOD: ABNORMAL
SP GR UR STRIP: 1.01 (ref 1–1.03)
SP GR UR: 1.01 (ref 1–1.03)
SQUAMOUS #/AREA URNS HPF: ABNORMAL /HPF
UROBILINOGEN UR QL STRIP: ABNORMAL
UROBILINOGEN UR QL: NORMAL
WBC # UR STRIP: ABNORMAL /HPF

## 2025-03-26 PROCEDURE — 87086 URINE CULTURE/COLONY COUNT: CPT | Performed by: STUDENT IN AN ORGANIZED HEALTH CARE EDUCATION/TRAINING PROGRAM

## 2025-03-26 PROCEDURE — 59025 FETAL NON-STRESS TEST: CPT

## 2025-03-26 PROCEDURE — 59025 FETAL NON-STRESS TEST: CPT | Performed by: STUDENT IN AN ORGANIZED HEALTH CARE EDUCATION/TRAINING PROGRAM

## 2025-03-26 PROCEDURE — 81002 URINALYSIS NONAUTO W/O SCOPE: CPT | Performed by: STUDENT IN AN ORGANIZED HEALTH CARE EDUCATION/TRAINING PROGRAM

## 2025-03-26 PROCEDURE — G0463 HOSPITAL OUTPT CLINIC VISIT: HCPCS

## 2025-03-26 PROCEDURE — 96361 HYDRATE IV INFUSION ADD-ON: CPT

## 2025-03-26 PROCEDURE — 81001 URINALYSIS AUTO W/SCOPE: CPT | Performed by: STUDENT IN AN ORGANIZED HEALTH CARE EDUCATION/TRAINING PROGRAM

## 2025-03-26 PROCEDURE — 25810000003 SODIUM CHLORIDE 0.9 % SOLUTION: Performed by: STUDENT IN AN ORGANIZED HEALTH CARE EDUCATION/TRAINING PROGRAM

## 2025-03-26 PROCEDURE — 96360 HYDRATION IV INFUSION INIT: CPT

## 2025-03-26 PROCEDURE — 87147 CULTURE TYPE IMMUNOLOGIC: CPT | Performed by: STUDENT IN AN ORGANIZED HEALTH CARE EDUCATION/TRAINING PROGRAM

## 2025-03-26 RX ORDER — CYCLOBENZAPRINE HCL 10 MG
10 TABLET ORAL ONCE
Status: COMPLETED | OUTPATIENT
Start: 2025-03-26 | End: 2025-03-26

## 2025-03-26 RX ORDER — LIDOCAINE HYDROCHLORIDE 10 MG/ML
0.5 INJECTION, SOLUTION INFILTRATION; PERINEURAL ONCE AS NEEDED
Status: DISCONTINUED | OUTPATIENT
Start: 2025-03-26 | End: 2025-03-27 | Stop reason: HOSPADM

## 2025-03-26 RX ORDER — SODIUM CHLORIDE 9 MG/ML
125 INJECTION, SOLUTION INTRAVENOUS CONTINUOUS
Status: DISCONTINUED | OUTPATIENT
Start: 2025-03-26 | End: 2025-03-27 | Stop reason: HOSPADM

## 2025-03-26 RX ORDER — HYDROXYZINE PAMOATE 25 MG/1
25 CAPSULE ORAL NIGHTLY PRN
Status: DISCONTINUED | OUTPATIENT
Start: 2025-03-26 | End: 2025-03-27 | Stop reason: HOSPADM

## 2025-03-26 RX ORDER — SODIUM CHLORIDE 0.9 % (FLUSH) 0.9 %
10 SYRINGE (ML) INJECTION EVERY 12 HOURS SCHEDULED
Status: DISCONTINUED | OUTPATIENT
Start: 2025-03-26 | End: 2025-03-27 | Stop reason: HOSPADM

## 2025-03-26 RX ORDER — SODIUM CHLORIDE 9 MG/ML
40 INJECTION, SOLUTION INTRAVENOUS AS NEEDED
Status: DISCONTINUED | OUTPATIENT
Start: 2025-03-26 | End: 2025-03-27 | Stop reason: HOSPADM

## 2025-03-26 RX ORDER — SODIUM CHLORIDE 0.9 % (FLUSH) 0.9 %
10 SYRINGE (ML) INJECTION AS NEEDED
Status: DISCONTINUED | OUTPATIENT
Start: 2025-03-26 | End: 2025-03-27 | Stop reason: HOSPADM

## 2025-03-26 RX ORDER — ACETAMINOPHEN 500 MG
1000 TABLET ORAL EVERY 6 HOURS PRN
Status: DISCONTINUED | OUTPATIENT
Start: 2025-03-26 | End: 2025-03-27 | Stop reason: HOSPADM

## 2025-03-26 RX ADMIN — ACETAMINOPHEN 1000 MG: 500 TABLET, FILM COATED ORAL at 18:40

## 2025-03-26 RX ADMIN — CYCLOBENZAPRINE 10 MG: 10 TABLET, FILM COATED ORAL at 15:55

## 2025-03-26 RX ADMIN — SODIUM CHLORIDE 1000 ML: 9 INJECTION, SOLUTION INTRAVENOUS at 16:40

## 2025-03-26 RX ADMIN — HYDROXYZINE PAMOATE 25 MG: 25 CAPSULE ORAL at 21:18

## 2025-03-26 RX ADMIN — SODIUM CHLORIDE 125 ML/HR: 9 INJECTION, SOLUTION INTRAVENOUS at 18:40

## 2025-03-27 ENCOUNTER — TELEPHONE (OUTPATIENT)
Dept: OBSTETRICS AND GYNECOLOGY | Facility: CLINIC | Age: 25
End: 2025-03-27
Payer: COMMERCIAL

## 2025-03-27 VITALS
HEIGHT: 66 IN | BODY MASS INDEX: 27.77 KG/M2 | WEIGHT: 172.8 LBS | SYSTOLIC BLOOD PRESSURE: 99 MMHG | TEMPERATURE: 98.1 F | OXYGEN SATURATION: 98 % | HEART RATE: 75 BPM | RESPIRATION RATE: 16 BRPM | DIASTOLIC BLOOD PRESSURE: 56 MMHG

## 2025-03-27 DIAGNOSIS — M54.50 LOW BACK PAIN DURING PREGNANCY, THIRD TRIMESTER: Primary | ICD-10-CM

## 2025-03-27 DIAGNOSIS — O26.893 LOW BACK PAIN DURING PREGNANCY, THIRD TRIMESTER: Primary | ICD-10-CM

## 2025-03-27 PROCEDURE — 25810000003 SODIUM CHLORIDE 0.9 % SOLUTION: Performed by: STUDENT IN AN ORGANIZED HEALTH CARE EDUCATION/TRAINING PROGRAM

## 2025-03-27 PROCEDURE — 59025 FETAL NON-STRESS TEST: CPT | Performed by: STUDENT IN AN ORGANIZED HEALTH CARE EDUCATION/TRAINING PROGRAM

## 2025-03-27 PROCEDURE — 96361 HYDRATE IV INFUSION ADD-ON: CPT

## 2025-03-27 RX ADMIN — SODIUM CHLORIDE 125 ML/HR: 9 INJECTION, SOLUTION INTRAVENOUS at 02:24

## 2025-03-27 RX ADMIN — ACETAMINOPHEN 1000 MG: 500 TABLET, FILM COATED ORAL at 01:48

## 2025-03-27 NOTE — NURSING NOTE
Per Dr Ureña, pt may be D/C'ed home this morning. She can continue taking Flexeril and Tylenol at home. She should try a heating pad to see if this helps her back pain. She can call Dr Ureña's office for a referral for Physical therapy if she wants to try that for her back pain. She may have a work excuse for the next couple of days if she needs this.

## 2025-03-27 NOTE — NON STRESS TEST
Triage Note - Nursing Documentation  Labor and Delivery Admission Log    Romy Langley  : 2000  MRN: [id number]  CSN: [id number]    Date in / Time in:  3/27/2025  Time in:     Date out / Time out:    Time out: 927    Nurse: Sheila Cristobal RN    Patient Info: She is a 24 y.o. year old  at 32w2d with an DEQUAN of 2025, by Ultrasound who was seen on the [facility].    Chief Complaint:   Chief Complaint   Patient presents with    Back Pain     Back pain and pelvic pain since Monday.       Provider Instructions / Disposition: Patient arrived from home for back and pelvic pain. Orders placed by physician and patient was placed in obs. Contractions slowed throughout nightshift. Discharged home with education on signs and symptoms of labor, fetal kick counts, plan to call the office to get a PT referral per MD order, and to continue taking flexeril and tylenol.     Patient Active Problem List   Diagnosis    Umbilical hernia without obstruction and without gangrene    Iron deficiency anemia due to chronic blood loss    Generalized abdominal pain    Chronic idiopathic constipation    Recurrent umbilical hernia    RUQ abdominal pain    Symptomatic cholelithiasis    Sore throat    GBS bacteriuria    Previous  section    Request for sterilization    Pregnancy       NST Documentation (Only applicable > 32 weeks): Interpretation A  Nonstress Test Interpretation A: Reactive (25 0800 : Sheila Cristobal, RN)

## 2025-03-27 NOTE — TELEPHONE ENCOUNTER
Provider: DR. MEYERS    Caller: Romy Langley    Relationship to Patient: Self    Pharmacy:     Phone Number: [phone]    Reason for Call: REFERRAL FOR PHYSICAL THERAPY -LOWER BACK PAIN    When was the patient last seen: 03.24.25    PATIENT WAS SEEN AT [facility] ON 03.26.25 AND PER NOTE FROM DR. MEYERS PATIENT COULD HAVE REFERRAL FOR PHYSICAL THERAPY -LOWER BACK PAIN    PATIENT CAN BE REACHED .[phone]    THANK YOU

## 2025-03-27 NOTE — NON STRESS TEST
Romy Langley, a  at 32w2d with an DEQUAN of 2025, by Ultrasound, was seen at [facility] test.    Chief Complaint   Patient presents with    Back Pain     Back pain and pelvic pain since Monday.       Patient Active Problem List   Diagnosis    Umbilical hernia without obstruction and without gangrene    Iron deficiency anemia due to chronic blood loss    Generalized abdominal pain    Chronic idiopathic constipation    Recurrent umbilical hernia    RUQ abdominal pain    Symptomatic cholelithiasis    Sore throat    GBS bacteriuria    Previous  section    Request for sterilization    Pregnancy       Start Time:   Stop Time:     Interpretation A  Nonstress Test Interpretation A: Reactive

## 2025-03-28 ENCOUNTER — RESULTS FOLLOW-UP (OUTPATIENT)
Dept: LABOR AND DELIVERY | Facility: HOSPITAL | Age: 25
End: 2025-03-28
Payer: COMMERCIAL

## 2025-03-28 LAB — BACTERIA SPEC AEROBE CULT: ABNORMAL

## 2025-03-28 RX ORDER — AMOXICILLIN 500 MG/1
500 CAPSULE ORAL 3 TIMES DAILY
Qty: 21 CAPSULE | Refills: 0 | Status: SHIPPED | OUTPATIENT
Start: 2025-03-28

## 2025-03-28 NOTE — TELEPHONE ENCOUNTER
Notified Romy of urine culture results. (+) GBS. Amoxicillin 500mg TID sent to pharmacy. She does report ongoing back/ pelvic pain. She is using tylenol, flexeril, and heating pad and resting. Advised that if symptoms worsen or she has any new concerns over the weekend to present to . All questions answered.    Tasha Ureña MD   Obstetrics and Gynecology  [facility]

## 2025-03-31 ENCOUNTER — TELEPHONE (OUTPATIENT)
Dept: PHYSICAL THERAPY | Facility: OTHER | Age: 25
End: 2025-03-31
Payer: COMMERCIAL

## 2025-03-31 NOTE — TELEPHONE ENCOUNTER
Caller: Romy Langley    Relationship: Self    Best call back number: [phone]    What is the best time to reach you: ANY    What was the call regarding: RETURNING [facility] CALL

## 2025-04-07 ENCOUNTER — ROUTINE PRENATAL (OUTPATIENT)
Dept: OBSTETRICS AND GYNECOLOGY | Facility: CLINIC | Age: 25
End: 2025-04-07
Payer: COMMERCIAL

## 2025-04-07 VITALS — BODY MASS INDEX: 28.7 KG/M2 | SYSTOLIC BLOOD PRESSURE: 112 MMHG | DIASTOLIC BLOOD PRESSURE: 72 MMHG | WEIGHT: 177.8 LBS

## 2025-04-07 DIAGNOSIS — Z34.83 PRENATAL CARE, SUBSEQUENT PREGNANCY IN THIRD TRIMESTER: Primary | ICD-10-CM

## 2025-04-07 DIAGNOSIS — Z30.2 REQUEST FOR STERILIZATION: ICD-10-CM

## 2025-04-07 DIAGNOSIS — Z98.891 PREVIOUS CESAREAN SECTION: ICD-10-CM

## 2025-04-07 NOTE — PROGRESS NOTES
Prenatal Care Visit    Subjective   Chief Complaint   Patient presents with    Routine Prenatal Visit     No issues or concerns        History:   Romy is a  currently at 33w6d who presents for a prenatal care visit today.    No major issues.    Social History    Tobacco Use      Smoking status: Never        Passive exposure: Never      Smokeless tobacco: Never       Objective   /72   Wt 80.6 kg (177 lb 12.8 oz)   LMP 2024 (Exact Date) Comment: Due date sometime in May per pt.  BMI 28.70 kg/m²   Physical Exam:  Normal, gestational age-appropriate exam today        Plan   Medical Decision Making:    I have reviewed the prenatal labs and ultrasound(s) today. I have reviewed the most recent prenatal progress note(s).    Diagnosis: Supervision of high risk pregnancy  Previous C/S with planned repeat C/S  GBS bacteriuria  Desires permanent sterilization   Tests/Orders/Rx today: No orders of the defined types were placed in this encounter.      Medication Management: None     Topics discussed: Prenatal care milestones   section risks, benefits  kick counts and fetal movement  PIH precautions   labor signs and symptoms  tubal risks, benefits   Tests next visit: GBS testing   Next visit: 2 week(s)     Parvez Galindo MD  Obstetrics and Gynecology  [facility]

## 2025-04-16 ENCOUNTER — PATIENT MESSAGE (OUTPATIENT)
Dept: OBSTETRICS AND GYNECOLOGY | Facility: CLINIC | Age: 25
End: 2025-04-16
Payer: COMMERCIAL

## 2025-04-16 ENCOUNTER — HOSPITAL ENCOUNTER (OUTPATIENT)
Facility: HOSPITAL | Age: 25
Discharge: HOME | End: 2025-04-16
Attending: OBSTETRICS & GYNECOLOGY | Admitting: OBSTETRICS & GYNECOLOGY
Payer: COMMERCIAL

## 2025-04-16 VITALS
SYSTOLIC BLOOD PRESSURE: 106 MMHG | OXYGEN SATURATION: 99 % | RESPIRATION RATE: 16 BRPM | HEART RATE: 107 BPM | DIASTOLIC BLOOD PRESSURE: 70 MMHG | TEMPERATURE: 97.9 F

## 2025-04-16 LAB
BACTERIA UR QL AUTO: ABNORMAL /HPF
BILIRUB BLD-MCNC: NEGATIVE MG/DL
BILIRUB UR QL STRIP: NEGATIVE
CLARITY UR: ABNORMAL
CLARITY, POC: CLEAR
COLOR UR: ABNORMAL
COLOR UR: YELLOW
GLUCOSE UR STRIP-MCNC: NEGATIVE MG/DL
GLUCOSE UR STRIP-MCNC: NEGATIVE MG/DL
HGB UR QL STRIP.AUTO: NEGATIVE
HYALINE CASTS UR QL AUTO: ABNORMAL /LPF
KETONES UR QL STRIP: NEGATIVE
KETONES UR QL: NEGATIVE
LEUKOCYTE EST, POC: ABNORMAL
LEUKOCYTE ESTERASE UR QL STRIP.AUTO: ABNORMAL
NITRITE UR QL STRIP: NEGATIVE
NITRITE UR-MCNC: NEGATIVE MG/ML
PH UR STRIP.AUTO: 6.5 [PH] (ref 5–8)
PH UR: 6.5 [PH] (ref 5–8)
PROT UR QL STRIP: NEGATIVE
PROT UR STRIP-MCNC: ABNORMAL MG/DL
RBC # UR STRIP: ABNORMAL /HPF
RBC # UR STRIP: NEGATIVE /UL
REF LAB TEST METHOD: ABNORMAL
SP GR UR STRIP: 1.01 (ref 1–1.03)
SP GR UR: 1.01 (ref 1–1.03)
SQUAMOUS #/AREA URNS HPF: ABNORMAL /HPF
TRANS CELLS #/AREA URNS HPF: ABNORMAL /HPF
UROBILINOGEN UR QL STRIP: ABNORMAL
UROBILINOGEN UR QL: NORMAL
WBC # UR STRIP: ABNORMAL /HPF

## 2025-04-16 PROCEDURE — 87086 URINE CULTURE/COLONY COUNT: CPT | Performed by: OBSTETRICS & GYNECOLOGY

## 2025-04-16 PROCEDURE — G0463 HOSPITAL OUTPT CLINIC VISIT: HCPCS

## 2025-04-16 PROCEDURE — 59025 FETAL NON-STRESS TEST: CPT

## 2025-04-16 PROCEDURE — 59025 FETAL NON-STRESS TEST: CPT | Performed by: OBSTETRICS & GYNECOLOGY

## 2025-04-16 PROCEDURE — 87147 CULTURE TYPE IMMUNOLOGIC: CPT | Performed by: OBSTETRICS & GYNECOLOGY

## 2025-04-16 PROCEDURE — 81002 URINALYSIS NONAUTO W/O SCOPE: CPT | Performed by: OBSTETRICS & GYNECOLOGY

## 2025-04-16 PROCEDURE — 81001 URINALYSIS AUTO W/SCOPE: CPT | Performed by: OBSTETRICS & GYNECOLOGY

## 2025-04-16 RX ORDER — SODIUM CHLORIDE 9 MG/ML
40 INJECTION, SOLUTION INTRAVENOUS AS NEEDED
Status: DISCONTINUED | OUTPATIENT
Start: 2025-04-16 | End: 2025-04-16 | Stop reason: HOSPADM

## 2025-04-16 RX ORDER — SODIUM CHLORIDE 0.9 % (FLUSH) 0.9 %
10 SYRINGE (ML) INJECTION AS NEEDED
Status: DISCONTINUED | OUTPATIENT
Start: 2025-04-16 | End: 2025-04-16 | Stop reason: HOSPADM

## 2025-04-16 RX ORDER — LIDOCAINE HYDROCHLORIDE 10 MG/ML
0.5 INJECTION, SOLUTION INFILTRATION; PERINEURAL ONCE AS NEEDED
Status: DISCONTINUED | OUTPATIENT
Start: 2025-04-16 | End: 2025-04-16 | Stop reason: HOSPADM

## 2025-04-16 RX ORDER — SODIUM CHLORIDE 0.9 % (FLUSH) 0.9 %
10 SYRINGE (ML) INJECTION EVERY 12 HOURS SCHEDULED
Status: DISCONTINUED | OUTPATIENT
Start: 2025-04-16 | End: 2025-04-16 | Stop reason: HOSPADM

## 2025-04-16 NOTE — NON STRESS TEST
Triage Note - Nursing Documentation  Labor and Delivery Admission Log    Romy Langley  : 2000  MRN: [id number]  CSN: [id number]    Date in / Time in:  2025  Time in: 1231    Date out / Time out:    Time out: 2837    Nurse: Setff Lyon RN    Patient Info: She is a 25 y.o. year old  at 35w1d with an DEQUAN of 2025, by Ultrasound who was seen on the [facility].    Chief Complaint:   Chief Complaint   Patient presents with    Back Pain     DAILY, CONSTANT LOW BACK PAIN AND VAGINAL PRESSURE       Provider Instructions / Disposition: VE closed/50/-3, discharged home. Follows up in office on Monday.     Patient Active Problem List   Diagnosis    Umbilical hernia without obstruction and without gangrene    Iron deficiency anemia due to chronic blood loss    Generalized abdominal pain    Chronic idiopathic constipation    Recurrent umbilical hernia    RUQ abdominal pain    Symptomatic cholelithiasis    Sore throat    GBS bacteriuria    Previous  section    Request for sterilization    Pregnancy       NST Documentation (Only applicable > 32 weeks): Interpretation A  Nonstress Test Interpretation A: Reactive (25 1426 : Steff Lyon, RN)

## 2025-04-16 NOTE — TELEPHONE ENCOUNTER
Called patient. Stated she was in a lot of pain. Advised patient to go to labor duran for evaluation

## 2025-04-17 LAB — BACTERIA SPEC AEROBE CULT: ABNORMAL

## 2025-04-21 ENCOUNTER — ROUTINE PRENATAL (OUTPATIENT)
Dept: OBSTETRICS AND GYNECOLOGY | Facility: CLINIC | Age: 25
End: 2025-04-21
Payer: COMMERCIAL

## 2025-04-21 VITALS — WEIGHT: 181 LBS | DIASTOLIC BLOOD PRESSURE: 70 MMHG | SYSTOLIC BLOOD PRESSURE: 112 MMHG | BODY MASS INDEX: 29.21 KG/M2

## 2025-04-21 DIAGNOSIS — Z98.891 PREVIOUS CESAREAN SECTION: Primary | ICD-10-CM

## 2025-04-21 DIAGNOSIS — N30.00 ACUTE CYSTITIS WITHOUT HEMATURIA: Primary | ICD-10-CM

## 2025-04-21 RX ORDER — CEPHALEXIN 500 MG/1
500 CAPSULE ORAL 4 TIMES DAILY
Qty: 28 CAPSULE | Refills: 0 | Status: SHIPPED | OUTPATIENT
Start: 2025-04-21 | End: 2025-04-28

## 2025-04-21 NOTE — PROGRESS NOTES
Chief Complaint   Patient presents with    Routine Prenatal Visit     No Complaints/concerns        HPI: Romy is a  currently at 35w6d here for prenatal visit who reports the following:  Baby is active. She went to  last week for pain and pressure. Urine culture + GBS, antibiotic was sent to pharmacy today. She has been having irregular ctxs.                 EXAM:     Vitals:    25 1330   BP: 112/70      Abdomen:   See prenatal flowsheet as noted and reviewed, soft, nontender   Pelvic:  FT/30/-2 posterior   Urine:  See prenatal flowsheet as noted and reviewed    Lab Results   Component Value Date    ABO O 10/09/2024    RH Positive 10/09/2024    ABSCRN Negative 10/09/2024       MDM:  Impression: Supervision of low risk pregnancy  Previous C/S with planned repeat C/S   Tests done today: none   Topics discussed: kick counts and fetal movement  labor signs and symptoms  Reviewed OB labs   Tests next visit: none                RTO:                        1 weeks    This note was electronically signed.  Karly Duarte, APRN  2025

## 2025-04-26 ENCOUNTER — HOSPITAL ENCOUNTER (OUTPATIENT)
Facility: HOSPITAL | Age: 25
Discharge: HOME | End: 2025-04-26
Attending: OBSTETRICS & GYNECOLOGY | Admitting: OBSTETRICS & GYNECOLOGY
Payer: COMMERCIAL

## 2025-04-26 VITALS
OXYGEN SATURATION: 99 % | HEIGHT: 66 IN | WEIGHT: 184 LBS | RESPIRATION RATE: 18 BRPM | DIASTOLIC BLOOD PRESSURE: 62 MMHG | HEART RATE: 93 BPM | TEMPERATURE: 97.9 F | SYSTOLIC BLOOD PRESSURE: 107 MMHG | BODY MASS INDEX: 29.57 KG/M2

## 2025-04-26 LAB
BILIRUB BLD-MCNC: NEGATIVE MG/DL
CLARITY, POC: CLEAR
COLOR UR: YELLOW
GLUCOSE UR STRIP-MCNC: NEGATIVE MG/DL
KETONES UR QL: NEGATIVE
LEUKOCYTE EST, POC: NEGATIVE
NITRITE UR-MCNC: NEGATIVE MG/ML
PH UR: 6 [PH] (ref 5–8)
PROT UR STRIP-MCNC: ABNORMAL MG/DL
RBC # UR STRIP: NEGATIVE /UL
SP GR UR: 1.01 (ref 1–1.03)
UROBILINOGEN UR QL: NORMAL

## 2025-04-26 PROCEDURE — 81002 URINALYSIS NONAUTO W/O SCOPE: CPT | Performed by: OBSTETRICS & GYNECOLOGY

## 2025-04-26 PROCEDURE — G0463 HOSPITAL OUTPT CLINIC VISIT: HCPCS

## 2025-04-26 PROCEDURE — 59025 FETAL NON-STRESS TEST: CPT | Performed by: OBSTETRICS & GYNECOLOGY

## 2025-04-26 PROCEDURE — 59025 FETAL NON-STRESS TEST: CPT

## 2025-04-26 RX ORDER — LIDOCAINE HYDROCHLORIDE 10 MG/ML
0.5 INJECTION, SOLUTION INFILTRATION; PERINEURAL ONCE AS NEEDED
Status: DISCONTINUED | OUTPATIENT
Start: 2025-04-26 | End: 2025-04-26 | Stop reason: HOSPADM

## 2025-04-26 RX ORDER — SODIUM CHLORIDE 0.9 % (FLUSH) 0.9 %
10 SYRINGE (ML) INJECTION EVERY 12 HOURS SCHEDULED
Status: DISCONTINUED | OUTPATIENT
Start: 2025-04-26 | End: 2025-04-26 | Stop reason: HOSPADM

## 2025-04-26 RX ORDER — SODIUM CHLORIDE 9 MG/ML
40 INJECTION, SOLUTION INTRAVENOUS AS NEEDED
Status: DISCONTINUED | OUTPATIENT
Start: 2025-04-26 | End: 2025-04-26 | Stop reason: HOSPADM

## 2025-04-26 RX ORDER — SODIUM CHLORIDE 0.9 % (FLUSH) 0.9 %
10 SYRINGE (ML) INJECTION AS NEEDED
Status: DISCONTINUED | OUTPATIENT
Start: 2025-04-26 | End: 2025-04-26 | Stop reason: HOSPADM

## 2025-04-26 NOTE — NON STRESS TEST
Triage Note - Nursing Documentation  Labor and Delivery Admission Log    Romy Langley  : 2000  MRN: [id number]  CSN: [id number]    Date in / Time in:  2025  Time in:     Date out / Time out:    Time out:     Nurse: Anali Mendoza RN    Patient Info: She is a 25 y.o. year old  at 36w4d with an DEQUAN of 2025, by Ultrasound who was seen on the [facility].    Chief Complaint:   Chief Complaint   Patient presents with    Abdominal Pain     Lower abdominal pain, pressure in bottom area, rating pain 9, constant       Provider Instructions / Disposition: Fetal tracing reassuring, positive fetal movement reported by patient. Discharge home, instructed on fetal kick counts, s/s of labor, instructed to utilize a belly band, rest, increase fluid intake, tylenol,  reasons to return to labor duran and to follow up with OB provider as scheduled. Patient and grandmother verbalized understanding.    Patient Active Problem List   Diagnosis    Umbilical hernia without obstruction and without gangrene    Iron deficiency anemia due to chronic blood loss    Generalized abdominal pain    Chronic idiopathic constipation    Recurrent umbilical hernia    RUQ abdominal pain    Symptomatic cholelithiasis    Sore throat    GBS bacteriuria    Previous  section    Request for sterilization    Pregnancy       NST Documentation (Only applicable > 32 weeks): Interpretation A  Nonstress Test Interpretation A: Reactive (25 : Anali Mendoza, RN)

## 2025-04-28 ENCOUNTER — ROUTINE PRENATAL (OUTPATIENT)
Dept: OBSTETRICS AND GYNECOLOGY | Facility: CLINIC | Age: 25
End: 2025-04-28
Payer: COMMERCIAL

## 2025-04-28 VITALS — SYSTOLIC BLOOD PRESSURE: 112 MMHG | WEIGHT: 186 LBS | BODY MASS INDEX: 30.02 KG/M2 | DIASTOLIC BLOOD PRESSURE: 64 MMHG

## 2025-04-28 DIAGNOSIS — Z30.2 REQUEST FOR STERILIZATION: ICD-10-CM

## 2025-04-28 DIAGNOSIS — O26.893 LOW BACK PAIN DURING PREGNANCY, THIRD TRIMESTER: ICD-10-CM

## 2025-04-28 DIAGNOSIS — D50.9 IRON DEFICIENCY ANEMIA DURING PREGNANCY: ICD-10-CM

## 2025-04-28 DIAGNOSIS — O99.019 IRON DEFICIENCY ANEMIA DURING PREGNANCY: ICD-10-CM

## 2025-04-28 DIAGNOSIS — Z98.891 PREVIOUS CESAREAN SECTION: ICD-10-CM

## 2025-04-28 DIAGNOSIS — M54.50 LOW BACK PAIN DURING PREGNANCY, THIRD TRIMESTER: ICD-10-CM

## 2025-04-28 DIAGNOSIS — Z34.83 ENCOUNTER FOR SUPERVISION OF OTHER NORMAL PREGNANCY, THIRD TRIMESTER: Primary | ICD-10-CM

## 2025-04-28 DIAGNOSIS — K21.9 GASTROESOPHAGEAL REFLUX DISEASE WITHOUT ESOPHAGITIS: ICD-10-CM

## 2025-04-28 NOTE — PROGRESS NOTES
Chief Complaint  Routine Prenatal Visit    History of Present Illness:  Romy is a  currently at 36w6d who presents today with complaints of continued pressure and low back pain.  Patient had previously been seen on labor and delivery.  She was monitored for contractions.  She was fingertip dilated.  She reports her contractions have been about the same.  She denies any vaginal bleeding or spotting.  She did complete her previous antibiotics.  She has continued on her Protonix.  She does reports she is not taking her prenatal vitamins or iron supplements faithfully.  She does have her repeat  section with tubal ligation scheduled.    Exam:  Vitals:  See prenatal flowsheet as noted and reviewed  General: Alert, cooperative, and does not appear in any distress  Abdomen:   See prenatal flowsheet as noted and reviewed    Uterus gravid, non-tender; no palpable masses    No guarding or rebound tenderness  Pelvic:  See prenatal flowsheet as noted and reviewed  Ext:  See prenatal flowsheet as noted and reviewed    Moves extremities well, no cyanosis and no redness  Urine:  See prenatal flowsheet as noted and reviewed    Data Review:  The following data was reviewed by: Suni Oquendo MD on 2025:  Prenatal Labs:  Lab Results   Component Value Date    HGB 9.7 (L) 02/10/2025    RUBELLAABIGG 1.70 10/09/2024    HEPBSAG Negative 10/09/2024    ABO O 10/09/2024    RH Positive 10/09/2024    ABSCRN Negative 10/09/2024    [id number] Non Reactive 10/09/2024    HEPCVIRUSABY 0.2 2022    STREPGPB Positive (A) 2022    URINECX 50,000 CFU/mL Streptococcus agalactiae (Group B) (A) 2025       Admission on 2025, Discharged on 2025   Component Date Value    Color 2025 Yellow     Clarity, UA 2025 Clear     Glucose, UA 2025 Negative     Bilirubin 2025 Negative     Ketones, UA 2025 Negative     Specific Gravity  2025 1.015     Blood, UA 2025 Negative     pH,  Urine 04/26/2025 6.0     Protein, POC 04/26/2025 Trace (A)     Urobilinogen, UA 04/26/2025 Normal     Leukocytes 04/26/2025 Negative     Nitrite, UA 04/26/2025 Negative    Admission on 04/16/2025, Discharged on 04/16/2025   Component Date Value    Color 04/16/2025 Dark Yellow     Clarity, UA 04/16/2025 Clear     Glucose, UA 04/16/2025 Negative     Bilirubin 04/16/2025 Negative     Ketones, UA 04/16/2025 Negative     Specific Gravity  04/16/2025 1.010     Blood, UA 04/16/2025 Negative     pH, Urine 04/16/2025 6.5     Protein, POC 04/16/2025 Trace (A)     Urobilinogen, UA 04/16/2025 Normal     Leukocytes 04/16/2025 Moderate (2+) (A)     Nitrite, UA 04/16/2025 Negative     Color, UA 04/16/2025 Yellow     Appearance, UA 04/16/2025 Cloudy (A)     pH, UA 04/16/2025 6.5     Specific Gravity, UA 04/16/2025 1.011     Glucose, UA 04/16/2025 Negative     Ketones, UA 04/16/2025 Negative     Bilirubin, UA 04/16/2025 Negative     Blood, UA 04/16/2025 Negative     Protein, UA 04/16/2025 Negative     Leuk Esterase, UA 04/16/2025 Moderate (2+) (A)     Nitrite, UA 04/16/2025 Negative     Urobilinogen, UA 04/16/2025 1.0 E.U./dL     RBC, UA 04/16/2025 None Seen     WBC, UA 04/16/2025 3-5 (A)     Bacteria, UA 04/16/2025 1+ (A)     Squamous Epithelial Cell* 04/16/2025 7-12 (A)     Transitional Epithelial * 04/16/2025 0-2     Hyaline Casts, UA 04/16/2025 None Seen     Methodology 04/16/2025 Manual Light Microscopy     Urine Culture 04/16/2025 50,000 CFU/mL Streptococcus agalactiae (Group B) (A)      Imaging:  US abdomen limited  Narrative: PROCEDURE: US ABDOMEN LIMITED-     HISTORY: umbilical hernia; R10.33-Periumbilical pain     COMPARISON: None.     TECHNIQUE: Sonographic images of the umbilicus region were obtained in  the transverse and sagittal planes.     FINDINGS: There is no fluid collection, mass, or abdominal wall defect  in the area of interest. No evidence of recurrent hernia.     Impression: Unremarkable exam.            Images were reviewed, interpreted, and dictated by Dr. Olman Heller MD  Transcribed by Micheline Glynn PA-C.     This report was signed and finalized on 3/25/2025 9:22 AM by Olman Heller MD.        Medical Records:  None    Assessment and Plan:  Problem List Items Addressed This Visit          Genitourinary and Reproductive     Request for sterilization       Gravid and     Previous  section  Patient does have her repeat  section with tubal ligation scheduled.     Other Visit Diagnoses         Encounter for supervision of other normal pregnancy, third trimester    -  Primary  Topics discussed:      section risks, benefits  GERD management  iron supplementation  kick counts and fetal movement  PIH precautions   labor signs and symptoms        Low back pain during pregnancy, third trimester      Patient may continue with the use of her Flexeril 10 mg 3 times daily as needed.  Patient may continue with Tylenol 1000 mg every 8 hours.      Gastroesophageal reflux disease without esophagitis      Patient to continue with her Protonix 40 mg daily.      Iron deficiency anemia during pregnancy      Patient highly encouraged in taking her prenatal vitamins or Flintstones with iron to daily.  Patient also encouraged to take her iron supplements as discussed.          Follow Up/Instructions:  Follow up as scheduled.  Patient was given instructions and counseling regarding her condition or for health maintenance advice. Please see specific information pulled into the AVS if appropriate.     Note: Speech recognition transcription software may have been used to dictate portions of this document.  An attempt at proofreading has been made though minor errors in transcription may still be present.    This note was electronically signed.  Suni Oquendo M.D.

## 2025-05-05 ENCOUNTER — ROUTINE PRENATAL (OUTPATIENT)
Dept: OBSTETRICS AND GYNECOLOGY | Facility: CLINIC | Age: 25
End: 2025-05-05
Payer: COMMERCIAL

## 2025-05-05 VITALS — BODY MASS INDEX: 30.51 KG/M2 | WEIGHT: 189 LBS | DIASTOLIC BLOOD PRESSURE: 68 MMHG | SYSTOLIC BLOOD PRESSURE: 112 MMHG

## 2025-05-05 DIAGNOSIS — Z30.2 REQUEST FOR STERILIZATION: ICD-10-CM

## 2025-05-05 DIAGNOSIS — O99.019 IRON DEFICIENCY ANEMIA DURING PREGNANCY: ICD-10-CM

## 2025-05-05 DIAGNOSIS — K21.9 GASTROESOPHAGEAL REFLUX DISEASE WITHOUT ESOPHAGITIS: ICD-10-CM

## 2025-05-05 DIAGNOSIS — M54.50 LOW BACK PAIN DURING PREGNANCY, THIRD TRIMESTER: ICD-10-CM

## 2025-05-05 DIAGNOSIS — O26.893 LOW BACK PAIN DURING PREGNANCY, THIRD TRIMESTER: ICD-10-CM

## 2025-05-05 DIAGNOSIS — D50.9 IRON DEFICIENCY ANEMIA DURING PREGNANCY: ICD-10-CM

## 2025-05-05 DIAGNOSIS — O26.899 CRAMPING AFFECTING PREGNANCY, ANTEPARTUM: ICD-10-CM

## 2025-05-05 DIAGNOSIS — R10.9 CRAMPING AFFECTING PREGNANCY, ANTEPARTUM: ICD-10-CM

## 2025-05-05 DIAGNOSIS — Z34.83 ENCOUNTER FOR SUPERVISION OF OTHER NORMAL PREGNANCY, THIRD TRIMESTER: Primary | ICD-10-CM

## 2025-05-05 DIAGNOSIS — Z98.891 PREVIOUS CESAREAN SECTION: ICD-10-CM

## 2025-05-05 NOTE — PROGRESS NOTES
Chief Complaint  Routine Prenatal Visit    History of Present Illness:  Romy is a  currently at 37w6d who presents today with complaints of cramping and contractions.  Patient has not been able to time them.  She previously been seen on labor duran 2 weeks ago.  She reports the contractions are stronger but not closer together.  She denies any vaginal bleeding or spotting.  She does have her repeat  section scheduled for next week.  She is planning on a tubal ligation.  She has been taking her prenatal vitamins and iron supplements.  She has been taking her Protonix.    Exam:  Vitals:  See prenatal flowsheet as noted and reviewed  General: Alert, cooperative, and does not appear in any distress  Abdomen:   See prenatal flowsheet as noted and reviewed    Uterus gravid, non-tender; no palpable masses    No guarding or rebound tenderness  Pelvic:  See prenatal flowsheet as noted and reviewed    Cervix was checked; chaperone present  0-FTcm/50%/-3  Ext:  See prenatal flowsheet as noted and reviewed    Moves extremities well, no cyanosis and no redness  Urine:  See prenatal flowsheet as noted and reviewed    Data Review:  The following data was reviewed by: Suni Oquendo MD on 2025:  Prenatal Labs:  Lab Results   Component Value Date    HGB 9.7 (L) 02/10/2025    RUBELLAABIGG 1.70 10/09/2024    HEPBSAG Negative 10/09/2024    ABO O 10/09/2024    RH Positive 10/09/2024    ABSCRN Negative 10/09/2024    [id number] Non Reactive 10/09/2024    HEPCVIRUSABY 0.2 2022    STREPGPB Positive (A) 2022    URINECX 50,000 CFU/mL Streptococcus agalactiae (Group B) (A) 2025       Admission on 2025, Discharged on 2025   Component Date Value    Color 2025 Yellow     Clarity, UA 2025 Clear     Glucose, UA 2025 Negative     Bilirubin 2025 Negative     Ketones, UA 2025 Negative     Specific Gravity  2025 1.015     Blood, UA 2025 Negative     pH, Urine  04/26/2025 6.0     Protein, POC 04/26/2025 Trace (A)     Urobilinogen, UA 04/26/2025 Normal     Leukocytes 04/26/2025 Negative     Nitrite, UA 04/26/2025 Negative    Admission on 04/16/2025, Discharged on 04/16/2025   Component Date Value    Color 04/16/2025 Dark Yellow     Clarity, UA 04/16/2025 Clear     Glucose, UA 04/16/2025 Negative     Bilirubin 04/16/2025 Negative     Ketones, UA 04/16/2025 Negative     Specific Gravity  04/16/2025 1.010     Blood, UA 04/16/2025 Negative     pH, Urine 04/16/2025 6.5     Protein, POC 04/16/2025 Trace (A)     Urobilinogen, UA 04/16/2025 Normal     Leukocytes 04/16/2025 Moderate (2+) (A)     Nitrite, UA 04/16/2025 Negative     Color, UA 04/16/2025 Yellow     Appearance, UA 04/16/2025 Cloudy (A)     pH, UA 04/16/2025 6.5     Specific Gravity, UA 04/16/2025 1.011     Glucose, UA 04/16/2025 Negative     Ketones, UA 04/16/2025 Negative     Bilirubin, UA 04/16/2025 Negative     Blood, UA 04/16/2025 Negative     Protein, UA 04/16/2025 Negative     Leuk Esterase, UA 04/16/2025 Moderate (2+) (A)     Nitrite, UA 04/16/2025 Negative     Urobilinogen, UA 04/16/2025 1.0 E.U./dL     RBC, UA 04/16/2025 None Seen     WBC, UA 04/16/2025 3-5 (A)     Bacteria, UA 04/16/2025 1+ (A)     Squamous Epithelial Cell* 04/16/2025 7-12 (A)     Transitional Epithelial * 04/16/2025 0-2     Hyaline Casts, UA 04/16/2025 None Seen     Methodology 04/16/2025 Manual Light Microscopy     Urine Culture 04/16/2025 50,000 CFU/mL Streptococcus agalactiae (Group B) (A)      Imaging:  US abdomen limited  Narrative: PROCEDURE: US ABDOMEN LIMITED-     HISTORY: umbilical hernia; R10.33-Periumbilical pain     COMPARISON: None.     TECHNIQUE: Sonographic images of the umbilicus region were obtained in  the transverse and sagittal planes.     FINDINGS: There is no fluid collection, mass, or abdominal wall defect  in the area of interest. No evidence of recurrent hernia.     Impression: Unremarkable exam.           Images were  reviewed, interpreted, and dictated by Dr. Olman Heller MD  Transcribed by Micheline Glynn PA-C.     This report was signed and finalized on 3/25/2025 9:22 AM by Olman Heller MD.        Medical Records:  None    Assessment and Plan:  Problem List Items Addressed This Visit          Genitourinary and Reproductive     Request for sterilization       Gravid and     Previous  section  Repeat  section as scheduled.  Preop today.  Patient is desiring permanent surgical sterilization.     Other Visit Diagnoses         Encounter for supervision of other normal pregnancy, third trimester    -  Primary  Topics discussed:      section risks, benefits  GERD management  iron supplementation  kick counts and fetal movement  labor signs and symptoms  PIH precautions  tubal risks, benefits        Low back pain during pregnancy, third trimester      Labor precautions and instructions have been given.      Gastroesophageal reflux disease without esophagitis      Patient to continue her Protonix 40 mg daily.      Iron deficiency anemia during pregnancy      Patient to continue her FeSO4 325 mg twice daily.      Cramping affecting pregnancy, antepartum      Labor precautions and instructions have been given.          Follow Up/Instructions:  Follow up as scheduled.  Patient was given instructions and counseling regarding her condition or for health maintenance advice. Please see specific information pulled into the AVS if appropriate.     Note: Speech recognition transcription software may have been used to dictate portions of this document.  An attempt at proofreading has been made though minor errors in transcription may still be present.    This note was electronically signed.  Suni Oquendo M.D.

## 2025-05-12 NOTE — PLAN OF CARE
Goal Outcome Evaluation:   Occasional contractions noted. Pt still is reporting pain in lower back that is a constant ache. VS stable. Provider will see pt this morning for follow up.                                          Clear

## 2025-05-13 ENCOUNTER — HOSPITAL ENCOUNTER (INPATIENT)
Facility: HOSPITAL | Age: 25
LOS: 3 days | Discharge: HOME | End: 2025-05-16
Attending: OBSTETRICS & GYNECOLOGY | Admitting: OBSTETRICS & GYNECOLOGY
Payer: COMMERCIAL

## 2025-05-13 ENCOUNTER — ANESTHESIA EVENT (OUTPATIENT)
Dept: PERIOP | Facility: HOSPITAL | Age: 25
End: 2025-05-13
Payer: COMMERCIAL

## 2025-05-13 ENCOUNTER — ANESTHESIA (OUTPATIENT)
Dept: PERIOP | Facility: HOSPITAL | Age: 25
End: 2025-05-13
Payer: COMMERCIAL

## 2025-05-13 DIAGNOSIS — Z98.891 S/P CESAREAN SECTION: Primary | ICD-10-CM

## 2025-05-13 LAB
ABO GROUP BLD: NORMAL
AMPHET+METHAMPHET UR QL: NEGATIVE
AMPHETAMINES UR QL: NEGATIVE
BACTERIA UR QL AUTO: ABNORMAL /HPF
BARBITURATES UR QL SCN: NEGATIVE
BASOPHILS # BLD AUTO: 0.05 10*3/MM3 (ref 0–0.2)
BASOPHILS NFR BLD AUTO: 0.4 % (ref 0–1.5)
BENZODIAZ UR QL SCN: NEGATIVE
BILIRUB UR QL STRIP: NEGATIVE
BLD GP AB SCN SERPL QL: NEGATIVE
BUPRENORPHINE SERPL-MCNC: NEGATIVE NG/ML
CANNABINOIDS SERPL QL: NEGATIVE
CLARITY UR: ABNORMAL
COCAINE UR QL: NEGATIVE
COLOR UR: YELLOW
DEPRECATED RDW RBC AUTO: 37.5 FL (ref 37–54)
EOSINOPHIL # BLD AUTO: 0.12 10*3/MM3 (ref 0–0.4)
EOSINOPHIL NFR BLD AUTO: 0.9 % (ref 0.3–6.2)
ERYTHROCYTE [DISTWIDTH] IN BLOOD BY AUTOMATED COUNT: 14.4 % (ref 12.3–15.4)
FENTANYL UR-MCNC: NEGATIVE NG/ML
GLUCOSE UR STRIP-MCNC: NEGATIVE MG/DL
HCT VFR BLD AUTO: 25.3 % (ref 34–46.6)
HGB BLD-MCNC: 7.6 G/DL (ref 12–15.9)
HGB UR QL STRIP.AUTO: NEGATIVE
HYALINE CASTS UR QL AUTO: ABNORMAL /LPF
IMM GRANULOCYTES # BLD AUTO: 0.27 10*3/MM3 (ref 0–0.05)
IMM GRANULOCYTES NFR BLD AUTO: 1.9 % (ref 0–0.5)
KETONES UR QL STRIP: NEGATIVE
LEUKOCYTE ESTERASE UR QL STRIP.AUTO: ABNORMAL
LYMPHOCYTES # BLD AUTO: 2.75 10*3/MM3 (ref 0.7–3.1)
LYMPHOCYTES NFR BLD AUTO: 19.8 % (ref 19.6–45.3)
MCH RBC QN AUTO: 21.7 PG (ref 26.6–33)
MCHC RBC AUTO-ENTMCNC: 30 G/DL (ref 31.5–35.7)
MCV RBC AUTO: 72.3 FL (ref 79–97)
METHADONE UR QL SCN: NEGATIVE
MONOCYTES # BLD AUTO: 1.06 10*3/MM3 (ref 0.1–0.9)
MONOCYTES NFR BLD AUTO: 7.6 % (ref 5–12)
NEUTROPHILS NFR BLD AUTO: 69.4 % (ref 42.7–76)
NEUTROPHILS NFR BLD AUTO: 9.63 10*3/MM3 (ref 1.7–7)
NITRITE UR QL STRIP: NEGATIVE
NRBC BLD AUTO-RTO: 0 /100 WBC (ref 0–0.2)
OPIATES UR QL: NEGATIVE
OXYCODONE UR QL SCN: NEGATIVE
PCP UR QL SCN: NEGATIVE
PH UR STRIP.AUTO: 7 [PH] (ref 5–8)
PLATELET # BLD AUTO: 237 10*3/MM3 (ref 140–450)
PMV BLD AUTO: 9.3 FL (ref 6–12)
PROT UR QL STRIP: NEGATIVE
RBC # BLD AUTO: 3.5 10*6/MM3 (ref 3.77–5.28)
RBC # UR STRIP: ABNORMAL /HPF
REF LAB TEST METHOD: ABNORMAL
RH BLD: POSITIVE
SP GR UR STRIP: 1.01 (ref 1–1.03)
SQUAMOUS #/AREA URNS HPF: ABNORMAL /HPF
T&S EXPIRATION DATE: NORMAL
TREPONEMA PALLIDUM IGG+IGM AB [PRESENCE] IN SERUM OR PLASMA BY IMMUNOASSAY: NORMAL
TRICYCLICS UR QL SCN: NEGATIVE
UROBILINOGEN UR QL STRIP: ABNORMAL
WBC # UR STRIP: ABNORMAL /HPF
WBC NRBC COR # BLD AUTO: 13.88 10*3/MM3 (ref 3.4–10.8)

## 2025-05-13 PROCEDURE — 25010000002 OXYTOCIN PER 10 UNITS: Performed by: NURSE ANESTHETIST, CERTIFIED REGISTERED

## 2025-05-13 PROCEDURE — 86920 COMPATIBILITY TEST SPIN: CPT

## 2025-05-13 PROCEDURE — 86780 TREPONEMA PALLIDUM: CPT | Performed by: STUDENT IN AN ORGANIZED HEALTH CARE EDUCATION/TRAINING PROGRAM

## 2025-05-13 PROCEDURE — 59025 FETAL NON-STRESS TEST: CPT

## 2025-05-13 PROCEDURE — 25010000002 METHYLERGONOVINE MALEATE PER 0.2 MG: Performed by: STUDENT IN AN ORGANIZED HEALTH CARE EDUCATION/TRAINING PROGRAM

## 2025-05-13 PROCEDURE — 85025 COMPLETE CBC W/AUTO DIFF WBC: CPT | Performed by: STUDENT IN AN ORGANIZED HEALTH CARE EDUCATION/TRAINING PROGRAM

## 2025-05-13 PROCEDURE — 63710000001 DIPHENHYDRAMINE PER 50 MG: Performed by: OBSTETRICS & GYNECOLOGY

## 2025-05-13 PROCEDURE — G0463 HOSPITAL OUTPT CLINIC VISIT: HCPCS

## 2025-05-13 PROCEDURE — 25010000002 FAMOTIDINE 10 MG/ML SOLUTION: Performed by: STUDENT IN AN ORGANIZED HEALTH CARE EDUCATION/TRAINING PROGRAM

## 2025-05-13 PROCEDURE — 25010000002 MIDAZOLAM PER 1MG: Performed by: NURSE ANESTHETIST, CERTIFIED REGISTERED

## 2025-05-13 PROCEDURE — 59515 CESAREAN DELIVERY: CPT | Performed by: OBSTETRICS & GYNECOLOGY

## 2025-05-13 PROCEDURE — 81001 URINALYSIS AUTO W/SCOPE: CPT | Performed by: STUDENT IN AN ORGANIZED HEALTH CARE EDUCATION/TRAINING PROGRAM

## 2025-05-13 PROCEDURE — 51703 INSERT BLADDER CATH COMPLEX: CPT

## 2025-05-13 PROCEDURE — 58611 LIGATE OVIDUCT(S) ADD-ON: CPT | Performed by: OBSTETRICS & GYNECOLOGY

## 2025-05-13 PROCEDURE — 25010000002 BUPIVACAINE PF 0.75 % SOLUTION: Performed by: NURSE ANESTHETIST, CERTIFIED REGISTERED

## 2025-05-13 PROCEDURE — 87086 URINE CULTURE/COLONY COUNT: CPT | Performed by: STUDENT IN AN ORGANIZED HEALTH CARE EDUCATION/TRAINING PROGRAM

## 2025-05-13 PROCEDURE — 86900 BLOOD TYPING SEROLOGIC ABO: CPT | Performed by: STUDENT IN AN ORGANIZED HEALTH CARE EDUCATION/TRAINING PROGRAM

## 2025-05-13 PROCEDURE — 0UL70CZ OCCLUSION OF BILATERAL FALLOPIAN TUBES WITH EXTRALUMINAL DEVICE, OPEN APPROACH: ICD-10-PCS | Performed by: OBSTETRICS & GYNECOLOGY

## 2025-05-13 PROCEDURE — 80307 DRUG TEST PRSMV CHEM ANLYZR: CPT | Performed by: OBSTETRICS & GYNECOLOGY

## 2025-05-13 PROCEDURE — 86901 BLOOD TYPING SEROLOGIC RH(D): CPT | Performed by: STUDENT IN AN ORGANIZED HEALTH CARE EDUCATION/TRAINING PROGRAM

## 2025-05-13 PROCEDURE — 25010000002 KETOROLAC TROMETHAMINE PER 15 MG: Performed by: STUDENT IN AN ORGANIZED HEALTH CARE EDUCATION/TRAINING PROGRAM

## 2025-05-13 PROCEDURE — 86850 RBC ANTIBODY SCREEN: CPT | Performed by: STUDENT IN AN ORGANIZED HEALTH CARE EDUCATION/TRAINING PROGRAM

## 2025-05-13 PROCEDURE — 25010000002 ONDANSETRON PER 1 MG: Performed by: OBSTETRICS & GYNECOLOGY

## 2025-05-13 PROCEDURE — 25010000002 MORPHINE PER 10 MG: Performed by: NURSE ANESTHETIST, CERTIFIED REGISTERED

## 2025-05-13 PROCEDURE — 25010000002 NALBUPHINE PER 10 MG: Performed by: NURSE ANESTHETIST, CERTIFIED REGISTERED

## 2025-05-13 PROCEDURE — 25810000003 LACTATED RINGERS PER 1000 ML: Performed by: NURSE ANESTHETIST, CERTIFIED REGISTERED

## 2025-05-13 PROCEDURE — 25810000003 LACTATED RINGERS SOLUTION: Performed by: STUDENT IN AN ORGANIZED HEALTH CARE EDUCATION/TRAINING PROGRAM

## 2025-05-13 PROCEDURE — 25010000002 CEFAZOLIN PER 500 MG: Performed by: STUDENT IN AN ORGANIZED HEALTH CARE EDUCATION/TRAINING PROGRAM

## 2025-05-13 PROCEDURE — 25010000002 ONDANSETRON PER 1 MG: Performed by: NURSE ANESTHETIST, CERTIFIED REGISTERED

## 2025-05-13 PROCEDURE — 25010000002 FENTANYL CITRATE (PF) 50 MCG/ML SOLUTION: Performed by: NURSE ANESTHETIST, CERTIFIED REGISTERED

## 2025-05-13 PROCEDURE — 25810000003 LACTATED RINGERS PER 1000 ML: Performed by: STUDENT IN AN ORGANIZED HEALTH CARE EDUCATION/TRAINING PROGRAM

## 2025-05-13 DEVICE — CLIP FALLOP FILSHIE TI PR STRL BX/10: Type: IMPLANTABLE DEVICE | Site: FALLOPIAN TUBE | Status: FUNCTIONAL

## 2025-05-13 DEVICE — SEAL HEMO SURG ARISTA/AH ABS/PWDR 5GM: Type: IMPLANTABLE DEVICE | Site: UTERUS | Status: FUNCTIONAL

## 2025-05-13 RX ORDER — SODIUM CHLORIDE 9 MG/ML
40 INJECTION, SOLUTION INTRAVENOUS AS NEEDED
Status: DISCONTINUED | OUTPATIENT
Start: 2025-05-13 | End: 2025-05-13 | Stop reason: HOSPADM

## 2025-05-13 RX ORDER — ONDANSETRON 2 MG/ML
4 INJECTION INTRAMUSCULAR; INTRAVENOUS EVERY 6 HOURS PRN
Status: DISCONTINUED | OUTPATIENT
Start: 2025-05-13 | End: 2025-05-13 | Stop reason: HOSPADM

## 2025-05-13 RX ORDER — DIPHENHYDRAMINE HCL 25 MG
25 CAPSULE ORAL EVERY 4 HOURS PRN
Status: DISCONTINUED | OUTPATIENT
Start: 2025-05-13 | End: 2025-05-16 | Stop reason: HOSPADM

## 2025-05-13 RX ORDER — NALBUPHINE HYDROCHLORIDE 10 MG/ML
2.5 INJECTION INTRAMUSCULAR; INTRAVENOUS; SUBCUTANEOUS EVERY 4 HOURS PRN
Status: DISPENSED | OUTPATIENT
Start: 2025-05-13 | End: 2025-05-14

## 2025-05-13 RX ORDER — IPRATROPIUM BROMIDE AND ALBUTEROL SULFATE 2.5; .5 MG/3ML; MG/3ML
3 SOLUTION RESPIRATORY (INHALATION) ONCE AS NEEDED
Status: DISCONTINUED | OUTPATIENT
Start: 2025-05-13 | End: 2025-05-16 | Stop reason: HOSPADM

## 2025-05-13 RX ORDER — SODIUM CHLORIDE, SODIUM LACTATE, POTASSIUM CHLORIDE, CALCIUM CHLORIDE 600; 310; 30; 20 MG/100ML; MG/100ML; MG/100ML; MG/100ML
INJECTION, SOLUTION INTRAVENOUS CONTINUOUS PRN
Status: DISCONTINUED | OUTPATIENT
Start: 2025-05-13 | End: 2025-05-13 | Stop reason: SURG

## 2025-05-13 RX ORDER — CALCIUM CARBONATE 500 MG/1
1 TABLET, CHEWABLE ORAL EVERY 4 HOURS PRN
Status: DISCONTINUED | OUTPATIENT
Start: 2025-05-13 | End: 2025-05-16 | Stop reason: HOSPADM

## 2025-05-13 RX ORDER — LIDOCAINE HYDROCHLORIDE 10 MG/ML
0.5 INJECTION, SOLUTION INFILTRATION; PERINEURAL ONCE AS NEEDED
Status: DISCONTINUED | OUTPATIENT
Start: 2025-05-13 | End: 2025-05-13 | Stop reason: HOSPADM

## 2025-05-13 RX ORDER — FAMOTIDINE 10 MG/ML
20 INJECTION, SOLUTION INTRAVENOUS ONCE AS NEEDED
Status: DISCONTINUED | OUTPATIENT
Start: 2025-05-13 | End: 2025-05-13 | Stop reason: HOSPADM

## 2025-05-13 RX ORDER — OXYTOCIN/0.9 % SODIUM CHLORIDE 30/500 ML
125 PLASTIC BAG, INJECTION (ML) INTRAVENOUS ONCE AS NEEDED
Status: DISCONTINUED | OUTPATIENT
Start: 2025-05-13 | End: 2025-05-16 | Stop reason: HOSPADM

## 2025-05-13 RX ORDER — FAMOTIDINE 20 MG/1
20 TABLET, FILM COATED ORAL ONCE AS NEEDED
Status: DISCONTINUED | OUTPATIENT
Start: 2025-05-13 | End: 2025-05-13 | Stop reason: HOSPADM

## 2025-05-13 RX ORDER — PRENATAL VIT/IRON FUM/FOLIC AC 27MG-0.8MG
1 TABLET ORAL DAILY
Status: DISCONTINUED | OUTPATIENT
Start: 2025-05-13 | End: 2025-05-16 | Stop reason: HOSPADM

## 2025-05-13 RX ORDER — OXYTOCIN 10 [USP'U]/ML
INJECTION, SOLUTION INTRAMUSCULAR; INTRAVENOUS AS NEEDED
Status: DISCONTINUED | OUTPATIENT
Start: 2025-05-13 | End: 2025-05-13 | Stop reason: SURG

## 2025-05-13 RX ORDER — SODIUM CHLORIDE 0.9 % (FLUSH) 0.9 %
10 SYRINGE (ML) INJECTION AS NEEDED
Status: DISCONTINUED | OUTPATIENT
Start: 2025-05-13 | End: 2025-05-13 | Stop reason: HOSPADM

## 2025-05-13 RX ORDER — MISOPROSTOL 200 UG/1
800 TABLET ORAL AS NEEDED
Status: DISCONTINUED | OUTPATIENT
Start: 2025-05-13 | End: 2025-05-13 | Stop reason: HOSPADM

## 2025-05-13 RX ORDER — SODIUM CHLORIDE, SODIUM LACTATE, POTASSIUM CHLORIDE, CALCIUM CHLORIDE 600; 310; 30; 20 MG/100ML; MG/100ML; MG/100ML; MG/100ML
125 INJECTION, SOLUTION INTRAVENOUS CONTINUOUS
Status: DISCONTINUED | OUTPATIENT
Start: 2025-05-13 | End: 2025-05-13

## 2025-05-13 RX ORDER — CARBOPROST TROMETHAMINE 250 UG/ML
250 INJECTION, SOLUTION INTRAMUSCULAR AS NEEDED
Status: DISCONTINUED | OUTPATIENT
Start: 2025-05-13 | End: 2025-05-13 | Stop reason: HOSPADM

## 2025-05-13 RX ORDER — ACETAMINOPHEN 325 MG/1
650 TABLET ORAL EVERY 6 HOURS
Status: DISCONTINUED | OUTPATIENT
Start: 2025-05-13 | End: 2025-05-13 | Stop reason: HOSPADM

## 2025-05-13 RX ORDER — PROMETHAZINE HYDROCHLORIDE 12.5 MG/1
12.5 TABLET ORAL EVERY 6 HOURS PRN
Status: DISCONTINUED | OUTPATIENT
Start: 2025-05-13 | End: 2025-05-13 | Stop reason: HOSPADM

## 2025-05-13 RX ORDER — DIPHENHYDRAMINE HCL 25 MG
25 CAPSULE ORAL NIGHTLY PRN
Status: DISCONTINUED | OUTPATIENT
Start: 2025-05-13 | End: 2025-05-13 | Stop reason: HOSPADM

## 2025-05-13 RX ORDER — TRANEXAMIC ACID 10 MG/ML
1000 INJECTION, SOLUTION INTRAVENOUS ONCE AS NEEDED
Status: DISCONTINUED | OUTPATIENT
Start: 2025-05-13 | End: 2025-05-13 | Stop reason: HOSPADM

## 2025-05-13 RX ORDER — FAMOTIDINE 10 MG/ML
20 INJECTION, SOLUTION INTRAVENOUS ONCE
Status: COMPLETED | OUTPATIENT
Start: 2025-05-13 | End: 2025-05-13

## 2025-05-13 RX ORDER — ALUMINA, MAGNESIA, AND SIMETHICONE 2400; 2400; 240 MG/30ML; MG/30ML; MG/30ML
15 SUSPENSION ORAL EVERY 4 HOURS PRN
Status: DISCONTINUED | OUTPATIENT
Start: 2025-05-13 | End: 2025-05-16 | Stop reason: HOSPADM

## 2025-05-13 RX ORDER — METHYLERGONOVINE MALEATE 0.2 MG/ML
200 INJECTION INTRAVENOUS ONCE AS NEEDED
Status: COMPLETED | OUTPATIENT
Start: 2025-05-13 | End: 2025-05-13

## 2025-05-13 RX ORDER — ONDANSETRON 2 MG/ML
4 INJECTION INTRAMUSCULAR; INTRAVENOUS ONCE AS NEEDED
Status: ACTIVE | OUTPATIENT
Start: 2025-05-13 | End: 2025-05-14

## 2025-05-13 RX ORDER — MORPHINE SULFATE 2 MG/ML
2 INJECTION, SOLUTION INTRAMUSCULAR; INTRAVENOUS
Status: DISCONTINUED | OUTPATIENT
Start: 2025-05-13 | End: 2025-05-13 | Stop reason: HOSPADM

## 2025-05-13 RX ORDER — ACETAMINOPHEN 500 MG
1000 TABLET ORAL EVERY 6 HOURS
Status: COMPLETED | OUTPATIENT
Start: 2025-05-13 | End: 2025-05-14

## 2025-05-13 RX ORDER — IBUPROFEN 600 MG/1
600 TABLET, FILM COATED ORAL EVERY 6 HOURS
Status: DISCONTINUED | OUTPATIENT
Start: 2025-05-13 | End: 2025-05-13 | Stop reason: HOSPADM

## 2025-05-13 RX ORDER — OXYTOCIN/0.9 % SODIUM CHLORIDE 30/500 ML
42 PLASTIC BAG, INJECTION (ML) INTRAVENOUS AS NEEDED
Status: DISCONTINUED | OUTPATIENT
Start: 2025-05-13 | End: 2025-05-13 | Stop reason: HOSPADM

## 2025-05-13 RX ORDER — OXYCODONE HYDROCHLORIDE 5 MG/1
5 TABLET ORAL EVERY 4 HOURS PRN
Status: DISCONTINUED | OUTPATIENT
Start: 2025-05-13 | End: 2025-05-16 | Stop reason: HOSPADM

## 2025-05-13 RX ORDER — ACETAMINOPHEN 500 MG
1000 TABLET ORAL ONCE
Status: COMPLETED | OUTPATIENT
Start: 2025-05-13 | End: 2025-05-13

## 2025-05-13 RX ORDER — FENTANYL CITRATE 0.05 MG/ML
INJECTION, SOLUTION INTRAMUSCULAR; INTRAVENOUS AS NEEDED
Status: DISCONTINUED | OUTPATIENT
Start: 2025-05-13 | End: 2025-05-13 | Stop reason: SURG

## 2025-05-13 RX ORDER — CITRIC ACID/SODIUM CITRATE 334-500MG
30 SOLUTION, ORAL ORAL ONCE
Status: COMPLETED | OUTPATIENT
Start: 2025-05-13 | End: 2025-05-13

## 2025-05-13 RX ORDER — NALBUPHINE HYDROCHLORIDE 10 MG/ML
2.5 INJECTION INTRAMUSCULAR; INTRAVENOUS; SUBCUTANEOUS ONCE AS NEEDED
Status: COMPLETED | OUTPATIENT
Start: 2025-05-13 | End: 2025-05-14

## 2025-05-13 RX ORDER — ONDANSETRON 2 MG/ML
INJECTION INTRAMUSCULAR; INTRAVENOUS AS NEEDED
Status: DISCONTINUED | OUTPATIENT
Start: 2025-05-13 | End: 2025-05-13 | Stop reason: SURG

## 2025-05-13 RX ORDER — ONDANSETRON 2 MG/ML
4 INJECTION INTRAMUSCULAR; INTRAVENOUS ONCE AS NEEDED
Status: DISCONTINUED | OUTPATIENT
Start: 2025-05-13 | End: 2025-05-16 | Stop reason: HOSPADM

## 2025-05-13 RX ORDER — SODIUM CHLORIDE 0.9 % (FLUSH) 0.9 %
10 SYRINGE (ML) INJECTION EVERY 12 HOURS SCHEDULED
Status: DISCONTINUED | OUTPATIENT
Start: 2025-05-13 | End: 2025-05-13 | Stop reason: HOSPADM

## 2025-05-13 RX ORDER — ONDANSETRON 4 MG/1
4 TABLET, ORALLY DISINTEGRATING ORAL EVERY 6 HOURS PRN
Status: DISCONTINUED | OUTPATIENT
Start: 2025-05-13 | End: 2025-05-13 | Stop reason: HOSPADM

## 2025-05-13 RX ORDER — NALOXONE HCL 0.4 MG/ML
0.4 VIAL (ML) INJECTION
Status: DISCONTINUED | OUTPATIENT
Start: 2025-05-13 | End: 2025-05-16 | Stop reason: HOSPADM

## 2025-05-13 RX ORDER — ACETAMINOPHEN 325 MG/1
650 TABLET ORAL EVERY 6 HOURS
Status: DISCONTINUED | OUTPATIENT
Start: 2025-05-14 | End: 2025-05-16 | Stop reason: HOSPADM

## 2025-05-13 RX ORDER — ONDANSETRON 2 MG/ML
4 INJECTION INTRAMUSCULAR; INTRAVENOUS EVERY 6 HOURS PRN
Status: DISCONTINUED | OUTPATIENT
Start: 2025-05-13 | End: 2025-05-16 | Stop reason: HOSPADM

## 2025-05-13 RX ORDER — NALOXONE HCL 0.4 MG/ML
0.4 VIAL (ML) INJECTION
Status: ACTIVE | OUTPATIENT
Start: 2025-05-13 | End: 2025-05-14

## 2025-05-13 RX ORDER — MORPHINE SULFATE 1 MG/ML
INJECTION, SOLUTION EPIDURAL; INTRATHECAL; INTRAVENOUS AS NEEDED
Status: DISCONTINUED | OUTPATIENT
Start: 2025-05-13 | End: 2025-05-13 | Stop reason: SURG

## 2025-05-13 RX ORDER — MEPERIDINE HYDROCHLORIDE 25 MG/ML
12.5 INJECTION INTRAMUSCULAR; INTRAVENOUS; SUBCUTANEOUS
Status: ACTIVE | OUTPATIENT
Start: 2025-05-13 | End: 2025-05-14

## 2025-05-13 RX ORDER — OXYCODONE HYDROCHLORIDE 5 MG/1
10 TABLET ORAL EVERY 4 HOURS PRN
Status: DISCONTINUED | OUTPATIENT
Start: 2025-05-13 | End: 2025-05-16 | Stop reason: HOSPADM

## 2025-05-13 RX ORDER — DIPHENHYDRAMINE HYDROCHLORIDE 50 MG/ML
25 INJECTION, SOLUTION INTRAMUSCULAR; INTRAVENOUS NIGHTLY PRN
Status: DISCONTINUED | OUTPATIENT
Start: 2025-05-13 | End: 2025-05-13 | Stop reason: HOSPADM

## 2025-05-13 RX ORDER — BUPIVACAINE HYDROCHLORIDE 7.5 MG/ML
INJECTION, SOLUTION EPIDURAL; RETROBULBAR
Status: COMPLETED | OUTPATIENT
Start: 2025-05-13 | End: 2025-05-13

## 2025-05-13 RX ORDER — IBUPROFEN 800 MG/1
800 TABLET, FILM COATED ORAL EVERY 6 HOURS
Status: DISCONTINUED | OUTPATIENT
Start: 2025-05-13 | End: 2025-05-16 | Stop reason: HOSPADM

## 2025-05-13 RX ORDER — MORPHINE SULFATE 2 MG/ML
2 INJECTION, SOLUTION INTRAMUSCULAR; INTRAVENOUS EVERY 4 HOURS PRN
Status: DISCONTINUED | OUTPATIENT
Start: 2025-05-13 | End: 2025-05-16 | Stop reason: HOSPADM

## 2025-05-13 RX ORDER — OXYTOCIN/0.9 % SODIUM CHLORIDE 30/500 ML
333 PLASTIC BAG, INJECTION (ML) INTRAVENOUS ONCE
Status: DISCONTINUED | OUTPATIENT
Start: 2025-05-13 | End: 2025-05-13 | Stop reason: HOSPADM

## 2025-05-13 RX ORDER — PROCHLORPERAZINE EDISYLATE 5 MG/ML
10 INJECTION INTRAMUSCULAR; INTRAVENOUS ONCE AS NEEDED
Status: DISCONTINUED | OUTPATIENT
Start: 2025-05-13 | End: 2025-05-16 | Stop reason: HOSPADM

## 2025-05-13 RX ORDER — KETOROLAC TROMETHAMINE 30 MG/ML
30 INJECTION, SOLUTION INTRAMUSCULAR; INTRAVENOUS ONCE
Status: COMPLETED | OUTPATIENT
Start: 2025-05-13 | End: 2025-05-13

## 2025-05-13 RX ORDER — DOCUSATE SODIUM 100 MG/1
100 CAPSULE, LIQUID FILLED ORAL 2 TIMES DAILY PRN
Status: DISCONTINUED | OUTPATIENT
Start: 2025-05-13 | End: 2025-05-15

## 2025-05-13 RX ORDER — MIDAZOLAM HYDROCHLORIDE 2 MG/2ML
INJECTION, SOLUTION INTRAMUSCULAR; INTRAVENOUS AS NEEDED
Status: DISCONTINUED | OUTPATIENT
Start: 2025-05-13 | End: 2025-05-13 | Stop reason: SURG

## 2025-05-13 RX ADMIN — ACETAMINOPHEN 1000 MG: 500 TABLET, FILM COATED ORAL at 13:33

## 2025-05-13 RX ADMIN — KETOROLAC TROMETHAMINE 30 MG: 30 INJECTION, SOLUTION INTRAMUSCULAR; INTRAVENOUS at 10:57

## 2025-05-13 RX ADMIN — ONDANSETRON 4 MG: 2 INJECTION INTRAMUSCULAR; INTRAVENOUS at 09:07

## 2025-05-13 RX ADMIN — NALBUPHINE HYDROCHLORIDE 2.5 MG: 10 INJECTION, SOLUTION INTRAMUSCULAR; INTRAVENOUS; SUBCUTANEOUS at 15:21

## 2025-05-13 RX ADMIN — ONDANSETRON 4 MG: 2 INJECTION INTRAMUSCULAR; INTRAVENOUS at 13:33

## 2025-05-13 RX ADMIN — MORPHINE SULFATE 0.2 MG: 1 INJECTION EPIDURAL; INTRATHECAL; INTRAVENOUS at 09:16

## 2025-05-13 RX ADMIN — SODIUM CHLORIDE, POTASSIUM CHLORIDE, SODIUM LACTATE AND CALCIUM CHLORIDE 2000 ML: 600; 310; 30; 20 INJECTION, SOLUTION INTRAVENOUS at 08:40

## 2025-05-13 RX ADMIN — SODIUM CHLORIDE 2 G: 9 INJECTION, SOLUTION INTRAVENOUS at 09:03

## 2025-05-13 RX ADMIN — OXYTOCIN 20 UNITS: 10 INJECTION INTRAVENOUS at 09:31

## 2025-05-13 RX ADMIN — SODIUM CHLORIDE, POTASSIUM CHLORIDE, SODIUM LACTATE AND CALCIUM CHLORIDE: 600; 310; 30; 20 INJECTION, SOLUTION INTRAVENOUS at 09:49

## 2025-05-13 RX ADMIN — MIDAZOLAM HYDROCHLORIDE 0.5 MG: 1 INJECTION, SOLUTION INTRAMUSCULAR; INTRAVENOUS at 09:07

## 2025-05-13 RX ADMIN — DIPHENHYDRAMINE HYDROCHLORIDE 25 MG: 25 CAPSULE ORAL at 13:33

## 2025-05-13 RX ADMIN — MIDAZOLAM HYDROCHLORIDE 1.5 MG: 1 INJECTION, SOLUTION INTRAMUSCULAR; INTRAVENOUS at 09:33

## 2025-05-13 RX ADMIN — FENTANYL CITRATE 10 MCG: 0.05 INJECTION, SOLUTION INTRAMUSCULAR; INTRAVENOUS at 09:16

## 2025-05-13 RX ADMIN — METHYLERGONOVINE MALEATE 200 MCG: 0.2 INJECTION, SOLUTION INTRAMUSCULAR; INTRAVENOUS at 11:11

## 2025-05-13 RX ADMIN — IBUPROFEN 800 MG: 800 TABLET, FILM COATED ORAL at 18:57

## 2025-05-13 RX ADMIN — IBUPROFEN 800 MG: 800 TABLET, FILM COATED ORAL at 23:31

## 2025-05-13 RX ADMIN — BUPIVACAINE HYDROCHLORIDE 1.6 ML: 7.5 INJECTION, SOLUTION EPIDURAL; RETROBULBAR at 09:16

## 2025-05-13 RX ADMIN — SODIUM CHLORIDE, POTASSIUM CHLORIDE, SODIUM LACTATE AND CALCIUM CHLORIDE 125 ML/HR: 600; 310; 30; 20 INJECTION, SOLUTION INTRAVENOUS at 08:33

## 2025-05-13 RX ADMIN — SODIUM CHLORIDE, POTASSIUM CHLORIDE, SODIUM LACTATE AND CALCIUM CHLORIDE: 600; 310; 30; 20 INJECTION, SOLUTION INTRAVENOUS at 09:00

## 2025-05-13 RX ADMIN — NALBUPHINE HYDROCHLORIDE 2.5 MG: 10 INJECTION, SOLUTION INTRAMUSCULAR; INTRAVENOUS; SUBCUTANEOUS at 20:18

## 2025-05-13 RX ADMIN — ACETAMINOPHEN 1000 MG: 500 TABLET, FILM COATED ORAL at 20:17

## 2025-05-13 RX ADMIN — OXYTOCIN 20 UNITS: 10 INJECTION INTRAVENOUS at 09:49

## 2025-05-13 RX ADMIN — ACETAMINOPHEN 1000 MG: 500 TABLET, FILM COATED ORAL at 08:33

## 2025-05-13 RX ADMIN — FAMOTIDINE 20 MG: 10 INJECTION, SOLUTION INTRAVENOUS at 08:34

## 2025-05-13 RX ADMIN — SODIUM CITRATE AND CITRIC ACID MONOHYDRATE 30 ML: 500; 334 SOLUTION ORAL at 08:33

## 2025-05-13 NOTE — ANESTHESIA POSTPROCEDURE EVALUATION
Patient: Romy Langley    Procedure Summary       Date: 25 Room / Location: [facility] OR 1 /  [facility] OR    Anesthesia Start: 0900 Anesthesia Stop: 1015    Procedure:  SECTION REPEAT WITH TUBAL (Abdomen) Diagnosis:       Previous  section      Request for sterilization      (Previous  section [Z98.891])      (Request for sterilization [Z30.2])    Surgeons: Lukas Rodriguez MD Provider: Juliann Ruelas CRNA    Anesthesia Type: spinal, ITN ASA Status: 2            Anesthesia Type: spinal, ITN    Vitals  Vitals Value Taken Time   BP 90/73 25 10:22   Temp 97.4 °F (36.3 °C) 25 10:15   Pulse 84 25 10:22   Resp 16 25 10:15   SpO2 100 % 25 10:20   Vitals shown include unfiled device data.        Post Anesthesia Care and Evaluation    Patient location during evaluation: PACU (in recovery on L&D unit)  Patient participation: complete - patient participated  Level of consciousness: awake and alert  Pain score: 0  Pain management: satisfactory to patient    Airway patency: patent  Anesthetic complications: No anesthetic complications  PONV Status: none  Cardiovascular status: acceptable and stable  Respiratory status: acceptable  Hydration status: acceptable  Post Neuraxial Block status: Motor and sensory function returned to baseline and No signs or symptoms of PDPH  Comments: Vitals signs as noted in nursing documentation as per protocol.

## 2025-05-13 NOTE — ANESTHESIA PREPROCEDURE EVALUATION
Anesthesia Evaluation     Patient summary reviewed, Nursing notes reviewed and pregnancy test completed   no history of anesthetic complications:   NPO Solid Status: > 8 hours  NPO Liquid Status: > 8 hours           Airway   Mallampati: II  TM distance: <3 FB  Neck ROM: full  No difficulty expected  Dental    (+) partials and poor dentition    Pulmonary     breath sounds clear to auscultation  (+) pneumonia resolved ,  (-) not a smoker  Cardiovascular - negative cardio ROS  Exercise tolerance: good (4-7 METS)    ECG reviewed  Rhythm: regular  Rate: normal      ROS comment: 2-24  NSR    Neuro/Psych- negative ROS  GI/Hepatic/Renal/Endo    (+) GERD well controlled, thyroid problem hypothyroidism    Musculoskeletal     Abdominal  - normal exam    Abdomen: soft.  Bowel sounds: normal.   Substance History - negative use     OB/GYN negative ob/gyn ROS   (-)  Pregnant        Other   blood dyscrasia anemia,                   Anesthesia Plan    ASA 2     spinal and ITN     (Risk and benefits discussed, discussed risk of nausea, vomiting, itching, low blood pressure, post-procedural back pain, PDPH, and infection. Patient reports understanding. Continue with POC    Risks and benefits discussed including risk of aspiration, recall and dental damage. All patient questions answered. Will continue with POC.)  intravenous induction     Anesthetic plan, risks, benefits, and alternatives have been provided, discussed and informed consent has been obtained with: patient.  Pre-procedure education provided  Plan discussed with CRNA.      CODE STATUS:    Code Status (Patient has no pulse and is not breathing): CPR (Attempt to Resuscitate)  Medical Interventions (Patient has pulse or is breathing): Full Support  Level Of Support Discussed With: Patient

## 2025-05-13 NOTE — H&P
Jose  Romy Langley  : 2000  MRN: [id number]  CSN: [id number]    History and Physical    Subjective   Romy Langley is a 25 y.o. year old  who present for surgery due to intrauterine pregnancy at term.  39 weeks.  Prior .  Desired permanent sterilization.    Past Medical History:   Diagnosis Date    Anemia     Glaucoma     left eye    Irritable bowel syndrome     Lactose intolerance     Seasonal allergies     Slow to wake up after anesthesia     Thyroid activity decreased     Umbilical hernia     Urinary tract infection     Wears glasses      Past Surgical History:   Procedure Laterality Date     SECTION N/A 2022    Procedure:  SECTION PRIMARY;  Surgeon: Lukas Rodriguez MD;  Location: [facility] OR;  Service: Obstetrics/Gynecology;  Laterality: N/A;    CHOLECYSTECTOMY N/A 2024    Procedure: CHOLECYSTECTOMY LAPAROSCOPIC WITH DAVINCI ROBOT;  Surgeon: Mery Cadet DO;  Location: [facility] OR;  Service: Robotics - DaVinci;  Laterality: N/A;    COLONOSCOPY      FOOT/TOE TENDON REPAIR Left 2020    Procedure: RECONSTRUCTION PT TENDON LEFT;  Surgeon: Uziel Garcia DPM;  Location: [facility] OR;  Service: Podiatry    TARSAL TUNNEL RELEASE Left 2020    Procedure: EXCISION TARSAL BONE LEFT;  Surgeon: Uziel Garcia DPM;  Location: [facility] OR;  Service: Podiatry    UMBILICAL HERNIA REPAIR N/A 2023    Procedure: OPEN UMBILICAL HERNIA REPAIR;  Surgeon: Mery Cadet DO;  Location: [facility] OR;  Service: General;  Laterality: N/A;    UMBILICAL HERNIA REPAIR N/A 2024    Procedure: UMBILICAL HERNIA REVISION;  Surgeon: Mery Cadet DO;  Location: [facility] OR;  Service: General;  Laterality: N/A;    WISDOM TOOTH EXTRACTION       Social History    Tobacco Use      Smoking status: Never        Passive exposure: Never      Smokeless tobacco: Never    No current facility-administered medications for this  encounter.    Current Outpatient Medications:     cyclobenzaprine (FLEXERIL) 10 MG tablet, Take 1 tablet by mouth Every 8 (Eight) Hours As Needed for Muscle Spasms., Disp: 30 tablet, Rfl: 1    doxylamine (UNISOM) 25 MG tablet, Take 1 tablet by mouth Every Night., Disp: 30 tablet, Rfl: 5    famotidine (Pepcid) 20 MG tablet, Take 1 tablet by mouth 2 (Two) Times a Day As Needed for Indigestion or Heartburn., Disp: 60 tablet, Rfl: 3    ferrous sulfate 324 (65 Fe) MG tablet delayed-release EC tablet, Take 1 tablet by mouth 2 (Two) Times a Day With Meals., Disp: 60 tablet, Rfl: 6    ondansetron ODT (ZOFRAN-ODT) 8 MG disintegrating tablet, Take 1 tablet by mouth Every 8 (Eight) Hours As Needed for Nausea or Vomiting., Disp: 30 tablet, Rfl: 0    pantoprazole (PROTONIX) 40 MG EC tablet, Take 1 tablet by mouth Daily., Disp: 30 tablet, Rfl: 6    promethazine (PHENERGAN) 12.5 MG tablet, Take 1 tablet by mouth Every 8 (Eight) Hours As Needed for Nausea or Vomiting., Disp: 20 tablet, Rfl: 0    vitamin B-6 (PYRIDOXINE) 25 MG tablet, Take 1 tablet by mouth Every Night., Disp: 30 tablet, Rfl: 5    Allergies   Allergen Reactions    Latex Rash       Review of Systems   Constitutional: Negative.    HENT: Negative.     Respiratory: Negative.     Cardiovascular: Negative.          Objective   LMP 08/04/2024 (Exact Date) Comment: Due date sometime in May per pt.  General: well developed; well nourished  no acute distress  mentation appropriate   Heart: regular rate and rhythm, S1, S2 normal, no murmur, click, rub or gallop   Lungs: breathing is unlabored  clear to auscultation bilaterally   Abdomen: soft, non-tender; no masses  no umbilical or inguinal hernias are present  no hepato-splenomegaly   Pelvis:: External genitalia:  normal appearance of the external genitalia including Bartholin's and [place]'s glands.   Labs  Lab Results   Component Value Date     02/10/2025    HGB 9.7 (L) 02/10/2025    HCT 30.9 (L) 02/10/2025    WBC 9.72  02/10/2025     02/10/2025    K 4.0 02/10/2025     02/10/2025    CO2 24.6 02/10/2025    BUN 5 (L) 02/10/2025    CREATININE 0.69 02/10/2025    GLUCOSE 118 (H) 02/10/2025    ALBUMIN 3.4 (L) 02/10/2025    CALCIUM 8.7 02/10/2025    AST 12 02/10/2025    ALT 7 02/10/2025    BILITOT <0.2 02/10/2025        Assessment   IUP @ 39 weeks  Prior C/S x 1  Desired permanent sterilization     Plan   R C/S, BTL   2. Risks, complications, benefits, and other alternatives discussed.    Lukas Rodriguez MD  5/13/2025  07:28 EDT

## 2025-05-13 NOTE — ANESTHESIA PROCEDURE NOTES
Spinal Block    Pre-sedation assessment completed: 5/13/2025 9:00 AM    Patient reassessed immediately prior to procedure    Patient location during procedure: OR  Indication:at surgeon's request and procedure for pain  Performed By  CRNA/CAA: Juliann Ruelas CRNA  Preanesthetic Checklist  Completed: patient identified, IV checked, site marked, risks and benefits discussed, surgical consent, monitors and equipment checked, pre-op evaluation and timeout performed  Spinal Block Prep:  Patient Position:sitting  Sterile Tech:cap, gloves, mask and sterile barriers  Prep:Chloraprep  Patient Monitoring:blood pressure monitoring, continuous pulse oximetry and EKG    Spinal Block Procedure  Approach:midline  Guidance:landmark technique and palpation technique  Location:L4-L5  Needle Type:Dione  Needle Gauge:25 G  Placement of Spinal needle event:cerebrospinal fluid aspirated  Paresthesia: no  Fluid Appearance:clear  Medications: bupivacaine PF (MARCAINE) injection 0.75% - Intrathecal   1.6 mL - 5/13/2025 9:16:00 AM   Post Assessment  Patient Tolerance:patient tolerated the procedure well with no apparent complications  Complications no  Additional Notes  Risks discussed , including but not limited to:  Headache, itching, n&v, infection, failure, decreased blood pressure, permanent/chronic back pain, nerve damage, paralysis, etc. All questions answered and informed consent obtained. Skin localized with lidocaine skin wheel. 1.6 ml 0.75% Marcaine with dextrose intrathecal

## 2025-05-13 NOTE — OP NOTE
Jose Langley  : 2000  MRN: [id number]  CSN: [id number]    Operative Report    Pre-Operative Dx:   IUP at 39w0d weeks   Previous  section - declines   Desires sterilization      Postoperative dx:    Same as above     Procedure: Repeat  (LTCS) with bilateral placement of filshie clips       Surgeon: Lukas Rodriguez MD   Assistant: Staff  was responsible for performing the following activities: Retraction and Placing Dressing and their skilled assistance was necessary for the success of this case.         Anesthesia: Spinal       EBL: <950 mls.       Antibiotics: cefazolin 2 gms     Drains: Bolden     Findings: VFI, 8#4, 3 VC, intact placenta, normal adnexa       Indications: 25-year-old prior  presenting at term for repeat.  Desired permanent sterilization as well.  Risks benefits alternatives discussed all questions answered.           Procedure Details:   After the appropriate time out, the patient was prepped and draped in the usual sterile fashion.  She had been placed in the dorsal supine left lateral tilt position.  A bolden catheter had been placed in the bladder for drainage during the procedure. A pfannenstiel skin incision was made with a knife and carried down to the fascia.  The fascia was nicked with a scalpel and the incision was extended bilaterally with curved Sagastume scissors. The superior aspect of the fascia was grasped with Kocher clamps x 2 and bluntly as well as sharply dissected away from the underlying rectus muscles.  A similar process was repeated inferiorly. The rectus muscles were  and the peritoneal cavity was entered sharply without complications. The bladder flap was created with Metzenbaum scissors and pickups with teeth.  The  retractor was placed and after checking for no entrapment, was retracted down.  A transverse uterine incision was made with the knife and extended bilaterally.  The infant was  delivered from the vertex presentation.  The mouth and nose were bulb suctioned.  The cord was doubly clamped and cut and the infant handed to the pediatric nurse in attendance.  Cord blood was obtained.  The placenta was manually extracted from the uterus.  The uterus was then elevated from the abdomen and wiped free of remaining membranes.  The uterine incision was closed with #1 chromic in a running locking fashion with a second imbricating stitch.  The uterus had been returned to the abdomen.  The lateral gutters were wiped free of remaining clots.  Debora suture 2-0 chromic x 2 was placed to complete hemostatic process.  Daryl was placed along the closure site.  Tubes were traced to their fimbriated end.  Proximal ampullary portions Filshie clips x 2 were placed in close approximation transverse to the long axis without incident.  The site of surgical incision was inspected and noted to be hemostatic.  The  retractor was removed.  The subfascial tissue was inspected and noted to be hemostatic.  Peritoneum was closed with 3-0 Vicryl suture in a running fashion.  The fascia was closed with 0 PDS in a running non-locking fashion.  Subcutaneous tissue was inspected and noted to be hemostatic with minimal bovie electrocautery.  Damaris's fascia was closed with 3-0 Vicryl in a running non-locking fashion.  The skin was approximated with 4-0 Vicryl on a Otis needle in a running subcuticular fashion.  Glue and Steris placed.. Dressings were placed.  All instrument and sponge counts were correct at the end of the procedure. The patient tolerated the procedure well.  There were no complications.  She was taken to postoperative recovery room in stable condition.          Complications:   None      Disposition:   Mother to Mother Baby/Postpartum  in stable condition currently.   Baby to NBN  in stable condition currently.     Lukas Rodriguez MD  2025  10:13 EDT

## 2025-05-13 NOTE — NON STRESS TEST
Romy Langley, a  at 39w0d with an DEQUAN of 2025, by Ultrasound, was seen at [facility] for a nonstress test.    Chief Complaint   Patient presents with    Scheduled      With tubal       Patient Active Problem List   Diagnosis    Umbilical hernia without obstruction and without gangrene    Iron deficiency anemia due to chronic blood loss    Generalized abdominal pain    Chronic idiopathic constipation    Recurrent umbilical hernia    RUQ abdominal pain    Symptomatic cholelithiasis    Sore throat    GBS bacteriuria    Previous  section    Request for sterilization    Pregnancy       Start Time: 0800  Stop Time: 08    Interpretation A  Nonstress Test Interpretation A: Reactive

## 2025-05-13 NOTE — PAYOR COMM NOTE
"DELIVERY NOTICE    To:  [employer]  From: Luis Bonilla RN  Phone: [phone]  Fax: [phone]  NPI: [id number]  [facility]: [id number]      Arielle Langley (25 y.o. Female)       Date of Birth   2000    Social Security Number       Address   [address] [place] [address] [place] [address]    Home Phone   [phone]    MRN   [id number]       Hinduism   Patient Refused    Marital Status   Single                            Admission Date   5/13/2025    Admission Type   Elective    Admitting Provider   Lukas Rodriguez MD    Attending Provider   Lukas Rodriguez MD    Department, Room/Bed   [facility] OB GYN, W205/1       Discharge Date       Discharge Disposition       Discharge Destination                                 Attending Provider: Lukas Rodriguez MD    Allergies: Latex    Isolation: None   Infection: None   Code Status: CPR    Ht: 170.2 cm (67\")   Wt: 87.9 kg (193 lb 12.8 oz)    Admission Cmt: None   Principal Problem: Pregnancy [Z34.90]                   Active Insurance as of 5/13/2025       Primary Coverage       Payor Plan Insurance Group Employer/Plan Group    [employer] [place] [employer]        Payor Plan Address Payor Plan Phone Number Payor Plan Fax Number Effective Dates    [address]   11/1/2016 - None Entered    [facility] [id number]         Subscriber Name Subscriber Birth Date Member ID       ARIELLE LANGLEY 2000 [id number]                     Emergency Contacts        (Rel.) Home Phone Work Phone Mobile Phone    Nadya Langley (Mother) [phone] -- [phone]    Kamari Langley (Father) [phone] -- [phone]              History & Physical    No notes of this type exist for this encounter.       Vital Signs (last 2 days)       Date/Time Temp Temp src Pulse Resp BP Patient Position SpO2    05/13/25 1159 -- -- 66 16 119/59 Lying 100    05/13/25 1130 97.3 (36.3) Oral 82 16 116/61 Lying 100 "    05/13/25 1115 -- -- 90 -- 106/68 -- 100    05/13/25 1100 -- -- 96 -- 115/54 -- 100    05/13/25 1045 -- -- 93 -- 99/65 -- 100    05/13/25 1034 -- -- 105 -- 120/78 -- --    05/13/25 1032 -- -- 88 -- 99/41 -- --    05/13/25 1028 -- -- 90 -- 115/63 -- --    05/13/25 1026 -- -- 89 -- 119/60 -- 99    05/13/25 1022 -- -- 84 -- 90/73 -- --    05/13/25 1019 -- -- 83 -- 110/64 -- --    05/13/25 1015 97.4 (36.3) Oral 82 16 113/75 Lying 100    05/13/25 0830 -- -- 105 -- 122/71 -- 99    05/13/25 0803 98.1 (36.7) Oral 110 17 119/70 Lying 100          Facility-Administered Medications as of 5/13/2025   Medication Dose Route Frequency Provider Last Rate Last Admin    [COMPLETED] acetaminophen (TYLENOL) tablet 1,000 mg  1,000 mg Oral Once Tasha Ureña MD   1,000 mg at 05/13/25 0833    acetaminophen (TYLENOL) tablet 1,000 mg  1,000 mg Oral Q6H Lukas Rodriguez MD        Followed by    [START ON 5/14/2025] acetaminophen (TYLENOL) tablet 650 mg  650 mg Oral Q6H Lukas Rodriguez MD        aluminum-magnesium hydroxide-simethicone (MAALOX MAX) 400-400-40 MG/5ML suspension 15 mL  15 mL Oral Q4H PRN Lukas Rodriguez MD        Or    calcium carbonate (TUMS) chewable tablet 500 mg (200 mg elemental)  1 tablet Oral Q4H PRN Lukas Rodriguez MD        [COMPLETED] bupivacaine PF (MARCAINE) 0.75 % injection   Intrathecal One-Time Injection Juliann Ruelas CRNA   1.6 mL at 05/13/25 0916    [COMPLETED] ceFAZolin 2000 mg IVPB in 100 mL NS (VTB)  2 g Intravenous Once Tasha Ureña MD   2 g at 05/13/25 0903    diphenhydrAMINE (BENADRYL) capsule 25 mg  25 mg Oral Q4H PRN Lukas Rodriguez MD        docusate sodium (COLACE) capsule 100 mg  100 mg Oral BID PRN Lukas Rodriguez MD        [COMPLETED] famotidine (PEPCID) injection 20 mg  20 mg Intravenous Once Tasha Ureña MD   20 mg at 05/13/25 0834    HYDROmorphone (DILAUDID) injection 0.5 mg  0.5 mg Intravenous Q15 Min PRN  Juliann Ruelas CRNA        ibuprofen (ADVIL,MOTRIN) tablet 800 mg  800 mg Oral Q6H Lukas Rodriguez MD        ipratropium-albuterol (DUO-NEB) nebulizer solution 3 mL  3 mL Nebulization Once PRN Juliann Ruelas CRNA        [COMPLETED] ketorolac (TORADOL) injection 30 mg  30 mg Intravenous Once Tasha Ureña MD   30 mg at 05/13/25 1057    [COMPLETED] lactated ringers bolus 2,000 mL  2,000 mL Intravenous Once Tasha Ureña MD 4,000 mL/hr at 05/13/25 0840 2,000 mL at 05/13/25 0840    lanolin topical 1 Application  1 Application Topical Q1H PRN Lukas Rodriguez MD        meperidine (DEMEROL) injection 12.5 mg  12.5 mg Intravenous Q5 Min PRN Juliann Ruelas CRNA        [COMPLETED] methylergonovine (METHERGINE) injection 200 mcg  200 mcg Intramuscular Once PRN Tasha Ureña MD   200 mcg at 05/13/25 1111    Morphine sulfate (PF) injection 2 mg  2 mg Intravenous Q4H PRN Lukas Rodriguez MD        And    naloxone (NARCAN) injection 0.4 mg  0.4 mg Intravenous Q5 Min PRN Lukas Rodriguez MD        nalbuphine (NUBAIN) injection 2.5 mg  2.5 mg Intravenous Once PRN Juliann Ruelas CRNA        nalbuphine (NUBAIN) injection 2.5 mg  2.5 mg Intravenous Q4H PRN Juliann Ruelas CRNA        naloxone (NARCAN) injection 0.4 mg  0.4 mg Intravenous Q1H PRN Juliann Ruelas CRNA        ondansetron (ZOFRAN) injection 4 mg  4 mg Intravenous Once PRN Juliann Ruelas CRNA        ondansetron (ZOFRAN) injection 4 mg  4 mg Intravenous Once PRN Juliann Ruelas CRNA        ondansetron (ZOFRAN) injection 4 mg  4 mg Intravenous Q6H PRN Lukas Rodriguez MD        oxyCODONE (ROXICODONE) immediate release tablet 5 mg  5 mg Oral Q4H PRN Lukas Rodriguez MD        Or    oxyCODONE (ROXICODONE) immediate release tablet 10 mg  10 mg Oral Q4H PRN Lukas Rodriguez MD        oxytocin (PITOCIN) 30 units in 0.9% sodium chloride 500 mL  (premix)  125 mL/hr Intravenous Once PRN Lukas Rodriguez MD        prenatal vitamin tablet 1 tablet  1 tablet Oral Daily Lukas Rodriguez MD        prochlorperazine (COMPAZINE) injection 10 mg  10 mg Intravenous Once PRN Juliann Ruelas CRNA        [COMPLETED] Sod Citrate-Citric Acid (BICITRA) oral solution 30 mL  30 mL Oral Once Tasha Ureña MD   30 mL at 05/13/25 0833     Lab Results (last 48 hours)       Procedure Component Value Units Date/Time    Urinalysis, Microscopic Only - Urine, Clean Catch [[id number]]  (Abnormal) Collected: 05/13/25 0812    Specimen: Urine, Clean Catch Updated: 05/13/25 0833     RBC, UA None Seen /HPF      WBC, UA 11-20 /HPF      Bacteria, UA 1+ /HPF      Squamous Epithelial Cells, UA 7-12 /HPF      Hyaline Casts, UA None Seen /LPF      Methodology Manual Light Microscopy    Urine Culture - Urine, Urine, Clean Catch [[id number]] Collected: 05/13/25 0812    Specimen: Urine, Clean Catch Updated: 05/13/25 0833    Fentanyl, Urine - Urine, Clean Catch [[id number]]  (Normal) Collected: 05/13/25 0812    Specimen: Urine, Clean Catch Updated: 05/13/25 0831     Fentanyl, Urine Negative    Narrative:      Negative Threshold:      Fentanyl 5 ng/mL     The normal value for the drug tested is negative. This report includes final unconfirmed screening results to be used for medical treatment purposes only. Unconfirmed results must not be used for non-medical purposes such as employment or legal testing. Clinical consideration should be applied to any drug of abuse test, particularly when unconfirmed results are used.           Urine Drug Screen - Urine, Clean Catch [[id number]]  (Normal) Collected: 05/13/25 0812    Specimen: Urine, Clean Catch Updated: 05/13/25 0828     THC, Screen, Urine Negative     Phencyclidine (PCP), Urine Negative     Cocaine Screen, Urine Negative     Methamphetamine, Ur Negative     Opiate Screen Negative     Amphetamine Screen, Urine Negative      Benzodiazepine Screen, Urine Negative     Tricyclic Antidepressants Screen Negative     Methadone Screen, Urine Negative     Barbiturates Screen, Urine Negative     Oxycodone Screen, Urine Negative     Buprenorphine, Screen, Urine Negative    Narrative:      Cutoff For Drugs Screened:    Amphetamines               500 ng/ml  Barbiturates               200 ng/ml  Benzodiazepines            150 ng/ml  Cocaine                    150 ng/ml  Methadone                  200 ng/ml  Opiates                    100 ng/ml  Phencyclidine               25 ng/ml  THC                         50 ng/ml  Methamphetamine            500 ng/ml  Tricyclic Antidepressants  300 ng/ml  Oxycodone                  100 ng/ml  Buprenorphine               10 ng/ml    The normal value for all drugs tested is negative. This report includes unconfirmed screening results, with the cutoff values listed, to be used for medical treatment purposes only.  Unconfirmed results must not be used for non-medical purposes such as employment or legal testing.  Clinical consideration should be applied to any drug of abuse test, particularly when unconfirmed results are used.      Urinalysis With Culture If Indicated - Urine, Clean Catch [[id number]]  (Abnormal) Collected: 05/13/25 0812    Specimen: Urine, Clean Catch Updated: 05/13/25 0820     Color, UA Yellow     Appearance, UA Cloudy     pH, UA 7.0     Specific Gravity, UA 1.007     Glucose, UA Negative     Ketones, UA Negative     Bilirubin, UA Negative     Blood, UA Negative     Protein, UA Negative     Leuk Esterase, UA Large (3+)     Nitrite, UA Negative     Urobilinogen, UA 0.2 E.U./dL    Narrative:      In absence of clinical symptoms, the presence of pyuria, bacteria, and/or nitrites on the urinalysis result does not correlate with infection.    CBC & Differential [[id number]]  (Abnormal) Collected: 05/13/25 0759    Specimen: Blood Updated: 05/13/25 0820    Narrative:      The following orders were created  "for panel order CBC & Differential.  Procedure                               Abnormality         Status                     ---------                               -----------         ------                     CBC Auto Differential[[id number]]        Abnormal            Final result                 Please view results for these tests on the individual orders.    CBC Auto Differential [[id number]]  (Abnormal) Collected: 05/13/25 0759    Specimen: Blood Updated: 05/13/25 0820     WBC 13.88 10*3/mm3      RBC 3.50 10*6/mm3      Hemoglobin 7.6 g/dL      Hematocrit 25.3 %      MCV 72.3 fL      MCH 21.7 pg      MCHC 30.0 g/dL      RDW 14.4 %      RDW-SD 37.5 fl      MPV 9.3 fL      Platelets 237 10*3/mm3      Neutrophil % 69.4 %      Lymphocyte % 19.8 %      Monocyte % 7.6 %      Eosinophil % 0.9 %      Basophil % 0.4 %      Immature Grans % 1.9 %      Neutrophils, Absolute 9.63 10*3/mm3      Lymphocytes, Absolute 2.75 10*3/mm3      Monocytes, Absolute 1.06 10*3/mm3      Eosinophils, Absolute 0.12 10*3/mm3      Basophils, Absolute 0.05 10*3/mm3      Immature Grans, Absolute 0.27 10*3/mm3      nRBC 0.0 /100 WBC     Treponema pallidum AB w/Reflex RPR [[id number]] Collected: 05/13/25 0759    Specimen: Blood Updated: 05/13/25 0811          Orders (last 48 hrs)        Start     Ordered    05/14/25 1400  acetaminophen (TYLENOL) tablet 650 mg  Every 6 Hours        Placed in \"Followed by\" Linked Group    05/13/25 1206 05/14/25 0600  Discontinue Indwelling Urinary Catheter in AM  Once        Comments: Cummings catheter may be removed at 8 hours post-op if urine output is adequate (>30 ml/hr)    05/13/25 1206    05/14/25 0600  Wound Care  Per Order Details        Comments: Postop Day 1. Remove Dressing & Leave Incision Open to Air.    05/13/25 1206    05/14/25 0600  CBC & Differential  Morning Draw         05/13/25 1206    05/14/25 0000  Remove Abdominal Dressing  Once         05/13/25 1206    05/13/25 1800  Ambulate Patient  2 Times " "Daily      Comments: After anesthesia wears off.    05/13/25 1206    05/13/25 1700  ibuprofen (ADVIL,MOTRIN) tablet 800 mg  Every 6 Hours         05/13/25 1206    05/13/25 1700  ibuprofen (ADVIL,MOTRIN) tablet 600 mg  Every 6 Hours,   Status:  Discontinued         05/13/25 1024    05/13/25 1400  acetaminophen (TYLENOL) tablet 1,000 mg  Every 6 Hours        Placed in \"Followed by\" Linked Group    05/13/25 1206    05/13/25 1400  acetaminophen (TYLENOL) tablet 650 mg  Every 6 Hours,   Status:  Discontinued         05/13/25 1024    05/13/25 1300  Incentive spirometry RT  Every Hour       05/13/25 1206    05/13/25 1300  prenatal vitamin tablet 1 tablet  Daily         05/13/25 1206    05/13/25 1207  Anesthesia Follow-Up  Once        Provider:  (Not yet assigned)    05/13/25 1206    05/13/25 1207  Vital Signs  Per Hospital Policy/Protocol         05/13/25 1206    05/13/25 1207  IV Site Care  Continuous         05/13/25 1206    05/13/25 1207  Respirations  Continuous        Comments: If respiratory rate is less than 10/min, notify the Anesthesiologist    05/13/25 1206    05/13/25 1207  If Respiratory Rate is Less Than 8/Min, See Narcan Order. Notify Anesthesiologist STAT.  Continuous         05/13/25 1206    05/13/25 1207  Blood Pressure and Pulse Every 4 Hours  Continuous         05/13/25 1206    05/13/25 1207  Activity & Removal of Cummings Catheter, Per Obstetrician  Continuous         05/13/25 1206    05/13/25 1207  Notify Anesthesiologist for Any Questions / Problems  Continuous         05/13/25 1206    05/13/25 1207  Notify Anesthesia for Temp Over 101.4F  Continuous         05/13/25 1206    05/13/25 1207  Code Status and Medical Interventions: CPR (Attempt to Resuscitate); Full  Continuous         05/13/25 1206    05/13/25 1207  Vital Signs Per Hospital Policy  Per Hospital Policy/Protocol         05/13/25 1206    05/13/25 1207  Notify Provider  Continuous        Comments: Open Order Report to View Parameters Requiring " "Provider Notification    05/13/25 1206    05/13/25 1207  Patient May Shower  Per Order Details        Comments: After Anesthesia Wears Off & With Assistance    05/13/25 1206    05/13/25 1207  Diet: Regular/House; Fluid Consistency: Thin (IDDSI 0)  Diet Effective Now         05/13/25 1206    05/13/25 1207  Advance Diet As Tolerated -Regular  Until Discontinued         05/13/25 1206    05/13/25 1207  I/O  Every Shift       05/13/25 1206    05/13/25 1207  Fundal and Lochia Check  Per Order Details        Comments: Every 30 Minutes x2, Every 1 Hour x4, Every 4 Hours x24 Hours, Then Every Shift.    05/13/25 1206    05/13/25 1207  Weigh Patient  Once         05/13/25 1206    05/13/25 1207  Continue Indwelling Urinary Catheter Already in Place  Once         05/13/25 1206    05/13/25 1207  Notify Provider if Bladder Distention Continues  Continuous        Comments: Post Catheter Removal    05/13/25 1206    05/13/25 1207  Urinary Catheter Care  Every Shift       05/13/25 1206    05/13/25 1207  Turn Cough Deep Breathe  Once         05/13/25 1206    05/13/25 1207  Encourage early intake of PO fluids  Continuous         05/13/25 1206    05/13/25 1207  Ambulate Patient 3-5 times per day (with or without Cummings)  Every Shift       05/13/25 1206    05/13/25 1207  Apply Abdominal Binding Until Discontinued  Until Discontinued         05/13/25 1206    05/13/25 1207  \"If patient tolerates food and liquids after completion of second bag of Pitocin, saline lock IV and discontinue IV fluid infusions.  Once         05/13/25 1206    05/13/25 1207  Breast pump to bed  Once         05/13/25 1206    05/13/25 1207  If indicated -- Please administer RH Immunoglobulin based on results of cord blood evaluation and fetal screen lab tests, pharmacy to dispense  Per Order Details        Comments: See Process Instructions For Reference Range Details.    05/13/25 1206    05/13/25 1207  Place Sequential Compression Device  Once         05/13/25 1206    " "05/13/25 1207  Maintain Sequential Compression Device  Continuous         05/13/25 1206    05/13/25 1207  VTE Prophylaxis Not Indicated: Low Risk; No Risk Factors (0)  Once         05/13/25 1206    05/13/25 1206  ondansetron (ZOFRAN) injection 4 mg  Once As Needed         05/13/25 1206    05/13/25 1206  nalbuphine (NUBAIN) injection 2.5 mg  Every 4 Hours PRN         05/13/25 1206    05/13/25 1206  naloxone (NARCAN) injection 0.4 mg  Every 1 Hour PRN         05/13/25 1206    05/13/25 1206  oxytocin (PITOCIN) 30 units in 0.9% sodium chloride 500 mL (premix)  Once As Needed         05/13/25 1206    05/13/25 1206  oxyCODONE (ROXICODONE) immediate release tablet 5 mg  Every 4 Hours PRN        Placed in \"Or\" Linked Group    05/13/25 1206    05/13/25 1206  oxyCODONE (ROXICODONE) immediate release tablet 10 mg  Every 4 Hours PRN        Placed in \"Or\" Linked Group    05/13/25 1206    05/13/25 1206  Morphine sulfate (PF) injection 2 mg  Every 4 Hours PRN        Placed in \"And\" Linked Group    05/13/25 1206    05/13/25 1206  naloxone (NARCAN) injection 0.4 mg  Every 5 Minutes PRN        Placed in \"And\" Linked Group    05/13/25 1206    05/13/25 1206  diphenhydrAMINE (BENADRYL) capsule 25 mg  Every 4 Hours PRN         05/13/25 1206    05/13/25 1206  docusate sodium (COLACE) capsule 100 mg  2 Times Daily PRN         05/13/25 1206    05/13/25 1206  ondansetron (ZOFRAN) injection 4 mg  Every 6 Hours PRN         05/13/25 1206    05/13/25 1206  lanolin topical 1 Application  Every 1 Hour PRN         05/13/25 1206    05/13/25 1206  aluminum-magnesium hydroxide-simethicone (MAALOX MAX) 400-400-40 MG/5ML suspension 15 mL  Every 4 Hours PRN        Placed in \"Or\" Linked Group    05/13/25 1206    05/13/25 1206  calcium carbonate (TUMS) chewable tablet 500 mg (200 mg elemental)  Every 4 Hours PRN        Placed in \"Or\" Linked Group    05/13/25 1206    05/13/25 1115  oxytocin (PITOCIN) 30 units in 0.9% sodium chloride 500 mL (premix)  Once,   " "Status:  Discontinued        Placed in \"Followed by\" Linked Group    05/13/25 1024    05/13/25 1115  oxytocin (PITOCIN) 30 units in 0.9% sodium chloride 500 mL (premix)  As Needed,   Status:  Discontinued        Placed in \"Followed by\" Linked Group    05/13/25 1024    05/13/25 1115  ketorolac (TORADOL) injection 30 mg  Once         05/13/25 1024    05/13/25 1025  Continuous Pulse Oximetry  Continuous         05/13/25 1024    05/13/25 1025  Vital signs every 5 minutes for 15 minutes, every 15 minutes thereafter.  Once         05/13/25 1024    05/13/25 1025  Notify Anesthesia of Any Acute Changes in Patient Condition  Continuous        Comments: Open Order Report to View Parameters Requiring Provider Notification    05/13/25 1024    05/13/25 1025  Notify Anesthesia for Unrelieved Pain  Continuous        Comments: Open Order Report to View Parameters Requiring Provider Notification    05/13/25 1024    05/13/25 1025  Once DC criteria to floor met, follow surgeon's orders.  Continuous         05/13/25 1024    05/13/25 1025  Discharge patient from PACU when discharge criteria is met.  Continuous         05/13/25 1024    05/13/25 1025  Ambu Bag at Bedside  Continuous         05/13/25 1024    05/13/25 1025  Notify Physician (specified)  Until Discontinued         05/13/25 1024    05/13/25 1025  Vital Signs Per Hospital Policy  Per Hospital Policy/Protocol         05/13/25 1024    05/13/25 1025  Strict Bed Rest  Until Discontinued         05/13/25 1024    05/13/25 1025  Fundal & Lochia Check  Per Order Details,   Status:  Canceled        Comments: Every 15 Minutes x4, Then Every 30 Minutes x2, Then Every Shift    05/13/25 1024    05/13/25 1025  Indwelling Urinary Catheter  Once,   Status:  Canceled         05/13/25 1024    05/13/25 1025  Diet: Regular/House; Fluid Consistency: Thin (IDDSI 0)  Diet Effective Now,   Status:  Canceled         05/13/25 1024    05/13/25 1025  Advance Diet As Tolerated -  Until Discontinued      " "   05/13/25 1024    05/13/25 1024  nalbuphine (NUBAIN) injection 2.5 mg  Once As Needed         05/13/25 1024    05/13/25 1024  meperidine (DEMEROL) injection 12.5 mg  Every 5 Minutes PRN         05/13/25 1024    05/13/25 1024  ondansetron (ZOFRAN) injection 4 mg  Once As Needed         05/13/25 1024    05/13/25 1024  prochlorperazine (COMPAZINE) injection 10 mg  Once As Needed         05/13/25 1024    05/13/25 1024  ipratropium-albuterol (DUO-NEB) nebulizer solution 3 mL  Once As Needed         05/13/25 1024    05/13/25 1024  HYDROmorphone (DILAUDID) injection 0.5 mg  Every 15 Minutes PRN         05/13/25 1024    05/13/25 1024  Strict Intake and Output  Every Shift       05/13/25 1024    05/13/25 1024  Fundal & Lochia Check  Every Shift       05/13/25 1024    05/13/25 1024  Morphine sulfate (PF) injection 2 mg  Every 1 Hour PRN,   Status:  Discontinued         05/13/25 1024    05/13/25 1024  methylergonovine (METHERGINE) injection 200 mcg  Once As Needed         05/13/25 1024    05/13/25 1024  carboprost (HEMABATE) injection 250 mcg  As Needed,   Status:  Discontinued         05/13/25 1024    05/13/25 1024  miSOPROStol (CYTOTEC) tablet 800 mcg  As Needed,   Status:  Discontinued         05/13/25 1024    05/13/25 1024  tranexamic acid 1000 mg in 100 mL 0.7% NaCl infusion (premix)  Once As Needed,   Status:  Discontinued         05/13/25 1024    05/13/25 1024  ondansetron ODT (ZOFRAN-ODT) disintegrating tablet 4 mg  Every 6 Hours PRN,   Status:  Discontinued        Placed in \"Or\" Linked Group    05/13/25 1024    05/13/25 1024  ondansetron (ZOFRAN) injection 4 mg  Every 6 Hours PRN,   Status:  Discontinued        Placed in \"Or\" Linked Group    05/13/25 1024    05/13/25 1024  promethazine (PHENERGAN) tablet 12.5 mg  Every 6 Hours PRN,   Status:  Discontinued         05/13/25 1024    05/13/25 1024  famotidine (PEPCID) injection 20 mg  Once As Needed,   Status:  Discontinued        Placed in \"Or\" Linked Group    05/13/25 " "1024    05/13/25 1024  famotidine (PEPCID) tablet 20 mg  Once As Needed,   Status:  Discontinued        Placed in \"Or\" Linked Group    05/13/25 1024    05/13/25 1024  diphenhydrAMINE (BENADRYL) capsule 25 mg  Nightly PRN,   Status:  Discontinued        Placed in \"Or\" Linked Group    05/13/25 1024    05/13/25 1024  diphenhydrAMINE (BENADRYL) injection 25 mg  Nightly PRN,   Status:  Discontinued        Placed in \"Or\" Linked Group    05/13/25 1024    05/13/25 0900  sodium chloride 0.9 % flush 10 mL  Every 12 Hours Scheduled,   Status:  Discontinued         05/13/25 0749    05/13/25 0845  ceFAZolin 2000 mg IVPB in 100 mL NS (VTB)  Once         05/13/25 0749    05/13/25 0845  lactated ringers bolus 2,000 mL  Once         05/13/25 0749    05/13/25 0845  lactated ringers infusion  Continuous,   Status:  Discontinued         05/13/25 0749    05/13/25 0845  famotidine (PEPCID) injection 20 mg  Once         05/13/25 0749    05/13/25 0845  Sod Citrate-Citric Acid (BICITRA) oral solution 30 mL  Once         05/13/25 0749    05/13/25 0845  acetaminophen (TYLENOL) tablet 1,000 mg  Once         05/13/25 0749    05/13/25 0834  Urine Culture - Urine, Urine, Clean Catch  Once         05/13/25 0833    05/13/25 0821  Urinalysis, Microscopic Only - Urine, Clean Catch  Once         05/13/25 0820    05/13/25 0816  Fentanyl, Urine - Urine, Clean Catch  Once         05/13/25 0815    05/13/25 0811  Urine Drug Screen - Urine, Clean Catch  Once         05/13/25 0811    05/13/25 0800  Vital Signs q 4 while awake  Every 4 Hours,   Status:  Canceled      Comments: While the patient is awake.    05/13/25 0749    05/13/25 0750  Admit To Obstetrics Inpatient  Once         05/13/25 0749    05/13/25 0750  Code Status and Medical Interventions: CPR (Attempt to Resuscitate); Full Support  Continuous,   Status:  Canceled         05/13/25 0749    05/13/25 0750  Obtain informed consent  Once         05/13/25 0749    05/13/25 0750  Vital Signs Per Hospital " "Policy  Per Hospital Policy/Protocol         2549    25  Continuous Fetal Monitoring With NST on Admission and Prior to Initiation of Oxytocin.  Per Order Details        Comments: Continuous Fetal Monitoring With NST on Admission    25  External Uterine Contraction Monitoring  Per Hospital Policy/Protocol         25  Notify Physician (specified)  Until Discontinued         25  Notify physician for tachysystole (per hospital algorithm)  Until Discontinued         25  Notify physician if membranes ruptured, bleeding greater than 1 pad an hour, fetal heart tone abnormality, and severe pain  Until Discontinued         25  Up as Tolerated  Until Discontinued         25  Initiate Group Beta Strep (GBS) Prophylaxis Protocol, If Criteria Met  Continuous        Comments: NO TREATMENT RECOMMENDED IF: 1)  Maternal GBS status known negative 2)  Scheduled  birth with intact membranes, not in labor.  3 ) Maternal GBS unknown, no risk factors.   TREAT WITH ANTIBIOTICS IF:  1)  Maternal GBS status is known postive.  2)  Maternal GBS status unknown with these risk factors:  a)  Previous infant affected by GBS infection.  b)  GBS urinary tract infection (UTI) or bacteruria during pregnancy  c)  Unexplained maternal fever in labor (greater than or equal to 100.4F or 38.0C)  d)  Prolonged rupture of the membranes greater than or equal to 18 hours.  e)  Gestational age less than 37 weeks.    25  Insert Indwelling Urinary Catheter  Once        Placed in \"And\" Linked Group    25  Assess Need for Indwelling Urinary Catheter - Follow Removal Protocol  Continuous        Comments: Indwelling Urinary Catheter Removal Criteria  Discontinue Indwelling Urinary Catheter Unless One of the Following is " "Present  Urinary Retention or Obstruction  Chronic Cummings Catheter Use  End of Life  Critical Illness with Strict I/O   Tract or Abdominal Surgery  Stage 3/4 Sacral / Perineal Wound  Required Activity Restriction: Trauma  Required Activity Restriction: Spine Surgery  If Patient is Being Followed by Urology Contact Them PRIOR to Removal  Do Not Remove Indwelling Urinary Catheter Order is Present with a CLINICAL REASON to Maintain the Catheter. Provider is Required to Include a Clinical Reason to Maintain a Urinary Catheter    Chronic Cummings Catheter Use (Present on Admission)  Assess for Continued Need & Document Medical Necessity  If Infection is Suspected, Contact the Provider       Placed in \"And\" Linked Group    05/13/25 0749    05/13/25 0750  Chlorhexadine Skin Prep Unless Otherwise Indicated  Once         05/13/25 0749    05/13/25 0750  SCD (sequential compression devices)  Once         05/13/25 0749    05/13/25 0750  Document Gatorade Consumption Prior to Admission (Yes or No)  Once         05/13/25 0749    05/13/25 0750  NPO Diet NPO Type: Ice Chips  Diet Effective Now,   Status:  Canceled         05/13/25 0749    05/13/25 0750  Type & Screen  Once         05/13/25 0749    05/13/25 0750  Treponema pallidum AB w/Reflex RPR  STAT         05/13/25 0749    05/13/25 0750  CBC & Differential  STAT         05/13/25 0749    05/13/25 0750  Urinalysis With Culture If Indicated - Urine, Clean Catch  Once         05/13/25 0749    05/13/25 0750  Insert Peripheral IV  Once         05/13/25 0749    05/13/25 0750  Saline Lock & Maintain IV Access  Continuous         05/13/25 0749    05/13/25 0750  CBC Auto Differential  PROCEDURE ONCE         05/13/25 0749    05/13/25 0749  Urinary Catheter Care  Every Shift,   Status:  Canceled      Placed in \"And\" Linked Group    05/13/25 0749    05/13/25 0749  sodium chloride 0.9 % flush 10 mL  As Needed,   Status:  Discontinued         05/13/25 0749    05/13/25 0749  sodium chloride 0.9 " % infusion 40 mL  As Needed,   Status:  Discontinued         05/13/25 0749    05/13/25 0749  lidocaine (XYLOCAINE) 1 % injection 0.5 mL  Once As Needed,   Status:  Discontinued         05/13/25 0749    Unscheduled  Oxygen Therapy- Nasal Cannula; 2 LPM  Continuous PRN       05/13/25 1024    Unscheduled  Oxygen Therapy- Nasal Cannula; 2 LPM  Continuous PRN       05/13/25 1206    Unscheduled  Continuous Pulse Oximetry  Continuous PRN       05/13/25 1206    Unscheduled  Blood Gas, Arterial -With Co-Ox Panel: Yes  As Needed       05/13/25 1206    Unscheduled  Up with Assistance  As Needed       05/13/25 1206    Unscheduled  Bladder Scan if Patient Unable to Void 4-6 Hours After Catheter Removal  As Needed         05/13/25 1206    Unscheduled  Straight Cath Every 4-6 Hours As Needed If Patient is Unable to Void After 4-6 Hours, Bladder Scan Volume is Greater Than 500mL & Patient Has Symptoms of Bladder Discomfort / Distention  As Needed       05/13/25 1206    Unscheduled  Schedule / Prompt Voiding For Patients With Urinary Incontinence  As Needed       05/13/25 1206    Unscheduled  Chewing Gum  As Needed       05/13/25 1206    Unscheduled  Apply witch hazel pads / TUCKS to perineum as needed for comfort PRN  As Needed       05/13/25 1206    Unscheduled  Warm compress  As Needed       05/13/25 1206    Unscheduled  Apply ace wrap, tight bra, or binder  As Needed       05/13/25 1206    Unscheduled  Apply ice packs  As Needed       05/13/25 1206    Unscheduled  Bladder Assessment  As Needed       05/13/25 1024                  Physician Progress Notes (last 48 hours)  Notes from 05/11/25 1246 through 05/13/25 1246   No notes of this type exist for this encounter.       Consult Notes (last 48 hours)  Notes from 05/11/25 1246 through 05/13/25 1246   No notes of this type exist for this encounter.       Maternal Vitals (last 2 days)       Date/Time Temp Pulse Resp BP SpO2 Weight    05/13/25 1159 -- 66 16 119/59 100 --     05/13/25 1130 97.3 (36.3) 82 16 116/61 100 --    05/13/25 1115 -- 90 -- 106/68 100 --    05/13/25 1100 -- 96 -- 115/54 100 --    05/13/25 1045 -- 93 -- 99/65 100 --    05/13/25 1034 -- 105 -- 120/78 -- --    05/13/25 1032 -- 88 -- 99/41 -- --    05/13/25 1028 -- 90 -- 115/63 -- --    05/13/25 1026 -- 89 -- 119/60 99 --    05/13/25 1022 -- 84 -- 90/73 -- --    05/13/25 1019 -- 83 -- 110/64 -- --    05/13/25 1015 97.4 (36.3) 82 16 113/75 100 --    05/13/25 0830 -- 105 -- 122/71 99 --    05/13/25 0803 98.1 (36.7) 110 17 119/70 100 87.9 (193.8)           Date/Time Fetal HR Assessment Method Fetal HR (beats/min) Fetal HR Baseline Fetal HR Variability Fetal HR Accelerations Fetal HR Decelerations Fetal HR Tracing Category    05/13/25 0916 intermittent auscultation, using Doppler  135  normal range  --  --  --  --            None

## 2025-05-14 LAB
BACTERIA SPEC AEROBE CULT: NORMAL
BASOPHILS # BLD AUTO: 0.03 10*3/MM3 (ref 0–0.2)
BASOPHILS # BLD AUTO: 0.05 10*3/MM3 (ref 0–0.2)
BASOPHILS NFR BLD AUTO: 0.2 % (ref 0–1.5)
BASOPHILS NFR BLD AUTO: 0.4 % (ref 0–1.5)
DEPRECATED RDW RBC AUTO: 38.1 FL (ref 37–54)
DEPRECATED RDW RBC AUTO: 41.2 FL (ref 37–54)
EOSINOPHIL # BLD AUTO: 0.1 10*3/MM3 (ref 0–0.4)
EOSINOPHIL # BLD AUTO: 0.15 10*3/MM3 (ref 0–0.4)
EOSINOPHIL NFR BLD AUTO: 0.7 % (ref 0.3–6.2)
EOSINOPHIL NFR BLD AUTO: 1.1 % (ref 0.3–6.2)
ERYTHROCYTE [DISTWIDTH] IN BLOOD BY AUTOMATED COUNT: 14.5 % (ref 12.3–15.4)
ERYTHROCYTE [DISTWIDTH] IN BLOOD BY AUTOMATED COUNT: 15.5 % (ref 12.3–15.4)
HCT VFR BLD AUTO: 24.1 % (ref 34–46.6)
HCT VFR BLD AUTO: 24.2 % (ref 34–46.6)
HGB BLD-MCNC: 7.1 G/DL (ref 12–15.9)
HGB BLD-MCNC: 7.5 G/DL (ref 12–15.9)
IMM GRANULOCYTES # BLD AUTO: 0.14 10*3/MM3 (ref 0–0.05)
IMM GRANULOCYTES # BLD AUTO: 0.18 10*3/MM3 (ref 0–0.05)
IMM GRANULOCYTES NFR BLD AUTO: 1 % (ref 0–0.5)
IMM GRANULOCYTES NFR BLD AUTO: 1.3 % (ref 0–0.5)
LYMPHOCYTES # BLD AUTO: 2.02 10*3/MM3 (ref 0.7–3.1)
LYMPHOCYTES # BLD AUTO: 2.04 10*3/MM3 (ref 0.7–3.1)
LYMPHOCYTES NFR BLD AUTO: 14.3 % (ref 19.6–45.3)
LYMPHOCYTES NFR BLD AUTO: 14.6 % (ref 19.6–45.3)
MCH RBC QN AUTO: 21.5 PG (ref 26.6–33)
MCH RBC QN AUTO: 23 PG (ref 26.6–33)
MCHC RBC AUTO-ENTMCNC: 29.5 G/DL (ref 31.5–35.7)
MCHC RBC AUTO-ENTMCNC: 31 G/DL (ref 31.5–35.7)
MCV RBC AUTO: 72.8 FL (ref 79–97)
MCV RBC AUTO: 74.2 FL (ref 79–97)
MONOCYTES # BLD AUTO: 0.85 10*3/MM3 (ref 0.1–0.9)
MONOCYTES # BLD AUTO: 1.09 10*3/MM3 (ref 0.1–0.9)
MONOCYTES NFR BLD AUTO: 6.1 % (ref 5–12)
MONOCYTES NFR BLD AUTO: 7.7 % (ref 5–12)
NEUTROPHILS NFR BLD AUTO: 10.67 10*3/MM3 (ref 1.7–7)
NEUTROPHILS NFR BLD AUTO: 10.74 10*3/MM3 (ref 1.7–7)
NEUTROPHILS NFR BLD AUTO: 75.6 % (ref 42.7–76)
NEUTROPHILS NFR BLD AUTO: 77 % (ref 42.7–76)
NRBC BLD AUTO-RTO: 0 /100 WBC (ref 0–0.2)
NRBC BLD AUTO-RTO: 0 /100 WBC (ref 0–0.2)
PLATELET # BLD AUTO: 227 10*3/MM3 (ref 140–450)
PLATELET # BLD AUTO: 230 10*3/MM3 (ref 140–450)
PMV BLD AUTO: 9.1 FL (ref 6–12)
PMV BLD AUTO: 9.3 FL (ref 6–12)
RBC # BLD AUTO: 3.26 10*6/MM3 (ref 3.77–5.28)
RBC # BLD AUTO: 3.31 10*6/MM3 (ref 3.77–5.28)
WBC NRBC COR # BLD AUTO: 13.95 10*3/MM3 (ref 3.4–10.8)
WBC NRBC COR # BLD AUTO: 14.11 10*3/MM3 (ref 3.4–10.8)

## 2025-05-14 PROCEDURE — 36430 TRANSFUSION BLD/BLD COMPNT: CPT

## 2025-05-14 PROCEDURE — 0503F POSTPARTUM CARE VISIT: CPT | Performed by: PHYSICIAN ASSISTANT

## 2025-05-14 PROCEDURE — 85025 COMPLETE CBC W/AUTO DIFF WBC: CPT | Performed by: OBSTETRICS & GYNECOLOGY

## 2025-05-14 PROCEDURE — 25010000002 NALBUPHINE PER 10 MG: Performed by: NURSE ANESTHETIST, CERTIFIED REGISTERED

## 2025-05-14 PROCEDURE — P9016 RBC LEUKOCYTES REDUCED: HCPCS

## 2025-05-14 PROCEDURE — 90715 TDAP VACCINE 7 YRS/> IM: CPT | Performed by: OBSTETRICS & GYNECOLOGY

## 2025-05-14 PROCEDURE — 90471 IMMUNIZATION ADMIN: CPT | Performed by: OBSTETRICS & GYNECOLOGY

## 2025-05-14 PROCEDURE — 86900 BLOOD TYPING SEROLOGIC ABO: CPT

## 2025-05-14 PROCEDURE — 85025 COMPLETE CBC W/AUTO DIFF WBC: CPT | Performed by: PHYSICIAN ASSISTANT

## 2025-05-14 PROCEDURE — 25010000002 TETANUS-DIPHTH-ACELL PERTUSSIS 5-2.5-18.5 LF-MCG/0.5 SUSPENSION PREFILLED SYRINGE: Performed by: OBSTETRICS & GYNECOLOGY

## 2025-05-14 RX ADMIN — OXYCODONE 10 MG: 5 TABLET ORAL at 08:34

## 2025-05-14 RX ADMIN — ACETAMINOPHEN 1000 MG: 500 TABLET, FILM COATED ORAL at 02:42

## 2025-05-14 RX ADMIN — IBUPROFEN 800 MG: 800 TABLET, FILM COATED ORAL at 22:39

## 2025-05-14 RX ADMIN — PRENATAL VITAMINS-IRON FUMARATE 27 MG IRON-FOLIC ACID 0.8 MG TABLET 1 TABLET: at 08:34

## 2025-05-14 RX ADMIN — DOCUSATE SODIUM 100 MG: 100 CAPSULE, LIQUID FILLED ORAL at 19:51

## 2025-05-14 RX ADMIN — NALBUPHINE HYDROCHLORIDE 2.5 MG: 10 INJECTION, SOLUTION INTRAMUSCULAR; INTRAVENOUS; SUBCUTANEOUS at 20:00

## 2025-05-14 RX ADMIN — IBUPROFEN 800 MG: 800 TABLET, FILM COATED ORAL at 11:57

## 2025-05-14 RX ADMIN — ACETAMINOPHEN 650 MG: 325 TABLET, FILM COATED ORAL at 14:57

## 2025-05-14 RX ADMIN — TETANUS TOXOID, REDUCED DIPHTHERIA TOXOID AND ACELLULAR PERTUSSIS VACCINE, ADSORBED 0.5 ML: 5; 2.5; 8; 8; 2.5 SUSPENSION INTRAMUSCULAR at 05:51

## 2025-05-14 RX ADMIN — NALBUPHINE HYDROCHLORIDE 2.5 MG: 10 INJECTION, SOLUTION INTRAMUSCULAR; INTRAVENOUS; SUBCUTANEOUS at 00:08

## 2025-05-14 RX ADMIN — OXYCODONE 10 MG: 5 TABLET ORAL at 14:57

## 2025-05-14 RX ADMIN — DOCUSATE SODIUM 100 MG: 100 CAPSULE, LIQUID FILLED ORAL at 08:34

## 2025-05-14 RX ADMIN — IBUPROFEN 800 MG: 800 TABLET, FILM COATED ORAL at 05:05

## 2025-05-14 RX ADMIN — IBUPROFEN 800 MG: 800 TABLET, FILM COATED ORAL at 17:55

## 2025-05-14 RX ADMIN — ACETAMINOPHEN 1000 MG: 500 TABLET, FILM COATED ORAL at 08:34

## 2025-05-14 NOTE — PLAN OF CARE
Goal Outcome Evaluation:  Plan of Care Reviewed With: patient        Progress: improving  Outcome Evaluation: VSS, Adequate I/O, Catheter removed during this shift, patient ambulating well, Breastfeeding going well, Pain wellmanaged with PO meds, Continue with POC.

## 2025-05-14 NOTE — PROGRESS NOTES
[facility]   PROGRESS NOTE    Post-Op Day 1 S/P  and tubal ligation  Subjective   Subjective  Patient reports:  Pain is well controlled. She is ambulating. Tolerating diet.  Voiding - without difficulty; flatus reported..  Vaginal bleeding is light.  She experiences dizziness and lightheaded when stands.     Objective    Objective     Vitals: Vital Signs Range for the last 24 hours  Temperature: Temp:  [97.3 °F (36.3 °C)-98.7 °F (37.1 °C)] 98 °F (36.7 °C)   Temp Source: Temp src: Oral   BP: BP: ()/(41-86) 106/62   Pulse: Heart Rate:  [] 88   Respirations: Resp:  [16-18] 18   SPO2: SpO2:  [96 %-100 %] 98 %   O2 Amount (l/min):     O2 Devices     Weight:              Physical Exam    Lungs Respirations even and unlabored   Abdomen Soft, non-tender    Incision  dressed   Extremities extremities normal, atraumatic, no cyanosis or edema     I reviewed the patient's new clinical results.CBC & Differential (2025 06:31)     Assessment & Plan        Pregnancy    Previous  section    Request for sterilization    Assessment & Plan    Assessment:    Romy Langley is Day 1  post-partum  , Low Transverse  .    Severe anemia, symptomatic    Plan:   continue routine post op care. With symptomatic anemia patient agreeable to transfuse 1 unit blood.      Jackelin Pradhan PA-C  25  08:33 EDT

## 2025-05-14 NOTE — CONSULTS
Patient: Romy Langley  Procedure(s):   SECTION REPEAT WITH TUBAL  Anesthesia type: spinal, ITN    Patient location: Labor and Delivery  Last vitals:   Vitals:    25 0944   BP: 105/71   Pulse: 90   Resp: 18   Temp: 97.8 °F (36.6 °C)   SpO2: 97%     Level of consciousness: awake, alert, and oriented    Post-anesthesia pain: adequate analgesia  Airway patency: patent  Respiratory: unassisted  Cardiovascular: stable and blood pressure at baseline  Hydration: euvolemic    Anesthetic complications: no

## 2025-05-15 LAB
BASOPHILS # BLD AUTO: 0.05 10*3/MM3 (ref 0–0.2)
BASOPHILS NFR BLD AUTO: 0.3 % (ref 0–1.5)
BH BB BLOOD EXPIRATION DATE: NORMAL
BH BB BLOOD TYPE BARCODE: 5100
BH BB DISPENSE STATUS: NORMAL
BH BB PRODUCT CODE: NORMAL
BH BB UNIT NUMBER: NORMAL
CROSSMATCH INTERPRETATION: NORMAL
DEPRECATED RDW RBC AUTO: 42.5 FL (ref 37–54)
EOSINOPHIL # BLD AUTO: 0.24 10*3/MM3 (ref 0–0.4)
EOSINOPHIL NFR BLD AUTO: 1.6 % (ref 0.3–6.2)
ERYTHROCYTE [DISTWIDTH] IN BLOOD BY AUTOMATED COUNT: 15.9 % (ref 12.3–15.4)
HCT VFR BLD AUTO: 26.7 % (ref 34–46.6)
HGB BLD-MCNC: 8.3 G/DL (ref 12–15.9)
IMM GRANULOCYTES # BLD AUTO: 0.19 10*3/MM3 (ref 0–0.05)
IMM GRANULOCYTES NFR BLD AUTO: 1.3 % (ref 0–0.5)
LYMPHOCYTES # BLD AUTO: 2.13 10*3/MM3 (ref 0.7–3.1)
LYMPHOCYTES NFR BLD AUTO: 14.3 % (ref 19.6–45.3)
MCH RBC QN AUTO: 23.1 PG (ref 26.6–33)
MCHC RBC AUTO-ENTMCNC: 31.1 G/DL (ref 31.5–35.7)
MCV RBC AUTO: 74.4 FL (ref 79–97)
MONOCYTES # BLD AUTO: 1.1 10*3/MM3 (ref 0.1–0.9)
MONOCYTES NFR BLD AUTO: 7.4 % (ref 5–12)
NEUTROPHILS NFR BLD AUTO: 11.17 10*3/MM3 (ref 1.7–7)
NEUTROPHILS NFR BLD AUTO: 75.1 % (ref 42.7–76)
NRBC BLD AUTO-RTO: 0 /100 WBC (ref 0–0.2)
PLATELET # BLD AUTO: 235 10*3/MM3 (ref 140–450)
PMV BLD AUTO: 9.4 FL (ref 6–12)
RBC # BLD AUTO: 3.59 10*6/MM3 (ref 3.77–5.28)
UNIT  ABO: NORMAL
UNIT  RH: NORMAL
WBC NRBC COR # BLD AUTO: 14.88 10*3/MM3 (ref 3.4–10.8)

## 2025-05-15 PROCEDURE — 85025 COMPLETE CBC W/AUTO DIFF WBC: CPT | Performed by: OBSTETRICS & GYNECOLOGY

## 2025-05-15 PROCEDURE — 63710000001 DIPHENHYDRAMINE PER 50 MG: Performed by: OBSTETRICS & GYNECOLOGY

## 2025-05-15 PROCEDURE — 36430 TRANSFUSION BLD/BLD COMPNT: CPT

## 2025-05-15 PROCEDURE — 86900 BLOOD TYPING SEROLOGIC ABO: CPT

## 2025-05-15 PROCEDURE — P9016 RBC LEUKOCYTES REDUCED: HCPCS

## 2025-05-15 RX ORDER — HYDROXYZINE HYDROCHLORIDE 25 MG/1
25 TABLET, FILM COATED ORAL 3 TIMES DAILY PRN
Status: DISCONTINUED | OUTPATIENT
Start: 2025-05-15 | End: 2025-05-16 | Stop reason: HOSPADM

## 2025-05-15 RX ORDER — DOCUSATE SODIUM 100 MG/1
100 CAPSULE, LIQUID FILLED ORAL 2 TIMES DAILY
Status: DISCONTINUED | OUTPATIENT
Start: 2025-05-15 | End: 2025-05-16 | Stop reason: HOSPADM

## 2025-05-15 RX ADMIN — IBUPROFEN 800 MG: 800 TABLET, FILM COATED ORAL at 22:32

## 2025-05-15 RX ADMIN — ACETAMINOPHEN 650 MG: 325 TABLET, FILM COATED ORAL at 03:25

## 2025-05-15 RX ADMIN — IBUPROFEN 800 MG: 800 TABLET, FILM COATED ORAL at 11:59

## 2025-05-15 RX ADMIN — DIPHENHYDRAMINE HYDROCHLORIDE 25 MG: 25 CAPSULE ORAL at 16:46

## 2025-05-15 RX ADMIN — IBUPROFEN 800 MG: 800 TABLET, FILM COATED ORAL at 06:37

## 2025-05-15 RX ADMIN — ACETAMINOPHEN 650 MG: 325 TABLET, FILM COATED ORAL at 14:40

## 2025-05-15 RX ADMIN — OXYCODONE 10 MG: 5 TABLET ORAL at 00:27

## 2025-05-15 RX ADMIN — HYDROXYZINE HYDROCHLORIDE 25 MG: 25 TABLET, FILM COATED ORAL at 17:43

## 2025-05-15 RX ADMIN — OXYCODONE 5 MG: 5 TABLET ORAL at 22:32

## 2025-05-15 RX ADMIN — PRENATAL VITAMINS-IRON FUMARATE 27 MG IRON-FOLIC ACID 0.8 MG TABLET 1 TABLET: at 08:12

## 2025-05-15 RX ADMIN — OXYCODONE 5 MG: 5 TABLET ORAL at 16:46

## 2025-05-15 RX ADMIN — ACETAMINOPHEN 650 MG: 325 TABLET, FILM COATED ORAL at 20:09

## 2025-05-15 RX ADMIN — DIPHENHYDRAMINE HYDROCHLORIDE 25 MG: 25 CAPSULE ORAL at 22:31

## 2025-05-15 RX ADMIN — OXYCODONE 5 MG: 5 TABLET ORAL at 14:40

## 2025-05-15 RX ADMIN — DOCUSATE SODIUM 100 MG: 100 CAPSULE, LIQUID FILLED ORAL at 20:09

## 2025-05-15 RX ADMIN — IBUPROFEN 800 MG: 800 TABLET, FILM COATED ORAL at 17:43

## 2025-05-15 RX ADMIN — DOCUSATE SODIUM 100 MG: 100 CAPSULE, LIQUID FILLED ORAL at 08:11

## 2025-05-15 RX ADMIN — ACETAMINOPHEN 650 MG: 325 TABLET, FILM COATED ORAL at 08:11

## 2025-05-15 RX ADMIN — DIPHENHYDRAMINE HYDROCHLORIDE 25 MG: 25 CAPSULE ORAL at 00:27

## 2025-05-15 NOTE — PLAN OF CARE
Goal Outcome Evaluation:  Plan of Care Reviewed With: patient        Progress: improving  Outcome Evaluation: VSS, Adequate I/O, Fundus and Lochia WNL, Pain managed with PO meds, COntinue with POC.

## 2025-05-15 NOTE — PROGRESS NOTES
Jose Langley  : 2000  MRN: [id number]  CSN: [id number]    Post-operative Day #2  Subjective   Her pain is well controlled.  Vaginal bleeding is appropriate amount.  She is not passing gas and has not had a bowel movement.  She has had some gas pain in her right shoulder. Upon review of her respiratory system, she has no respiratory symptoms and and denies SOB, cough, wheezing, chest pain. She is breast feeding      Objective     Min/max vitals past 24 hours:   Temp  Min: 97.7 °F (36.5 °C)  Max: 98.6 °F (37 °C)  BP  Min: 95/62  Max: 122/73  Pulse  Min: 69  Max: 105  Resp  Min: 15  Max: 18        General: well developed; well nourished  no acute distress   Resp: breathing is unlabored   CV: Not performed.   Abdomen: incision is clean, dry, intact, and without drainage   Pelvic: Not performed   Ext: normal appearance with no cyanosis or edema and no calf tenderness     Results from last 7 days   Lab Units 05/15/25  0630 25  1945 25  0631   WBC 10*3/mm3 14.88* 13.95* 14.11*   HEMOGLOBIN g/dL 8.3* 7.5* 7.1*   HEMATOCRIT % 26.7* 24.2* 24.1*   PLATELETS 10*3/mm3 235 230 227     Lab Results   Component Value Date    ABO O 2025    RH Positive 2025    RUBELLAABIGG 1.70 10/09/2024    HEPBSAG Negative 10/09/2024        Assessment   POD #2 S/P Repeat  (LTCS) with BTL  Anemia     Plan   Continue routine post-operative care  Ambulate in [facility]    Karly Duarte, JASPAL  5/15/2025  09:21 EDT

## 2025-05-16 VITALS
WEIGHT: 193.8 LBS | HEART RATE: 96 BPM | HEIGHT: 67 IN | DIASTOLIC BLOOD PRESSURE: 77 MMHG | SYSTOLIC BLOOD PRESSURE: 115 MMHG | OXYGEN SATURATION: 97 % | BODY MASS INDEX: 30.42 KG/M2 | TEMPERATURE: 98.1 F | RESPIRATION RATE: 18 BRPM

## 2025-05-16 PROBLEM — Z98.891 S/P CESAREAN SECTION: Status: ACTIVE | Noted: 2025-05-16

## 2025-05-16 LAB
BH BB BLOOD EXPIRATION DATE: NORMAL
BH BB BLOOD TYPE BARCODE: 5100
BH BB DISPENSE STATUS: NORMAL
BH BB PRODUCT CODE: NORMAL
BH BB UNIT NUMBER: NORMAL
CROSSMATCH INTERPRETATION: NORMAL
UNIT  ABO: NORMAL
UNIT  RH: NORMAL

## 2025-05-16 PROCEDURE — 0503F POSTPARTUM CARE VISIT: CPT | Performed by: MIDWIFE

## 2025-05-16 RX ORDER — ACETAMINOPHEN 500 MG
1000 TABLET ORAL EVERY 8 HOURS PRN
Qty: 60 TABLET | Refills: 0 | Status: SHIPPED | OUTPATIENT
Start: 2025-05-16

## 2025-05-16 RX ORDER — HYDROXYZINE HYDROCHLORIDE 25 MG/1
25 TABLET, FILM COATED ORAL 3 TIMES DAILY PRN
Qty: 20 TABLET | Refills: 0 | Status: SHIPPED | OUTPATIENT
Start: 2025-05-16

## 2025-05-16 RX ORDER — PSEUDOEPHEDRINE HCL 30 MG
100 TABLET ORAL 2 TIMES DAILY PRN
Qty: 30 CAPSULE | Refills: 0 | Status: SHIPPED | OUTPATIENT
Start: 2025-05-16

## 2025-05-16 RX ORDER — OXYCODONE HYDROCHLORIDE 5 MG/1
5 TABLET ORAL EVERY 6 HOURS PRN
Qty: 12 TABLET | Refills: 0 | Status: SHIPPED | OUTPATIENT
Start: 2025-05-16

## 2025-05-16 RX ORDER — IBUPROFEN 800 MG/1
800 TABLET, FILM COATED ORAL EVERY 8 HOURS PRN
Qty: 60 TABLET | Refills: 0 | Status: SHIPPED | OUTPATIENT
Start: 2025-05-16

## 2025-05-16 RX ADMIN — DOCUSATE SODIUM 100 MG: 100 CAPSULE, LIQUID FILLED ORAL at 08:39

## 2025-05-16 RX ADMIN — IBUPROFEN 800 MG: 800 TABLET, FILM COATED ORAL at 11:01

## 2025-05-16 RX ADMIN — PRENATAL VITAMINS-IRON FUMARATE 27 MG IRON-FOLIC ACID 0.8 MG TABLET 1 TABLET: at 08:38

## 2025-05-16 RX ADMIN — ACETAMINOPHEN 650 MG: 325 TABLET, FILM COATED ORAL at 02:13

## 2025-05-16 RX ADMIN — ACETAMINOPHEN 650 MG: 325 TABLET, FILM COATED ORAL at 08:39

## 2025-05-16 RX ADMIN — IBUPROFEN 800 MG: 800 TABLET, FILM COATED ORAL at 05:05

## 2025-05-16 NOTE — DISCHARGE SUMMARY
Discharge Summary     Jose Langley  : 2000  MRN: [id number]  CSN: [id number]    Date of Admission: 2025   Date of Discharge:  2025   Delivering Physician: Lukas Rodriguez MD       Admission Diagnosis: Pregnancy [Z34.90]   Discharge Diagnosis: Same as above plus  Pregnancy at 39w0d - delivered       Procedures: Repeat  (LTCS) with [facility]     Hospital Course  See the completed operative report for details regarding antepartum course and delivery.    Her post-operative course was unremarkable.  On POD # 3 she felt like she was ready for D/C.  She was evaluated and it was determined she was able to be discharged to home.  She had no febrile morbidity. She had normal bowel and bladder function and was hemodynamically stable.  Her wound was healing well without obvious signs of infections. She is breast feeding , pumping.    Exam  General: no distress  Resp: unlabored  Abdomen: incision clean, dry, intact  Extremities: no edema    Infant  female fetus weighing 3730 g (8 lb 3.6 oz)  Apgars -  9 @ 1 minute /  9 @ 5 minutes.    Discharge labs  Lab Results   Component Value Date    WBC 14.88 (H) 05/15/2025    HGB 8.3 (L) 05/15/2025    HCT 26.7 (L) 05/15/2025     05/15/2025       Discharge Medications     Discharge Medications        New Medications        Instructions Start Date   acetaminophen 500 MG tablet  Commonly known as: TYLENOL   1,000 mg, Oral, Every 8 Hours PRN      docusate sodium 100 MG capsule   100 mg, Oral, 2 Times Daily PRN      hydrOXYzine 25 MG tablet  Commonly known as: ATARAX   25 mg, Oral, 3 Times Daily PRN      ibuprofen 800 MG tablet  Commonly known as: ADVIL,MOTRIN   800 mg, Oral, Every 8 Hours PRN      oxyCODONE 5 MG immediate release tablet  Commonly known as: ROXICODONE   5 mg, Oral, Every 6 Hours PRN             Continue These Medications        Instructions Start Date   cyclobenzaprine 10 MG tablet  Commonly known as: FLEXERIL   10  mg, Oral, Every 8 Hours PRN      famotidine 20 MG tablet  Commonly known as: Pepcid   20 mg, Oral, 2 Times Daily PRN      ferrous sulfate 324 (65 Fe) MG tablet delayed-release EC tablet   324 mg, Oral, 2 Times Daily With Meals      ondansetron ODT 8 MG disintegrating tablet  Commonly known as: ZOFRAN-ODT   8 mg, Oral, Every 8 Hours PRN      pantoprazole 40 MG EC tablet  Commonly known as: PROTONIX   40 mg, Oral, Daily      promethazine 12.5 MG tablet  Commonly known as: PHENERGAN   12.5 mg, Oral, Every 8 Hours PRN             Stop These Medications      doxylamine 25 MG tablet  Commonly known as: UNISOM     vitamin B-6 25 MG tablet  Commonly known as: PYRIDOXINE              Discharge Disposition Home or Self Care   Condition on Discharge: good   Follow-up: 2 weeks with [facility].     Time spent on discharge: 30 minutes or less  Karly Duarte, APRN  5/16/2025

## 2025-05-16 NOTE — NURSING NOTE
TRACY MEDEIROSM evaluated pt this morning. Pt expresed desire to go home. CNM provided RN with discharge orders. RN provided pt and mother with discharge teaching, which included postpartum care, incision care, breastfeeding and breast care, maternal nutrition, s/s of pre-eclampsia, s/s of infection, s/s of postpartum depression and when to return to the hospital. Pt and mother demonstrated understanding of the teaching. Pt left the unit at 1207.

## 2025-05-16 NOTE — PLAN OF CARE
Problem: Adult Inpatient Plan of Care  Goal: Plan of Care Review  Outcome: Met  Goal: Patient-Specific Goal (Individualized)  Outcome: Met  Goal: Absence of Hospital-Acquired Illness or Injury  Outcome: Met  Intervention: Identify and Manage Fall Risk  Recent Flowsheet Documentation  Taken 2025 by Genoveva Summers RN  Safety Promotion/Fall Prevention: safety round/check completed  Intervention: Prevent and Manage VTE (Venous Thromboembolism) Risk  Recent Flowsheet Documentation  Taken 2025 by Genoveva Summers RN  VTE Prevention/Management: (Pt ambulatory)   bilateral   SCDs (sequential compression devices) off  Goal: Optimal Comfort and Wellbeing  Outcome: Met  Intervention: Monitor Pain and Promote Comfort  Recent Flowsheet Documentation  Taken 2025 by Genoveva Summers RN  Pain Management Interventions: pain medication given  Goal: Readiness for Transition of Care  Outcome: Met     Problem: Breastfeeding  Goal: Effective Breastfeeding  Outcome: Met     Problem: Postpartum ( Delivery)  Goal: Successful Parent Role Transition  Outcome: Met  Goal: Hemostasis  Outcome: Met  Goal: Effective Bowel Elimination  Outcome: Met  Goal: Fluid and Electrolyte Balance  Outcome: Met  Goal: Absence of Infection Signs and Symptoms  Outcome: Met  Goal: Anesthesia/Sedation Recovery  Outcome: Met  Intervention: Optimize Anesthesia Recovery  Recent Flowsheet Documentation  Taken 2025 by Genoveva Summers RN  Safety Promotion/Fall Prevention: safety round/check completed  Goal: Optimal Pain Control and Function  Outcome: Met  Intervention: Prevent or Manage Pain  Recent Flowsheet Documentation  Taken 2025 by Genoveva Summers RN  Pain Management Interventions: pain medication given  Goal: Nausea and Vomiting Relief  Outcome: Met  Goal: Effective Urinary Elimination  Outcome: Met  Goal: Effective Oxygenation and Ventilation  Outcome: Met   Goal Outcome Evaluation: Pt to be discharged today.

## 2025-05-17 ENCOUNTER — MATERNAL SCREENING (OUTPATIENT)
Dept: CALL CENTER | Facility: HOSPITAL | Age: 25
End: 2025-05-17
Payer: COMMERCIAL

## 2025-05-17 NOTE — OUTREACH NOTE
Maternal Screening Survey      Flowsheet Row Responses   Eligibility Eligible   Prep survey completed? Yes   Facility [facility] discharged from? [facility] - Registered Nurse

## 2025-05-21 ENCOUNTER — OFFICE VISIT (OUTPATIENT)
Dept: OBSTETRICS AND GYNECOLOGY | Facility: CLINIC | Age: 25
End: 2025-05-21
Payer: COMMERCIAL

## 2025-05-21 VITALS
DIASTOLIC BLOOD PRESSURE: 78 MMHG | HEIGHT: 67 IN | WEIGHT: 178.2 LBS | SYSTOLIC BLOOD PRESSURE: 110 MMHG | BODY MASS INDEX: 27.97 KG/M2

## 2025-05-21 DIAGNOSIS — Z09 POSTOP CHECK: Primary | ICD-10-CM

## 2025-05-21 NOTE — PROGRESS NOTES
Subjective   Chief Complaint   Patient presents with    Post-op     1 Week post op C/Section and Tubal.     Romy Langley is a 25 y.o. year old .  Patient's last menstrual period was 2024 (exact date).  She presents to be seen because of post op C/S.     OTHER COMPLAINTS:  Nothing else    The following portions of the patient's history were reviewed and updated as appropriate:She  has a past medical history of Anemia, Glaucoma, Irritable bowel syndrome, Lactose intolerance, Seasonal allergies, Slow to wake up after anesthesia, Thyroid activity decreased, Umbilical hernia, Urinary tract infection, and Wears glasses.  She does not have any pertinent problems on file.  She  has a past surgical history that includes Alexis tooth extraction; Foot/Toe Tendon Repair (Left, 2020); Tarsal Tunnel Release (Left, 2020);  section (N/A, 2022); Umbilical hernia repair (N/A, 2023); Colonoscopy; Umbilical hernia repair (N/A, 2024); Cholecystectomy (N/A, 2024); and  section with tubal (N/A, 2025).  Her family history includes Diabetes in her father and maternal grandfather; Heart attack in her maternal grandfather; Hypertension in her mother.  She  reports that she has never smoked. She has never been exposed to tobacco smoke. She has never used smokeless tobacco. She reports that she does not drink alcohol and does not use drugs.  Current Outpatient Medications   Medication Sig Dispense Refill    acetaminophen (TYLENOL) 500 MG tablet Take 2 tablets by mouth Every 8 (Eight) Hours As Needed for Mild Pain. 60 tablet 0    cyclobenzaprine (FLEXERIL) 10 MG tablet Take 1 tablet by mouth Every 8 (Eight) Hours As Needed for Muscle Spasms. 30 tablet 1    docusate sodium 100 MG capsule Take 1 capsule by mouth 2 (Two) Times a Day As Needed for Constipation. 30 capsule 0    famotidine (Pepcid) 20 MG tablet Take 1 tablet by mouth 2 (Two) Times a Day As Needed for  Indigestion or Heartburn. 60 tablet 3    ferrous sulfate 324 (65 Fe) MG tablet delayed-release EC tablet Take 1 tablet by mouth 2 (Two) Times a Day With Meals. 60 tablet 6    hydrOXYzine (ATARAX) 25 MG tablet Take 1 tablet by mouth 3 (Three) Times a Day As Needed for Anxiety. 20 tablet 0    ibuprofen (ADVIL,MOTRIN) 800 MG tablet Take 1 tablet by mouth Every 8 (Eight) Hours As Needed for Mild Pain. 60 tablet 0    ondansetron ODT (ZOFRAN-ODT) 8 MG disintegrating tablet Take 1 tablet by mouth Every 8 (Eight) Hours As Needed for Nausea or Vomiting. 30 tablet 0    oxyCODONE (ROXICODONE) 5 MG immediate release tablet Take 1 tablet by mouth Every 6 (Six) Hours As Needed for Moderate Pain. 12 tablet 0    pantoprazole (PROTONIX) 40 MG EC tablet Take 1 tablet by mouth Daily. 30 tablet 6    promethazine (PHENERGAN) 12.5 MG tablet Take 1 tablet by mouth Every 8 (Eight) Hours As Needed for Nausea or Vomiting. 20 tablet 0     No current facility-administered medications for this visit.     Current Outpatient Medications on File Prior to Visit   Medication Sig    acetaminophen (TYLENOL) 500 MG tablet Take 2 tablets by mouth Every 8 (Eight) Hours As Needed for Mild Pain.    cyclobenzaprine (FLEXERIL) 10 MG tablet Take 1 tablet by mouth Every 8 (Eight) Hours As Needed for Muscle Spasms.    docusate sodium 100 MG capsule Take 1 capsule by mouth 2 (Two) Times a Day As Needed for Constipation.    famotidine (Pepcid) 20 MG tablet Take 1 tablet by mouth 2 (Two) Times a Day As Needed for Indigestion or Heartburn.    ferrous sulfate 324 (65 Fe) MG tablet delayed-release EC tablet Take 1 tablet by mouth 2 (Two) Times a Day With Meals.    hydrOXYzine (ATARAX) 25 MG tablet Take 1 tablet by mouth 3 (Three) Times a Day As Needed for Anxiety.    ibuprofen (ADVIL,MOTRIN) 800 MG tablet Take 1 tablet by mouth Every 8 (Eight) Hours As Needed for Mild Pain.    ondansetron ODT (ZOFRAN-ODT) 8 MG disintegrating tablet Take 1 tablet by mouth Every 8  "(Eight) Hours As Needed for Nausea or Vomiting.    oxyCODONE (ROXICODONE) 5 MG immediate release tablet Take 1 tablet by mouth Every 6 (Six) Hours As Needed for Moderate Pain.    pantoprazole (PROTONIX) 40 MG EC tablet Take 1 tablet by mouth Daily.    promethazine (PHENERGAN) 12.5 MG tablet Take 1 tablet by mouth Every 8 (Eight) Hours As Needed for Nausea or Vomiting.     No current facility-administered medications on file prior to visit.     She is allergic to latex.    Social History    Tobacco Use      Smoking status: Never        Passive exposure: Never      Smokeless tobacco: Never    Review of Systems  Consitutional POS: nothing reported    NEG: anorexia or night sweats   Gastointestinal POS: nothing reported    NEG: bloating, change in bowel habits, melena, or reflux symptoms   Genitourinary POS: nothing reported    NEG: dysuria or hematuria   Integument POS: nothing reported    NEG: moles that are changing in size, shape, color or rashes   Breast POS: nothing reported    NEG: persistent breast lump, skin dimpling, or nipple discharge         Respiratory: negative  Cardiovascular: negative  GYN:  negative          Objective   /78   Ht 170.2 cm (67\")   Wt 80.8 kg (178 lb 3.2 oz)   LMP 08/04/2024 (Exact Date) Comment: Due date sometime in May per pt.  Breastfeeding Yes   BMI 27.91 kg/m²     General:  well developed; well nourished  no acute distress  mentation appropriate   Skin:  No suspicious lesions seen   Thyroid: not examined   Lungs:  breathing is unlabored   Heart:  Not performed.   Breasts:  Not performed.   Abdomen: soft, non-tender; no masses  no umbilical or inguinal hernias are present  no hepato-splenomegaly   Pelvis: Not performed.     Psychiatric: Alert and oriented ×3, mood and affect appropriate  HEENT: Atraumatic, normocephalic, normal scleral icterus  Extremities: 2+ pulses bilaterally, no edema      Lab Review   CBC    Imaging   No data reviewed        Assessment   1 week status " post repeat  with tubal.  Patient overall doing well.  Incision is healed and intact.  Steroid still in place.  Can remove in 2 weeks.  Patient was critically anemic preoperatively and did receive 2 units of blood.  Will repeat a CBC at time of next appointment.     Plan   As above      No orders of the defined types were placed in this encounter.         This note was electronically signed.      May 21, 2025

## 2025-05-27 ENCOUNTER — MATERNAL SCREENING (OUTPATIENT)
Dept: CALL CENTER | Facility: HOSPITAL | Age: 25
End: 2025-05-27
Payer: COMMERCIAL

## 2025-05-27 NOTE — OUTREACH NOTE
Maternal Screening Survey      Flowsheet Row Responses   Facility patient discharged from? [facility]   Attempt successful? Yes   Call start time 1330   Call end time 1331   I have been able to laugh and see the funny side of things. 0   I have looked forward with enjoyment to things. 0   I have blamed myself unnecessarily when things went wrong. 0   I have been anxious or worried for no good reason. 0   I have felt scared or panicky for no good reason. 0   Things have been getting on top of me. 0   I have been so unhappy that I have had difficulty sleeping. 0   I have felt sad or miserable. 0   I have been so unhappy that I have been crying. 0   The thought of harming myself has occurred to me. 0   [place]  Depression Scale Total 0   Did any of your parents have problems with alcohol or drug use? No   Do any of your peers have problems with alcohol or drug use? No   Does your partner have problems with alcohol or drug use? No   Before you were pregnant did you have problems with alcohol or drug use? (past) No   In the past month, did you drink beer, wine, liquor or use any other drugs? (pregnancy) No   Maternal Screening call completed Yes   Call end time 1331              Tammi FERRARA - Registered Nurse

## 2025-06-02 ENCOUNTER — LAB (OUTPATIENT)
Dept: LAB | Facility: HOSPITAL | Age: 25
End: 2025-06-02
Payer: COMMERCIAL

## 2025-06-02 ENCOUNTER — OFFICE VISIT (OUTPATIENT)
Dept: OBSTETRICS AND GYNECOLOGY | Facility: CLINIC | Age: 25
End: 2025-06-02
Payer: COMMERCIAL

## 2025-06-02 VITALS
WEIGHT: 168.4 LBS | DIASTOLIC BLOOD PRESSURE: 70 MMHG | HEIGHT: 67 IN | SYSTOLIC BLOOD PRESSURE: 100 MMHG | BODY MASS INDEX: 26.43 KG/M2

## 2025-06-02 DIAGNOSIS — R11.2 NAUSEA AND VOMITING, UNSPECIFIED VOMITING TYPE: ICD-10-CM

## 2025-06-02 DIAGNOSIS — R11.2 NAUSEA AND VOMITING, UNSPECIFIED VOMITING TYPE: Primary | ICD-10-CM

## 2025-06-02 LAB
BASOPHILS # BLD AUTO: 0.08 10*3/MM3 (ref 0–0.2)
BASOPHILS NFR BLD AUTO: 1 % (ref 0–1.5)
DEPRECATED RDW RBC AUTO: 50.4 FL (ref 37–54)
EOSINOPHIL # BLD AUTO: 0.14 10*3/MM3 (ref 0–0.4)
EOSINOPHIL NFR BLD AUTO: 1.7 % (ref 0.3–6.2)
ERYTHROCYTE [DISTWIDTH] IN BLOOD BY AUTOMATED COUNT: 18.4 % (ref 12.3–15.4)
HCT VFR BLD AUTO: 38.9 % (ref 34–46.6)
HGB BLD-MCNC: 11.7 G/DL (ref 12–15.9)
IMM GRANULOCYTES # BLD AUTO: 0.04 10*3/MM3 (ref 0–0.05)
IMM GRANULOCYTES NFR BLD AUTO: 0.5 % (ref 0–0.5)
LYMPHOCYTES # BLD AUTO: 2.48 10*3/MM3 (ref 0.7–3.1)
LYMPHOCYTES NFR BLD AUTO: 30.1 % (ref 19.6–45.3)
MCH RBC QN AUTO: 23.4 PG (ref 26.6–33)
MCHC RBC AUTO-ENTMCNC: 30.1 G/DL (ref 31.5–35.7)
MCV RBC AUTO: 77.8 FL (ref 79–97)
MONOCYTES # BLD AUTO: 0.54 10*3/MM3 (ref 0.1–0.9)
MONOCYTES NFR BLD AUTO: 6.6 % (ref 5–12)
NEUTROPHILS NFR BLD AUTO: 4.96 10*3/MM3 (ref 1.7–7)
NEUTROPHILS NFR BLD AUTO: 60.1 % (ref 42.7–76)
NRBC BLD AUTO-RTO: 0 /100 WBC (ref 0–0.2)
PLATELET # BLD AUTO: 632 10*3/MM3 (ref 140–450)
PMV BLD AUTO: 9.2 FL (ref 6–12)
RBC # BLD AUTO: 5 10*6/MM3 (ref 3.77–5.28)
WBC NRBC COR # BLD AUTO: 8.24 10*3/MM3 (ref 3.4–10.8)

## 2025-06-02 PROCEDURE — 84443 ASSAY THYROID STIM HORMONE: CPT

## 2025-06-02 PROCEDURE — 99213 OFFICE O/P EST LOW 20 MIN: CPT | Performed by: OBSTETRICS & GYNECOLOGY

## 2025-06-02 PROCEDURE — 1159F MED LIST DOCD IN RCRD: CPT | Performed by: OBSTETRICS & GYNECOLOGY

## 2025-06-02 PROCEDURE — 83690 ASSAY OF LIPASE: CPT

## 2025-06-02 PROCEDURE — 1160F RVW MEDS BY RX/DR IN RCRD: CPT | Performed by: OBSTETRICS & GYNECOLOGY

## 2025-06-02 PROCEDURE — 85025 COMPLETE CBC W/AUTO DIFF WBC: CPT

## 2025-06-02 PROCEDURE — 80053 COMPREHEN METABOLIC PANEL: CPT

## 2025-06-02 PROCEDURE — 82150 ASSAY OF AMYLASE: CPT

## 2025-06-02 PROCEDURE — 36415 COLL VENOUS BLD VENIPUNCTURE: CPT

## 2025-06-02 RX ORDER — METOCLOPRAMIDE 10 MG/1
10 TABLET ORAL 3 TIMES DAILY
Qty: 90 TABLET | Refills: 0 | Status: SHIPPED | OUTPATIENT
Start: 2025-06-02

## 2025-06-02 NOTE — PROGRESS NOTES
Subjective   Chief Complaint   Patient presents with    Post-op Follow-up     3 Weeks post op C/Section, can't keep any foods down and having a lot of pain in incision.     Romy Langley is a 25 y.o. year old .  Patient's last menstrual period was 2024 (exact date).  She presents to be seen because of weeks status post  section with tubal.  Last Thursday started having emesis after eating.  Patient did have a bowel movement yesterday.  She does not feel nauseous outside of eating but when she eats it occurs.  Her gallbladder surgically removed last summer..     OTHER COMPLAINTS:  Nothing else    The following portions of the patient's history were reviewed and updated as appropriate:She  has a past medical history of Anemia, Glaucoma, Irritable bowel syndrome, Lactose intolerance, Seasonal allergies, Slow to wake up after anesthesia, Thyroid activity decreased, Umbilical hernia, Urinary tract infection, and Wears glasses.  She does not have any pertinent problems on file.  She  has a past surgical history that includes [place] tooth extraction; Foot/Toe Tendon Repair (Left, 2020); Tarsal Tunnel Release (Left, 2020);  section (N/A, 2022); Umbilical hernia repair (N/A, 2023); Colonoscopy; Umbilical hernia repair (N/A, 2024); Cholecystectomy (N/A, 2024); and  section with tubal (N/A, 2025).  Her family history includes Diabetes in her father and maternal grandfather; Heart attack in her maternal grandfather; Hypertension in her mother.  She  reports that she has never smoked. She has never been exposed to tobacco smoke. She has never used smokeless tobacco. She reports that she does not drink alcohol and does not use drugs.  Current Outpatient Medications   Medication Sig Dispense Refill    acetaminophen (TYLENOL) 500 MG tablet Take 2 tablets by mouth Every 8 (Eight) Hours As Needed for Mild Pain. 60 tablet 0    ibuprofen  (ADVIL,MOTRIN) 800 MG tablet Take 1 tablet by mouth Every 8 (Eight) Hours As Needed for Mild Pain. 60 tablet 0    ondansetron ODT (ZOFRAN-ODT) 8 MG disintegrating tablet Take 1 tablet by mouth Every 8 (Eight) Hours As Needed for Nausea or Vomiting. 30 tablet 0    pantoprazole (PROTONIX) 40 MG EC tablet Take 1 tablet by mouth Daily. 30 tablet 6     No current facility-administered medications for this visit.     Current Outpatient Medications on File Prior to Visit   Medication Sig    acetaminophen (TYLENOL) 500 MG tablet Take 2 tablets by mouth Every 8 (Eight) Hours As Needed for Mild Pain.    ibuprofen (ADVIL,MOTRIN) 800 MG tablet Take 1 tablet by mouth Every 8 (Eight) Hours As Needed for Mild Pain.    ondansetron ODT (ZOFRAN-ODT) 8 MG disintegrating tablet Take 1 tablet by mouth Every 8 (Eight) Hours As Needed for Nausea or Vomiting.    pantoprazole (PROTONIX) 40 MG EC tablet Take 1 tablet by mouth Daily.    [DISCONTINUED] cyclobenzaprine (FLEXERIL) 10 MG tablet Take 1 tablet by mouth Every 8 (Eight) Hours As Needed for Muscle Spasms.    [DISCONTINUED] docusate sodium 100 MG capsule Take 1 capsule by mouth 2 (Two) Times a Day As Needed for Constipation.    [DISCONTINUED] famotidine (Pepcid) 20 MG tablet Take 1 tablet by mouth 2 (Two) Times a Day As Needed for Indigestion or Heartburn.    [DISCONTINUED] ferrous sulfate 324 (65 Fe) MG tablet delayed-release EC tablet Take 1 tablet by mouth 2 (Two) Times a Day With Meals.    [DISCONTINUED] hydrOXYzine (ATARAX) 25 MG tablet Take 1 tablet by mouth 3 (Three) Times a Day As Needed for Anxiety.    [DISCONTINUED] oxyCODONE (ROXICODONE) 5 MG immediate release tablet Take 1 tablet by mouth Every 6 (Six) Hours As Needed for Moderate Pain.    [DISCONTINUED] promethazine (PHENERGAN) 12.5 MG tablet Take 1 tablet by mouth Every 8 (Eight) Hours As Needed for Nausea or Vomiting.     No current facility-administered medications on file prior to visit.     She is allergic to  "latex.    Social History    Tobacco Use      Smoking status: Never        Passive exposure: Never      Smokeless tobacco: Never    Review of Systems  Consitutional POS: nothing reported    NEG: anorexia or night sweats   Gastointestinal POS: nothing reported    NEG: bloating, change in bowel habits, melena, or reflux symptoms   Genitourinary POS: nothing reported    NEG: dysuria or hematuria   Integument POS: nothing reported    NEG: moles that are changing in size, shape, color or rashes   Breast POS: nothing reported    NEG: persistent breast lump, skin dimpling, or nipple discharge         Respiratory: negative  Cardiovascular: negative  GYN:  negative          Objective   /70   Ht 170.2 cm (67\")   Wt 76.4 kg (168 lb 6.4 oz)   LMP 2024 (Exact Date) Comment: Due date sometime in May per pt.  Breastfeeding No   BMI 26.38 kg/m²     General:  well developed; well nourished  no acute distress  mentation appropriate   Skin:  No suspicious lesions seen   Thyroid: not examined   Lungs:  breathing is unlabored   Heart:  Not performed.   Breasts:  Not performed.   Abdomen: soft, non-tender; no masses  no umbilical or inguinal hernias are present  no hepato-splenomegaly   Pelvis: Not performed.     Psychiatric: Alert and oriented ×3, mood and affect appropriate  HEENT: Atraumatic, normocephalic, normal scleral icterus  Extremities: 2+ pulses bilaterally, no edema      Lab Review   CBC    Imaging   No data reviewed        Assessment   3-week status post primary  section with postprandial emesis.     Plan   Incision is overall well-healed.  There is no erythema or evidence of hernia.  Abdomen overall soft nondistended.  Will start Reglan 3 times daily with Protonix in the morning scheduled have patient back in a week.  Labs been ordered for outpatient lab      No orders of the defined types were placed in this encounter.         This note was electronically signed.      2025      "

## 2025-06-03 LAB
ALBUMIN SERPL-MCNC: 4.1 G/DL (ref 3.5–5.2)
ALBUMIN/GLOB SERPL: 1.1 G/DL
ALP SERPL-CCNC: 135 U/L (ref 39–117)
ALT SERPL W P-5'-P-CCNC: 9 U/L (ref 1–33)
AMYLASE SERPL-CCNC: 67 U/L (ref 28–100)
ANION GAP SERPL CALCULATED.3IONS-SCNC: 10 MMOL/L (ref 5–15)
AST SERPL-CCNC: 18 U/L (ref 1–32)
BILIRUB SERPL-MCNC: 0.3 MG/DL (ref 0–1.2)
BUN SERPL-MCNC: 10 MG/DL (ref 6–20)
BUN/CREAT SERPL: 11.9 (ref 7–25)
CALCIUM SPEC-SCNC: 9.5 MG/DL (ref 8.6–10.5)
CHLORIDE SERPL-SCNC: 101 MMOL/L (ref 98–107)
CO2 SERPL-SCNC: 25 MMOL/L (ref 22–29)
CREAT SERPL-MCNC: 0.84 MG/DL (ref 0.57–1)
EGFRCR SERPLBLD CKD-EPI 2021: 99.7 ML/MIN/1.73
GLOBULIN UR ELPH-MCNC: 3.7 GM/DL
GLUCOSE SERPL-MCNC: 78 MG/DL (ref 65–99)
LIPASE SERPL-CCNC: 46 U/L (ref 13–60)
POTASSIUM SERPL-SCNC: 4.2 MMOL/L (ref 3.5–5.2)
PROT SERPL-MCNC: 7.8 G/DL (ref 6–8.5)
SODIUM SERPL-SCNC: 136 MMOL/L (ref 136–145)
TSH SERPL DL<=0.05 MIU/L-ACNC: 1.13 UIU/ML (ref 0.27–4.2)

## 2025-06-11 ENCOUNTER — OFFICE VISIT (OUTPATIENT)
Dept: OBSTETRICS AND GYNECOLOGY | Facility: CLINIC | Age: 25
End: 2025-06-11
Payer: COMMERCIAL

## 2025-06-11 VITALS
DIASTOLIC BLOOD PRESSURE: 60 MMHG | BODY MASS INDEX: 26.06 KG/M2 | SYSTOLIC BLOOD PRESSURE: 100 MMHG | HEIGHT: 67 IN | WEIGHT: 166 LBS

## 2025-06-11 DIAGNOSIS — R11.0 POSTPRANDIAL NAUSEA: Primary | ICD-10-CM

## 2025-06-11 PROCEDURE — 99213 OFFICE O/P EST LOW 20 MIN: CPT | Performed by: OBSTETRICS & GYNECOLOGY

## 2025-06-11 RX ORDER — ASCORBIC ACID 500 MG
TABLET ORAL
COMMUNITY
Start: 2025-06-10

## 2025-06-11 RX ORDER — ESCITALOPRAM OXALATE 10 MG/1
1 TABLET ORAL DAILY
COMMUNITY
Start: 2025-06-04

## 2025-06-11 NOTE — PROGRESS NOTES
Subjective   Chief Complaint   Patient presents with    Postpartum Care     4 Weeks post op C/Section and tubal     Romyterra Langley is a 25 y.o. year old .  Patient's last menstrual period was 2024 (exact date).  She presents to be seen because of status post  section with tubal ligation.     OTHER COMPLAINTS:  Placed on reglan last week and to conitue PPI due to postprandial emesis- this has improved over the last week,     The following portions of the patient's history were reviewed and updated as appropriate:She  has a past medical history of Anemia, Glaucoma, Irritable bowel syndrome, Lactose intolerance, Seasonal allergies, Slow to wake up after anesthesia, Thyroid activity decreased, Umbilical hernia, Urinary tract infection, and Wears glasses.  She does not have any pertinent problems on file.  She  has a past surgical history that includes [place] tooth extraction; Foot/Toe Tendon Repair (Left, 2020); Tarsal Tunnel Release (Left, 2020);  section (N/A, 2022); Umbilical hernia repair (N/A, 2023); Colonoscopy; Umbilical hernia repair (N/A, 2024); Cholecystectomy (N/A, 2024); and  section with tubal (N/A, 2025).  Her family history includes Diabetes in her father and maternal grandfather; Heart attack in her maternal grandfather; Hypertension in her mother.  She  reports that she has never smoked. She has never been exposed to tobacco smoke. She has never used smokeless tobacco. She reports that she does not drink alcohol and does not use drugs.  Current Outpatient Medications   Medication Sig Dispense Refill    acetaminophen (TYLENOL) 500 MG tablet Take 2 tablets by mouth Every 8 (Eight) Hours As Needed for Mild Pain. 60 tablet 0    escitalopram (LEXAPRO) 10 MG tablet Take 1 tablet by mouth Daily.      ibuprofen (ADVIL,MOTRIN) 800 MG tablet Take 1 tablet by mouth Every 8 (Eight) Hours As Needed for Mild Pain. 60 tablet 0     "metoclopramide (Reglan) 10 MG tablet Take 1 tablet by mouth 3 times a day. 90 tablet 0    ondansetron ODT (ZOFRAN-ODT) 8 MG disintegrating tablet Take 1 tablet by mouth Every 8 (Eight) Hours As Needed for Nausea or Vomiting. 30 tablet 0    pantoprazole (PROTONIX) 40 MG EC tablet Take 1 tablet by mouth Daily. 30 tablet 6    vitamin C (ASCORBIC ACID) 500 MG tablet        No current facility-administered medications for this visit.     Current Outpatient Medications on File Prior to Visit   Medication Sig    acetaminophen (TYLENOL) 500 MG tablet Take 2 tablets by mouth Every 8 (Eight) Hours As Needed for Mild Pain.    escitalopram (LEXAPRO) 10 MG tablet Take 1 tablet by mouth Daily.    ibuprofen (ADVIL,MOTRIN) 800 MG tablet Take 1 tablet by mouth Every 8 (Eight) Hours As Needed for Mild Pain.    metoclopramide (Reglan) 10 MG tablet Take 1 tablet by mouth 3 times a day.    ondansetron ODT (ZOFRAN-ODT) 8 MG disintegrating tablet Take 1 tablet by mouth Every 8 (Eight) Hours As Needed for Nausea or Vomiting.    pantoprazole (PROTONIX) 40 MG EC tablet Take 1 tablet by mouth Daily.    vitamin C (ASCORBIC ACID) 500 MG tablet      No current facility-administered medications on file prior to visit.     She is allergic to latex.    Social History    Tobacco Use      Smoking status: Never        Passive exposure: Never      Smokeless tobacco: Never    Review of Systems  Consitutional POS: nothing reported    NEG: anorexia or night sweats   Gastointestinal POS: nothing reported    NEG: bloating, change in bowel habits, melena, or reflux symptoms   Genitourinary POS: nothing reported    NEG: dysuria or hematuria   Integument POS: nothing reported    NEG: moles that are changing in size, shape, color or rashes   Breast POS: nothing reported    NEG: persistent breast lump, skin dimpling, or nipple discharge         Respiratory: negative  Cardiovascular: negative  GYN:  negative          Objective   /60   Ht 170.2 cm (67\")   " Wt 75.3 kg (166 lb)   LMP 08/04/2024 (Exact Date) Comment: Due date sometime in May per pt.  Breastfeeding No   BMI 26.00 kg/m²     General:  well developed; well nourished  no acute distress  mentation appropriate   Skin:  No suspicious lesions seen   Thyroid: not examined   Lungs:  Not performed.  breathing is unlabored   Heart:  Not performed.   Breasts:  Not performed.   Abdomen: soft, non-tender; no masses  no umbilical or inguinal hernias are present  no hepato-splenomegaly   Pelvis: Not performed.     Psychiatric: Alert and oriented ×3, mood and affect appropriate  HEENT: Atraumatic, normocephalic, normal scleral icterus  Extremities: 2+ pulses bilaterally, no edema      Lab Review   CBC, CMP, and TSH    Imaging   No data reviewed        Assessment   As perennial nausea and vomiting-this is improved with metoclopramide and PPI.  Really is related more so now to meal size and if patient keeps meal smaller it does not occur.     Plan   Follow-up 2 weeks for Pap      No orders of the defined types were placed in this encounter.         This note was electronically signed.      June 11, 2025

## 2025-06-23 ENCOUNTER — POSTPARTUM VISIT (OUTPATIENT)
Dept: OBSTETRICS AND GYNECOLOGY | Facility: CLINIC | Age: 25
End: 2025-06-23
Payer: COMMERCIAL

## 2025-06-23 VITALS
BODY MASS INDEX: 26.37 KG/M2 | HEIGHT: 67 IN | DIASTOLIC BLOOD PRESSURE: 78 MMHG | WEIGHT: 168 LBS | SYSTOLIC BLOOD PRESSURE: 108 MMHG

## 2025-06-23 PROBLEM — Z98.891 PREVIOUS CESAREAN SECTION: Status: RESOLVED | Noted: 2025-03-24 | Resolved: 2025-06-23

## 2025-06-23 PROBLEM — D50.0 IRON DEFICIENCY ANEMIA DUE TO CHRONIC BLOOD LOSS: Status: RESOLVED | Noted: 2023-10-27 | Resolved: 2025-06-23

## 2025-06-23 PROBLEM — Z98.891 S/P CESAREAN SECTION: Status: RESOLVED | Noted: 2025-05-16 | Resolved: 2025-06-23

## 2025-06-23 PROBLEM — Z30.2 REQUEST FOR STERILIZATION: Status: RESOLVED | Noted: 2025-03-24 | Resolved: 2025-06-23

## 2025-06-23 PROBLEM — R82.71 GBS BACTERIURIA: Status: RESOLVED | Noted: 2024-11-19 | Resolved: 2025-06-23

## 2025-06-23 PROBLEM — Z34.90 PREGNANCY: Status: RESOLVED | Noted: 2025-03-24 | Resolved: 2025-06-23

## 2025-06-23 PROCEDURE — 99212 OFFICE O/P EST SF 10 MIN: CPT | Performed by: MIDWIFE

## 2025-06-23 NOTE — PROGRESS NOTES
"History  Chief Complaint   Patient presents with    Postpartum Care     Has questions regarding her heavy bleeding, pain in her incision when she cough or sneeze        Roym Langley is a 25 y.o. year old  presenting to be seen for her postpartum visit.  She had a Repeat  (LTCS) with BTL.    Since delivery she has not been sexually active.   She does not have concerns about post-partum blues/depression.   She is bottle feeding.  For ongoing contraception, her plans are tubal ligation.  She has had a menstrual cycle. LMP  and it has been heavy         Physical  /78   Ht 170.2 cm (67\")   Wt 76.2 kg (168 lb)   LMP 2024 (Exact Date) Comment: Due date sometime in May per pt.  Breastfeeding No   BMI 26.31 kg/m²     General:  well developed; well nourished  no acute distress  mentation appropriate   Abdomen: soft, non-tender; no masses  incision is healed   Pelvis: Not performed.        Assessment   Normal 6 week postpartum exam  Contraceptive management     Plan   BC options reviewed and compared today: tubal sterilization  Pap smear normal 22  Follow up 4 months for annual    No orders of the defined types were placed in this encounter.         This note was electronically signed.  Karly Duarte, JASPAL  2025  "

## (undated) DEVICE — SUT VIC 4/0 KS 27IN VCP662H

## (undated) DEVICE — STERILE BABY BLANKET W/CSR: Brand: MEDLINE

## (undated) DEVICE — SUT GUT CHRM 2/0 SH 27IN G123H

## (undated) DEVICE — SYR SLPTP 30CC

## (undated) DEVICE — SUT SILK 0 TIES 30IN A306H

## (undated) DEVICE — SUT PDS 0 CT 36IN DYED Z358T

## (undated) DEVICE — SUT VIC 2/0 SH 27IN

## (undated) DEVICE — SUT GUT CHRM 1 CTX 36IN 905H

## (undated) DEVICE — NDL HYPO ECLPS SFTY 22G 1 1/2IN

## (undated) DEVICE — SUT PDS 0 CT 36IN VIO PDP358T

## (undated) DEVICE — SYR LL TP 10ML STRL

## (undated) DEVICE — GLV SURG ULTRATOUCH BIOGEL/COAT PF LF SZ6 STRL

## (undated) DEVICE — TOWEL,OR,DSP,ST,NATURAL,DLX,4/PK,20PK/CS: Brand: MEDLINE

## (undated) DEVICE — PENCL SMOKE/EVAC MEGADYNE TELESCP 10FT

## (undated) DEVICE — RICH C-SECTION: Brand: MEDLINE INDUSTRIES, INC.

## (undated) DEVICE — GLV SURG SENSICARE POLYISPRN W/ALOE PF LF 6 GRN STRL

## (undated) DEVICE — CADIERE FORCEPS: Brand: ENDOWRIST

## (undated) DEVICE — PROXIMATE SKIN STAPLERS (35 WIDE) CONTAINS 35 STAINLESS STEEL STAPLES (FIXED HEAD): Brand: PROXIMATE

## (undated) DEVICE — ADHS LIQ MASTISOL 2/3ML

## (undated) DEVICE — LARGE, DISPOSABLE ALEXIS O C-SECTION PROTECTOR - RETRACTOR: Brand: ALEXIS ® O C-SECTION PROTECTOR - RETRACTOR

## (undated) DEVICE — SUT VIC 3/0 SH 27IN J416H

## (undated) DEVICE — ADHS SKIN PREMIERPRO EXOFIN TOPICAL HI/VISC .5ML

## (undated) DEVICE — SUT PROLN 0 MO6 30IN 8418H

## (undated) DEVICE — GLV SURG SENSICARE PI LF PF 6.5

## (undated) DEVICE — SOL IRR NACL 0.9PCT BT 1000ML

## (undated) DEVICE — INTENDED FOR TISSUE SEPARATION, AND OTHER PROCEDURES THAT REQUIRE A SHARP SURGICAL BLADE TO PUNCTURE OR CUT.: Brand: BARD-PARKER ® CARBON RIB-BACK BLADES

## (undated) DEVICE — COLUMN DRAPE

## (undated) DEVICE — PENCL ES MEGADINE EZ/CLEAN BUTN W/HOLSTR 10FT

## (undated) DEVICE — UNDYED BRAIDED (POLYGLACTIN 910), SYNTHETIC ABSORBABLE SUTURE: Brand: COATED VICRYL

## (undated) DEVICE — CLNSR INST PREKLENZ SOAK/SHLD 6ML MD

## (undated) DEVICE — YANKAUER,BULB TIP,W/O VENT,RIGID,STERILE: Brand: MEDLINE

## (undated) DEVICE — RICH MAJOR PROCEDURE: Brand: MEDLINE INDUSTRIES, INC.

## (undated) DEVICE — IRRIGATOR BULB ASEPTO 60CC STRL

## (undated) DEVICE — CUP EXTRCT VAC

## (undated) DEVICE — 3M™ STERI-STRIP™ BLEND TONE SKIN CLOSURES, B1557, TAN, 1/2 IN X 4 IN (12MM X 100MM), 6 STRIPS/ENVELOPE: Brand: 3M™ STERI-STRIP™

## (undated) DEVICE — MICRO HVTSA, 0.5G AND HVTSA SOURCEMARK PRODUCT CODE M1206 AND M1206-01: Brand: EXOFIN MICRO HVTSA, 0.5G

## (undated) DEVICE — PATIENT RETURN ELECTRODE, SINGLE-USE, CONTACT QUALITY MONITORING, ADULT, WITH 9FT CORD, FOR PATIENTS WEIGING OVER 33LBS. (15KG): Brand: MEGADYNE

## (undated) DEVICE — SOL IRR NACL 0.9PCT BO 1000ML

## (undated) DEVICE — GLV SURG SENSICARE LT W/ALOE PF LF 8 STRL

## (undated) DEVICE — SUT VICRYL 3/0 CT1 27IN J258H

## (undated) DEVICE — PK EXTRM LOWR 20

## (undated) DEVICE — ARM DRAPE

## (undated) DEVICE — GLV SURG BIOGEL PI ULTRATOUCH G SZ7.5 LF

## (undated) DEVICE — LARGE HEM-O-LOK CLIP APPLIER: Brand: ENDOWRIST

## (undated) DEVICE — CANNULA SEAL

## (undated) DEVICE — SUT VIC 0/0 UR6 27IN DYED J603H

## (undated) DEVICE — NDL HYPO ECLPS SFTY 18G 1 1/2IN

## (undated) DEVICE — ANCHOR TISSUE RETRIEVAL SYSTEM, BAG SIZE 125 ML, PORT SIZE 8 MM: Brand: ANCHOR TISSUE RETRIEVAL SYSTEM

## (undated) DEVICE — STRIP,CLOSURE,WOUND,MEDI-STRIP,1/2X4: Brand: MEDLINE

## (undated) DEVICE — PRECISION THIN (5.5 X 0.38 X 18.0MM)

## (undated) DEVICE — PK LAP GEN 70

## (undated) DEVICE — DRSNG WND BORDR/ADHS NONADHR/GZ LF 4X4IN STRL

## (undated) DEVICE — Device

## (undated) DEVICE — ENDOPATH XCEL BLADELESS TROCARS WITH STABILITY SLEEVES: Brand: ENDOPATH XCEL

## (undated) DEVICE — ANTIBACTERIAL UNDYED BRAIDED (POLYGLACTIN 910), SYNTHETIC ABSORBABLE SUTURE: Brand: COATED VICRYL

## (undated) DEVICE — BNDG COBAN S/ADHR WRP LF 1IN 5YD TN PK/5

## (undated) DEVICE — GLV SURG SENSICARE GREEN W/ALOE PF LF 6 STRL

## (undated) DEVICE — PROGRASP FORCEPS: Brand: ENDOWRIST

## (undated) DEVICE — GLV SURG SENSICARE POLYISPRN W/ALOE PF LF 6.5 GRN STRL

## (undated) DEVICE — TUBING, SUCTION, 1/4" X 12', STRAIGHT: Brand: MEDLINE

## (undated) DEVICE — SLV SCD CALF HEMOFORCE DVT THERP REPROC MD

## (undated) DEVICE — FLEXIBLE YANKAUER,MEDIUM TIP, NO VACUUM CONTROL: Brand: ARGYLE

## (undated) DEVICE — DRSNG WND GZ CURAD OIL EMULSION 3X3IN STRL

## (undated) DEVICE — GLV SURG BIOGEL PI ULTRATOUCH G SZ6.5 LF

## (undated) DEVICE — PERMANENT CAUTERY HOOK: Brand: ENDOWRIST

## (undated) DEVICE — BNDG ELAS ECON W/CLIP 4IN 5YD LF STRL

## (undated) DEVICE — PAD,NON-ADHERENT,3X8,STERILE,LF,1/PK: Brand: MEDLINE

## (undated) DEVICE — SPNG GZ STRL 2S 4X4 12PLY

## (undated) DEVICE — BLADELESS OBTURATOR: Brand: WECK VISTA

## (undated) DEVICE — TBG PENCL TELESCP MEGADYNE SMOKE EVAC 10FT

## (undated) DEVICE — PAD,ABDOMINAL,5"X9",STERILE,LF,1/PK: Brand: MEDLINE INDUSTRIES, INC.

## (undated) DEVICE — HDRST POSTN SLOTTED A/

## (undated) DEVICE — GLV SURG SENSICARE PI LF PF 8 GRN STRL

## (undated) DEVICE — SPNG GZ WOVN 4X4IN 12PLY 10/BX STRL

## (undated) DEVICE — APPL CHLORAPREP W/TINT 26ML ORNG

## (undated) DEVICE — GLV SURG BIOGEL M LTX PF 8

## (undated) DEVICE — HYPODERMIC SAFETY NEEDLE: Brand: MONOJECT

## (undated) DEVICE — BNDG GZ SOF-FORM CONFRM 3X75IN LF STRL

## (undated) DEVICE — ST TBG PNEUMOCLEAR EVAC SMOKE HIFLO

## (undated) DEVICE — GOWN SURG ORBIS LVL3 LG STRL